# Patient Record
Sex: MALE | Race: AMERICAN INDIAN OR ALASKA NATIVE | NOT HISPANIC OR LATINO | Employment: OTHER | URBAN - METROPOLITAN AREA
[De-identification: names, ages, dates, MRNs, and addresses within clinical notes are randomized per-mention and may not be internally consistent; named-entity substitution may affect disease eponyms.]

---

## 2017-12-21 LAB
ANION GAP SERPL CALCULATED.3IONS-SCNC: 7 MMOL/L (ref 5–15)
BUN SERPL-MCNC: 15 MG/DL (ref 8–26)
CALCIUM SERPL-MCNC: 8.9 MG/DL (ref 8.5–10.1)
CHLORIDE SERPLBLD-SCNC: 107 MMOL/L (ref 98–107)
CO2 SERPL-SCNC: 28 MMOL/L (ref 21–32)
CREAT SERPL-MCNC: 1 MG/DL (ref 0.7–1.2)
GLUCOSE SERPL-MCNC: 43 MG/DL (ref 74–106)
HBA1C MFR BLD: 8 % (ref 0–5.7)
MAGNESIUM (EXTERNAL): 2.2 (ref 1.8–2.4)
POTASSIUM SERPL-SCNC: 3.5 MMOL/L (ref 3.5–5)
SODIUM SERPL-SCNC: 142 MMOL/L (ref 136–145)

## 2017-12-31 ENCOUNTER — TRANSFERRED RECORDS (OUTPATIENT)
Dept: HEALTH INFORMATION MANAGEMENT | Facility: CLINIC | Age: 72
End: 2017-12-31

## 2018-01-20 ENCOUNTER — APPOINTMENT (OUTPATIENT)
Dept: GENERAL RADIOLOGY | Facility: CLINIC | Age: 73
End: 2018-01-20
Attending: EMERGENCY MEDICINE
Payer: MEDICARE

## 2018-01-20 ENCOUNTER — APPOINTMENT (OUTPATIENT)
Dept: CT IMAGING | Facility: CLINIC | Age: 73
End: 2018-01-20
Attending: EMERGENCY MEDICINE
Payer: MEDICARE

## 2018-01-20 ENCOUNTER — HOSPITAL ENCOUNTER (EMERGENCY)
Facility: CLINIC | Age: 73
Discharge: ANOTHER HOSPITAL | End: 2018-01-21
Attending: EMERGENCY MEDICINE | Admitting: EMERGENCY MEDICINE
Payer: MEDICARE

## 2018-01-20 DIAGNOSIS — I63.9 CEREBROVASCULAR ACCIDENT (CVA), UNSPECIFIED MECHANISM (H): ICD-10-CM

## 2018-01-20 DIAGNOSIS — R79.89 TROPONIN LEVEL ELEVATED: ICD-10-CM

## 2018-01-20 DIAGNOSIS — R41.82 ALTERED MENTAL STATUS, UNSPECIFIED ALTERED MENTAL STATUS TYPE: ICD-10-CM

## 2018-01-20 LAB
ALBUMIN SERPL-MCNC: 3.7 G/DL (ref 3.4–5)
ALBUMIN UR-MCNC: 30 MG/DL
ALP SERPL-CCNC: 97 U/L (ref 40–150)
ALT SERPL W P-5'-P-CCNC: 21 U/L (ref 0–70)
ANION GAP SERPL CALCULATED.3IONS-SCNC: 2 MMOL/L (ref 3–14)
APPEARANCE UR: CLEAR
AST SERPL W P-5'-P-CCNC: 19 U/L (ref 0–45)
BASE EXCESS BLDV CALC-SCNC: 0.7 MMOL/L
BASOPHILS # BLD AUTO: 0.1 10E9/L (ref 0–0.2)
BASOPHILS NFR BLD AUTO: 1.2 %
BILIRUB SERPL-MCNC: 0.4 MG/DL (ref 0.2–1.3)
BILIRUB UR QL STRIP: NEGATIVE
BUN SERPL-MCNC: 22 MG/DL (ref 7–30)
CALCIUM SERPL-MCNC: 8.8 MG/DL (ref 8.5–10.1)
CHLORIDE SERPL-SCNC: 106 MMOL/L (ref 94–109)
CO2 SERPL-SCNC: 32 MMOL/L (ref 20–32)
COLOR UR AUTO: YELLOW
CREAT BLD-MCNC: 1 MG/DL (ref 0.66–1.25)
CREAT SERPL-MCNC: 1.02 MG/DL (ref 0.66–1.25)
DIFFERENTIAL METHOD BLD: NORMAL
EOSINOPHIL # BLD AUTO: 0.3 10E9/L (ref 0–0.7)
EOSINOPHIL NFR BLD AUTO: 3 %
ERYTHROCYTE [DISTWIDTH] IN BLOOD BY AUTOMATED COUNT: 15 % (ref 10–15)
ETHANOL SERPL-MCNC: <0.01 G/DL
FLUAV+FLUBV AG SPEC QL: NEGATIVE
FLUAV+FLUBV AG SPEC QL: NEGATIVE
GFR SERPL CREATININE-BSD FRML MDRD: 72 ML/MIN/1.7M2
GFR SERPL CREATININE-BSD FRML MDRD: 74 ML/MIN/1.7M2
GLUCOSE BLDC GLUCOMTR-MCNC: 179 MG/DL (ref 70–99)
GLUCOSE SERPL-MCNC: 190 MG/DL (ref 70–99)
GLUCOSE UR STRIP-MCNC: 150 MG/DL
HCO3 BLDV-SCNC: 26 MMOL/L (ref 21–28)
HCT VFR BLD AUTO: 44.8 % (ref 40–53)
HGB BLD-MCNC: 14.5 G/DL (ref 13.3–17.7)
HGB UR QL STRIP: NEGATIVE
IMM GRANULOCYTES # BLD: 0 10E9/L (ref 0–0.4)
IMM GRANULOCYTES NFR BLD: 0.2 %
KETONES UR STRIP-MCNC: NEGATIVE MG/DL
LACTATE BLD-SCNC: 1 MMOL/L (ref 0.7–2)
LEUKOCYTE ESTERASE UR QL STRIP: NEGATIVE
LYMPHOCYTES # BLD AUTO: 2.8 10E9/L (ref 0.8–5.3)
LYMPHOCYTES NFR BLD AUTO: 33.6 %
MCH RBC QN AUTO: 31.5 PG (ref 26.5–33)
MCHC RBC AUTO-ENTMCNC: 32.4 G/DL (ref 31.5–36.5)
MCV RBC AUTO: 97 FL (ref 78–100)
MONOCYTES # BLD AUTO: 0.9 10E9/L (ref 0–1.3)
MONOCYTES NFR BLD AUTO: 11.2 %
MUCOUS THREADS #/AREA URNS LPF: PRESENT /LPF
NEUTROPHILS # BLD AUTO: 4.2 10E9/L (ref 1.6–8.3)
NEUTROPHILS NFR BLD AUTO: 50.8 %
NITRATE UR QL: NEGATIVE
NRBC # BLD AUTO: 0 10*3/UL
NRBC BLD AUTO-RTO: 0 /100
O2/TOTAL GAS SETTING VFR VENT: NORMAL %
PCO2 BLDV: 45 MM HG (ref 40–50)
PH BLDV: 7.38 PH (ref 7.32–7.43)
PH UR STRIP: 5 PH (ref 5–7)
PLATELET # BLD AUTO: 275 10E9/L (ref 150–450)
PO2 BLDV: 39 MM HG (ref 25–47)
POTASSIUM SERPL-SCNC: 4 MMOL/L (ref 3.4–5.3)
PROT SERPL-MCNC: 7.9 G/DL (ref 6.8–8.8)
RBC # BLD AUTO: 4.61 10E12/L (ref 4.4–5.9)
RBC #/AREA URNS AUTO: 1 /HPF (ref 0–2)
SODIUM SERPL-SCNC: 140 MMOL/L (ref 133–144)
SOURCE: ABNORMAL
SP GR UR STRIP: 1.03 (ref 1–1.03)
SPECIMEN SOURCE: NORMAL
SQUAMOUS #/AREA URNS AUTO: <1 /HPF (ref 0–1)
TROPONIN I SERPL-MCNC: 1.24 UG/L (ref 0–0.04)
UROBILINOGEN UR STRIP-MCNC: 0 MG/DL (ref 0–2)
WBC # BLD AUTO: 8.3 10E9/L (ref 4–11)
WBC #/AREA URNS AUTO: 2 /HPF (ref 0–2)

## 2018-01-20 PROCEDURE — 93005 ELECTROCARDIOGRAM TRACING: CPT | Mod: 76

## 2018-01-20 PROCEDURE — 82565 ASSAY OF CREATININE: CPT

## 2018-01-20 PROCEDURE — 00000146 ZZHCL STATISTIC GLUCOSE BY METER IP

## 2018-01-20 PROCEDURE — 25000132 ZZH RX MED GY IP 250 OP 250 PS 637: Mod: GY | Performed by: EMERGENCY MEDICINE

## 2018-01-20 PROCEDURE — 83605 ASSAY OF LACTIC ACID: CPT | Performed by: EMERGENCY MEDICINE

## 2018-01-20 PROCEDURE — 96360 HYDRATION IV INFUSION INIT: CPT

## 2018-01-20 PROCEDURE — 85025 COMPLETE CBC W/AUTO DIFF WBC: CPT | Performed by: EMERGENCY MEDICINE

## 2018-01-20 PROCEDURE — 93005 ELECTROCARDIOGRAM TRACING: CPT

## 2018-01-20 PROCEDURE — A9270 NON-COVERED ITEM OR SERVICE: HCPCS | Mod: GY | Performed by: EMERGENCY MEDICINE

## 2018-01-20 PROCEDURE — 25000128 H RX IP 250 OP 636: Performed by: EMERGENCY MEDICINE

## 2018-01-20 PROCEDURE — 81001 URINALYSIS AUTO W/SCOPE: CPT | Mod: XU | Performed by: EMERGENCY MEDICINE

## 2018-01-20 PROCEDURE — 71046 X-RAY EXAM CHEST 2 VIEWS: CPT

## 2018-01-20 PROCEDURE — 87804 INFLUENZA ASSAY W/OPTIC: CPT | Performed by: EMERGENCY MEDICINE

## 2018-01-20 PROCEDURE — 82803 BLOOD GASES ANY COMBINATION: CPT | Performed by: EMERGENCY MEDICINE

## 2018-01-20 PROCEDURE — 87040 BLOOD CULTURE FOR BACTERIA: CPT | Mod: 91 | Performed by: EMERGENCY MEDICINE

## 2018-01-20 PROCEDURE — 80053 COMPREHEN METABOLIC PANEL: CPT | Performed by: EMERGENCY MEDICINE

## 2018-01-20 PROCEDURE — 80320 DRUG SCREEN QUANTALCOHOLS: CPT | Performed by: EMERGENCY MEDICINE

## 2018-01-20 PROCEDURE — 70450 CT HEAD/BRAIN W/O DYE: CPT

## 2018-01-20 PROCEDURE — 36415 COLL VENOUS BLD VENIPUNCTURE: CPT | Performed by: EMERGENCY MEDICINE

## 2018-01-20 PROCEDURE — 99285 EMERGENCY DEPT VISIT HI MDM: CPT | Mod: 25

## 2018-01-20 PROCEDURE — 84484 ASSAY OF TROPONIN QUANT: CPT | Performed by: EMERGENCY MEDICINE

## 2018-01-20 PROCEDURE — 96361 HYDRATE IV INFUSION ADD-ON: CPT

## 2018-01-20 RX ORDER — SODIUM CHLORIDE 9 MG/ML
1000 INJECTION, SOLUTION INTRAVENOUS CONTINUOUS
Status: DISCONTINUED | OUTPATIENT
Start: 2018-01-20 | End: 2018-01-21 | Stop reason: HOSPADM

## 2018-01-20 RX ORDER — NICOTINE 21 MG/24HR
1 PATCH, TRANSDERMAL 24 HOURS TRANSDERMAL DAILY
Status: DISCONTINUED | OUTPATIENT
Start: 2018-01-20 | End: 2018-01-21 | Stop reason: HOSPADM

## 2018-01-20 RX ORDER — METOPROLOL TARTRATE 50 MG
50 TABLET ORAL ONCE
Status: COMPLETED | OUTPATIENT
Start: 2018-01-20 | End: 2018-01-20

## 2018-01-20 RX ORDER — ASPIRIN 325 MG
325 TABLET ORAL ONCE
Status: COMPLETED | OUTPATIENT
Start: 2018-01-20 | End: 2018-01-20

## 2018-01-20 RX ADMIN — SODIUM CHLORIDE 1000 ML: 9 INJECTION, SOLUTION INTRAVENOUS at 21:10

## 2018-01-20 RX ADMIN — METOPROLOL TARTRATE 50 MG: 50 TABLET, FILM COATED ORAL at 23:49

## 2018-01-20 RX ADMIN — ASPIRIN 325 MG ORAL TABLET 325 MG: 325 PILL ORAL at 23:15

## 2018-01-20 RX ADMIN — NICOTINE 1 PATCH: 21 PATCH, EXTENDED RELEASE TRANSDERMAL at 23:30

## 2018-01-20 ASSESSMENT — ENCOUNTER SYMPTOMS
ABDOMINAL PAIN: 0
COUGH: 1
FACIAL ASYMMETRY: 0
FEVER: 0
VOMITING: 0
WEAKNESS: 0
NUMBNESS: 0
NAUSEA: 0
DIARRHEA: 0
CONFUSION: 1
HEADACHES: 0
SHORTNESS OF BREATH: 0

## 2018-01-21 ENCOUNTER — APPOINTMENT (OUTPATIENT)
Dept: CARDIOLOGY | Facility: CLINIC | Age: 73
DRG: 064 | End: 2018-01-21
Attending: INTERNAL MEDICINE
Payer: MEDICARE

## 2018-01-21 ENCOUNTER — APPOINTMENT (OUTPATIENT)
Dept: OCCUPATIONAL THERAPY | Facility: CLINIC | Age: 73
DRG: 064 | End: 2018-01-21
Attending: INTERNAL MEDICINE
Payer: MEDICARE

## 2018-01-21 ENCOUNTER — HOSPITAL ENCOUNTER (INPATIENT)
Facility: CLINIC | Age: 73
LOS: 2 days | Discharge: HOME | DRG: 064 | End: 2018-01-23
Attending: INTERNAL MEDICINE | Admitting: INTERNAL MEDICINE
Payer: MEDICARE

## 2018-01-21 ENCOUNTER — APPOINTMENT (OUTPATIENT)
Dept: MRI IMAGING | Facility: CLINIC | Age: 73
DRG: 064 | End: 2018-01-21
Attending: INTERNAL MEDICINE
Payer: MEDICARE

## 2018-01-21 ENCOUNTER — APPOINTMENT (OUTPATIENT)
Dept: SPEECH THERAPY | Facility: CLINIC | Age: 73
DRG: 064 | End: 2018-01-21
Attending: INTERNAL MEDICINE
Payer: MEDICARE

## 2018-01-21 ENCOUNTER — APPOINTMENT (OUTPATIENT)
Dept: PHYSICAL THERAPY | Facility: CLINIC | Age: 73
DRG: 064 | End: 2018-01-21
Attending: INTERNAL MEDICINE
Payer: MEDICARE

## 2018-01-21 VITALS
SYSTOLIC BLOOD PRESSURE: 140 MMHG | HEART RATE: 86 BPM | DIASTOLIC BLOOD PRESSURE: 94 MMHG | OXYGEN SATURATION: 97 % | TEMPERATURE: 98 F | WEIGHT: 195 LBS | RESPIRATION RATE: 18 BRPM

## 2018-01-21 DIAGNOSIS — I21.4 NSTEMI (NON-ST ELEVATED MYOCARDIAL INFARCTION) (H): ICD-10-CM

## 2018-01-21 DIAGNOSIS — Z72.0 TOBACCO ABUSE: ICD-10-CM

## 2018-01-21 DIAGNOSIS — I63.412 CEREBROVASCULAR ACCIDENT (CVA) DUE TO EMBOLISM OF LEFT MIDDLE CEREBRAL ARTERY (H): Primary | ICD-10-CM

## 2018-01-21 DIAGNOSIS — I48.0 PAROXYSMAL ATRIAL FIBRILLATION (H): ICD-10-CM

## 2018-01-21 PROBLEM — I63.9 STROKE (H): Status: ACTIVE | Noted: 2018-01-21

## 2018-01-21 LAB
CHOLEST SERPL-MCNC: 133 MG/DL
ERYTHROCYTE [DISTWIDTH] IN BLOOD BY AUTOMATED COUNT: 15 % (ref 10–15)
GLUCOSE BLDC GLUCOMTR-MCNC: 156 MG/DL (ref 70–99)
GLUCOSE BLDC GLUCOMTR-MCNC: 171 MG/DL (ref 70–99)
GLUCOSE BLDC GLUCOMTR-MCNC: 201 MG/DL (ref 70–99)
GLUCOSE BLDC GLUCOMTR-MCNC: 90 MG/DL (ref 70–99)
HBA1C MFR BLD: 8.4 % (ref 4.3–6)
HCT VFR BLD AUTO: 38 % (ref 40–53)
HDLC SERPL-MCNC: 60 MG/DL
HGB BLD-MCNC: 12.9 G/DL (ref 13.3–17.7)
INTERPRETATION ECG - MUSE: NORMAL
LDLC SERPL CALC-MCNC: 66 MG/DL
MAGNESIUM SERPL-MCNC: 2 MG/DL (ref 1.6–2.3)
MCH RBC QN AUTO: 31.9 PG (ref 26.5–33)
MCHC RBC AUTO-ENTMCNC: 33.9 G/DL (ref 31.5–36.5)
MCV RBC AUTO: 94 FL (ref 78–100)
NONHDLC SERPL-MCNC: 73 MG/DL
PLATELET # BLD AUTO: 233 10E9/L (ref 150–450)
RBC # BLD AUTO: 4.04 10E12/L (ref 4.4–5.9)
TRIGL SERPL-MCNC: 35 MG/DL
TROPONIN I SERPL-MCNC: 1.03 UG/L (ref 0–0.04)
TROPONIN I SERPL-MCNC: 1.23 UG/L (ref 0–0.04)
WBC # BLD AUTO: 6.6 10E9/L (ref 4–11)

## 2018-01-21 PROCEDURE — 93010 ELECTROCARDIOGRAM REPORT: CPT | Performed by: INTERNAL MEDICINE

## 2018-01-21 PROCEDURE — 84484 ASSAY OF TROPONIN QUANT: CPT | Performed by: INTERNAL MEDICINE

## 2018-01-21 PROCEDURE — 99222 1ST HOSP IP/OBS MODERATE 55: CPT | Mod: 25 | Performed by: INTERNAL MEDICINE

## 2018-01-21 PROCEDURE — 92526 ORAL FUNCTION THERAPY: CPT | Mod: GN | Performed by: SPEECH-LANGUAGE PATHOLOGIST

## 2018-01-21 PROCEDURE — 00000146 ZZHCL STATISTIC GLUCOSE BY METER IP

## 2018-01-21 PROCEDURE — A9270 NON-COVERED ITEM OR SERVICE: HCPCS | Mod: GY | Performed by: INTERNAL MEDICINE

## 2018-01-21 PROCEDURE — 36415 COLL VENOUS BLD VENIPUNCTURE: CPT | Performed by: INTERNAL MEDICINE

## 2018-01-21 PROCEDURE — 99223 1ST HOSP IP/OBS HIGH 75: CPT | Mod: AI | Performed by: INTERNAL MEDICINE

## 2018-01-21 PROCEDURE — 25000132 ZZH RX MED GY IP 250 OP 250 PS 637: Mod: GY

## 2018-01-21 PROCEDURE — 25000131 ZZH RX MED GY IP 250 OP 636 PS 637: Mod: GY | Performed by: INTERNAL MEDICINE

## 2018-01-21 PROCEDURE — A9270 NON-COVERED ITEM OR SERVICE: HCPCS | Mod: GY

## 2018-01-21 PROCEDURE — 93306 TTE W/DOPPLER COMPLETE: CPT

## 2018-01-21 PROCEDURE — 25000128 H RX IP 250 OP 636: Performed by: INTERNAL MEDICINE

## 2018-01-21 PROCEDURE — 70553 MRI BRAIN STEM W/O & W/DYE: CPT

## 2018-01-21 PROCEDURE — 83036 HEMOGLOBIN GLYCOSYLATED A1C: CPT | Performed by: INTERNAL MEDICINE

## 2018-01-21 PROCEDURE — 99223 1ST HOSP IP/OBS HIGH 75: CPT | Performed by: PSYCHIATRY & NEUROLOGY

## 2018-01-21 PROCEDURE — 21000001 ZZH R&B HEART CARE

## 2018-01-21 PROCEDURE — 83735 ASSAY OF MAGNESIUM: CPT | Performed by: INTERNAL MEDICINE

## 2018-01-21 PROCEDURE — A9585 GADOBUTROL INJECTION: HCPCS | Performed by: INTERNAL MEDICINE

## 2018-01-21 PROCEDURE — 25000132 ZZH RX MED GY IP 250 OP 250 PS 637: Mod: GY | Performed by: INTERNAL MEDICINE

## 2018-01-21 PROCEDURE — 25000125 ZZHC RX 250: Performed by: INTERNAL MEDICINE

## 2018-01-21 PROCEDURE — 84484 ASSAY OF TROPONIN QUANT: CPT | Mod: 91 | Performed by: EMERGENCY MEDICINE

## 2018-01-21 PROCEDURE — 93306 TTE W/DOPPLER COMPLETE: CPT | Mod: 26 | Performed by: INTERNAL MEDICINE

## 2018-01-21 PROCEDURE — 92610 EVALUATE SWALLOWING FUNCTION: CPT | Mod: GN | Performed by: SPEECH-LANGUAGE PATHOLOGIST

## 2018-01-21 PROCEDURE — 97116 GAIT TRAINING THERAPY: CPT | Mod: GP | Performed by: PHYSICAL THERAPIST

## 2018-01-21 PROCEDURE — 40000193 ZZH STATISTIC PT WARD VISIT: Performed by: PHYSICAL THERAPIST

## 2018-01-21 PROCEDURE — 93005 ELECTROCARDIOGRAM TRACING: CPT

## 2018-01-21 PROCEDURE — 40000133 ZZH STATISTIC OT WARD VISIT: Performed by: OCCUPATIONAL THERAPIST

## 2018-01-21 PROCEDURE — 80061 LIPID PANEL: CPT | Performed by: INTERNAL MEDICINE

## 2018-01-21 PROCEDURE — 40000225 ZZH STATISTIC SLP WARD VISIT: Performed by: SPEECH-LANGUAGE PATHOLOGIST

## 2018-01-21 PROCEDURE — 97161 PT EVAL LOW COMPLEX 20 MIN: CPT | Mod: GP | Performed by: PHYSICAL THERAPIST

## 2018-01-21 PROCEDURE — 97535 SELF CARE MNGMENT TRAINING: CPT | Mod: GO | Performed by: OCCUPATIONAL THERAPIST

## 2018-01-21 PROCEDURE — 99207 ZZC NO CHARGE VISIT/PATIENT NOT SEEN: CPT | Performed by: HOSPITALIST

## 2018-01-21 PROCEDURE — 97165 OT EVAL LOW COMPLEX 30 MIN: CPT | Mod: GO | Performed by: OCCUPATIONAL THERAPIST

## 2018-01-21 PROCEDURE — 85027 COMPLETE CBC AUTOMATED: CPT | Performed by: INTERNAL MEDICINE

## 2018-01-21 RX ORDER — NICOTINE 21 MG/24HR
1 PATCH, TRANSDERMAL 24 HOURS TRANSDERMAL DAILY
Status: DISCONTINUED | OUTPATIENT
Start: 2018-01-21 | End: 2018-01-23 | Stop reason: HOSPADM

## 2018-01-21 RX ORDER — PROCHLORPERAZINE MALEATE 5 MG
5 TABLET ORAL EVERY 6 HOURS PRN
Status: DISCONTINUED | OUTPATIENT
Start: 2018-01-21 | End: 2018-01-23 | Stop reason: HOSPADM

## 2018-01-21 RX ORDER — POTASSIUM CHLORIDE 1.5 G/1.58G
20-40 POWDER, FOR SOLUTION ORAL
Status: DISCONTINUED | OUTPATIENT
Start: 2018-01-21 | End: 2018-01-23 | Stop reason: HOSPADM

## 2018-01-21 RX ORDER — POTASSIUM CHLORIDE 29.8 MG/ML
20 INJECTION INTRAVENOUS
Status: DISCONTINUED | OUTPATIENT
Start: 2018-01-21 | End: 2018-01-23 | Stop reason: HOSPADM

## 2018-01-21 RX ORDER — BISACODYL 10 MG
10 SUPPOSITORY, RECTAL RECTAL DAILY PRN
Status: DISCONTINUED | OUTPATIENT
Start: 2018-01-21 | End: 2018-01-23 | Stop reason: HOSPADM

## 2018-01-21 RX ORDER — SODIUM CHLORIDE 9 MG/ML
INJECTION, SOLUTION INTRAVENOUS CONTINUOUS
Status: DISCONTINUED | OUTPATIENT
Start: 2018-01-21 | End: 2018-01-23

## 2018-01-21 RX ORDER — GLIPIZIDE 10 MG/1
10 TABLET ORAL
COMMUNITY
End: 2023-04-04

## 2018-01-21 RX ORDER — CLOPIDOGREL 300 MG/1
600 TABLET, FILM COATED ORAL ONCE
Status: COMPLETED | OUTPATIENT
Start: 2018-01-21 | End: 2018-01-21

## 2018-01-21 RX ORDER — ACETAMINOPHEN 650 MG/1
650 SUPPOSITORY RECTAL EVERY 4 HOURS PRN
Status: DISCONTINUED | OUTPATIENT
Start: 2018-01-21 | End: 2018-01-23 | Stop reason: HOSPADM

## 2018-01-21 RX ORDER — ASPIRIN 300 MG/1
300 SUPPOSITORY RECTAL DAILY
Status: DISCONTINUED | OUTPATIENT
Start: 2018-01-21 | End: 2018-01-23

## 2018-01-21 RX ORDER — PROCHLORPERAZINE 25 MG
12.5 SUPPOSITORY, RECTAL RECTAL EVERY 12 HOURS PRN
Status: DISCONTINUED | OUTPATIENT
Start: 2018-01-21 | End: 2018-01-23 | Stop reason: HOSPADM

## 2018-01-21 RX ORDER — LABETALOL HYDROCHLORIDE 5 MG/ML
10-40 INJECTION, SOLUTION INTRAVENOUS EVERY 10 MIN PRN
Status: DISCONTINUED | OUTPATIENT
Start: 2018-01-21 | End: 2018-01-22

## 2018-01-21 RX ORDER — GADOBUTROL 604.72 MG/ML
9 INJECTION INTRAVENOUS ONCE
Status: COMPLETED | OUTPATIENT
Start: 2018-01-21 | End: 2018-01-21

## 2018-01-21 RX ORDER — ONDANSETRON 2 MG/ML
4 INJECTION INTRAMUSCULAR; INTRAVENOUS EVERY 6 HOURS PRN
Status: DISCONTINUED | OUTPATIENT
Start: 2018-01-21 | End: 2018-01-23 | Stop reason: HOSPADM

## 2018-01-21 RX ORDER — IOPAMIDOL 755 MG/ML
140 INJECTION, SOLUTION INTRAVASCULAR ONCE
Status: COMPLETED | OUTPATIENT
Start: 2018-01-21 | End: 2018-01-21

## 2018-01-21 RX ORDER — DEXTROSE MONOHYDRATE 25 G/50ML
25-50 INJECTION, SOLUTION INTRAVENOUS
Status: DISCONTINUED | OUTPATIENT
Start: 2018-01-21 | End: 2018-01-23 | Stop reason: HOSPADM

## 2018-01-21 RX ORDER — CLOPIDOGREL BISULFATE 75 MG/1
75 TABLET ORAL DAILY
Status: DISCONTINUED | OUTPATIENT
Start: 2018-01-22 | End: 2018-01-23

## 2018-01-21 RX ORDER — POTASSIUM CHLORIDE 1500 MG/1
20-40 TABLET, EXTENDED RELEASE ORAL
Status: DISCONTINUED | OUTPATIENT
Start: 2018-01-21 | End: 2018-01-23 | Stop reason: HOSPADM

## 2018-01-21 RX ORDER — ONDANSETRON 4 MG/1
4 TABLET, ORALLY DISINTEGRATING ORAL EVERY 6 HOURS PRN
Status: DISCONTINUED | OUTPATIENT
Start: 2018-01-21 | End: 2018-01-23 | Stop reason: HOSPADM

## 2018-01-21 RX ORDER — POLYETHYLENE GLYCOL 3350 17 G/17G
17 POWDER, FOR SOLUTION ORAL DAILY PRN
Status: DISCONTINUED | OUTPATIENT
Start: 2018-01-21 | End: 2018-01-23 | Stop reason: HOSPADM

## 2018-01-21 RX ORDER — POTASSIUM CL/LIDO/0.9 % NACL 10MEQ/0.1L
10 INTRAVENOUS SOLUTION, PIGGYBACK (ML) INTRAVENOUS
Status: DISCONTINUED | OUTPATIENT
Start: 2018-01-21 | End: 2018-01-23 | Stop reason: HOSPADM

## 2018-01-21 RX ORDER — MAGNESIUM SULFATE HEPTAHYDRATE 40 MG/ML
4 INJECTION, SOLUTION INTRAVENOUS EVERY 4 HOURS PRN
Status: DISCONTINUED | OUTPATIENT
Start: 2018-01-21 | End: 2018-01-23 | Stop reason: HOSPADM

## 2018-01-21 RX ORDER — NICOTINE POLACRILEX 4 MG
15-30 LOZENGE BUCCAL
Status: DISCONTINUED | OUTPATIENT
Start: 2018-01-21 | End: 2018-01-23 | Stop reason: HOSPADM

## 2018-01-21 RX ORDER — AMOXICILLIN 250 MG
2 CAPSULE ORAL 2 TIMES DAILY PRN
Status: DISCONTINUED | OUTPATIENT
Start: 2018-01-21 | End: 2018-01-23 | Stop reason: HOSPADM

## 2018-01-21 RX ORDER — ACETAMINOPHEN 325 MG/1
650 TABLET ORAL EVERY 4 HOURS PRN
Status: DISCONTINUED | OUTPATIENT
Start: 2018-01-21 | End: 2018-01-23 | Stop reason: HOSPADM

## 2018-01-21 RX ORDER — AMOXICILLIN 250 MG
1 CAPSULE ORAL 2 TIMES DAILY PRN
Status: DISCONTINUED | OUTPATIENT
Start: 2018-01-21 | End: 2018-01-23 | Stop reason: HOSPADM

## 2018-01-21 RX ORDER — POTASSIUM CHLORIDE 7.45 MG/ML
10 INJECTION INTRAVENOUS
Status: DISCONTINUED | OUTPATIENT
Start: 2018-01-21 | End: 2018-01-23 | Stop reason: HOSPADM

## 2018-01-21 RX ORDER — NALOXONE HYDROCHLORIDE 0.4 MG/ML
.1-.4 INJECTION, SOLUTION INTRAMUSCULAR; INTRAVENOUS; SUBCUTANEOUS
Status: DISCONTINUED | OUTPATIENT
Start: 2018-01-21 | End: 2018-01-23 | Stop reason: HOSPADM

## 2018-01-21 RX ADMIN — IOPAMIDOL 140 ML: 755 INJECTION, SOLUTION INTRAVENOUS at 07:06

## 2018-01-21 RX ADMIN — INSULIN GLARGINE 20 UNITS: 100 INJECTION, SOLUTION SUBCUTANEOUS at 10:43

## 2018-01-21 RX ADMIN — CLOPIDOGREL BISULFATE 600 MG: 300 TABLET, FILM COATED ORAL at 14:36

## 2018-01-21 RX ADMIN — NICOTINE 1 PATCH: 21 PATCH, EXTENDED RELEASE TRANSDERMAL at 09:42

## 2018-01-21 RX ADMIN — INSULIN ASPART 1 UNITS: 100 INJECTION, SOLUTION INTRAVENOUS; SUBCUTANEOUS at 17:16

## 2018-01-21 RX ADMIN — SODIUM CHLORIDE: 9 INJECTION, SOLUTION INTRAVENOUS at 02:54

## 2018-01-21 RX ADMIN — SODIUM CHLORIDE: 9 INJECTION, SOLUTION INTRAVENOUS at 07:07

## 2018-01-21 RX ADMIN — GADOBUTROL 9 ML: 604.72 INJECTION INTRAVENOUS at 06:29

## 2018-01-21 RX ADMIN — ASPIRIN 325 MG: 325 TABLET, DELAYED RELEASE ORAL at 09:41

## 2018-01-21 RX ADMIN — INSULIN ASPART 1 UNITS: 100 INJECTION, SOLUTION INTRAVENOUS; SUBCUTANEOUS at 14:00

## 2018-01-21 ASSESSMENT — VISUAL ACUITY
OU: NORMAL ACUITY

## 2018-01-21 NOTE — PLAN OF CARE
Problem: Patient Care Overview  Goal: Plan of Care/Patient Progress Review  Outcome: No Change  A&O x 4, vss, a-febrile, denies pain/lightheadedness or vision problems, neuros are intact except slight facial droop, MRI done this morning see results, CTA of neck done also this morning, pictures were no clear per tech, will repeat CTA of neck tomorrow morning, will need 18 gauge iv access to left AC for CT. Up independently to the bathroom, speech saw, no swallowing issues, neurology/cardiology/OT and speech following.

## 2018-01-21 NOTE — ED NOTES
Pt family reports confusion, memory lapses and difficulty with words. Pt denies this and states he feels fine unsure of date initially but corrected himself.
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
## 2018-01-21 NOTE — PROGRESS NOTES
01/21/18 1223   Quick Adds   Type of Visit Initial Occupational Therapy Evaluation   Living Environment   Lives With significant other   Living Arrangements house   Home Accessibility bed and bath are not on the first floor;tub/shower is not walk in   Number of Stairs to Enter Home 0   Number of Stairs Within Home 14   Transportation Available car   Living Environment Comment pt lives in a 2 story home   Self-Care   Dominant Hand right   Usual Activity Tolerance good   Current Activity Tolerance moderate   Regular Exercise no   Equipment Currently Used at Home none   Activity/Exercise/Self-Care Comment was I with ADL's prior to admit   General Information   Onset of Illness/Injury or Date of Surgery - Date 01/21/18   Patient/Family Goals Statement to be discharged   Additional Occupational Profile Info/Pertinent History of Current Problem pt admitted with confusion, slurred speech and aphasia x 3 days. CT showed subacute infarct in L frontal lobe   Precautions/Limitations fall precautions   Heart Disease Risk Factors Smoking   General Observations pt in bed, agreeable to OT eval. family present   Cognitive Status Examination   Orientation orientation to person, place and time   Level of Consciousness alert   Able to Follow Commands WNL/WFL   Personal Safety (Cognitive) impulsive   Memory intact   Cognitive Comment some word finding difficulty noted   Visual Perception   Visual Perception No deficits were identified   Sensory Examination   Sensory Quick Adds No deficits were identified   Pain Assessment   Patient Currently in Pain No   Integumentary/Edema   Integumentary/Edema no deficits were identifed   Posture   Posture not impaired   Range of Motion (ROM)   ROM Quick Adds No deficits were identified   Strength   Manual Muscle Testing Quick Adds No deficits were identified   Hand Strength   Hand Strength Comments WFL   Muscle Tone Assessment   Muscle Tone Quick Adds No deficits were identified   Coordination  "  Upper Extremity Coordination No deficits were identified   Mobility   Bed Mobility Comments SBA   Transfer Skill: Bed to Chair/Chair to Bed   Level of [place]: Bed to Chair stand-by assist   Physical Assist/Nonphysical Assist: Bed to Chair supervision   Transfer Skill: Sit to Stand   Level of [place]: Sit/Stand stand-by assist   Physical Assist/Nonphysical Assist: Sit/Stand supervision   Transfer Skill: Toilet Transfer   Level of [place]: Toilet stand-by assist   Physical Assist/Nonphysical Assist: Toilet supervision   Lower Body Dressing   Level of [place]: Dress Lower Body stand-by assist   Toileting   Level of [place]: Toilet stand-by assist   Grooming   Level of [place]: Grooming stand-by assist   Physical Assist/Nonphysical Assist: Grooming supervision   Eating/Self Feeding   Level of [place]: Eating independent   Instrumental Activities of Daily Living (IADL)   Previous Responsibilities shopping;meal prep;medication management;finances;driving;work   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   Activities of Daily Living Analysis   Impairments Contributing to Impaired Activities of Daily Living balance impaired;cognition impaired  (impulsive)   General Therapy Interventions   Planned Therapy Interventions ADL retraining;IADL retraining;cognition;home program guidelines;progressive activity/exercise   Clinical Impression   Criteria for Skilled Therapeutic Interventions Met yes, treatment indicated   OT Diagnosis decreased I with ADL\"s and functional mobility   Influenced by the following impairments impulsive, decreased cogntion,    Assessment of Occupational Performance 1-3 Performance Deficits   Identified Performance Deficits social skills, home mgmt   Clinical Decision Making (Complexity) Low complexity   Therapy Frequency daily   Predicted Duration of Therapy Intervention (days/wks) 4 days   Anticipated Discharge Disposition Home with Outpatient Therapy   Risks " "and Benefits of Treatment have been explained. Yes   Patient, Family & other staff in agreement with plan of care Yes   Clinical Impression Comments pt may benefit from driving assessment prior to returning to work   [facility] TM \"6 Clicks\"   2016, Trustees of [facility], under license to [employer].  All rights reserved.   6 Clicks Short Forms Daily Activity Inpatient Short Form   [facility] AM-PAC  \"6 Clicks\" Daily Activity Inpatient Short Form   1. Putting on and taking off regular lower body clothing? 3 - A Little   2. Bathing (including washing, rinsing, drying)? 3 - A Little   3. Toileting, which includes using toilet, bedpan or urinal? 4 - None   4. Putting on and taking off regular upper body clothing? 4 - None   5. Taking care of personal grooming such as brushing teeth? 4 - None   6. Eating meals? 4 - None   Daily Activity Raw Score (Score out of 24.Lower scores equate to lower levels of function) 22   Total Evaluation Time   Total Evaluation Time (Minutes) 10     "

## 2018-01-21 NOTE — PLAN OF CARE
Problem: Patient Care Overview  Goal: Plan of Care/Patient Progress Review  Outcome: No Change  Patient arrived from [facility] around 0130. Alert and oriented x4. VSS on RA. Neuro's intact except slight slurred speech and facial droop. Speech to evaluate for diet. Rash to BLE. Scratches upper arms and back. Patient denies pain and shortness of breath. IVF infusing at 75 mL/hr. Tele: SR with PAC and BBB. Plan for neurology, cardiology, and speech consults. MRI done this shift. Will continue to monitor.     Understanding stroke booklet given to patient.

## 2018-01-21 NOTE — IP AVS SNAPSHOT
[facility]    [address] [place] [address]., [address]    [place] [address]    Phone:  [phone]                                       After Visit Summary   1/21/2018    Yanick Adorno    MRN: [id number]           After Visit Summary Signature Page     I have received my discharge instructions, and my questions have been answered. I have discussed any challenges I see with this plan with the nurse or doctor.    ..........................................................................................................................................  Patient/Patient Representative Signature      ..........................................................................................................................................  Patient Representative Print Name and Relationship to Patient    ..................................................               ................................................  Date                                            Time    ..........................................................................................................................................  Reviewed by Signature/Title    ...................................................              ..............................................  Date                                                            Time

## 2018-01-21 NOTE — PHARMACY-ADMISSION MEDICATION HISTORY
Admission medication history interview status for the 1/21/2018  admission is complete. See EPIC admission navigator for prior to admission medications     Medication history source reliability:Good    Actions taken by pharmacist (provider contacted, etc):interviewed patient     Additional medication history information not noted on PTA med list :None    Medication reconciliation/reorder completed by provider prior to medication history? No    Time spent in this activity: 10 minutes    Prior to Admission medications    Medication Sig Last Dose Taking? Auth Provider   insulin glargine (LANTUS) 100 UNIT/ML injection Inject 28 Units Subcutaneous every morning 1/20/2018 at am Yes Unknown, Entered By History   GLIPIZIDE PO Take 10 mg by mouth 2 times daily (before meals) 1/20/2018 at am Yes Unknown, Entered By History   METFORMIN HCL PO Take 500 mg by mouth 2 times daily (with meals)  1/20/2018 at am Yes Reported, Patient

## 2018-01-21 NOTE — PLAN OF CARE
Problem: Patient Care Overview  Goal: Plan of Care/Patient Progress Review      Discharge Planner SLP   Patient plan for discharge: Did not state  Current status: A bedside swallow evaluation was completed this am with mild oral-pharyngeal dysphagia found.  See report for details.  Recommend regular diet textures and thin liquids with strategies including: sit up at 90 degrees, small bites/sips, alternate textures, check right side for oral residue.  Plan to continue swallow Tx for 1-2 additional visits.  Decreased verbal initiation was noted during conversation, but answers were accurate to basic questions.  Will evaluate speech-language function as able.  Barriers to return to prior living situation: To be determined  Recommendations for discharge: To be determined; Follow up SLP services recommended, OP if no other needs present  Rationale for recommendations: Insure tolerance of a regular diet and train strategies; Insure return to baseline level of communication       Entered by: Candi Iyer 01/21/2018 9:48 AM

## 2018-01-21 NOTE — PLAN OF CARE
Problem: Patient Care Overview  Goal: Plan of Care/Patient Progress Review  PT: Orders received, evaluation completed, treatment initiated. Pt is 1/21 admit for L frontal CVA    Discharge Planner PT   Patient plan for discharge: Home with OPPT  Current status: Pt is IND with bed mobility and SBA for all transfers due to mild impulsivity. Ambulated 250' during session with SBA for normal gait, however CGA with DGI dual tasking. Pt demo heavy, midfoot strike gait pattern with impaired reciprocal arm movement, yet no overt LOB during session. Pt asc<>desc 6 stairs with no report of SOB, fatigue and VSS at end of session.  Barriers to return to prior living situation: 14 stairs in the home, decreased activity tolerance, and impaired dynamic balance  Recommendations for discharge: Home with OPPT  Rationale for recommendations: Pt would benefit from continued skilled therapy for improved balance with functional activity and to progress towards IND to PLOF.       Entered by: Briana Chatterjee 01/21/2018 5:27 PM

## 2018-01-21 NOTE — IP AVS SNAPSHOT
MRN:[id number]                      After Visit Summary   1/21/2018    Yancik Adorno    MRN: [id number]           Thank you!     Thank you for choosing [facility] for your care. Our goal is always to provide you with excellent care. Hearing back from our patients is one way we can continue to improve our services. Please take a few minutes to complete the written survey that you may receive in the mail after you visit with us. Thank you!        Patient Information     Date Of Birth          1945        Designated Caregiver       Most Recent Value    Caregiver    Will someone help with your care after discharge? no      About your hospital stay     You were admitted on:  January 21, 2018 You last received care in the:  [facility]    You were discharged on:  January 23, 2018        Reason for your hospital stay       Stroke                  Who to Call     For medical emergencies, please call 911.  For non-urgent questions about your medical care, please call your primary care provider or clinic, [phone]          Attending Provider     Provider Specialty    Mele Branham MD Internal Medicine       Primary Care Provider Office Phone # Fax #    Candi Leon [phone]       When to contact your care team       Call your primary doctor if you have any of the following:  increased shortness of breath or increased chest pain.                  After Care Instructions     Activity       Your activity upon discharge: activity as tolerated            Diet       Follow this diet upon discharge: Orders Placed This Encounter      Combination Diet 6749-0898 Calories: Moderate Consistent CHO (4-6 CHO units/meal)            Discharge Instructions       Gradually resume your diabetes medications                  Follow-up Appointments     Follow-up and recommended labs and tests        Follow up with primary care provider, Candi Leon,  within 7 days to evaluate medication change and for hospital follow- up.  The following labs/tests are recommended:   - needs outpatient CT chest abdomen and pelvis to evaluate the mediastinal lymph nodes.    Follow up with neurology, stroke clinic in 1 month.      A follow-up appointment has been made for you with Candi Leon on Thursday, February 1st at 10:00 AM at [facility].  Please call the clinic at [phone] should you need to change or cancel your appointment.      A follow-up appointment has been made for you with Dr. Case Gonzalez on Wednesday, February 28th at 10:45 AM at [facility] [place] [facility].  Please call the clinic at [phone] should you need to change or cancel your appointment. The clinic will be sending you paperwork that you need to fill out and send back to the clinic prior to the appointment.     [facility] [address], [address]  [place] [address]                  Your next 10 appointments already scheduled     Feb 09, 2018  1:30 PM CST   Return Discharge with ABENA Goff CNP   [facility] ([facility])    [address] [place] [address]   [phone]            Mar 19, 2018  8:45 AM CDT   Return Visit with Edin Choi MD   [facility] ([facility])    [address] [id number]  [facility] [address]   [phone]              Additional Services     Follow-Up with Cardiac Advanced Practice Provider           Occupational Therapy Referral       *This therapy referral will be filtered to a centralized scheduling office at [facility] and the patient will receive a call to schedule an appointment at a [place] location most convenient for them. *     [facility] provides Occupational Therapy evaluation and treatment and many specialty  "services across the [place] system.  If requesting a specialty program, please choose from the list below.    If you have not heard from the scheduling office within 2 business days, please call [phone] for all locations, with the exception of [place], please call [phone].     Treatment: Evaluation & Treatment  Special Instructions/Modalities: follow up cognitive function testing  Special Programs: Cognition Assessment post stroke    Please be aware that coverage of these services is subject to the terms and limitations of your health insurance plan.  Call member services at your health plan with any benefit or coverage questions.      **Note to Provider:  If you are referring outside of [facility] for the therapy appointment, please list the name of the location in the \"special instructions\" above, print the referral and give to the patient to schedule the appointment.            Physical Therapy Referral       *This therapy referral will be filtered to a centralized scheduling office at [facility] and the patient will receive a call to schedule an appointment at a [place] location most convenient for them. *     [facility] provides Physical Therapy evaluation and treatment and many specialty services across the [place] system.  If requesting a specialty program, please choose from the list below.    If you have not heard from the scheduling office within 2 business days, please call [phone] for all locations, with the exception of [place], please call [phone].  Treatment: Evaluation & Treatment  Special Instructions/Modalities: none  Special Programs: post stroke rehabilitation    Please be aware that coverage of these services is subject to the terms and limitations of your health insurance plan.  Call member services at your health plan with any benefit or coverage questions.      **Note to Provider:  If you are referring outside of [place] for the " "therapy appointment, please list the name of the location in the \"special instructions\" above, print the referral and give to the patient to schedule the appointment.            Speech Therapy Referral       *This therapy referral will be filtered to a centralized scheduling office at [facility] and the patient will receive a call to schedule an appointment at a [place] location most convenient for them. *     [facility] provides Speech Therapy evaluation and treatment and many specialty services across the [place] system.  If requesting a specialty program, please choose from the list below.  If you have not heard from the scheduling office within 2 business days, please call [phone] for all locations, with the exception of [place], please call [phone].       Treatment: Evaluation & Treatment  Speech Treatment Diagnosis: Aphasia    Dysarthria  Language Deficits  Special Instructions: none  Special Programs:     Please be aware that coverage of these services is subject to the terms and limitations of your health insurance plan.  Call member services at your health plan with any benefit or coverage questions.      **Note to Provider:  If you are referring outside of [place] for the therapy appointment, please list the name of the location in the \"special instructions\" above, print the referral and give to the patient to schedule the appointment.            Follow-Up with Cardiologist                 Further instructions from your care team       A follow-up appointment has been made for you with Candi Leon on Thursday, February 1st at 10:00 AM at [facility].  Please call the clinic at [phone] should you need to change or cancel your appointment.       A follow-up appointment has been made for you with Dr. Case Gonzalez on Wednesday, February 28th at 10:45 AM at [facility] [place] [facility].  Please call the clinic at " "[phone] should you need to change or cancel your appointment. The clinic will be sending you paperwork that you need to fill out and send back to the clinic prior to the appointment.     [facility] [address], [address]   [place] [address]     Pending Results     Date and Time Order Name Status Description    2018 0844 EKG 12-lead, tracing only Preliminary     2018 0235 EKG 12-lead, tracing only Preliminary     2018 Blood culture Preliminary     2018 1940 Blood culture Preliminary             Statement of Approval     Ordered          18 1544  I have reviewed and agree with all the recommendations and orders detailed in this document.  EFFECTIVE NOW     Approved and electronically signed by:  Poornima Khoury MD             Admission Information     Date & Time Provider Department Dept. Phone    2018 Mele Branham MD [facility] [phone]      Your Vitals Were     Blood Pressure Pulse Temperature Respirations Height Weight    135/84 (BP Location: Right arm) 84 97.4  F (36.3  C) (Oral) 20 1.88 m (6' 2\") 86.8 kg (191 lb 6.4 oz)    Pulse Oximetry BMI (Body Mass Index)                96% 24.57 kg/m2          [employer] Information     [employer] lets you send messages to your doctor, view your test results, renew your prescriptions, schedule appointments and more. To sign up, go to [email or web address] . Click on \"Log in\" on the left side of the screen, which will take you to the Welcome page. Then click on \"Sign up Now\" on the right side of the page.     You will be asked to enter the access code listed below, as well as some personal information. Please follow the directions to create your username and password.     Your access code is: [email or web address]  Expires: 2018  8:10 PM     Your access code will  in 90 days. If you need help or a new code, please call your [place] clinic or [phone].      "   Care EveryWhere ID     This is your Care EveryWhere ID. This could be used by other organizations to access your [facility] medical records  [id number]        Equal Access to Services     ALDO MARSAHLL : Meek Bowling, gordy ram, ekaterinanik lawsbriananival perdomomark anthony, waxyeyo sailajain hayaayesenia perdomohenrique abarca rosie bautista. So [facility] [phone].    ATENCIÓN: Si habla español, tiene a moeller disposición servicios gratuitos de asistencia lingüística. Llame al [phone].    We comply with applicable federal civil rights laws and [place] laws. We do not discriminate on the basis of race, color, national origin, age, disability, sex, sexual orientation, or gender identity.               Review of your medicines      START taking        Dose / Directions    apixaban ANTICOAGULANT 5 MG tablet   Commonly known as:  ELIQUIS   Used for:  Paroxysmal atrial fibrillation (H)        Dose:  5 mg   Take 1 tablet (5 mg) by mouth 2 times daily   Quantity:  60 tablet   Refills:  0       aspirin 81 MG chewable tablet   Used for:  Paroxysmal atrial fibrillation (H), NSTEMI (non-ST elevated myocardial infarction) (H)        Dose:  81 mg   Start taking on:  1/24/2018   Take 1 tablet (81 mg) by mouth daily   Quantity:  30 tablet   Refills:  0       atorvastatin 40 MG tablet   Commonly known as:  LIPITOR        Dose:  40 mg   Take 1 tablet (40 mg) by mouth daily   Quantity:  10 tablet   Refills:  0       metoprolol succinate 25 MG 24 hr tablet   Commonly known as:  TOPROL-XL   Used for:  Paroxysmal atrial fibrillation (H), NSTEMI (non-ST elevated myocardial infarction) (H)        Dose:  25 mg   Take 1 tablet (25 mg) by mouth daily   Quantity:  10 tablet   Refills:  0       nicotine 21 MG/24HR 24 hr patch   Commonly known as:  NICODERM CQ   Used for:  Tobacco abuse        Dose:  1 patch   Start taking on:  1/24/2018   Place 1 patch onto the skin daily   Quantity:  10 patch   Refills:  0         CONTINUE these medicines which have NOT CHANGED         Dose / Directions    GLIPIZIDE PO        Dose:  10 mg   Take 10 mg by mouth 2 times daily (before meals)   Refills:  0       insulin glargine 100 UNIT/ML injection   Commonly known as:  LANTUS        Dose:  28 Units   Inject 28 Units Subcutaneous every morning   Refills:  0       METFORMIN HCL PO        Dose:  500 mg   Take 500 mg by mouth 2 times daily (with meals)   Refills:  0            Where to get your medicines      These medications were sent to [facility] [place], [place] - [address]  [address] [facility], [place] [address]     Phone:  [phone]     apixaban ANTICOAGULANT 5 MG tablet    aspirin 81 MG chewable tablet    atorvastatin 40 MG tablet    metoprolol succinate 25 MG 24 hr tablet    nicotine 21 MG/24HR 24 hr patch                Protect others around you: Learn how to safely use, store and throw away your medicines at www.disposemymeds.org.             Medication List: This is a list of all your medications and when to take them. Check marks below indicate your daily home schedule. Keep this list as a reference.      Medications           Morning Afternoon Evening Bedtime As Needed    apixaban ANTICOAGULANT 5 MG tablet   Commonly known as:  ELIQUIS   Take 1 tablet (5 mg) by mouth 2 times daily                                      aspirin 81 MG chewable tablet   Take 1 tablet (81 mg) by mouth daily   Start taking on:  1/24/2018                                   atorvastatin 40 MG tablet   Commonly known as:  LIPITOR   Take 1 tablet (40 mg) by mouth daily   Last time this was given:  40 mg on 1/22/2018  9:59 PM                                   GLIPIZIDE PO   Take 10 mg by mouth 2 times daily (before meals)                                      insulin glargine 100 UNIT/ML injection   Commonly known as:  LANTUS   Inject 28 Units Subcutaneous every morning   Last time this was given:  20 Units on 1/23/2018  2:17 PM                                   METFORMIN HCL PO   Take  500 mg by mouth 2 times daily (with meals)                                      metoprolol succinate 25 MG 24 hr tablet   Commonly known as:  TOPROL-XL   Take 1 tablet (25 mg) by mouth daily   Last time this was given:  25 mg on 1/23/2018  5:23 PM                                   nicotine 21 MG/24HR 24 hr patch   Commonly known as:  NICODERM CQ   Place 1 patch onto the skin daily   Start taking on:  1/24/2018   Last time this was given:  1 patch on 1/23/2018 10:00 AM                                             More Information        Atorvastatin Calcium Oral tablet  What is this medicine?  ATORVASTATIN (a TORE va sta tin) is known as a HMG-CoA reductase inhibitor or 'statin'. It lowers the level of cholesterol and triglycerides in the blood. This drug may also reduce the risk of heart attack, stroke, or other health problems in patients with risk factors for heart disease. Diet and lifestyle changes are often used with this drug.  This medicine may be used for other purposes; ask your health care provider or pharmacist if you have questions.  What should I tell my health care provider before I take this medicine?  They need to know if you have any of these conditions:    frequently drink alcoholic beverages    history of stroke, TIA    kidney disease    liver disease    muscle aches or weakness    other medical condition    an unusual or allergic reaction to atorvastatin, other medicines, foods, dyes, or preservatives    pregnant or trying to get pregnant    breast-feeding  How should I use this medicine?  Take this medicine by mouth with a glass of water. Follow the directions on the prescription label. You can take this medicine with or without food. Take your doses at regular intervals. Do not take your medicine more often than directed.  Talk to your pediatrician regarding the use of this medicine in children. While this drug may be prescribed for children as young as 10 years old for selected conditions,  precautions do apply.  Overdosage: If you think you have taken too much of this medicine contact a poison control center or emergency room at once.  NOTE: This medicine is only for you. Do not share this medicine with others.  What if I miss a dose?  If you miss a dose, take it as soon as you can. If it is almost time for your next dose, take only that dose. Do not take double or extra doses.  What may interact with this medicine?  Do not take this medicine with any of the following medications:    red yeast rice    telaprevir    telithromycin    voriconazole  This medicine may also interact with the following medications:    alcohol    antiviral medicines for HIV or AIDS    boceprevir    certain antibiotics like clarithromycin, erythromycin, troleandomycin    certain medicines for cholesterol like fenofibrate or gemfibrozil    cimetidine    clarithromycin    colchicine    cyclosporine    digoxin    female hormones, like estrogens or progestins and birth control pills    grapefruit juice    medicines for fungal infections like fluconazole, itraconazole, ketoconazole    niacin    rifampin    spironolactone  This list may not describe all possible interactions. Give your health care provider a list of all the medicines, herbs, non-prescription drugs, or dietary supplements you use. Also tell them if you smoke, drink alcohol, or use illegal drugs. Some items may interact with your medicine.  What should I watch for while using this medicine?  Visit your doctor or health care professional for regular check-ups. You may need regular tests to make sure your liver is working properly.  Tell your doctor or health care professional right away if you get any unexplained muscle pain, tenderness, or weakness, especially if you also have a fever and tiredness. Your doctor or health care professional may tell you to stop taking this medicine if you develop muscle problems. If your muscle problems do not go away after stopping  this medicine, contact your health care professional.  This drug is only part of a total heart-health program. Your doctor or a dietician can suggest a low-cholesterol and low-fat diet to help. Avoid alcohol and smoking, and keep a proper exercise schedule.  Do not use this drug if you are pregnant or breast-feeding. Serious side effects to an unborn child or to an infant are possible. Talk to your doctor or pharmacist for more information.  This medicine may affect blood sugar levels. If you have diabetes, check with your doctor or health care professional before you change your diet or the dose of your diabetic medicine.  If you are going to have surgery tell your health care professional that you are taking this drug.  What side effects may I notice from receiving this medicine?  Side effects that you should report to your doctor or health care professional as soon as possible:    allergic reactions like skin rash, itching or hives, swelling of the face, lips, or tongue    dark urine    fever    joint pain    muscle cramps, pain    redness, blistering, peeling or loosening of the skin, including inside the mouth    trouble passing urine or change in the amount of urine    unusually weak or tired    yellowing of eyes or skin  Side effects that usually do not require medical attention (report to your doctor or health care professional if they continue or are bothersome):    constipation    heartburn    stomach gas, pain, upset  This list may not describe all possible side effects. Call your doctor for medical advice about side effects. You may report side effects to FDA at 5-129-FDA-1439.  Where should I keep my medicine?  Keep out of the reach of children.  Store at room temperature between 20 to 25 degrees C (68 to 77 degrees F). Throw away any unused medicine after the expiration date.  NOTE:This sheet is a summary. It may not cover all possible information. If you have questions about this medicine, talk to  your doctor, pharmacist, or health care provider. Copyright  2016 Gold Standard                Apixaban Oral tablet  What is this medicine?  APIXABAN (a PIX a ban) is an anticoagulant (blood thinner). It is used to lower the chance of stroke in people with a medical condition called atrial fibrillation. It is also used to treat or prevent blood clots in the lungs or in the veins.  This medicine may be used for other purposes; ask your health care provider or pharmacist if you have questions.  What should I tell my health care provider before I take this medicine?  They need to know if you have any of these conditions:    bleeding disorders    bleeding in the brain    blood in your stools (black or tarry stools) or if you have blood in your vomit    history of stomach bleeding    kidney disease    liver disease    mechanical heart valve    an unusual or allergic reaction to apixaban, other medicines, foods, dyes, or preservatives    pregnant or trying to get pregnant    breast-feeding  How should I use this medicine?  Take this medicine by mouth with a glass of water. Follow the directions on the prescription label. You can take it with or without food. If it upsets your stomach, take it with food. Take your medicine at regular intervals. Do not take it more often than directed. Do not stop taking except on your doctor's advice. Stopping this medicine may increase your risk of a blot clot. Be sure to refill your prescription before you run out of medicine.  Talk to your pediatrician regarding the use of this medicine in children. Special care may be needed.  Overdosage: If you think you have taken too much of this medicine contact a poison control center or emergency room at once.  NOTE: This medicine is only for you. Do not share this medicine with others.  What if I miss a dose?  If you miss a dose, take it as soon as you can. If it is almost time for your next dose, take only that dose. Do not take double or extra  doses.  What may interact with this medicine?  This medicine may interact with the following:    aspirin and aspirin-like medicines    certain medicines for fungal infections like ketoconazole and itraconazole    certain medicines for seizures like carbamazepine and phenytoin    certain medicines that treat or prevent blood clots like warfarin, enoxaparin, and dalteparin    clarithromycin    NSAIDs, medicines for pain and inflammation, like ibuprofen or naproxen    rifampin    ritonavir    Tucumcari's wort  This list may not describe all possible interactions. Give your health care provider a list of all the medicines, herbs, non-prescription drugs, or dietary supplements you use. Also tell them if you smoke, drink alcohol, or use illegal drugs. Some items may interact with your medicine.  What should I watch for while using this medicine?  Notify your doctor or health care professional and seek emergency treatment if you develop breathing problems; changes in vision; chest pain; severe, sudden headache; pain, swelling, warmth in the leg; trouble speaking; sudden numbness or weakness of the face, arm, or leg. These can be signs that your condition has gotten worse.  If you are going to have surgery, tell your doctor or health care professional that you are taking this medicine.  Tell your health care professional that you use this medicine before you have a spinal or epidural procedure. Sometimes people who take this medicine have bleeding problems around the spine when they have a spinal or epidural procedure. This bleeding is very rare. If you have a spinal or epidural procedure while on this medicine, call your health care professional immediately if you have back pain, numbness or tingling (especially in your legs and feet), muscle weakness, paralysis, or loss of bladder or bowel control.  Avoid sports and activities that might cause injury while you are using this medicine. Severe falls or injuries can cause  unseen bleeding. Be careful when using sharp tools or knives. Consider using an electric razor. Take special care brushing or flossing your teeth. Report any injuries, bruising, or red spots on the skin to your doctor or health care professional.  What side effects may I notice from receiving this medicine?  Side effects that you should report to your doctor or health care professional as soon as possible:    allergic reactions like skin rash, itching or hives, swelling of the face, lips, or tongue    signs and symptoms of bleeding such as bloody or black, tarry stools; red or dark-brown urine; spitting up blood or brown material that looks like coffee grounds; red spots on the skin; unusual bruising or bleeding from the eye, gums, or nose  This list may not describe all possible side effects. Call your doctor for medical advice about side effects. You may report side effects to FDA at 4-632-FDA-8100.  Where should I keep my medicine?  Keep out of the reach of children.  Store at room temperature between 20 and 25 degrees C (68 and 77 degrees F). Throw away any unused medicine after the expiration date.  NOTE: This sheet is a summary. It may not cover all possible information. If you have questions about this medicine, talk to your doctor, pharmacist, or health care provider.  NOTE:This sheet is a summary. It may not cover all possible information. If you have questions about this medicine, talk to your doctor, pharmacist, or health care provider. Copyright  2016 [employer]                Patient Education    Nicotine Inhalation vapour, liquid    Nicotine Nasal spray, solution    Nicotine Polacrilex Chewing-gum, medicated    Nicotine Polacrilex Oral lozenge    Nicotine Transdermal patch - 16 hour    Nicotine Transdermal patch - 24 hour  Nicotine Transdermal patch - 24 hour  What is this medicine?  NICOTINE (JULIAN [place] teen) helps people stop smoking. The patches replace the nicotine found in cigarettes and help to  decrease withdrawal effects. They are most effective when used in combination with a stop-smoking program.  This medicine may be used for other purposes; ask your health care provider or pharmacist if you have questions.  What should I tell my health care provider before I take this medicine?  They need to know if you have any of these conditions:    diabetes    heart disease, angina, irregular heartbeat or previous heart attack    high blood pressure    lung disease, including asthma    overactive thyroid    pheochromocytoma    seizures or a history of seizures    skin problems, like eczema    stomach problems or ulcers    an unusual or allergic reaction to nicotine, adhesives, other medicines, foods, dyes, or preservatives    pregnant or trying to get pregnant    breast-feeding  How should I use this medicine?  This medicine is for use on the skin. Follow the directions that come with the patches. Find an area of skin on your upper arm, chest, or back that is clean, dry, greaseless, undamaged and hairless. Wash hands with plain soap and water. Do not use anything that contains aloe, lanolin or glycerin as these may prevent the patch from sticking. Dry thoroughly. Remove the patch from the sealed pouch. Do not try to cut or trim the patch. Using your palm, press the patch firmly in place for 10 seconds to make sure that there is good contact with your skin. After applying the patch, wash your hands. Change the patch every day, keeping to a regular schedule. When you apply a new patch, use a new area of skin. Wait at least 1 week before using the same area again.  Talk to your pediatrician regarding the use of this medicine in children. Special care may be needed.  Overdosage: If you think you have taken too much of this medicine contact a poison control center or emergency room at once.  NOTE: This medicine is only for you. Do not share this medicine with others.  What if I miss a dose?  If you forget to replace a  patch, use it as soon as you can. Only use one patch at a time and do not leave on the skin for longer than directed. If a patch falls off, you can replace it, but keep to your schedule and remove the patch at the right time.  What may interact with this medicine?    medicines for asthma    medicines for blood pressure    medicines for mental depression  This list may not describe all possible interactions. Give your health care provider a list of all the medicines, herbs, non-prescription drugs, or dietary supplements you use. Also tell them if you smoke, drink alcohol, or use illegal drugs. Some items may interact with your medicine.  What should I watch for while using this medicine?  You should begin using the nicotine patch the day you stop smoking. It is okay if you do not succeed at your attempt to quit and have a cigarette. You can still continue your quit attempt and keep using the product as directed. Just throw away your cigarettes and get back to your quit plan.  You can keep the patch in place during swimming, bathing, and showering. If your patch falls off during these activities, replace it.  When you first apply the patch, your skin may itch or burn. This should go away soon. When you remove a patch, the skin may look red, but this should only last for a few days. Call your doctor or health care professional if skin redness does not go away after 4 days, if your skin swells, or if you get a rash.  If you are a diabetic and you quit smoking, the effects of insulin may be increased and you may need to reduce your insulin dose. Check with your doctor or health care professional about how you should adjust your insulin dose.  If you are going to have a magnetic resonance imaging (MRI) procedure, tell your MRI technician if you have this patch on your body. It must be removed before a MRI.  What side effects may I notice from receiving this medicine?  Side effects that you should report to your doctor or  health care professional as soon as possible:    allergic reactions like skin rash, itching or hives, swelling of the face, lips, or tongue    breathing problems    changes in hearing    changes in vision    chest pain    cold sweats    confusion    fast, irregular heartbeat    feeling faint or lightheaded, falls    headache    increased saliva    skin redness that lasts more than 4 days    stomach pain    signs and symptoms of nicotine overdose like nausea; vomiting; dizziness; weakness; and rapid heartbeat  Side effects that usually do not require medical attention (report to your doctor or health care professional if they continue or are bothersome):    diarrhea    dry mouth    hiccups    irritability    nervousness or restlessness    trouble sleeping or vivid dreams  This list may not describe all possible side effects. Call your doctor for medical advice about side effects. You may report side effects to FDA at 5-730-LSH-0590.  Where should I keep my medicine?  Keep out of the reach of children.  Store at room temperature between 20 and 25 degrees C (68 and 77 degrees F). Protect from heat and light. Store in manufacturers packaging until ready to use. Throw away unused medicine after the expiration date. When you remove a patch, fold with sticky sides together; put in an empty opened pouch and throw away.  NOTE:This sheet is a summary. It may not cover all possible information. If you have questions about this medicine, talk to your doctor, pharmacist, or health care provider. Copyright  2016 [employer]                Patient Education    Aspirin Chewable tablet    Aspirin Chewing-gum, medicated    Aspirin Gastro-resistant tablet    Aspirin Oral tablet    Aspirin Oral tablet, extended-release    Aspirin Rectal suppository  Aspirin Oral tablet  What is this medicine?  ASPIRIN (AS pir in) is a pain reliever. It is used to treat mild pain and fever. This medicine is also used as directed by a doctor to prevent  and to treat heart attacks, to prevent strokes, and to treat arthritis or inflammation.  This medicine may be used for other purposes; ask your health care provider or pharmacist if you have questions.  What should I tell my health care provider before I take this medicine?  They need to know if you have any of these conditions:    anemia    asthma    bleeding problems    child with chickenpox, the flu, or other viral infection    diabetes    gout    if you frequently drink alcohol containing drinks    kidney disease    liver disease    low level of vitamin K    lupus    smoke tobacco    stomach ulcers or other problems    an unusual or allergic reaction to aspirin, tartrazine dye, other medicines, dyes, or preservatives    pregnant or trying to get pregnant    breast-feeding  How should I use this medicine?  Take this medicine by mouth with a glass of water. Follow the directions on the package or prescription label. You can take this medicine with or without food. If it upsets your stomach, take it with food. Do not take your medicine more often than directed.  Talk to your pediatrician regarding the use of this medicine in children. While this drug may be prescribed for children as young as 12 years of age for selected conditions, precautions do apply. Children and teenagers should not use this medicine to treat chicken pox or flu symptoms unless directed by a doctor.  Patients over 65 years old may have a stronger reaction and need a smaller dose.  Overdosage: If you think you have taken too much of this medicine contact a poison control center or emergency room at once.  NOTE: This medicine is only for you. Do not share this medicine with others.  What if I miss a dose?  If you are taking this medicine on a regular schedule and miss a dose, take it as soon as you can. If it is almost time for your next dose, take only that dose. Do not take double or extra doses.  What may interact with this medicine?  Do not  take this medicine with any of the following medications:    cidofovir    ketorolac    probenecid  This medicine may also interact with the following medications:    alcohol    alendronate    bismuth subsalicylate    flavocoxid    herbal supplements like feverfew, garlic, kirsten, ginkgo biloba, horse chestnut    medicines for diabetes or glaucoma like acetazolamide, methazolamide    medicines for gout    medicines that treat or prevent blood clots like enoxaparin, heparin, ticlopidine, warfarin    other aspirin and aspirin-like medicines    NSAIDs, medicines for pain and inflammation, like ibuprofen or naproxen    pemetrexed    sulfinpyrazone    varicella live vaccine  This list may not describe all possible interactions. Give your health care provider a list of all the medicines, herbs, non-prescription drugs, or dietary supplements you use. Also tell them if you smoke, drink alcohol, or use illegal drugs. Some items may interact with your medicine.  What should I watch for while using this medicine?  If you are treating yourself for pain, tell your doctor or health care professional if the pain lasts more than 10 days, if it gets worse, or if there is a new or different kind of pain. Tell your doctor if you see redness or swelling. Also, check with your doctor if you have a fever that lasts for more than 3 days. Only take this medicine to prevent heart attacks or blood clotting if prescribed by your doctor or health care professional.  Do not take aspirin or aspirin-like medicines with this medicine. Too much aspirin can be dangerous. Always read the labels carefully.  This medicine can irritate your stomach or cause bleeding problems. Do not smoke cigarettes or drink alcohol while taking this medicine. Do not lie down for 30 minutes after taking this medicine to prevent irritation to your throat.  If you are scheduled for any medical or dental procedure, tell your healthcare provider that you are taking this  medicine. You may need to stop taking this medicine before the procedure.  This medicine may be used to treat migraines. If you take migraine medicines for 10 or more days a month, your migraines may get worse. Keep a diary of headache days and medicine use. Contact your healthcare professional if your migraine attacks occur more frequently.  What side effects may I notice from receiving this medicine?  Side effects that you should report to your doctor or health care professional as soon as possible:    allergic reactions like skin rash, itching or hives, swelling of the face, lips, or tongue    breathing problems    changes in hearing, ringing in the ears    confusion    general ill feeling or flu-like symptoms    pain on swallowing    redness, blistering, peeling or loosening of the skin, including inside the mouth or nose    signs and symptoms of bleeding such as bloody or black, tarry stools; red or dark-brown urine; spitting up blood or brown material that looks like coffee grounds; red spots on the skin; unusual bruising or bleeding from the eye, gums, or nose    trouble passing urine or change in the amount of urine    unusually weak or tired    yellowing of the eyes or skin  Side effects that usually do not require medical attention (report to your doctor or health care professional if they continue or are bothersome):    diarrhea or constipation    headache    nausea, vomiting    stomach gas, heartburn  This list may not describe all possible side effects. Call your doctor for medical advice about side effects. You may report side effects to FDA at 3-975-FDA-8399.  Where should I keep my medicine?  Keep out of the reach of children.  Store at room temperature between 15 and 30 degrees C (59 and 86 degrees F). Protect from heat and moisture. Do not use this medicine if it has a strong vinegar smell. Throw away any unused medicine after the expiration date.  NOTE:This sheet is a summary. It may not cover all  possible information. If you have questions about this medicine, talk to your doctor, pharmacist, or health care provider. Copyright  2016 [employer]                Patient Education    Metoprolol Succinate Oral tablet, extended-release    Metoprolol Tartrate Oral tablet    Metoprolol Tartrate Solution for injection  Metoprolol Succinate Oral tablet, extended-release  What is this medicine?  METOPROLOL (me TOE proe lole) is a beta-blocker. Beta-blockers reduce the workload on the heart and help it to beat more regularly. This medicine is used to treat high blood pressure and to prevent chest pain. It is also used to after a heart attack and to prevent an additional heart attack from occurring.  This medicine may be used for other purposes; ask your health care provider or pharmacist if you have questions.  What should I tell my health care provider before I take this medicine?  They need to know if you have any of these conditions:    diabetes    heart or vessel disease like slow heart rate, worsening heart failure, heart block, sick sinus syndrome or Raynaud's disease    kidney disease    liver disease    lung or breathing disease, like asthma or emphysema    pheochromocytoma    thyroid disease    an unusual or allergic reaction to metoprolol, other beta-blockers, medicines, foods, dyes, or preservatives    pregnant or trying to get pregnant    breast-feeding  How should I use this medicine?  Take this medicine by mouth with a glass of water. Follow the directions on the prescription label. Do not crush or chew. Take this medicine with or immediately after meals. Take your doses at regular intervals. Do not take more medicine than directed. Do not stop taking this medicine suddenly. This could lead to serious heart-related effects.  Talk to your pediatrician regarding the use of this medicine in children. While this drug may be prescribed for children as young as 6 years for selected conditions, precautions do  apply.  Overdosage: If you think you have taken too much of this medicine contact a poison control center or emergency room at once.  NOTE: This medicine is only for you. Do not share this medicine with others.  What if I miss a dose?  If you miss a dose, take it as soon as you can. If it is almost time for your next dose, take only that dose. Do not take double or extra doses.  What may interact with this medicine?  This medicine may interact with the following medications:    certain medicines for blood pressure, heart disease, irregular heart beat    certain medicines for depression, like monoamine oxidase (MAO) inhibitors, fluoxetine, or paroxetine    clonidine    dobutamine    epinephrine    isoproterenol    reserpine  This list may not describe all possible interactions. Give your health care provider a list of all the medicines, herbs, non-prescription drugs, or dietary supplements you use. Also tell them if you smoke, drink alcohol, or use illegal drugs. Some items may interact with your medicine.  What should I watch for while using this medicine?  Visit your doctor or health care professional for regular check ups. Contact your doctor right away if your symptoms worsen. Check your blood pressure and pulse rate regularly. Ask your health care professional what your blood pressure and pulse rate should be, and when you should contact them.  You may get drowsy or dizzy. Do not drive, use machinery, or do anything that needs mental alertness until you know how this medicine affects you. Do not sit or stand up quickly, especially if you are an older patient. This reduces the risk of dizzy or fainting spells. Contact your doctor if these symptoms continue. Alcohol may interfere with the effect of this medicine. Avoid alcoholic drinks.  What side effects may I notice from receiving this medicine?  Side effects that you should report to your doctor or health care professional as soon as possible:    allergic  reactions like skin rash, itching or hives    cold or numb hands or feet    depression    difficulty breathing    faint    fever with sore throat    irregular heartbeat, chest pain    rapid weight gain    swollen legs or ankles  Side effects that usually do not require medical attention (report to your doctor or health care professional if they continue or are bothersome):    anxiety or nervousness    change in sex drive or performance    dry skin    headache    nightmares or trouble sleeping    short term memory loss    stomach upset or diarrhea    unusually tired  This list may not describe all possible side effects. Call your doctor for medical advice about side effects. You may report side effects to FDA at 7-604-FDA-2593.  Where should I keep my medicine?  Keep out of the reach of children.  Store at room temperature between 15 and 30 degrees C (59 and 86 degrees F). Throw away any unused medicine after the expiration date.  NOTE:This sheet is a summary. It may not cover all possible information. If you have questions about this medicine, talk to your doctor, pharmacist, or health care provider. Copyright  2016 [employer]

## 2018-01-21 NOTE — CONSULTS
[facility]      Neurology Stroke Consult    Patient Name: Yanick Adorno  : 1945 MRN#: [id number]    STROKE DATA    Stroke Code:  Stroke code not activated.  Time patient seen:  2018 8 00 AM  Last known normal (pt's baseline):  2017      TPA treatment:  Not given due to outside the time window.     National Institutes of Health Stroke Scale (at presentation)  NIHSS done at:  time patient seen      Score    Level of consciousness:  (0)   Alert, keenly responsive     LOC questions:  (0)   Answers both questions correctly    LOC commands:  (0)   Performs both tasks correctly    Best gaze:  (0)   Normal    Visual:  (0)   No visual loss    Facial palsy:  (1)  Minor facial palsy    Motor arm (left):  (0)   No drift    Motor arm (right):  (0)   No drift    Motor leg (left):  (0)   No drift    Motor leg (right):  (0)   No drift    Limb ataxia:  (0)   Absent    Sensory:  (0)   Normal- no sensory loss    Best language:  (0)   Normal- no aphasia    Dysarthria:  (0)   Normal    Extinction and inattention:  (0)   No abnormality        NIHSS Total Score:  0        Dysphagia Screen  Time of screening:pending speech evaluation     ASSESSMENT & RECOMMENDATIONS     The patient was seen for Left frontal stroke.    Recommendations:  Acute Ischemic Stroke (without tPA) Plan  - Neurochecks q 4  - Euthermia, Euglycemia  - Daily aspirin for secondary stroke prevention. Load with plavix and continue plavix and ASA for short term.  - Rpt CTA with better contrast timing.  - Statin  - TTE with Bubble Study  - Telemetry, EKG  - Bedside Glucose Monitoring  - A1c, Lipid Panel, Troponin x 3  - PT/OT/SLP  - PM&R  - Stroke Education  - Depression Screen  - Apnea Screen    Prophylaxis          For VTE Prevention:  - enoxaparin       Brief course:  Yanick Adorno is a 72 year old left handed male with hx of DM-2, smoker presented with confusion,slurred speech and Aphasia that has been going for at least 3 days  prior to presentation on 1/20. Last known normal per daughter was 4 days back. CT head showed a subacute infarct in the left frontal lobe. MRI confirmed the same. Vessel imaging was suboptimal but does not show major vessel occlusions. A1C 8.4, LDL 66, ECHO pending.    Pertinent Past Medical/Surgical History  Past Medical History:   Diagnosis Date     DIABETES UNCOMPL ADULT-TYPE II      ESOPHAGEAL REFLUX        No past surgical history on file.    Medications:   Current Facility-Administered Medications   Medication     naloxone (NARCAN) injection 0.1-0.4 mg     melatonin tablet 1 mg     Medication Instruction     aspirin EC EC tablet 325 mg    Or     aspirin Suppository 300 mg     labetalol (NORMODYNE/TRANDATE) injection 10-40 mg     prochlorperazine (COMPAZINE) injection 5 mg    Or     prochlorperazine (COMPAZINE) tablet 5 mg    Or     prochlorperazine (COMPAZINE) Suppository 12.5 mg     ondansetron (ZOFRAN-ODT) ODT tab 4 mg    Or     ondansetron (ZOFRAN) injection 4 mg     bisacodyl (DULCOLAX) Suppository 10 mg     polyethylene glycol (MIRALAX/GLYCOLAX) Packet 17 g     senna-docusate (SENOKOT-S;PERICOLACE) 8.6-50 MG per tablet 1 tablet    Or     senna-docusate (SENOKOT-S;PERICOLACE) 8.6-50 MG per tablet 2 tablet     acetaminophen (TYLENOL) Suppository 650 mg     acetaminophen (TYLENOL) tablet 650 mg     potassium chloride SA (K-DUR/KLOR-CON M) CR tablet 20-40 mEq     potassium chloride (KLOR-CON) Packet 20-40 mEq     potassium chloride 10 mEq in 100 mL sterile water intermittent infusion (premix)     potassium chloride 10 mEq in 100 mL intermittent infusion with 10 mg lidocaine     potassium chloride 20 mEq in 50 mL intermittent infusion     Patient is already receiving mechanical prophylaxis     0.9% sodium chloride infusion     magnesium sulfate 2 g in NS intermittent infusion (PharMEDium or [facility]V Cmpd)     magnesium sulfate 4 g in 100 mL sterile water (premade)     glucose 40 % gel 15-30 g    Or     dextrose 50 %  injection 25-50 mL    Or     glucagon injection 1 mg     insulin glargine (LANTUS) injection 20 Units     insulin aspart (NovoLOG) inj (RAPID ACTING)     insulin aspart (NovoLOG) inj (RAPID ACTING)     nicotine Patch in Place     [START ON 1/22/2018] nicotine patch REMOVAL     nicotine (NICODERM CQ) 21 MG/24HR 24 hr patch 1 patch   .    Allergies:   Allergies   Allergen Reactions     No Known Drug Allergies    .    Family History:   Family History   Problem Relation Age of Onset     DIABETES Father      CANCER Mother      DIABETES Brother    .    Social History:   Social History   Substance Use Topics     Smoking status: Current Every Day Smoker     Packs/day: 1.50     Years: 33.00     Smokeless tobacco: Not on file     Alcohol use Yes      Comment: rarely   .    Tobacco use: Current smoker    ROS:  The 10 point Review of Systems is negative other than noted in the HPI or here.     PHYSICAL EXAMINATION  Vital Signs:  B/P: 132/96,  T: 97.8,  P: Data Unavailable,  R: 18    General:  patient lying in bed without any acute distress Left handed.  HEENT:  normocephalic/atraumatic  Cardio:  RRR  Pulmonary:  no respiratory distress  Abdomen:  soft, non-tender  Extremities:  no edema  Skin:  intact, warm/dry     Neurologic  Mental Status:  fully alert, attentive and oriented, follows commands, speech clear and fluent  Cranial Nerves:  visual fields intact, PERRL, EOMI with normal smooth pursuit, facial sensation intact and symmetric, facial movements symmetric, hearing not formally tested but intact to conversation, palate elevation symmetric and uvula midline, no dysarthria, shoulder shrug strong bilaterally, tongue protrusion midline  Motor:  no abnormal movements, no pronator drift, strength 5/5 throughout upper and lower extremities  Reflexes:  2+ and symmetric throughout  Sensory:  intact/symmetric to light touch and pin prick throughout upper and lower extremities  Coordination:  FNF and HS intact without  dysmetria  Station/Gait:  deferred    Labs  Labs and Imaging reviewed and used in developing the plan; pertinent results included.     Lab Results   Component Value Date     (H) 01/20/2018       The patient was discussed with .    Miguel Kate MD   8:25 AM January 21, 2018  Vascular Neurology Fellow.

## 2018-01-21 NOTE — ED PROVIDER NOTES
History     Chief Complaint:  Confusion     HPI The history is obtained partially through the patient's daughter.     Yanick Adorno is a 72 year old male with a history of diabetes who presents accompanied by his daughter for evaluation of confusion. Approximately three days ago, the patient's wife noticed that he appeared abnormally confused, was having slurred speech and word finding difficulties, and was having increasing memory difficulties. The patient's daughter and her significant other have also noticed this. Today, was the first time since the patient's family noticed this confusion that his daughter was able to bring him into evaluation. The patient's daughter believes that she last saw him absolutely normal about four days ago. Currently in the ED, the patient denies feeling confused or noticing any differences in his speech. He does report a slight cough and congestion recently, but denies any fever, chest pain, shortness of breath, abdominal pain, nausea, vomiting, diarrhea, headache, numbness, weakness, facial asymmetry, or visual disturbance.     Allergies:  NKDA      Medications:    GLUCOTROL XL 10 MG OR TBCR  ACTOS 30 MG OR TABS  GLUCOPHAGE 500 MG OR TABS  AVELOX 400 MG OR TABS  ASPIRIN 325 MG OR TBEC    Past Medical History:    Diabetes mellitus, type II   Esophageal reflux     Past Surgical History:    History reviewed. No pertinent past surgical history.    Family History:    Diabetes - Father and brother  Cancer - Mother     Social History:  Tobacco use:    Current every day smoker - 1.50 packs / day for 33 years  Alcohol use:    Positive, rarely   Marital status:       Accompanied to ED by:  Daughter      Review of Systems   Constitutional: Negative for fever.   HENT: Positive for congestion.    Eyes: Negative for visual disturbance.   Respiratory: Positive for cough. Negative for shortness of breath.    Cardiovascular: Negative for chest pain.   Gastrointestinal: Negative for  abdominal pain, diarrhea, nausea and vomiting.   Neurological: Negative for facial asymmetry, weakness, numbness and headaches.   Psychiatric/Behavioral: Positive for confusion.   All other systems reviewed and are negative.    Physical Exam   First Vitals:  BP: (!) 164/99  Pulse: 86  Heart Rate: 86  Temp: 98  F (36.7  C)  Resp: 18  Weight: 88.5 kg (195 lb)  SpO2: 97 %    Physical Exam  Constitutional:  Appears well-developed and well-nourished. Alert.  His daughter provides much of the history for him.  He says that he thinks he is fine and does not need to be here.  Possibly has a small right facial droop, but daughter does not really notice that it is different from normal.  When addressed directly, he is conversant. Non toxic.  HENT:   Head: Atraumatic. No depressed skull fracture, Racoon Eyes, Agustin's sign, or hemotympanum. Face normal.  Nose: Nose normal.  Mouth/Throat: Oral mucosa is clear and moist. no trismus. Pharynx normal. Tonsils symmetric. No tonsillar enlargement, erythema, or exudate.  Eyes: Conjunctivae normal. EOM normal. Pupils equal, round, and reactive to light. No scleral icterus.   Neck: Normal range of motion. Neck supple. No tracheal deviation present.   Cardiovascular: Normal rate, regular rhythm. No gallop. No friction rub. No murmur heard. Symmetric radial artery pulses   Pulmonary/Chest: Effort normal. No stridor. No respiratory distress. No wheezes. No rales. No rhonchi . No tenderness.  No focal lung consolidation on exam.  Abdominal: Soft. Bowel sounds normal. No distension. No mass. No tenderness. No rebound. No guarding.   Musculoskeletal:   RUE: Normal range of motion. No tenderness. No deformity  LUE: Normal range of motion. No tenderness. No deformity  RLE: Normal range of motion. No edema. No tenderness. No deformity  LLE: Normal range of motion. No edema. No tenderness. No deformity  Lymph: No cervical adenopathy.   Neurological: Alert and oriented to person, place, and time.  Cranial Nerves intact II-XII except I did not formally test gag or visual acuity.  No definite facial droop.  Tongue protrudes in the midline.  Palate elevates symmetrically per  EOMI. Strength 5/5 bilaterally in the trapezius, deltoid, biceps, triceps, , thumb opposition, finger abduction, psoas, quadriceps, hamstring, gastrocnemius, tibialis anterior. Sensation intact to light touch in all 4 extremities (C4-T1 and L4-S1). Finger to nose and coordination normal. Mental status normal. Attention normal. GCS 15. Memory normal. Speech fluent. Cognition normal. Gait normal.  Romberg negative  Skin: Skin is warm and dry. No rash noted. No pallor. Normal capillary refill.  Psychiatric:  Normal mood. Normal affect.   Is a smoker.  No drugs or alcohol.    Emergency Department Course   ECG (19:28:20):  Indication: Screening for cardiovascular disease.   Rate 88 bpm. NY interval 200 ms. QRS duration 90 ms. QT/QTc 390/471 ms. P-R-T axes 64 -56 75.   Interpretation: Normal sinus rhythm, Possible left atrial enlargement, Left anterior fascicular block, cannot rule out inferior infarct (masked by fascicular block?), Abnormal ECG  Agree with computer interpretation. Yes  Interpreted at 1930 by Dr. Bauer.      ECG (23:40:16):  Indication: Screening for cardiovascular disease.   Rate 70 bpm. NY interval 216 ms. QRS duration 92 ms. QT/QTc 416/449 ms. P-R-T axes 67 -55 60.   Interpretation: Sinus rhythm with 1st degree AV block, Left anterior fascicular block, Nonspecific T wave abnormality, Abnormal ECG  Agree with computer interpretation. Yes   Interpreted at 7763 by Dr. Bauer.      Imaging:  Radiographic findings were communicated with the patient and family who voiced understanding of the findings.    XR Chest:  IMPRESSION: 0.5 cm right upper lobe pulmonary nodule. Given its density, this likely represents a calcified granuloma. The lungs are otherwise clear. No pleural effusion. Aortic arch calcification is  noted.    Per radiology.     CT Head w/o Contrast:  IMPRESSION:   1. Atrophy.  2. Low-density area in the left frontal lobe that likely represents subacute infarction.  3. No hemorrhage.  Per radiology.     Laboratory:  CBC: WNL (WBC 8.3, HGB 14.5, )    CMP: Anion gap 2 low, Glucose 190 high, o/w WNL (Creatinine 1.02)  Creatinine POCT: Creatinine 1.0, GFR 74  Glucose by meter: 179 high   Blood gas venous: pH 7.38, pCO2 45, pO2 39, Bicarbonate 26, Base excess venous 0.7, FIO2 room air  Troponin I 1938: 1.244 high   Troponin I 0000: 1.228 high   Lactic acid whole blood: 1.0   Alcohol ethyl: <0.01  Blood culture: Pending x2   Influenza A/B antigen: A negative, B negative   UA with Microscopic: , Protein albumin 30, Mucous present, o/w Negative     Interventions:  2110 NS 1,000 mL IV  2315 Aspirin 325 mg PO  2330 Nicotine 1 patch Transdermal  2349 Lopressor 50 mg PO    Emergency Department Course:  Nursing notes and vitals reviewed.  1925: I performed an exam of the patient as documented above.     2240: I updated and reassessed the patient.     2308: I updated and reassessed the patient.     2338: I spoke with Dr. Branham of the hospitalist service from [facility] regarding patient's presentation, findings, and plan of care.     2340: I updated and reassessed the patient.     Findings and plan explained to the Patient and family. Patient will be transferred to [facility] via EMS. Discussed the case with Dr. Branham, who will admit the patient to a monitored bed for further monitoring, evaluation, and treatment.     Impression & Plan      Medical Decision Making:  Yanick Adorno is a 72 year old male brought to the ER today by his daughter for evaluation of symptoms, concerning to his family but not to the patient, but began 3 or 4 days ago.  In particular his family feels that he has been confused, sometimes slurring his speech, not acting normally.  Symptoms have been present since at least  Wednesday morning.    Differential for his confusion was broad.  At this point no clear evidence for infectious etiology to cause delirium.  No significant electrolyte disturbance or hyponatremia.  No evidence for UTI.  It was questionable whether or not the patient had a right facial droop upon presentation but does not have any other obvious focal deficits to suggest stroke.  CT imaging of his head was obtained to look for bleed and does show evidence for a subacute left frontal infarct.  This is likely the cause of his symptoms.  It is clear that he is outside the window for IV or IA TPA.  No evidence for hemorrhagic conversion or significant mass-effect at this time.  Etiology for the stroke unclear.  No known history of A. fib.  No known history of PFO.  Will require admission for neurologic monitoring, further stroke workup.    Patient does have a chronic cough, that is not significantly the change from baseline.  Chest x-ray negative for pneumonia.  He is a smoker but is not having any bronchospasm at this time.  No evidence for significant CO2 retention.    He is not having any chest pain, dyspnea, dizziness, or other cardiovascular symptoms but his troponin is positive at 1.2.  Initial EKG was somewhat limited by artifact but showed no obvious coronary ischemia.  Second troponin was obtained and is essentially unchanged, suggesting that we may be seeing an MI that is also several days old.  Second EKG was obtained and does show possible lateral ischemia with nonspecific T-wave inversions.  No definite ST depression or elevation.  While the patient was here in the ER he was noted to have a brief run of consecutive PVCs about 25 beats in a row.  Overall this would qualify his nonsustained V. tach.  He was completely asymptomatic with this.  We added on beta-blocker given his baseline heart rate was in the 80s.  He will require admission to the cardiac floor.  Unfortunately there are no beds here at [facility]  that would be capable of handling this patient.  I discussed this with the patient and his daughter and they agreed to transfer to [facility].  I made contact with the hospitalist services also and they will accept the patient to their [facility] cardiac unit.  [facility]    Critical Care time:  none    Diagnosis:    ICD-10-CM   1. Cerebrovascular accident (CVA), unspecified mechanism (H) I63.9   2. Troponin level elevated R74.8   3. Altered mental status, unspecified altered mental status type R41.82       Disposition:  Transfer to [facility].       I, Shaka Spencer, am serving as a scribe at 7:25 PM on 1/20/2018 to document services personally performed by Dr. Bauer, based on my observations and the provider's statements to me.    [facility] EMERGENCY DEPARTMENT       Chele Bauer MD  01/21/18 0140

## 2018-01-21 NOTE — PROGRESS NOTES
Hospitalist brief update note:    Patient was admitted last night to evaluate stroke. He was noticed to have slurred speech and confusion.  Denies headache, chest pain, dyspnea currently.     VSS  Lungs clear  CVS normal S1S2  Neuro: AAOx3, CN 2-12 intact, strength symmetrical.    Labs noted, HbA1C 8.6  Lipid panel looks good  2D ECHO with bubble, done, report pending.  Troponin 1.2 then slightly down to 1.02.   MRI brain - Recent moderate-sized ischemic infarct of the lateral aspect of the left frontal lobe just above the left sylvian fissure (left middle cerebral artery territory). No other recent infarcts.  Appreciate neuro eval, plavix load and 75 mg daily. Full dose ASA continued.  Pending cardiology eval  BS 90 this am, diet resumed, but will keep him on glargine 20 units daily, covering with ISS.  Hodling PTA metformin and glipizide.  Therapy evals    Discussed with patient, likely 2 more days of hospital stay with work up.    Reviewed with CORI Khoury MD  Hospitalist

## 2018-01-21 NOTE — PLAN OF CARE
Problem: Patient Care Overview  Goal: Plan of Care/Patient Progress Review  Outcome: Therapy, progress toward functional goals as expected  OT eval completed and treatment initiated.  At baseline patient lives in a 2 story home with S.O. Bed and bath on second floor, tub shower. No bathroom on main level. 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   Discharge Planner OT   Patient plan for discharge: none in chart  Current status: pt needs SBA with mobility, noted to be impulsive. SBA with Br transfers and ADL's.   Barriers to return to prior living situation: bed and bath on second level  Recommendations for discharge: home with family assist and OP followup. Cognition can be deferred to SLP if they are going to follow up with swallow deficits, may benefit from driving assessment prior to returning to work.   Rationale for recommendations: impulsive and lacks insight into possible deficits.        Entered by: Lisy Marte 01/21/2018 3:18 PM

## 2018-01-21 NOTE — PROGRESS NOTES
01/21/18 0952   General Information   Onset Date 01/21/18   Start of Care Date 01/21/18   Referring Physician Dr. Branham   Patient Profile Review/OT: Additional Occupational Profile Info See Profile for full history and prior level of function   Patient/Family Goals Statement Agreed to POC goal.   Swallowing Evaluation Bedside swallow evaluation   Behaviorial Observations Alert   Mode of current nutrition NPO   Respiratory Status Room air   Comments Per MD note: Yanick Adorno is a 72 year old male with history of diabetes mellitus type 2, tobacco abuse who presents with confusion, slurred speech, found to have subacute left frontal CVA. Admitted 1/21/2018.      Patient reports a baseline congested cough unrelated to po intake for 2-3 months.  No dysphagia hx.   Clinical Swallow Evaluation   Dentition present and adequate  (upper partial denture)   Oral Labial Strength and Mobility impaired retraction  (at rest R droop, slight drool; L slight decreased lift on retraction)   Lingual Strength and Mobility impaired coordination  (min-no decrease)   Velar Elevation intact   Laryngeal Function Cough;Throat clear;Swallow;Voicing initiated;Dry swallow palpated   Clinical Swallow Eval: Thin Liquid Texture Trial   Mode of Presentation, Thin Liquids cup;straw   Volume of Liquid or Food Presented sips by cup x 8, sips by straw x 3   Oral Phase of Swallow (slight hold)   Pharyngeal Phase of Swallow (slight delay)   Diagnostic Statement no signs of aspiration    Clinical Swallow Eval: Solid Food Texture Trial   Mode of Presentation, Solid self-fed   Volume of Solid Food Presented bites x 5   Oral Phase, Solid (see below)   Pharyngeal Phase, Solid intact   Diagnostic Statement no signs of aspiration, decreased ROM on right during chewing, min residue   Swallow Compensations   Swallow Compensations Reduce amounts;Alternate viscosity of consistencies  (check for residue)   Esophageal Phase of Swallow   Patient reports or  presents with symptoms of esophageal dysphagia No   Swallow Eval: Clinical Impressions   Skilled Criteria for Therapy Intervention Skilled criteria met.  Treatment indicated.   Functional Assessment Scale (FAS) 5   Treatment Diagnosis mild oral-pharyngeal dysphagia   Diet texture recommendations Regular diet;Thin liquids   Recommended Feeding/Eating Techniques (see below)   Therapy Frequency (1-2 visits)   Predicted Duration of Therapy Intervention (days/wks) 3 days   Anticipated Discharge Disposition (OP SLP if no other needs present)   Risks and Benefits of Treatment have been explained. Yes   Patient, family and/or staff in agreement with Plan of Care Yes   Clinical Impression Comments A bedside swallow evaluation was completed this am with mild oral-pharyngeal dysphagia found.  See report for details.  Recommend regular diet textures and thin liquids with strategies including: sit up at 90 degrees, small bites/sips, alternate textures, check right side for oral residue.  Plan to continue swallow Tx for 1-2 additional visits   Total Evaluation Time   Total Evaluation Time (Minutes) 15

## 2018-01-21 NOTE — PROGRESS NOTES
01/21/18 1600   Quick Adds   Type of Visit Initial PT Evaluation   Living Environment   Lives With significant other   Living Arrangements house   Home Accessibility bed and bath are not on the first floor;tub/shower is not walk in   Number of Stairs to Enter Home 0   Number of Stairs Within Home 14   Stair Railings at Home inside, present at both sides   Transportation Available car   Living Environment Comment Pt lives independently in 2 story home with no use of AD for functional mobility and no services   Self-Care   Dominant Hand right   Usual Activity Tolerance good   Current Activity Tolerance moderate   Regular Exercise no   Equipment Currently Used at Home none   Functional Level Prior   Ambulation 0-->independent   Transferring 0-->independent   Toileting 0-->independent   Bathing 0-->independent   Dressing 0-->independent   Eating 0-->independent   Communication 0-->understands/communicates without difficulty   Swallowing 0-->swallows foods/liquids without difficulty   Cognition 0 - no cognition issues reported   Fall history within last six months no   Which of the above functional risks had a recent onset or change? ambulation;transferring   General Information   Onset of Illness/Injury or Date of Surgery - Date 01/21/18   Referring Physician Mele Branham MD   Patient/Family Goals Statement Agreeable to recommendation for OPPT   Pertinent History of Current Problem (include personal factors and/or comorbidities that impact the POC) Pt is a 72 year old male with history of diabetes mellitus type 2, tobacco abuse who presents with confusion, slurred speech, found to have subacute left frontal CVA.   Precautions/Limitations fall precautions   General Observations Pt agreeable to therapy and is supine with VSS w/ elevated HOB   Cognitive Status Examination   Orientation orientation to person, place and time   Level of Consciousness alert   Follows Commands and Answers Questions 100% of the time;able to  follow single-step instructions   Personal Safety and Judgment impaired;at risk behaviors demonstrated;impulsive   Pain Assessment   Patient Currently in Pain No   Posture    Posture Protracted shoulders   Range of Motion (ROM)   ROM Comment BLE ROM WNL for AROM   Strength   Strength Comments demonstrated functional strength WNL   Bed Mobility   Bed Mobility Comments Independent supine<>sitting EOB. Demo abiltiy to scoot at EOB and boost in bed for repositioning   Transfer Skills   Transfer Comments Sit<>stand SBA for impulsivity   Gait   Gait Comments Pt ambulated 10' with symmetric, heavy gait with impaired reciprocal UE use and with difficulty multitasking with DGI tasks including vertical and horizontal head turns, change in speeds, stopping in place evidenced by decreased speed and increased CHAPITO with dual-task   Balance   Balance Comments Pt performed rhomberg progression: normal CHAPITO eyes closed, NBOS eyes open, BL tandem stance with none-minimal postural sway. However, grossly demonstrated imapired balance with gait evidenced by stiff UE in flexed posture for improved stability. Pt completed 5xSTS in 14.15, indicating borderline for fall risk at 14.2 as cut-off. All scores just below fall risk for these measures   Sensory Examination   Sensory Perception Comments no remarkable changes noted in BLE with LT screen   Coordination   Coordination Comments no remarkable changes in BLE with heel to shin or alternating toe taps nor with finger to nose. However, grossly demonstrated some dyscoordination with gait evidenced by heavy flat foot initial contact and decreased reciprocal arm movement   General Therapy Interventions   Planned Therapy Interventions balance training;gait training;neuromuscular re-education;ROM;strengthening;stretching;transfer training;motor coordination training;home program guidelines;progressive activity/exercise;risk factor education   Clinical Impression   Criteria for Skilled Therapeutic  "Intervention yes, treatment indicated   PT Diagnosis impaired gait   Influenced by the following impairments impaired balance, strength and coordination   Functional limitations due to impairments limited functional transfers, gait, activity tolerance   Clinical Presentation Stable/Uncomplicated   Clinical Presentation Rationale Pt able to improve mobility this afternoon and improve functional activity tolerance   Clinical Decision Making (Complexity) Low complexity   Therapy Frequency` daily   Predicted Duration of Therapy Intervention (days/wks) 3   Anticipated Discharge Disposition Home with Outpatient Therapy   Risk & Benefits of therapy have been explained Yes   Patient, Family & other staff in agreement with plan of care Yes   Clinical Impression Comments Pt is open to recommendation for more skilled PT services to improve balance, cooridnation and functional activity tolerance   [facility] TM \"6 Clicks\"   2016, Trustees of [facility], under license to [employer].  All rights reserved.   6 Clicks Short Forms Basic Mobility Inpatient Short Form   [facility]  \"6 Clicks\" V.2 Basic Mobility Inpatient Short Form   1. Turning from your back to your side while in a flat bed without using bedrails? 4 - None   2. Moving from lying on your back to sitting on the side of a flat bed without using bedrails? 4 - None   3. Moving to and from a bed to a chair (including a wheelchair)? 3 - A Little   4. Standing up from a chair using your arms (e.g., wheelchair, or bedside chair)? 3 - A Little   5. To walk in hospital room? 3 - A Little   6. Climbing 3-5 steps with a railing? 3 - A Little   Basic Mobility Raw Score (Score out of 24.Lower scores equate to lower levels of function) 20   Total Evaluation Time   Total Evaluation Time (Minutes) 8     "

## 2018-01-21 NOTE — CONSULTS
CARDIOVASCULAR DISEASE CONSULTATION       REASON FOR CONSULTATION:  Troponin elevation and 1 episode of nonsustained ventricular tachycardia, asymptomatic.      HISTORY OF PRESENT ILLNESS:  Mr. Adorno is a 72-year-old male with a background history of type 2 diabetes and tobacco use, who was recently admitted for a subacute left frontal stroke on 01/21/2018.  Apparently, he was having episodes of confusion and altered speech, prompting his evaluation.  He was otherwise neurologically intact. Head imaging confirmed the presence of a subacute infarct of the left frontal lobe.   As part of his workup, a troponin series was obtained which was elevated, but trended flat then downward.  Cardiology Consult Service was asked to evaluate Mr. Adorno in this context.      Upon speaking with Mr. Adorno, he reports absent cardiac symptoms since and prior to his arrival here.  He specifically denies chest pain syndrome, shortness of breath, difficulty with exertion, palpitations, syncopal or presyncopal episodes.  He has no known history of coronary artery disease or other known heart disease.      Latest vital signs reveal that he is afebrile, hemodynamically stable, heartrate in the 70s, blood pressure 132/96.      CBC reveals a hemoglobin 12.9, otherwise unremarkable.  Creatinine is normal at 1.02.      A 12-lead ECG demonstrates low-anterior forces vs. anterior septal infarct pattern and left anterior fascicular block.      Chest x-ray reveals a normal heart size, increased LV contour, enlarged PA and a pulmonary nodule.  No evidence of congestion or infiltrate.      Troponin series reveals a troponin of 1.244, a 3-hour troponin of 1.228, a 6-hour troponin of 1.027.      There is no catheterization or transthoracic echocardiogram data available.      SOCIAL HISTORY:  The patient is a 1-pack-per-day smoker.  He does not drink excess alcohol.  He works at a car dealership.      FAMILY HISTORY:  Mother had uterine cancer.   Father had a stroke in his 80s.      PHYSICAL EXAMINATION:   GENERAL:  Well-appearing adult, appears slightly older than stated age, obese, friendly, Sensorium clear, some evidence of word-finding difficulty, relaxed watching the game, non-dyspneic.   HEENT:  No major abnormalities.   VESSELS:  JVP is modestly elevated about 8 cm of water above the right atrium.   LUNGS:  Positive rales in the left lung base.  Acceptable air movement.  Positive cough.   CARDIOVASCULAR:  Mildly displaced apical impulse, diffuse.  No precordial heaves.   ABDOMEN:  Soft, nontender, nondistended abdomen.  Liver edge is palpable, no pulsations.    EXTREMITIES:  No edema.  Acceptable perfusion.      IMPRESSION, REPORT AND PLAN:   1.  Troponinemia.    2.  Abnormal physical examination, subtle heart failure findings  3.  Anterior septal infarct pattern on 12-lead ECG.   4.  Nonsustained ventricular tachycardia, 6 beats, asymptomatic.   5.  Recent stroke.   6.  Type 2 diabetes.   7.  Tobacco abuse.        Mr. Adorno arrives to [facility] with symptoms from a subacute left frontal lobe stroke.  He is without a reported prior history or recent ischemic heart disease symptoms.  We await the results of his transthoracic echocardiogram to better determine if he has underlying ischemic heart disease (non-acute).  That being said there are some features about his physical examination, 12-lead ECG and a chest x-ray that suggests that there may be underlying myocardial disease; however, we will make a more reasonable determination of this following completion of his transthoracic echo.      Beyond his already engaged workup/therapy I have no other recommendations at this time.  He should continue his aspirin therapy, a nicotine patch replacement, and insulin dosing for glycemic control.        DIAGNOSES:     1.  Troponinemia.    2.  Abnormal physical examination.   3.  Anterior septal infarct pattern on 12-lead ECG.   4.  Nonsustained  ventricular tachycardia, 6 beats, asymptomatic.   5.  Recent stroke.   6.  Type 2 diabetes.   7.  Tobacco abuse.        Thank you for this consultation.  We will follow along.         JORGE GEREN MD             D: 2018 14:56   T: 2018 15:23   MT: OLEGARIO      Name:     KWAME MULLEN   MRN:      [id number]        Account:       [id number]   :      1945           Consult Date:  2018      Document: [id number]

## 2018-01-22 ENCOUNTER — APPOINTMENT (OUTPATIENT)
Dept: SPEECH THERAPY | Facility: CLINIC | Age: 73
DRG: 064 | End: 2018-01-22
Attending: INTERNAL MEDICINE
Payer: MEDICARE

## 2018-01-22 ENCOUNTER — APPOINTMENT (OUTPATIENT)
Dept: CT IMAGING | Facility: CLINIC | Age: 73
DRG: 064 | End: 2018-01-22
Attending: INTERNAL MEDICINE
Payer: MEDICARE

## 2018-01-22 ENCOUNTER — APPOINTMENT (OUTPATIENT)
Dept: PHYSICAL THERAPY | Facility: CLINIC | Age: 73
DRG: 064 | End: 2018-01-22
Attending: INTERNAL MEDICINE
Payer: MEDICARE

## 2018-01-22 LAB
ANION GAP SERPL CALCULATED.3IONS-SCNC: 5 MMOL/L (ref 3–14)
ANION GAP SERPL CALCULATED.3IONS-SCNC: 7 MMOL/L (ref 3–14)
BUN SERPL-MCNC: 14 MG/DL (ref 7–30)
BUN SERPL-MCNC: 16 MG/DL (ref 7–30)
CALCIUM SERPL-MCNC: 8.1 MG/DL (ref 8.5–10.1)
CALCIUM SERPL-MCNC: 8.3 MG/DL (ref 8.5–10.1)
CHLORIDE SERPL-SCNC: 109 MMOL/L (ref 94–109)
CHLORIDE SERPL-SCNC: 110 MMOL/L (ref 94–109)
CO2 SERPL-SCNC: 25 MMOL/L (ref 20–32)
CO2 SERPL-SCNC: 27 MMOL/L (ref 20–32)
CREAT SERPL-MCNC: 0.71 MG/DL (ref 0.66–1.25)
CREAT SERPL-MCNC: 0.76 MG/DL (ref 0.66–1.25)
GFR SERPL CREATININE-BSD FRML MDRD: >90 ML/MIN/1.7M2
GFR SERPL CREATININE-BSD FRML MDRD: >90 ML/MIN/1.7M2
GLUCOSE BLDC GLUCOMTR-MCNC: 140 MG/DL (ref 70–99)
GLUCOSE BLDC GLUCOMTR-MCNC: 159 MG/DL (ref 70–99)
GLUCOSE BLDC GLUCOMTR-MCNC: 169 MG/DL (ref 70–99)
GLUCOSE BLDC GLUCOMTR-MCNC: 223 MG/DL (ref 70–99)
GLUCOSE BLDC GLUCOMTR-MCNC: 265 MG/DL (ref 70–99)
GLUCOSE SERPL-MCNC: 140 MG/DL (ref 70–99)
GLUCOSE SERPL-MCNC: 259 MG/DL (ref 70–99)
MAGNESIUM SERPL-MCNC: 2.1 MG/DL (ref 1.6–2.3)
POTASSIUM SERPL-SCNC: 3.8 MMOL/L (ref 3.4–5.3)
POTASSIUM SERPL-SCNC: 4.2 MMOL/L (ref 3.4–5.3)
SODIUM SERPL-SCNC: 141 MMOL/L (ref 133–144)
SODIUM SERPL-SCNC: 142 MMOL/L (ref 133–144)
TROPONIN I SERPL-MCNC: 0.58 UG/L (ref 0–0.04)
TSH SERPL DL<=0.005 MIU/L-ACNC: 1.71 MU/L (ref 0.4–4)

## 2018-01-22 PROCEDURE — 25000132 ZZH RX MED GY IP 250 OP 250 PS 637: Mod: GY | Performed by: INTERNAL MEDICINE

## 2018-01-22 PROCEDURE — 40000225 ZZH STATISTIC SLP WARD VISIT: Performed by: SPEECH-LANGUAGE PATHOLOGIST

## 2018-01-22 PROCEDURE — 36415 COLL VENOUS BLD VENIPUNCTURE: CPT | Performed by: INTERNAL MEDICINE

## 2018-01-22 PROCEDURE — 21000001 ZZH R&B HEART CARE

## 2018-01-22 PROCEDURE — 25000128 H RX IP 250 OP 636: Performed by: INTERNAL MEDICINE

## 2018-01-22 PROCEDURE — 99232 SBSQ HOSP IP/OBS MODERATE 35: CPT | Performed by: HOSPITALIST

## 2018-01-22 PROCEDURE — 92526 ORAL FUNCTION THERAPY: CPT | Mod: GN | Performed by: SPEECH-LANGUAGE PATHOLOGIST

## 2018-01-22 PROCEDURE — 36415 COLL VENOUS BLD VENIPUNCTURE: CPT | Performed by: NURSE PRACTITIONER

## 2018-01-22 PROCEDURE — 99233 SBSQ HOSP IP/OBS HIGH 50: CPT | Mod: 25 | Performed by: INTERNAL MEDICINE

## 2018-01-22 PROCEDURE — 25000128 H RX IP 250 OP 636

## 2018-01-22 PROCEDURE — 80048 BASIC METABOLIC PNL TOTAL CA: CPT | Performed by: INTERNAL MEDICINE

## 2018-01-22 PROCEDURE — 84484 ASSAY OF TROPONIN QUANT: CPT | Performed by: NURSE PRACTITIONER

## 2018-01-22 PROCEDURE — 84443 ASSAY THYROID STIM HORMONE: CPT | Performed by: NURSE PRACTITIONER

## 2018-01-22 PROCEDURE — A9270 NON-COVERED ITEM OR SERVICE: HCPCS | Mod: GY | Performed by: NURSE PRACTITIONER

## 2018-01-22 PROCEDURE — 92523 SPEECH SOUND LANG COMPREHEN: CPT | Mod: GN | Performed by: SPEECH-LANGUAGE PATHOLOGIST

## 2018-01-22 PROCEDURE — 80048 BASIC METABOLIC PNL TOTAL CA: CPT | Performed by: NURSE PRACTITIONER

## 2018-01-22 PROCEDURE — 93010 ELECTROCARDIOGRAM REPORT: CPT | Performed by: INTERNAL MEDICINE

## 2018-01-22 PROCEDURE — A9270 NON-COVERED ITEM OR SERVICE: HCPCS | Mod: GY

## 2018-01-22 PROCEDURE — 25000131 ZZH RX MED GY IP 250 OP 636 PS 637: Mod: GY | Performed by: INTERNAL MEDICINE

## 2018-01-22 PROCEDURE — 25000132 ZZH RX MED GY IP 250 OP 250 PS 637: Mod: GY

## 2018-01-22 PROCEDURE — 40000193 ZZH STATISTIC PT WARD VISIT

## 2018-01-22 PROCEDURE — A9270 NON-COVERED ITEM OR SERVICE: HCPCS | Mod: GY | Performed by: INTERNAL MEDICINE

## 2018-01-22 PROCEDURE — 93005 ELECTROCARDIOGRAM TRACING: CPT

## 2018-01-22 PROCEDURE — 97116 GAIT TRAINING THERAPY: CPT | Mod: GP

## 2018-01-22 PROCEDURE — 83735 ASSAY OF MAGNESIUM: CPT | Performed by: NURSE PRACTITIONER

## 2018-01-22 PROCEDURE — 00000146 ZZHCL STATISTIC GLUCOSE BY METER IP

## 2018-01-22 PROCEDURE — 70498 CT ANGIOGRAPHY NECK: CPT

## 2018-01-22 PROCEDURE — 99232 SBSQ HOSP IP/OBS MODERATE 35: CPT | Performed by: PSYCHIATRY & NEUROLOGY

## 2018-01-22 PROCEDURE — 25000125 ZZHC RX 250: Performed by: INTERNAL MEDICINE

## 2018-01-22 PROCEDURE — 25000132 ZZH RX MED GY IP 250 OP 250 PS 637: Mod: GY | Performed by: NURSE PRACTITIONER

## 2018-01-22 RX ORDER — METOPROLOL TARTRATE 1 MG/ML
2.5 INJECTION, SOLUTION INTRAVENOUS EVERY 4 HOURS PRN
Status: DISCONTINUED | OUTPATIENT
Start: 2018-01-22 | End: 2018-01-23 | Stop reason: HOSPADM

## 2018-01-22 RX ORDER — IOPAMIDOL 755 MG/ML
70 INJECTION, SOLUTION INTRAVASCULAR ONCE
Status: COMPLETED | OUTPATIENT
Start: 2018-01-22 | End: 2018-01-22

## 2018-01-22 RX ORDER — ATORVASTATIN CALCIUM 40 MG/1
40 TABLET, FILM COATED ORAL DAILY
Status: DISCONTINUED | OUTPATIENT
Start: 2018-01-22 | End: 2018-01-23 | Stop reason: HOSPADM

## 2018-01-22 RX ADMIN — SODIUM CHLORIDE: 9 INJECTION, SOLUTION INTRAVENOUS at 11:02

## 2018-01-22 RX ADMIN — NICOTINE 1 PATCH: 21 PATCH, EXTENDED RELEASE TRANSDERMAL at 08:26

## 2018-01-22 RX ADMIN — SODIUM CHLORIDE 90 ML: 9 INJECTION, SOLUTION INTRAVENOUS at 17:31

## 2018-01-22 RX ADMIN — INSULIN ASPART 1 UNITS: 100 INJECTION, SOLUTION INTRAVENOUS; SUBCUTANEOUS at 18:45

## 2018-01-22 RX ADMIN — IOPAMIDOL 70 ML: 755 INJECTION, SOLUTION INTRAVENOUS at 17:30

## 2018-01-22 RX ADMIN — INSULIN ASPART 1 UNITS: 100 INJECTION, SOLUTION INTRAVENOUS; SUBCUTANEOUS at 12:52

## 2018-01-22 RX ADMIN — INSULIN ASPART 1 UNITS: 100 INJECTION, SOLUTION INTRAVENOUS; SUBCUTANEOUS at 08:27

## 2018-01-22 RX ADMIN — INSULIN GLARGINE 20 UNITS: 100 INJECTION, SOLUTION SUBCUTANEOUS at 08:27

## 2018-01-22 RX ADMIN — Medication 4000 UNITS: at 22:03

## 2018-01-22 RX ADMIN — HEPARIN SODIUM 1000 UNITS/HR: 10000 INJECTION, SOLUTION INTRAVENOUS at 22:04

## 2018-01-22 RX ADMIN — ATORVASTATIN CALCIUM 40 MG: 40 TABLET, FILM COATED ORAL at 21:59

## 2018-01-22 RX ADMIN — CLOPIDOGREL 75 MG: 75 TABLET, FILM COATED ORAL at 08:27

## 2018-01-22 RX ADMIN — ASPIRIN 325 MG: 325 TABLET, DELAYED RELEASE ORAL at 08:27

## 2018-01-22 ASSESSMENT — VISUAL ACUITY
OU: NORMAL ACUITY
OU: NORMAL ACUITY

## 2018-01-22 NOTE — PLAN OF CARE
Problem: Patient Care Overview  Goal: Plan of Care/Patient Progress Review  Discharge Planner PT   Patient plan for discharge: Home with OPPT  Current status: Pt is IND with bed mobility and SBA for all transfers due to mild impulsivity. Ambulated >1000' during session with SBA for normal gait, however CGA with dual tasking. Pt demo heavy, midfoot strike gait pattern with impaired reciprocal arm movement, yet no overt LOB during session, able to correct with vc however poor carry over requiring more frequent cueing. Pt asc<>desc 14 stairs with no report of SOB, fatigue and VSS at end of session.  Barriers to return to prior living situation: impaired dynamic balance with dual tasking  Recommendations for discharge: Home with OPPT  Rationale for recommendations: Pt would benefit from continued skilled therapy for improved balance with functional activity and to progress towards IND to PLOF.       Entered by: Briana Chatterjee 01/22/2018 9:58 AM

## 2018-01-22 NOTE — PROGRESS NOTES
01/22/18 1421   General Information, SLP   Type of Evaluation Speech and Language   Type of Visit Initial   Start of Care Date 01/22/18   Onset of Illness/Injury or Date of Surgery - Date 01/21/18   Referring Physician Dr. Branham   Patient/Family Goals Statement Agreed to POC goals.   General Info Comments Per MD note: In summary, Mr Adorno is a 72 year old man with hx of HTN, DM, Smoking presented with left MCA distribution embolic infarct. He also had mild troponin elevation with old anterior septal infarct for which cardiology is following him. His echo does have evidence of LA enlargement with EF 40-45%   Oral Motor Sensory Function   Completed on Swallow Evaluation Completed Clinical Bedside Swallow Evaluation   Language: Auditory Comprehension (understanding of spoken language)   Two Step Commands (out of 5 total) 5   Functional Assessment Scale (Auditory Comprehension) No Impairment  (for given subtest)   Language: Verbal Expression (use of spoken language to express information)   Responsive Naming; [place]kin Diagnostic Aphasia Exam 3 (out of 20 total) 19   Biographic Information (out of 3 total) 3   Conversation; Boykin Diagnostic Aphasia Exam rating (out of 5 total) 3   Define Words; [place] Test for Differential Diagnosis Of Aphasia (out of 10 total) 9   Generative Naming Score; Cognitive Linguistic Quick Test 4   Generative Naming; Cognitive Linguistic Quick Test Result Below mean   Functional Assessment Scale (Verbal Expression) Mild to Moderate Impairment   Reading Comprehension (understanding of written language)   Practical Reading (out of 7 total) 4   Functional Assessment Scale (Reading Comprehension) Moderate Impairment   Comments (Reading Comprehension) Patient misread some words when attempting questions during the practical reading task and then answered incorrectly.  Patient was not aware of errors independently.   Written Expression (use of writing to express information)   Generate  Sentences; [place] Test for Differential Diagnosis Of Aphasia (out of 6 total) 4   Functional Assessment Scale (Written Expression) Mild to Moderate Impairment   Comments (Written Expression) Paraphasic errors and omission of words noted.  Patient was not aware of errors independently.   Clinical Impression, SLP Eval   Criteria for Skilled Therapeutic Interventions Met Yes   SLP Diagnosis Moderate reading comprehension and mild-moderate expressive language deficits   Rehab Potential Good, to achieve stated therapy goals   Therapy Frequency Daily   Predicted Duration of Therapy Intervention (days/wks) 1 week   Anticipated Discharge Disposition Home with Outpatient Therapy   Risks and Benefits of Treatment have been explained. Yes   Patient, Family & other staff in agreement with plan of care Yes   Clinical Impression Comments Moderate reading comprehension and mild-moderate expressive language deficits were found during testing this pm.  Patient requires assistance for medication management and finances due to errors.  Plan to provide Tx daily until discharge.   Total Evaluation Time      Total Evaluation Time (Minutes) 25

## 2018-01-22 NOTE — PROGRESS NOTES
Progress Note    2018    In summary, Mr Adorno is a 72 year old man with hx of HTN, DM, Smoking presented with left MCA distribution embolic infarct. He also had mild troponin elevation with old anterior septal infarct for which cardiology is following him. His echo does have evidence of LA enlargement with EF 40-45%.    Plan :  - Awaiting repeat CTA head and neck   - Continue  and Plavix 75 for 3 months  - Cardionet monitoring at the time of discharge  - Smoking cessation, ANISH screen as outpatient  - PT/OT/ Speech  - BP in 140s/70s appropriate for now.  - Heparin GTT ok to start tomorrow given low infarct volume for Cardiac Cath.       24 hour events:  Stable overnight.     24 Hour Vital Signs Summary:  Temperatures:  Current - Temp: 98.4  F (36.9  C); Max - Temp  Av.1  F (36.7  C)  Min: 97.7  F (36.5  C)  Max: 98.4  F (36.9  C)  Respiration range: Resp  Av.5  Min: 16  Max: 18  Pulse range: No Data Recorded  Blood pressure range: Systolic (24hrs), Av , Min:126 , Max:149   ; Diastolic (24hrs), Av, Min:69, Max:101    Pulse oximetry range: SpO2  Av.8 %  Min: 95 %  Max: 97 %    Ventilator Settings  Resp: 16      Intake/Output Summary (Last 24 hours) at 18 1029  Last data filed at 18 0820   Gross per 24 hour   Intake             1260 ml   Output                0 ml   Net             1260 ml            Current Medications:    aspirin EC  325 mg Oral Daily    Or     aspirin  300 mg Rectal Daily     insulin glargine  20 Units Subcutaneous QAM AC     insulin aspart  1-7 Units Subcutaneous TID AC     insulin aspart  1-5 Units Subcutaneous At Bedtime     nicotine   Transdermal Q8H     nicotine   Transdermal Daily     nicotine  1 patch Transdermal Daily     clopidogrel  75 mg Oral Daily       PRN Medications:  naloxone, melatonin, - MEDICATION INSTRUCTIONS -, labetalol, prochlorperazine **OR** prochlorperazine **OR** prochlorperazine, ondansetron **OR** ondansetron, bisacodyl,  "polyethylene glycol, senna-docusate **OR** senna-docusate, acetaminophen, acetaminophen, potassium chloride, potassium chloride, potassium chloride, potassium chloride with lidocaine, potassium chloride, - MEDICATION INSTRUCTIONS -, magnesium sulfate, magnesium sulfate, glucose **OR** dextrose **OR** glucagon    Infusions:    - MEDICATION INSTRUCTIONS -       - MEDICATION INSTRUCTIONS -       NaCl 75 mL/hr at 01/21/18 1500       Allergies   Allergen Reactions     No Known Drug Allergies        Physical Examination:  /77 (BP Location: Right arm)  Temp 98.4  F (36.9  C) (Oral)  Resp 16  Ht 1.88 m (6' 2\")  Wt 89 kg (196 lb 3.4 oz)  SpO2 95%  BMI 25.19 kg/m2  Mental Status:  fully alert, attentive and oriented, follows commands, speech clear and fluent  Cranial Nerves:  visual fields intact, PERRL, EOMI with normal smooth pursuit, facial sensation intact and symmetric, facial movements symmetric, hearing not formally tested but intact to conversation, palate elevation symmetric and uvula midline, no dysarthria, shoulder shrug strong bilaterally, tongue protrusion midline  Motor:  no abnormal movements, no pronator drift, strength 5/5 throughout upper and lower extremities  Reflexes:  2+ and symmetric throughout  Sensory:  intact/symmetric to light touch and pin prick throughout upper and lower extremities  Coordination:  FNF and HS intact without dysmetria  Station/Gait:  deferred    Labs/Studies:  Recent Labs   Lab Test  01/22/18   0525  01/21/18   0530  01/20/18   1938   NA  142   --   140   POTASSIUM  3.8   --   4.0   CHLORIDE  110*   --   106   CO2  27   --   32   ANIONGAP  5   --   2*   GLC  140*   --   190*   BUN  14   --   22   CR  0.71   --   1.02   ROSA  8.1*   --   8.8   WBC   --   6.6  8.3   RBC   --   4.04*  4.61   HGB   --   12.9*  14.5   PLT   --   233  275       No results for input(s): INR, PTT in the last 25409 hours.        Recent Labs  Lab 01/20/18 2003   O2PER Room Air "           Imaging:  Reviewed    Plan discussed with Dr. Luc Moser MD  Fellow   0507

## 2018-01-22 NOTE — PROGRESS NOTES
[facility]    Hospitalist Progress Note    Date of Service (when I saw the patient): 01/22/2018    Assessment & Plan   Yanick Adorno is a 72 year old male with history of diabetes mellitus type 2, tobacco abuse who presents with confusion, slurred speech, found to have subacute left frontal CVA. Admitted 1/21/2018.      Subacute left frontal CVA: Patient denied any acute symptoms at presentation however his daughter noted new confusion as well as slurring of his speech starting 1/18/18.  His head CT on admission demonstrated a subacute infarct of the left frontal lobe. He remains neurologically intact and mostly oriented. Risk factors included diabetes, tobacco abuse.   - Neurology consulted, MRI brain showed Recent moderate-sized ischemic infarct of the lateral aspect of the left frontal lobe just above the left sylvian fissure, started on full dose ASA, plavix load then 75 mg daily added per neurology. Plan is CTA head and neck.  - PT, OT, speech therapy consulted, evaluations noted, now on regular diet.  - Echocardiogram with bubble study is negative for shunt. See below.  - Continue gentle IVF, as he will be NPO tonight.  - Lipid panel looks good, statin added. HgbA1c 8.4.   - Permissive hypertension, PRN medications ordered for SBP >220  - Telemetry to continue     NSVT  Troponin elevation  Denies any associated cardiopulmonary symptoms, EKG without acute ischemic changes. Significant elevation of Troponin but flat and overall not consistent with ACS, therefore heparin drip deferred. NSVT appears to be asymptomatic. Given his diabetes, smoking history, stroke, likely has underlying coronary artery disease.   - Echocardiogram shows global hypokinesis and mildly decreased LV EF 40-45%  - Cardiology consulted, discussed with eliezer Holguin NM stress test in am.  - Telemetry, ASA, Statin added.     Diabetes mellitus type 2  Patient reports being on glargine, glipizide, metformin.   -HbA1C8.4  indicating poor glycemic control.  - Holding PTA oral agents  - On glargine at reduced dose of 20 units daily, BS in mid 100s, and patient will be NPO after MN, already received glargine, closely monitor for hypoglycemia.  - Medium SSI      Tobacco abuse  - Smoking cessation consult placed  - Nicotine patch ordered    DVT Prophylaxis: Pneumatic Compression Devices    Code Status: Full Code    Disposition: Expected discharge: once cardiac work up complete, likely 1-2 days, discussed with patient, I called his son but unable to reach him on the phone.    Poornima Khoury MD  Hospitalist    Interval History   Patient was seen and examined in am, denies headache, slurred speech, diplopia, dysphagia or cough after eating.  No chest pain orthopnea or PND    -Data reviewed today: I reviewed all new labs and discussed ECHO result with Dr Choi.     ECHO result:   Interpretation Summary     The left ventricle is normal in size.  There is mild to moderate concentric left ventricular hypertrophy.  The visual ejection fraction is estimated at 40-45%.  The transmitral spectral Doppler flow pattern is suggestive of diastolic dysfunction of the left ventricle.  There is mild-moderate global hypokinesia of the left ventricle.  The right ventricle is normal in structure, function and size.  The left atrium is moderate to severely dilated. Intact atrial septum  A contrast injection (Bubble Study) was performed that was negative for flow across the interatrial septum.  The mitral valve leaflets are mildly thickened.  Thickened mitral valve chordae  There is trace to mild mitral regurgitation.  There is mild trileaflet aortic sclerosis.  There is trace to mild aortic regurgitation.  Mild aortic root dilatation.  The ascending aorta is Mildly dilated.  Sinus rhythm was noted.    The study was technically adequate. There is no comparison study available.    Physical Exam   Temp: 98.9  F (37.2  C) Temp src: Oral BP: 148/81   Heart Rate:  71 Resp: 16 SpO2: 96 % O2 Device: None (Room air)    Vitals:    01/21/18 0140 01/22/18 0202   Weight: 88.2 kg (194 lb 7.1 oz) 89 kg (196 lb 3.4 oz)     Vital Signs with Ranges  Temp:  [98  F (36.7  C)-98.9  F (37.2  C)] 98.9  F (37.2  C)  Heart Rate:  [71-83] 71  Resp:  [16-18] 16  BP: (141-150)/() 148/81  SpO2:  [95 %-97 %] 96 %  I/O last 3 completed shifts:  In: 1260 [P.O.:960; I.V.:300]  Out: -     Constitutional: Alert, awake. Oriented to self, was able to state that the year is 2-0-1-8 after couple of attempts. Not in distress.   HEENT: PERRLA EOMI, MMM  Respiratory: Bilateral equal air entry, clear to auscultation. No respiratory distress.  Cardiovascular: Regular s1s2, no murmur, rub or gallop. No tachycardia.  GI: Soft, non distended, non tender, bowel tones active.  Skin/Integumen: Macular rash over the shin bilaterally, no blister.  Neuro: Alert, awake, knows he is in hospital but though it was [facility]. Was able to state it is 2-0-1-8 after couple of attempts. CN 2-12 intact, has some expressive aphasia. Strength symmetrical.     Medications     - MEDICATION INSTRUCTIONS -       - MEDICATION INSTRUCTIONS -       NaCl 75 mL/hr at 01/22/18 1102       atorvastatin  40 mg Oral Daily     aspirin EC  325 mg Oral Daily    Or     aspirin  300 mg Rectal Daily     insulin glargine  20 Units Subcutaneous QAM AC     insulin aspart  1-7 Units Subcutaneous TID AC     insulin aspart  1-5 Units Subcutaneous At Bedtime     nicotine   Transdermal Q8H     nicotine   Transdermal Daily     nicotine  1 patch Transdermal Daily     clopidogrel  75 mg Oral Daily       Data     Recent Labs  Lab 01/22/18  0525 01/21/18  0530 01/21/18  0000 01/20/18  1938   WBC  --  6.6  --  8.3   HGB  --  12.9*  --  14.5   MCV  --  94  --  97   PLT  --  233  --  275     --   --  140   POTASSIUM 3.8  --   --  4.0   CHLORIDE 110*  --   --  106   CO2 27  --   --  32   BUN 14  --   --  22   CR 0.71  --   --  1.02   ANIONGAP 5   --   --  2*   ROSA 8.1*  --   --  8.8   *  --   --  190*   ALBUMIN  --   --   --  3.7   PROTTOTAL  --   --   --  7.9   BILITOTAL  --   --   --  0.4   ALKPHOS  --   --   --  97   ALT  --   --   --  21   AST  --   --   --  19   TROPI  --  1.027* 1.228* 1.244*       No results found for this or any previous visit (from the past 24 hour(s)).

## 2018-01-22 NOTE — PLAN OF CARE
Problem: Patient Care Overview  Goal: Plan of Care/Patient Progress Review      SLP - Attempted to see patient this am; however, he was not in his room at time of attempt.  Will return this pm for eval/Tx as able.

## 2018-01-22 NOTE — PLAN OF CARE
Problem: Patient Care Overview  Goal: Plan of Care/Patient Progress Review  A&Ox4, VSS on RA. Denies pain, neuros intact except for slurred speech and slight R facial droop. CMS intact. Tele: SR w/ 1st deg AVB. Up IND. Voiding in BR. IVF. 18gauge IV placed by resource RN- Plan for repeat CTA tomorrow.

## 2018-01-22 NOTE — PROGRESS NOTES
[facility]  Cardiology Progress Note  Date of Service: 01/22/2018       Physician Supervisory Attestation:   I have reviewed and discussed with Patt Moreno NP the history, physical and plan and independently interviewed and examined Yanick Adorno and agree with the plan as stated in the  Note.    --- He was noted to have mild TnI elevation in the setting of acute stroke. No chest pain but significant risk factors. We will obtain stress tomorrow for risk stratification.     Edin Choi MD 1/22/2018 5:16 PM    Assessment & Plan    Yanick Adorno is a 72 year old male with past medical history significant for T2DM, tobacco abuse admitted on 1/21/2018 with with confusion and altered speech. He was found to have a subacute left frontal stroke of the left frontal lobe.     Assessment:  1. Elevated troponin: 1.244-1.228-1.027. Echo showed LVEF 40-45% with mild to moderate LVH and mild to moderate global hypokinesia of the LV  2. Acute left frontal stroke:, likely embolic per neurology. Recommendation is ASA and Plavix x 3 months. Negative bubble study.  3. NSVT: 6 beats, asymptomatic  4. Tobacco abuse: Cessation highly recommended  5. Type 2 DM: per IM    Plan:   1. Repeat CTA today as previous was unable to evaluate aortic arch and carotids for presence of ulcerated plaque.   2. Start atorvastatin 40 mg daily  3. Troponin elevation could be secondary to recent CVA. Will obtain a Lexiscan stress test tomorrow for ischemic evaluation given cardiac risk factors. If cath is recommended would appreciate neurology's input regarding Heparin.  4. Discharge on 30 monitor for further assessment of NSVT and potential embolic source of CVA    Patt Moreno, APRN, CNP  [facility]  Pager: [phone]     Interval History   No chest pain, SOB, orthopnea. Ambulated with rehab without complaint.    Physical Exam   Temp: 98.4  F (36.9  C) Temp src: Oral BP: 144/85   Heart Rate: 78 Resp: 16 SpO2: 95 % O2 Device: None (Room  air)    Vitals:    01/21/18 0140 01/22/18 0202   Weight: 88.2 kg (194 lb 7.1 oz) 89 kg (196 lb 3.4 oz)       Constitutional:   NAD   Skin:   Warm and dry   Head:   Nontraumatic   Neck:   no JVD   Lungs:   symmetric, clear to auscultation   Cardiovascular:   regular rate and rhythm, S1S2, no murmurs appreciated   Abdomen:   Benign   Extremities and Back:   No edema   Neurological:   Left facial droop       Medications     - MEDICATION INSTRUCTIONS -       - MEDICATION INSTRUCTIONS -       NaCl 75 mL/hr at 01/21/18 1500       aspirin EC  325 mg Oral Daily    Or     aspirin  300 mg Rectal Daily     insulin glargine  20 Units Subcutaneous QAM AC     insulin aspart  1-7 Units Subcutaneous TID AC     insulin aspart  1-5 Units Subcutaneous At Bedtime     nicotine   Transdermal Q8H     nicotine   Transdermal Daily     nicotine  1 patch Transdermal Daily     clopidogrel  75 mg Oral Daily       Data     Most Recent 3 BMP's:  Recent Labs   Lab Test  01/22/18   0525  01/20/18   1938   NA  142  140   POTASSIUM  3.8  4.0   CHLORIDE  110*  106   CO2  27  32   BUN  14  22   CR  0.71  1.02   ANIONGAP  5  2*   ROSA  8.1*  8.8   GLC  140*  190*   Most Recent 3 Troponin's:  Recent Labs   Lab Test  01/21/18   0530  01/21/18   0000  01/20/18   1938   TROPI  1.027*  1.228*  1.244*     EKG: (reviewed personally) anterior sepal infarct pattern on EKG    Imaging: MRA: Recent moderate-sized ischemic infarct of the lateral aspect of the  left frontal lobe just above the left sylvian fissure (left middle cerebral artery territory). No other recent infarcts.  2. Diffuse cerebral volume loss and cerebral white matter changes  consistent with chronic small vessel ischemic disease.    CTA: pending    Tele: SR with PAC's    Echo: As above    Last ischemic eval: none

## 2018-01-22 NOTE — PLAN OF CARE
Problem: Patient Care Overview  Goal: Plan of Care/Patient Progress Review      Discharge Planner SLP   Patient plan for discharge: Home  Current status: Swallow Tx was provided, and a speech-language evaluation was completed this pm.  Patient tolerated regular solids and thin liquids without signs of aspiration.  Large bites/sips and a fast rate were noted.  Recommend a continued regular diet and thin liquids with reminders to take small bites/sips, use a slow rate, and alternate textures.  No further swallow Tx is indicated at this time.  Moderate reading comprehension and mild-moderate expressive language deficits were found during testing this pm.  Patient requires assistance for medication management and finances due to errors.  Plan to provide Tx daily until discharge.  Barriers to return to prior living situation: None  Recommendations for discharge: Family assist with finances and medications; OP SLP Tx  Rationale for recommendations: SLP Tx to maximize communication for daily needs       Entered by: Candi Iyer 01/22/2018 2:17 PM

## 2018-01-22 NOTE — PLAN OF CARE
Problem: Patient Care Overview  Goal: Plan of Care/Patient Progress Review  Outcome: Improving  pts alert oriented. Vital signs stable. Sinus  Rhythm on the tele monitor. Denies pain and SOB. NIH stroke scale at 2. pts ambulates independently. Lost RAC PIV. Plan for repeat CTA in am. Will continue to monitor.

## 2018-01-23 ENCOUNTER — APPOINTMENT (OUTPATIENT)
Dept: NUCLEAR MEDICINE | Facility: CLINIC | Age: 73
DRG: 064 | End: 2018-01-23
Attending: NURSE PRACTITIONER
Payer: MEDICARE

## 2018-01-23 ENCOUNTER — APPOINTMENT (OUTPATIENT)
Dept: SPEECH THERAPY | Facility: CLINIC | Age: 73
DRG: 064 | End: 2018-01-23
Attending: INTERNAL MEDICINE
Payer: MEDICARE

## 2018-01-23 ENCOUNTER — APPOINTMENT (OUTPATIENT)
Dept: CARDIOLOGY | Facility: CLINIC | Age: 73
DRG: 064 | End: 2018-01-23
Attending: NURSE PRACTITIONER
Payer: MEDICARE

## 2018-01-23 VITALS
HEART RATE: 84 BPM | SYSTOLIC BLOOD PRESSURE: 135 MMHG | DIASTOLIC BLOOD PRESSURE: 84 MMHG | BODY MASS INDEX: 24.56 KG/M2 | RESPIRATION RATE: 20 BRPM | WEIGHT: 191.4 LBS | HEIGHT: 74 IN | OXYGEN SATURATION: 96 % | TEMPERATURE: 97.4 F

## 2018-01-23 LAB
GLUCOSE BLDC GLUCOMTR-MCNC: 123 MG/DL (ref 70–99)
GLUCOSE BLDC GLUCOMTR-MCNC: 123 MG/DL (ref 70–99)
GLUCOSE BLDC GLUCOMTR-MCNC: 126 MG/DL (ref 70–99)
GLUCOSE BLDC GLUCOMTR-MCNC: 159 MG/DL (ref 70–99)
GLUCOSE BLDC GLUCOMTR-MCNC: 218 MG/DL (ref 70–99)
LMWH PPP CHRO-ACNC: 0.16 IU/ML

## 2018-01-23 PROCEDURE — A9270 NON-COVERED ITEM OR SERVICE: HCPCS | Mod: GY

## 2018-01-23 PROCEDURE — A9270 NON-COVERED ITEM OR SERVICE: HCPCS | Mod: GY | Performed by: NURSE PRACTITIONER

## 2018-01-23 PROCEDURE — 40000225 ZZH STATISTIC SLP WARD VISIT: Performed by: SPEECH-LANGUAGE PATHOLOGIST

## 2018-01-23 PROCEDURE — 00000146 ZZHCL STATISTIC GLUCOSE BY METER IP

## 2018-01-23 PROCEDURE — 85520 HEPARIN ASSAY: CPT | Performed by: INTERNAL MEDICINE

## 2018-01-23 PROCEDURE — 34300033 ZZH RX 343: Performed by: INTERNAL MEDICINE

## 2018-01-23 PROCEDURE — 25000132 ZZH RX MED GY IP 250 OP 250 PS 637: Mod: GY | Performed by: INTERNAL MEDICINE

## 2018-01-23 PROCEDURE — 25000132 ZZH RX MED GY IP 250 OP 250 PS 637: Mod: GY

## 2018-01-23 PROCEDURE — 25000128 H RX IP 250 OP 636: Performed by: INTERNAL MEDICINE

## 2018-01-23 PROCEDURE — 93016 CV STRESS TEST SUPVJ ONLY: CPT | Performed by: INTERNAL MEDICINE

## 2018-01-23 PROCEDURE — 25000132 ZZH RX MED GY IP 250 OP 250 PS 637: Mod: GY | Performed by: NURSE PRACTITIONER

## 2018-01-23 PROCEDURE — 78452 HT MUSCLE IMAGE SPECT MULT: CPT

## 2018-01-23 PROCEDURE — 40000855 ZZH STATISTIC ECHO STRESS OR NM NPI

## 2018-01-23 PROCEDURE — 93018 CV STRESS TEST I&R ONLY: CPT | Performed by: INTERNAL MEDICINE

## 2018-01-23 PROCEDURE — 93017 CV STRESS TEST TRACING ONLY: CPT

## 2018-01-23 PROCEDURE — 99232 SBSQ HOSP IP/OBS MODERATE 35: CPT | Mod: 25 | Performed by: INTERNAL MEDICINE

## 2018-01-23 PROCEDURE — 36415 COLL VENOUS BLD VENIPUNCTURE: CPT | Performed by: INTERNAL MEDICINE

## 2018-01-23 PROCEDURE — A9502 TC99M TETROFOSMIN: HCPCS | Performed by: INTERNAL MEDICINE

## 2018-01-23 PROCEDURE — 25000131 ZZH RX MED GY IP 250 OP 636 PS 637: Mod: GY | Performed by: INTERNAL MEDICINE

## 2018-01-23 PROCEDURE — 78452 HT MUSCLE IMAGE SPECT MULT: CPT | Mod: 26 | Performed by: INTERNAL MEDICINE

## 2018-01-23 PROCEDURE — 99239 HOSP IP/OBS DSCHRG MGMT >30: CPT | Performed by: HOSPITALIST

## 2018-01-23 PROCEDURE — 92507 TX SP LANG VOICE COMM INDIV: CPT | Mod: GN | Performed by: SPEECH-LANGUAGE PATHOLOGIST

## 2018-01-23 PROCEDURE — A9270 NON-COVERED ITEM OR SERVICE: HCPCS | Mod: GY | Performed by: INTERNAL MEDICINE

## 2018-01-23 PROCEDURE — 99232 SBSQ HOSP IP/OBS MODERATE 35: CPT | Performed by: PSYCHIATRY & NEUROLOGY

## 2018-01-23 RX ORDER — METOPROLOL SUCCINATE 25 MG/1
25 TABLET, EXTENDED RELEASE ORAL DAILY
Status: DISCONTINUED | OUTPATIENT
Start: 2018-01-23 | End: 2018-01-23 | Stop reason: HOSPADM

## 2018-01-23 RX ORDER — ATORVASTATIN CALCIUM 40 MG/1
40 TABLET, FILM COATED ORAL DAILY
Qty: 10 TABLET | Refills: 0 | Status: SHIPPED | OUTPATIENT
Start: 2018-01-23 | End: 2021-03-23

## 2018-01-23 RX ORDER — AMINOPHYLLINE 25 MG/ML
50-100 INJECTION, SOLUTION INTRAVENOUS
Status: DISCONTINUED | OUTPATIENT
Start: 2018-01-23 | End: 2018-01-23

## 2018-01-23 RX ORDER — REGADENOSON 0.08 MG/ML
0.4 INJECTION, SOLUTION INTRAVENOUS ONCE
Status: COMPLETED | OUTPATIENT
Start: 2018-01-23 | End: 2018-01-23

## 2018-01-23 RX ORDER — ACYCLOVIR 200 MG/1
0-1 CAPSULE ORAL
Status: DISCONTINUED | OUTPATIENT
Start: 2018-01-23 | End: 2018-01-23

## 2018-01-23 RX ORDER — ALBUTEROL SULFATE 90 UG/1
2 AEROSOL, METERED RESPIRATORY (INHALATION) EVERY 5 MIN PRN
Status: DISCONTINUED | OUTPATIENT
Start: 2018-01-23 | End: 2018-01-23

## 2018-01-23 RX ORDER — METOPROLOL SUCCINATE 25 MG/1
25 TABLET, EXTENDED RELEASE ORAL DAILY
Qty: 10 TABLET | Refills: 0 | Status: SHIPPED | OUTPATIENT
Start: 2018-01-23 | End: 2018-04-11

## 2018-01-23 RX ORDER — NICOTINE 21 MG/24HR
1 PATCH, TRANSDERMAL 24 HOURS TRANSDERMAL DAILY
Qty: 10 PATCH | Refills: 0 | Status: SHIPPED | OUTPATIENT
Start: 2018-01-24 | End: 2018-08-17

## 2018-01-23 RX ORDER — ASPIRIN 81 MG/1
81 TABLET, CHEWABLE ORAL DAILY
Qty: 30 TABLET | Refills: 0 | Status: SHIPPED | OUTPATIENT
Start: 2018-01-24 | End: 2018-03-21

## 2018-01-23 RX ORDER — ASPIRIN 81 MG/1
81 TABLET, CHEWABLE ORAL DAILY
Status: DISCONTINUED | OUTPATIENT
Start: 2018-01-24 | End: 2018-01-23 | Stop reason: HOSPADM

## 2018-01-23 RX ADMIN — TETROFOSMIN 9.9 MCI.: 1.38 INJECTION, POWDER, LYOPHILIZED, FOR SOLUTION INTRAVENOUS at 07:20

## 2018-01-23 RX ADMIN — INSULIN GLARGINE 20 UNITS: 100 INJECTION, SOLUTION SUBCUTANEOUS at 14:17

## 2018-01-23 RX ADMIN — TETROFOSMIN 31.8 MCI.: 1.38 INJECTION, POWDER, LYOPHILIZED, FOR SOLUTION INTRAVENOUS at 09:30

## 2018-01-23 RX ADMIN — NICOTINE 1 PATCH: 21 PATCH, EXTENDED RELEASE TRANSDERMAL at 10:00

## 2018-01-23 RX ADMIN — ASPIRIN 325 MG: 325 TABLET, DELAYED RELEASE ORAL at 14:17

## 2018-01-23 RX ADMIN — INSULIN ASPART 2 UNITS: 100 INJECTION, SOLUTION INTRAVENOUS; SUBCUTANEOUS at 18:39

## 2018-01-23 RX ADMIN — METOPROLOL SUCCINATE 25 MG: 25 TABLET, EXTENDED RELEASE ORAL at 17:23

## 2018-01-23 RX ADMIN — CLOPIDOGREL 75 MG: 75 TABLET, FILM COATED ORAL at 14:17

## 2018-01-23 RX ADMIN — REGADENOSON 0.4 MG: 0.08 INJECTION, SOLUTION INTRAVENOUS at 09:27

## 2018-01-23 ASSESSMENT — VISUAL ACUITY
OU: NORMAL ACUITY
OU: NORMAL ACUITY

## 2018-01-23 NOTE — PROGRESS NOTES
[facility]  Cardiology Progress Note  Date of Service: 01/23/2018       Physician Supervisory Attestation:   I have reviewed and discussed with Patt Moreno NP the history, physical and plan and independently interviewed and examined Yanick Adorno and agree with the plan as stated in the note.    --- Reviewed the patient's nuclear stress test. No significant ischemia is note. He's never had chest pain the pattern of TnI elevation didn't suggest ACS. We will continue Eliquis given PAF and low dose aspirin given probable CAD    -- Ok to discharge with close follow up    Edin Choi MD 1/23/2018 3:41 PM        Assessment & Plan    Yanick Adorno is a 72 year old male with past medical history significant for T2DM, tobacco abuse admitted on 1/21/2018 with with confusion and altered speech. He was found to have a subacute left frontal stroke of the left frontal lobe.     Assessment:  1. Elevated troponin: 1.244-1.228-1.027. Echo showed LVEF 40-45% with mild to moderate LVH and mild to moderate global hypokinesia of the LV. Lexiscan showed small fixed apical defect with only trival associated cande-infarct ischemia. Small area of ischemia in the inferolateral mid ventricle like diaphragmatic attenuation.  2. Acute left frontal stroke:, likely embolic per neurology. Recommendation is ASA and Plavix x 3 months. Negative bubble study. Atorvastatin 40 mg daily  3. Paroxysmal atrial fibrillation/flutter: Noted overnight on tele. CVR. Neurology is recommending anticoagulation with Eliquis  4. NSVT: 6 beats, asymptomatic  5. Tobacco abuse: Cessation highly recommended  6. Type 2 DM: per IM    Plan:   1. Medical management recommended for troponin elevation  2. Anticoagulation on Eliquis 5 mg BID  3. Continue ASA, but decrease to 81 mg   4. Discontinue Plavix   5. Add Metoprolol XL 25 mg.   6. OK to discharge per cardiology    [facility]:   ABENA Franklin, CNP 2/9  Dr. Choi in March    ABENA Franklin,  CNP  [facility]  Pager: [phone]     Interval History   No chest pain, SOB, orthopnea. Ambulated with rehab without complaint.    Physical Exam   Temp: 98.3  F (36.8  C) Temp src: Oral BP: 136/73   Heart Rate: 81 Resp: 18 SpO2: 97 % O2 Device: None (Room air)    Vitals:    01/21/18 0140 01/22/18 0202 01/23/18 0548   Weight: 88.2 kg (194 lb 7.1 oz) 89 kg (196 lb 3.4 oz) 86.8 kg (191 lb 6.4 oz)       Constitutional:   NAD   Skin:   Warm and dry   Head:   Nontraumatic   Neck:   no JVD   Lungs:   symmetric, clear to auscultation   Cardiovascular:   regular rate and rhythm, S1S2, no murmurs appreciated   Abdomen:   Benign   Extremities and Back:   No edema   Neurological:   Left facial droop       Medications     HEParin 1,000 Units/hr (01/23/18 0619)     - MEDICATION INSTRUCTIONS -       - MEDICATION INSTRUCTIONS -       NaCl 75 mL/hr at 01/22/18 1102       sodium chloride (PF)  10 mL Intravenous Once     atorvastatin  40 mg Oral Daily     aspirin EC  325 mg Oral Daily    Or     aspirin  300 mg Rectal Daily     insulin glargine  20 Units Subcutaneous QAM AC     insulin aspart  1-7 Units Subcutaneous TID AC     insulin aspart  1-5 Units Subcutaneous At Bedtime     nicotine   Transdermal Q8H     nicotine   Transdermal Daily     nicotine  1 patch Transdermal Daily     clopidogrel  75 mg Oral Daily       Data     Most Recent 3 BMP's:  Recent Labs   Lab Test  01/22/18   2140  01/22/18   0525  01/20/18   1938   NA  141  142  140   POTASSIUM  4.2  3.8  4.0   CHLORIDE  109  110*  106   CO2  25  27  32   BUN  16  14  22   CR  0.76  0.71  1.02   ANIONGAP  7  5  2*   ROSA  8.3*  8.1*  8.8   GLC  259*  140*  190*   Most Recent 3 Troponin's:  Recent Labs   Lab Test  01/22/18   2140  01/21/18   0530  01/21/18   0000   TROPI  0.585*  1.027*  1.228*     EKG: (reviewed personally) anterior sepal infarct pattern on EKG    Imaging: MRA: Recent moderate-sized ischemic infarct of the lateral aspect of the  left frontal lobe just above the  left sylvian fissure (left middle cerebral artery territory). No other recent infarcts.  2. Diffuse cerebral volume loss and cerebral white matter changes  consistent with chronic small vessel ischemic disease.    CTA: head and neck: IMPRESSION:   1. Recent infarct in the lateral left frontal lobe. No hemorrhage.  2. Brain atrophy and white matter changes consistent with sequelae of  small vessel ischemic disease.  3. Mild calcified atherosclerotic plaque in the right carotid siphon,  and tortuous cervical internal carotid arteries, but no significant  stenosis, arterial occlusion or thrombus.  4. Bilateral upper mediastinal adenopathy of indeterminate etiology.  Formal CT scan of the chest is suggested for further evaluation    Tele: Atrial fibrillation/Flutter with CVR last evening    Echo: As above    Last ischemic eval: none

## 2018-01-23 NOTE — PLAN OF CARE
Problem: Patient Care Overview  Goal: Plan of Care/Patient Progress Review  Outcome: Improving  Pt VSS on RA. Tele converted to afib/aflutter cvr last evening. A&Ox4, other neurological deficits include some facial drooping that comes and goes, and occasionally difficulty coming up with words . Up ad abbi, using bathroom. Denies pain or headache. Slept. Plan for lexiscan this am. Continue to monitor.

## 2018-01-23 NOTE — PLAN OF CARE
Problem: Patient Care Overview  Goal: Plan of Care/Patient Progress Review  Outcome: Adequate for Discharge Date Met: 01/23/18  D/C patient home with family, up in the room independent, no chest pain, tolerated food, review medications and D/C with family.

## 2018-01-23 NOTE — PROGRESS NOTES
Neuroscience Intensive Care Progress Note    2018    Assessment/Plan    In summary, Mr Adorno is a 72 year old man with hx of HTN, DM, Smoking presented with left MCA distribution embolic infarct. He also had mild troponin elevation with old anterior septal infarct for which cardiology is following him. His echo does have evidence of LA enlargement with EF 40-45%.    Patient was noted to be in A Flutter Vs Fib yesterday evening. That would explain embolic stroke. Repeat CTA imaging does not show <50% carotid stenosis with no plaque ulceration.     Plan :   - Heparin GTT, if needed from cardiology stand point - Otherwise consider Apixaban    - ASA and Plavix can be discontinued from stroke prophylaxis stand point    - Continue statin   - Cardiac monitoring to investigate stroke etiology not necessary     24 hour events:  A Flutter/ Fib noted.     24 Hour Vital Signs Summary:  Temperatures:  Current - Temp: 98  F (36.7  C); Max - Temp  Av.3  F (36.8  C)  Min: 98  F (36.7  C)  Max: 98.9  F (37.2  C)  Respiration range: Resp  Av.8  Min: 16  Max: 18  Pulse range: No Data Recorded  Blood pressure range: Systolic (24hrs), Av , Min:130 , Max:150   ; Diastolic (24hrs), Av, Min:72, Max:92    Pulse oximetry range: SpO2  Av.2 %  Min: 95 %  Max: 97 %    Ventilator Settings  Resp: 16      Intake/Output Summary (Last 24 hours) at 18 0619  Last data filed at 18 0820   Gross per 24 hour   Intake              480 ml   Output                0 ml   Net              480 ml            Current Medications:    sodium chloride (PF)  10 mL Intravenous Once     atorvastatin  40 mg Oral Daily     aspirin EC  325 mg Oral Daily    Or     aspirin  300 mg Rectal Daily     insulin glargine  20 Units Subcutaneous QAM AC     insulin aspart  1-7 Units Subcutaneous TID AC     insulin aspart  1-5 Units Subcutaneous At Bedtime     nicotine   Transdermal Q8H     nicotine   Transdermal Daily     nicotine  1 patch  "Transdermal Daily     clopidogrel  75 mg Oral Daily       PRN Medications:  sodium chloride (PF), HOLD MEDICATION, HOLD MEDICATION, HOLD MEDICATION, HOLD MEDICATION, HOLD MEDICATION, metoprolol, naloxone, melatonin, - MEDICATION INSTRUCTIONS -, prochlorperazine **OR** prochlorperazine **OR** prochlorperazine, ondansetron **OR** ondansetron, bisacodyl, polyethylene glycol, senna-docusate **OR** senna-docusate, acetaminophen, acetaminophen, potassium chloride, potassium chloride, potassium chloride, potassium chloride with lidocaine, potassium chloride, - MEDICATION INSTRUCTIONS -, magnesium sulfate, magnesium sulfate, glucose **OR** dextrose **OR** glucagon    Infusions:    HEParin 1,000 Units/hr (01/22/18 2204)     - MEDICATION INSTRUCTIONS -       - MEDICATION INSTRUCTIONS -       NaCl 75 mL/hr at 01/22/18 1102       Allergies   Allergen Reactions     No Known Drug Allergies        Physical Examination:  /72 (BP Location: Right arm)  Temp 98  F (36.7  C) (Oral)  Resp 16  Ht 1.88 m (6' 2\")  Wt 86.8 kg (191 lb 6.4 oz)  SpO2 97%  BMI 24.57 kg/m2  Alert oriented. Follows simple commands. EOMI, Symmetric face, not aphasic or dysarthric. Subtle right facial droop noted. Antigravity function noted in BL UE and LE. Localizes touch symmetrically.       Labs/Studies:  Recent Labs   Lab Test  01/22/18   2140  01/22/18   0525  01/21/18   0530  01/20/18   1938   NA  141  142   --   140   POTASSIUM  4.2  3.8   --   4.0   CHLORIDE  109  110*   --   106   CO2  25  27   --   32   ANIONGAP  7  5   --   2*   GLC  259*  140*   --   190*   BUN  16  14   --   22   CR  0.76  0.71   --   1.02   ROSA  8.3*  8.1*   --   8.8   WBC   --    --   6.6  8.3   RBC   --    --   4.04*  4.61   HGB   --    --   12.9*  14.5   PLT   --    --   233  275       No results for input(s): INR, PTT in the last 95068 hours.        Recent Labs  Lab 01/20/18  2003   O2PER Room Air           Imaging:  Reviewed.    Plan discussed with Dr. Luc Capone " MD Ehsan  Fellow   [id number]

## 2018-01-23 NOTE — PROGRESS NOTES
A follow-up appointment has been made for you with Candi Leon on Thursday, February 1st at 10:00 AM at [facility].  Please call the clinic at [phone] should you need to change or cancel your appointment.      A follow-up appointment has been made for you with Dr. Case Gonzalez on Wednesday, February 28th at 10:45 AM at [facility] [place] [facility].  Please call the clinic at [phone] should you need to change or cancel your appointment.    [facility] [address], [address]  [place] [address]

## 2018-01-23 NOTE — PLAN OF CARE
Problem: Patient Care Overview  Goal: Plan of Care/Patient Progress Review      Discharge Planner SLP   Patient plan for discharge: Home  Current status: Language Tx was provided this pm.  Improved word retrieval and reading comprehension were noted during the session.  Occasional semantic paraphasic error was observed during conversation.  Discharge to home planned today.  Barriers to return to prior living situation: None  Recommendations for discharge: Home with OP SLP   Rationale for recommendations: Insure resolution of aphasia and maximize communication for daily needs       Entered by: Candi Iyer 01/23/2018 4:02 PM     Speech Language Therapy Discharge Summary    Reason for therapy discharge:    Discharged to home with outpatient therapy.    Progress towards therapy goal(s). See goals on Care Plan in Epic electronic health record for goal details.  Goals partially met.  Barriers to achieving goals:   discharge from facility.    Therapy recommendation(s):    Continued therapy is recommended.  Rationale/Recommendations:  See note above.

## 2018-01-23 NOTE — PROVIDER NOTIFICATION
MD Notification    Notified Person:  MD    Notified Persons Name: Amrit Du LAURA    Notification Date/Time: 01/22/18 8:39 PM     Notification Interaction:  Talked with Physician    Purpose of Notification: Pt seems to be in new afib cvr, confirmed with EKG    Orders Received: Pt currently anticoagulated sufficiently, may consider heparin drip, prn rate control as needed, continue to monitor    Comments:
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
## 2018-01-23 NOTE — PLAN OF CARE
Problem: Patient Care Overview  Goal: Plan of Care/Patient Progress Review  OT: Patient gone for stress testing. Cancel OT this AM.

## 2018-01-23 NOTE — PLAN OF CARE
Problem: Patient Care Overview  Goal: Plan of Care/Patient Progress Review  Outcome: Improving  A&O x4. VSS on room air. Up independently, steady. Denied pain. CT angio completed today. Blood glucose monitored, insulin coverage given with meals. Plan for Lexiscan tomorrow morning, NPO at midnight. Discharge plan pending, possible discharge home tomorrow.

## 2018-01-23 NOTE — PLAN OF CARE
Problem: Patient Care Overview  Goal: Plan of Care/Patient Progress Review  PT: Per chart review, note that pt went into run of a flutter last night, pt gone for stress test, will hold until cardiology is able to review results.

## 2018-01-23 NOTE — DISCHARGE INSTRUCTIONS
A follow-up appointment has been made for you with Candi Leon on Thursday, February 1st at 10:00 AM at [facility].  Please call the clinic at [phone] should you need to change or cancel your appointment.       A follow-up appointment has been made for you with Dr. Case Gonzalez on Wednesday, February 28th at 10:45 AM at [facility].  Please call the clinic at [phone] should you need to change or cancel your appointment. The clinic will be sending you paperwork that you need to fill out and send back to the clinic prior to the appointment.     [facility] [address], [address]   [place] [address]

## 2018-01-24 LAB — INTERPRETATION ECG - MUSE: NORMAL

## 2018-01-24 NOTE — PLAN OF CARE
Problem: Patient Care Overview  Goal: Plan of Care/Patient Progress Review  Physical Therapy Discharge Summary    Reason for therapy discharge:    Discharged to home with outpatient therapy.    Progress towards therapy goal(s). See goals on Care Plan in [facility] electronic health record for goal details.  Goals met    Therapy recommendation(s):    Continued therapy is recommended.  Rationale/Recommendations:  OPPT to progress balance for community mobility.

## 2018-01-24 NOTE — PLAN OF CARE
Problem: Patient Care Overview  Goal: Plan of Care/Patient Progress Review  Occupational Therapy Discharge Summary    Reason for therapy discharge:    Discharged to home with outpatient therapy.    Progress towards therapy goal(s). See goals on Care Plan in [facility] electronic health record for goal details.  Goals not met.  Barriers to achieving goals:   discharge from facility.    Therapy recommendation(s):    Continued therapy is recommended.  Rationale/Recommendations:  To maximize I.

## 2018-01-25 ENCOUNTER — CARE COORDINATION (OUTPATIENT)
Dept: CARDIOLOGY | Facility: CLINIC | Age: 73
End: 2018-01-25

## 2018-01-25 LAB — INTERPRETATION ECG - MUSE: NORMAL

## 2018-01-25 NOTE — PROGRESS NOTES
Patient was evaluated by cardiology while inpatient for elevated troponin and paroxysmal afib (patient suffered left frontal stroke). Called patient to discuss any post hospital d/c questions he may have, review medication changes, and confirm f/u appts. Patient confirmed for RN he did have his rx for Eliquis. Patient denied any questions regarding new medications or changes to some of his current medications that he was taking prior to admission. Patient denied any SOB, chest pain, or light headedness. RN confirmed with patient that he has an apt scheduled on 2/9/18 with THOMAS Patt Moreno. Patient advised to call clinic with any cardiac related questions or concerns prior to his apt on 2/9/18. Patient verbalized understanding and agreed with plan.

## 2018-01-27 LAB
BACTERIA SPEC CULT: NO GROWTH
BACTERIA SPEC CULT: NO GROWTH
Lab: NORMAL
Lab: NORMAL
SPECIMEN SOURCE: NORMAL
SPECIMEN SOURCE: NORMAL

## 2018-02-01 ENCOUNTER — TRANSFERRED RECORDS (OUTPATIENT)
Dept: HEALTH INFORMATION MANAGEMENT | Facility: CLINIC | Age: 73
End: 2018-02-01

## 2018-02-05 ENCOUNTER — HOSPITAL ENCOUNTER (OUTPATIENT)
Dept: OCCUPATIONAL THERAPY | Facility: CLINIC | Age: 73
Setting detail: THERAPIES SERIES
End: 2018-02-05
Attending: HOSPITALIST
Payer: MEDICARE

## 2018-02-05 ENCOUNTER — HOSPITAL ENCOUNTER (OUTPATIENT)
Dept: PHYSICAL THERAPY | Facility: CLINIC | Age: 73
Setting detail: THERAPIES SERIES
End: 2018-02-05
Attending: HOSPITALIST
Payer: MEDICARE

## 2018-02-05 ENCOUNTER — HOSPITAL ENCOUNTER (OUTPATIENT)
Dept: SPEECH THERAPY | Facility: CLINIC | Age: 73
Setting detail: THERAPIES SERIES
End: 2018-02-05
Attending: HOSPITALIST
Payer: MEDICARE

## 2018-02-05 PROCEDURE — G9163 LANG EXPRESS GOAL STATUS: HCPCS | Mod: GN,CI | Performed by: SPEECH-LANGUAGE PATHOLOGIST

## 2018-02-05 PROCEDURE — 97161 PT EVAL LOW COMPLEX 20 MIN: CPT | Mod: GP | Performed by: PHYSICAL THERAPIST

## 2018-02-05 PROCEDURE — 97166 OT EVAL MOD COMPLEX 45 MIN: CPT | Mod: GO | Performed by: OCCUPATIONAL THERAPIST

## 2018-02-05 PROCEDURE — G8979 MOBILITY GOAL STATUS: HCPCS | Mod: GP,CI | Performed by: PHYSICAL THERAPIST

## 2018-02-05 PROCEDURE — 92523 SPEECH SOUND LANG COMPREHEN: CPT | Mod: GN | Performed by: SPEECH-LANGUAGE PATHOLOGIST

## 2018-02-05 PROCEDURE — 40000719 ZZHC STATISTIC PT DEPARTMENT NEURO VISIT: Performed by: PHYSICAL THERAPIST

## 2018-02-05 PROCEDURE — 97110 THERAPEUTIC EXERCISES: CPT | Mod: GP | Performed by: PHYSICAL THERAPIST

## 2018-02-05 PROCEDURE — 97535 SELF CARE MNGMENT TRAINING: CPT | Mod: GO | Performed by: OCCUPATIONAL THERAPIST

## 2018-02-05 PROCEDURE — 40000125 ZZHC STATISTIC OT OUTPT VISIT: Performed by: OCCUPATIONAL THERAPIST

## 2018-02-05 PROCEDURE — 40000211 ZZHC STATISTIC SLP  DEPARTMENT VISIT: Performed by: SPEECH-LANGUAGE PATHOLOGIST

## 2018-02-05 PROCEDURE — G9169 MEMORY GOAL STATUS: HCPCS | Mod: GO,CI | Performed by: OCCUPATIONAL THERAPIST

## 2018-02-05 PROCEDURE — G9162 LANG EXPRESS CURRENT STATUS: HCPCS | Mod: GN,CK | Performed by: SPEECH-LANGUAGE PATHOLOGIST

## 2018-02-05 PROCEDURE — G9168 MEMORY CURRENT STATUS: HCPCS | Mod: GO,CJ | Performed by: OCCUPATIONAL THERAPIST

## 2018-02-05 PROCEDURE — G8978 MOBILITY CURRENT STATUS: HCPCS | Mod: GP,CJ | Performed by: PHYSICAL THERAPIST

## 2018-02-05 ASSESSMENT — ACTIVITIES OF DAILY LIVING (ADL): IADL_QUICK_ADDS: MEAL PLANNING/PREPARATION;HOME/FINANCIAL/MANAGEMENT;COMMUNICATION/COMPUTER USE;COMMUNITY MOBILITY

## 2018-02-05 NOTE — PROGRESS NOTES
02/05/18 1200   Quick Adds   Quick Adds Certification   Type of Visit Initial Outpatient Occupational Therapy Evaluation   General Information   Start Of Care Date 02/05/18   Referring Physician Poornima Khoury   Orders Evaluate and treat as indicated   Orders Date 01/23/18   Medical Diagnosis L MCA frontal embolic CVA   Onset of Illness/Injury or Date of Surgery 01/21/18  (pt admitted, sx did start 1/18/18)   Surgical/Medical History Reviewed Yes   Additional Occupational Profile Info/Pertinent History of Current Problem Pt is an active 73 y/o who started having symptoms of blance changes, confucion and word finding on 1/18/18.  Did go to work for 2 days witout anyone susepcting changes, but family noted continued issues and brought him into ED>  Was found to have vertricular tachycardia and L MCA subacute infarct at L frontal area, was in ICU at [facility] for 1/21 - 1/23/18 and was discharged with 24 hour supervision of his family.  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   Role/Living Environment   Current Community Support Family/friend caregiver  (lives with wife/does not drive.  daughter drives to appts.)   Patient role/Employment history Employed  (Car [employer] )   Community/Avocational Activities Reads the newpaper daily.  Plays computer based solitiare and word games.   Current Living Environment House   Prior Level - Transfers Independent   Prior Level - Ambulation Independent   Prior Level - ADLS Independent   Prior Responsibilities - IADL Meal Preparation;Housekeeping;Laundry;Shopping;Yardwork;Medication management;Finances;Driving;Work   Prior Level Comments Pt mostly does the outside work, home maintenance tasks, fixing things. Wife tends to do the finances, house work, cooking and cleaning.  They grocery shop and do finances together.  She stopped driving 2-3 years ago per her vision changes ( worked heavily under microscopes).  Their daughter Inna is supportive, present today, now doing all of  "the driving for her parents/appointments and shopping.     Patient/family Goals Statement \" I really feel pretty normal.  I feel a bit more tired, taking more naps, but I do things around the house and take care of myself.  I just want to know when I can get back to driving and working.\"     Fall Risk Screen   Fall screen completed by OT   Have you fallen 2 or more times in the past year? No   Have you fallen and had an injury in the past year? No   Is patient a fall risk? Yes;Department fall risk interventions implemented   Fall screen comments per new Dx.   Cognitive Status Examination   Orientation Orientation to person, place and time   Level of Consciousness Alert   Follows Commands and Answers Questions Able to follow multistep instructions;75% of the time   Personal Safety and Judgment Impaired  (Pt lacks insight, not aware of deficits.)   Memory Impaired   Memory Comments [place] Cognitive Assessment:  Pt gets 19/30, moderate cog deficits.   Does well with 6/6 orientation, animal naming, word fluency sentence and number recall.  Gets 2/5 on serial 7's,  0/5 on delayed recall words, not able to word find 'tools of measurement' for abstraction.  Makes mildly impulsive errors during finger tap/attention task. Visual Spatial tasks were very challenging for keeping  letter/number pattern going, not fully accurate with copying square deign and was unsure how to place the hands of the clock, but has good countour and number placement    Attention Alternating attention impaired, difficulty shifting between tasks;Divided attention impaired, difficulty with simultaneous tasks   Organization/Problem Solving Prioritizing of information/tasks impaired   Executive Function Cognitive flexibility impaired;Working memory impaired, decreased storage of information for performing tasks;Planning ability impaired;Self awareness/monitoring impaired   Cognitive Comment Symbol Digit Modalities test (timed problem solving):  " Decodes 36/36 shapes in 90 seconds ( WNL where > 33 is WNL).     Visual Perception   Visual Perception No deficits were identified   Visual Field Appears WNL but will montor closely with periometry during IADL   Visual Attention Tested with Dynavision tasks.:  Mode A gets 49 hits/min  ( BNL where 52 is  WNL) and 2nd trial, gets 53 hits.  WIth mode B at 1.0 light speed, decreases to 28/65.  Slight decreased of R sided speed, will monitor closely   Oculomotor WNL tracking, sccades and NPC.     Sensation   Upper Extremity Sensory Examination No deficits were identified   Range of Motion (ROM)   ROM Comments Pt has h/o L shoulder fracture 5 years ago and cyst relase of plamr adhesions at L RF/SF.     Strength   Strength Comments 5/5 MMT for BUE, balanced.   Hand Strength   Hand Dominance Left   Left Hand  (pounds) 87 pounds   Right Hand  (pounds) 94 pounds   Left Lateral Pinch (pounds) 22 pounds   Right Lateral Pinch (pounds) 24 pounds   Left Three Point Pinch (pounds) 21 pounds   Right Three Point Pinch (pounds) 22 pounds   Coordination   Gross Motor Coordination Good symmetry, no dysmtria or tremor noted w  FNF and disdiadokinesis.   Left Hand, Nine Hole Peg Test (seconds) 31   Right Hand, Nine Hole Peg Test (seconds) 33   Functional Limitations Decreased speed  (Pt notes that this is how fast he is used to doing FMC.)   Functional Mobility   Ambulation (I) , no device needed.   Transfer Skills   Transfer Comments WNL   Lower Body Dressing   Lower Body Dressing Comments Pt's bathing, dressing rouitine takes longer per donna, ( Now takes 20 minutes 6/10 effort,  where he was able to do in 7 minutes total)   Eating/Self-Feeding   Eating/Self Feeding Comments Able to manage all fasterners, food packaging.     Activity Tolerance   Activity Tolerance Pt can stand for 10-15 minutes at a time.   Instrumental Activities of Daily Living Assessment   IADL Quick Adds Meal  Planning/Preparation;Home/Financial/Management;Communication/Computer Use;Community Mobility   Meal Planning/Preparation Pt able to make simple meals.   Home/Financial Management Manages meds with use of monthly med box, SBA with setting up, recalls admin times (I).  Having supervision with doing bills. Poor written legibility.     Communication/Computer Use uses [employer] for his bills, managing auto payments, web search.   Community Mobility Dependent on his daughter to drive   Planned Therapy Interventions   Planned Therapy Interventions Cognitive skills;Cognitive performance testing;Coordination training;Strengthening;Stretching;Visual perception   Adult OT Eval Goals   OT Eval Goals (Adult) 1;2;3;4;5;6   OT Goal 1   Goal Identifier Visual Attention   Goal Description OT to complete more in depth screening of cande visual skills as further deficits suspected; pt to demonstrate balanced visual awareness of all visual fields by scoring > 9/10 B on Scan Course Task and all modes of Dynavision to WNL.   Target Date 05/06/18   OT Goal 2   Goal Identifier STM   Goal Description 5/6/18   Target Date 05/06/18   OT Goal 3   Goal Identifier Money Management   Goal Description Patient will demonstrate the ability to complete simple to moderately complex money management tasks with 90% accuracy for increased attention to detail and problem solving skills to resume finances at home and manage money in the community.   Target Date 05/06/18   OT Goal 4   Goal Identifier CAM, IADL results   Goal Description Patient and family to verbalize understanding of  IADL screening, and Cognitive Assessment of [place] results and identify 3 strategies to increase patient's safety and independence in the home and community setting.   Target Date 05/06/18   OT Goal 5   Goal Identifier Errands   Goal Description Pt to independently complete executive functioning task sequence of 10-12 step Errands checklist ( clinic based series of daily  functioning tasks) with >90% accuracy for preparation, temporal awareness, organization/consideration of location, duration, phone/web/mailing/copying and sequence of tasks.   Target Date 05/06/18   OT Goal 6   Goal Identifier FMC   Goal Description Pt to demonstrate improved fine motor coordination by performing nine hole peg test in 25 seconds for B hands to support improved ability to cut/chop/prepare foods and write legibly.   Target Date 05/06/18   Clinical Impression   Criteria for Skilled Therapeutic Interventions Met Yes, treatment indicated   OT Diagnosis Decreased ADL/IADL   Influenced by the following impairments FMC, executive funcationing,work ing memory, divided attention, limited insight into deficits   Assessment of Occupational Performance 3-5 Performance Deficits   Identified Performance Deficits Needs A w driving, medications, finances.  UNable to work at this time.     Clinical Decision Making (Complexity) Moderate complexity   Therapy Frequency 2x/week   Predicted Duration of Therapy Intervention (days/wks) 12 weeks   Risks and Benefits of Treatment have been explained. Yes   Patient, Family & other staff in agreement with plan of care Yes   Education Assessment   Barriers To Learning No Barriers   Preferred Learning Style Listening;Reading;Demonstration;Pictures/video   Therapy Certification   Certification date from 02/05/18   Certification date to 05/06/18   Total Evaluation Time   Total Evaluation Time 35       Thank you for this thoughtful referral! If you have any questions, please call me [phone].  Nice to share in this patient's care with you.    Sincerely,   Rosa Vega, OTKEVIN/cece

## 2018-02-05 NOTE — PROGRESS NOTES
[facility]      Outpatient Physical Therapy Evaluation  PLAN OF TREATMENT FOR OUTPATIENT REHABILITATION  (COMPLETE FOR INITIAL CLAIMS ONLY)  Patient's Last Name, First Name, M.I.  YOB: 1945  Yanick Adorno                        Provider's Name  [facility] Medical Record No.  [id number]                               Onset Date:  1/21/18   Start of Care Date: 2/5/18     Type: Physical Therapy Medical Diagnosis: CVA due to embolism of L MCA                        Therapy Diagnosis:  Impaired gait   Visits from SOC:  1   _________________________________________________________________________________  Plan of Treatment:      Frequency/Duration: 1 x a week for 4 sessions in 8 weeks.      Goals: see eval in EPIC  _________________________________________________________________________________    I CERTIFY THE NEED FOR THESE SERVICES FURNISHED UNDER        THIS PLAN OF TREATMENT AND WHILE UNDER MY CARE     (Physician co-signature of this document indicates review and certification of the therapy plan).                Certification date from: 2/5/18  Certification date to: 4/2/18    Referring Provider: FLORENCE DOVE MD

## 2018-02-05 NOTE — PROGRESS NOTES
[facility] OUTPATIENT SPEECH-LANGUAGE PATHOLOGY EVALUATION 02/05/18 0900       Present No   General Information   Type of Evaluation Speech and Language   Type Of Visit Initial   Start Of Care Date 02/05/18   Referring Physician Dr. Peng MD   Orders Evaluate And Treat   Medical Diagnosis L CVA    Onset Of Illness/injury Or Date Of Surgery 01/21/18   Precautions/Limitations no known precautions/limitations   Hearing WFL   Surgical/Medical history reviewed Yes   Pertinent History Of Current Problem Catrachito Mahmood, is a 72-y.o. male with PMH significant for a left CVA on 1/21/2018. Pt received in-patient SLP services addressing verbal expression, reading comprehension, and written expression. Pt discharged on 1/23/2018 with OP SLP orders to evaluate and treat expressive/receptive language. Pt presents today with his wife and daughter, pt did not express any concerns at start of evaluation.    Prior Level Of Function Comment Pt did not have impairments prior to stroke.   Current Community Support  Family/friend caregiver   Patient Role/employment History Employed;Other/comments  ([employer] for [employer] years. [employer]  )   Living environment [place]   Patient/family Goals Pt reports he wants to improve his memory and return to work.   Fall Risk Screen   Fall screen completed by OT   Have you fallen 2 or more times in the past year? No   Have you fallen and had an injury in the past year? No   Is patient a fall risk? Department fall risk interventions implemented;Yes   Fall screen comments per OT report   Pain Assessment   Pain Reported No   Language: Auditory Comprehension (understanding of spoken language)   Comments (Auditory Comprehension) SLP: Did not test at this evalution as Pt evaluated as IP on 1/22/2018 and no impairments were id'ed at that time.    Language: Verbal Expression (use of spoken language to express information)   Tests were administered at the following  levels Basic (rote activities);Moderate (routine daily activities);Complex (vocation/community/social activities)   Automatized Sequences; [place] Diagnostic Aphasia Exam (out of 8 total) 8   Phrase/Sentence Completion (out of 10 total) 10   Responsive Naming; [place] Diagnostic Aphasia Exam 3 (out of 20 total) 20   Biographic Information (out of 3 total) 3   Generate Sentences; [place] Test for Differential Diagnosis Of Aphasia (out of 6 total) 5   [place] Naming Test, short form (out of 15 total) 14   Define Words; [place] Test for Differential Diagnosis Of Aphasia (out of 10 total) 4.5   Generative Naming Score; Cognitive Linguistic Quick Test 4   Generative Naming; Cognitive Linguistic Quick Test Result Below mean   Conversation; [place] Diagnostic Aphasia Exam rating (out of 5 total) 4   Functional Assessment Scale (Verbal Expression) Mild to Moderate Impairment   Comments (Verbal Expression) SLP: at the complex level, Pt scored mild/mod below average.   Reading Comprehension (understanding of written language)   Tests were administered at the following levels Basic (rote activities);Complex (vocation/community/social activities);Moderate (routine daily activities)   Simple Phrase/Sentence (out of 15 total) 14   Commands (out of 5 total) 3   Sentences and Paragraphs; [place] Diagnostic Aphasia Exam (out of 10 total) 10   Functional Reading; Reading Comprehension Battery for Aphasia (out of 10 total) 4   Functional Assessment Scale (Reading Comprehension) Mild to Moderate Impairment   Comments (Reading Comprehension) At moderate level, Pt demonstrated moderate errors. Pt was not aware of errors independently.   Written Expression (use of writing to express information)   Tests were administered at the following levels Basic (rote activities);Moderate (routine daily activities);Complex (vocation/community/social activities)   Mechanics of Writing; [place] Diagnostic Aphasia Exam (out of 5 total) 5  (2  self-corrections noted)   Recall Written Symbols; [place] Diagnostic Aphasia Exam (out of 62 total) 62   Spelling to Dictation; [place] Diagnostic Aphasia Exam (out of 10 total) 8   Generate Sentences; [place] Test for Differential Diagnosis Of Aphasia (out of 6 total) 4  (Spelling/ redd errors noted)   Narrative Writing; [place] Diagnostic Aphasia Exam 3 Cookie Theft (out of 11 total) 6   Functional Assessment Scale (Written Expression) Mild to Moderate Impairment   Comments (Written Expression) Paraphasic errors and omission of words noted. Patient was not aware of errors independently   Pragmatics (the social or functional use of a language)   Comments (Pragmatics) Pt took phone call in session for 3 minutes, unaware of SLP ready to begin eval.   Cognitive Status Examination   Cognitive Status Exam Comments OT to address cognition.    Education Assessment   Barriers to Learning Cognitive   Preferred Learning Style Listening;Reading;Pictures/video;Demonstration   General Therapy Interventions   Planned Therapy Interventions Language   Language Reading comprehension;Verbal expression;Written expression   Clinical Impression, SLP Eval   Criteria for Skilled Therapeutic Interventions Met yes;treatment indicated   SLP Diagnosis Mild/Moderate Expressive/Receptive Aphasia   Functional limitations due to impairments At this time Pt unable to safely and independently return to work   Rehab potential affected by Theapy attendance and carryover HEP   Therapy Frequency 2 times;per week   Predicted Duration of Therapy Intervention (days/wks) 6 wks   Risks and Benefits of Treatment have been explained. Yes   Patient, Family & other staff in agreement with plan of care Yes   Clinical Impression Comments Mr. Adorno presents with mild to moderate expressive/receptive aphasia characterized by difficulty in complex verbal communication such as when on the phone, written expression such as writing emails, and reading comprehension  such as following written directions. Based on the results of this evaluation, SLP recommends Pt to receive skilled intervention 2x/wk for 6-8 weeks pending progress. Therapy to address word-finding strategies and complex level written and reading skills.   Language/Cognition Goals   Language/Cognition Goals 1;2;3   Language/Cognition Goal 1   Goal Identifier LTG 1- Language- Verbal Expression (V/E)   Goal Description Pt will learn, implement, and demonstrate use of 3 word-finding strategies with 80% accuracy during complex word finding tasks, provided 0-min cues in order to talk on the phone with customers.   Target Date 04/02/18   Language/Cognition Goal 2   Goal Identifier LTG 2- Language- Written Expression (W/E)   Goal Description Patient will complete a complex writing task (e.g. generating an email for work) with 80% accuracy provided 0-min cues in order to take notes while on the phone with a customer.   Target Date 04/02/18   Language/Cognition Goal 3   Goal Identifier LTG 3- Language- Reading Comprehension (R/C)   Goal Description Patient will complete a moderate to complex reading task with 80% accuracy provided 0-minimal cues in order to read a sports article.    Target Date 04/02/18   Total Session Time   Total Evaluation Time 38 (+8 minutes treatment)     Thank you for referring Nicholas Adorno  to outpatient therapy at [facility] in [place].  Please call Celeste Lee MA, SLP-CCC at [phone] or email [email or web address] with any questions or concerns.      MOSHE Golden  Speech-Language Pathology        Celeste Lee M.A., SLP-CCC  Speech-Language Pathologist

## 2018-02-05 NOTE — PROGRESS NOTES
[facility]          OUTPATIENT OCCUPATIONAL THERAPY  EVALUATION  PLAN OF TREATMENT FOR OUTPATIENT REHABILITATION  (COMPLETE FOR INITIAL CLAIMS ONLY)  Patient's Last Name, First Name, M.I.  YOB: 1945  Yanick Adorno                        Provider's Name  [facility] Medical Record No.  [id number]                               Onset Date:     01/21/18 (pt admitted, sx did start 1/18/18)   Start of Care Date:     02/05/18   Type:     ___PT   _X_OT   ___SLP Medical Diagnosis:     L MCA frontal embolic CVA                          OT Diagnosis:     Decreased ADL/IADL Visits from SOC:  1   _________________________________________________________________________________  Plan of Treatment/Functional Goals:  Cognitive skills, Cognitive performance testing, Coordination training, Strengthening, Stretching, Visual perception         Goals  Goal Identifier: Visual Attention  Goal Description: OT to complete more in depth screening of cande visual skills as further deficits suspected; pt to demonstrate balanced visual awareness of all visual fields by scoring > 9/10 B on Scan Course Task and all modes of Dynavision to WNL.  Target Date: 05/06/18     Goal Identifier: STM  Goal Description: 5/6/18  Target Date: 05/06/18     Goal Identifier: Money Management  Goal Description: Patient will demonstrate the ability to complete simple to moderately complex money management tasks with 90% accuracy for increased attention to detail and problem solving skills to resume finances at home and manage money in the community.  Target Date: 05/06/18     Goal Identifier: CAM, IADL results  Goal Description: Patient and family to verbalize understanding of  IADL screening, and Cognitive Assessment of [place] results and identify 3 strategies to increase patient's safety and independence in the home and  community setting.  Target Date: 05/06/18     Goal Identifier: Errands  Goal Description: Pt to independently complete executive functioning task sequence of 10-12 step Errands checklist ( clinic based series of daily functioning tasks) with >90% accuracy for preparation, temporal awareness, organization/consideration of location, duration, phone/web/mailing/copying and sequence of tasks.  Target Date: 05/06/18     Goal Identifier[place] [facility]  [place]al Description: Pt to demonstrate improved fine motor coordination by performing nine hole peg test in 25 seconds for B hands to support improved ability to cut/chop/prepare foods and write legibly.  Target Date: 05/06/18            Therapy Frequency: 2x/week     Predicted Duration of Therapy Intervention (days/wks): 12 weeks ( starting with 8 weeks)  CHILO Aldrich          I CERTIFY THE NEED FOR THESE SERVICES FURNISHED UNDER        THIS PLAN OF TREATMENT AND WHILE UNDER MY CARE     (Physician co-signature of this document indicates review and certification of the therapy plan).                 ,    Certification date from: 02/05/18, Certification date to: 05/06/18               Referring Physician: Poornima Khoury     Initial Assessment        See Epic Evaluation      Start Of Care Date: 02/05/18
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
## 2018-02-06 ENCOUNTER — OFFICE VISIT (OUTPATIENT)
Dept: CARDIOLOGY | Facility: CLINIC | Age: 73
End: 2018-02-06
Payer: MEDICARE

## 2018-02-06 VITALS
HEART RATE: 74 BPM | HEIGHT: 74 IN | DIASTOLIC BLOOD PRESSURE: 72 MMHG | WEIGHT: 199.4 LBS | SYSTOLIC BLOOD PRESSURE: 118 MMHG | BODY MASS INDEX: 25.59 KG/M2

## 2018-02-06 DIAGNOSIS — I48.0 PAROXYSMAL ATRIAL FIBRILLATION (H): ICD-10-CM

## 2018-02-06 DIAGNOSIS — I63.412 CEREBROVASCULAR ACCIDENT (CVA) DUE TO EMBOLISM OF LEFT MIDDLE CEREBRAL ARTERY (H): ICD-10-CM

## 2018-02-06 DIAGNOSIS — I42.9 CARDIOMYOPATHY, UNSPECIFIED TYPE (H): Primary | ICD-10-CM

## 2018-02-06 PROCEDURE — 99214 OFFICE O/P EST MOD 30 MIN: CPT | Performed by: PHYSICIAN ASSISTANT

## 2018-02-06 NOTE — LETTER
2018    [facility] [address]    RE: Yanick Adorno       Dear Colleague,    I had the pleasure of seeing Yanick Adorno in the [facility].    CARDIOLOGY CLINIC PROGRESS NOTE    DOS: 2018    Yanick Adorno  : 1945, 72 year old  MRN: [id number]      History:   I had the pleasure of meeting Yanick Adorno today in the cardiology clinic for follow up of a recent hospital admission. He was accompanied by his wife, Cindy.     Review and summary of recent hospital admission:   Yanick is a 72 year old male with past medical history significant for T2DM, tobacco abuse, who was admitted on 2018 with with confusion and altered speech. He was found to have a subacute left frontal stroke of the left frontal lobe. In addition, troponin was mildly elevated. There was no history of chest pain. Echo showed LVEF 40-45%, trace to mild MR, trace to mild AI, mild aortic root dilatation. He underwent a Lexiscan stress test that did not show any significant ischemia. He was started on ASA and statin for presumed CAD. He also went into afib with controlled rate during his admission and was started on Eliquis and BB.     Interval History:   He presents today for follow up. He tells me he is going to OT/PT/speech therapy. He is doing well without any major deficit.   He is taking his medications and denies any bleeding concerns or any other side effects. No chest pain, palpitations, SOB.   He has quit smoking, and is using the nicotine patch.   His dog bit him on the right lower leg. He said it has happened before - does not seem infected and it is healing. We reviewed when he should go in to have it evaluated and consider antibiotics.   [facility].   Has questions about how long he will be on meds.   Seeing neurology 18.   He works full time at a [employer]        ROS:  Skin:  Positive for pigmentation   Eyes:  Negative    ENT:  Negative    Respiratory:  Positive for cough  Cardiovascular:    Positive for;fatigue  Gastroenterology: Negative    Genitourinary:  Negative    Musculoskeletal:  Negative    Neurologic:  Positive for stroke;numbness or tingling of feet  Psychiatric:  Negative    Heme/Lymph/Imm:  Negative    Endocrine:  Positive for diabetes    PAST MEDICAL HISTORY:  Past Medical History:   Diagnosis Date     Esophageal reflux      Type II or unspecified type diabetes mellitus without mention of complication, not stated as uncontrolled        PAST SURGICAL HISTORY:  No past surgical history on file.    SOCIAL HISTORY:  Social History     Social History     Marital status:      Spouse name: N/A     Number of children: N/A     Years of education: N/A     Social History Main Topics     Smoking status: Former Smoker     Packs/day: 1.50     Years: 33.00     Quit date: 1/20/2018     Smokeless tobacco: Never Used     Alcohol use No     Drug use: None     Sexual activity: Not Asked     Other Topics Concern     None     Social History Narrative       FAMILY HISTORY:  Family History   Problem Relation Age of Onset     DIABETES Father      Uterine Cancer Mother      DIABETES Brother      DIABETES Brother      Suicide Brother      DIABETES Brother        MEDS:   Current Outpatient Prescriptions on File Prior to Visit:  aspirin 81 MG chewable tablet Take 1 tablet (81 mg) by mouth daily   apixaban ANTICOAGULANT (ELIQUIS) 5 MG tablet Take 1 tablet (5 mg) by mouth 2 times daily   atorvastatin (LIPITOR) 40 MG tablet Take 1 tablet (40 mg) by mouth daily   metoprolol succinate (TOPROL-XL) 25 MG 24 hr tablet Take 1 tablet (25 mg) by mouth daily   nicotine (NICODERM CQ) 21 MG/24HR 24 hr patch Place 1 patch onto the skin daily   insulin glargine (LANTUS) 100 UNIT/ML injection Inject 28 Units Subcutaneous every morning   GLIPIZIDE PO Take 10 mg by mouth 2 times daily (before  "meals)   METFORMIN HCL PO Take 500 mg by mouth 2 times daily (with meals)      No current facility-administered medications on file prior to visit.     ALLERGIES:   Allergies   Allergen Reactions     No Known Drug Allergies        PHYSICAL EXAM:  Vitals: /72 (BP Location: Right arm, Patient Position: Chair, Cuff Size: Adult Large)  Pulse 74  Ht 1.88 m (6' 2\")  Wt 90.4 kg (199 lb 6.4 oz)  BMI 25.6 kg/m2  Constitutional:  cooperative;well developed;well nourished;in no acute distress        Skin:  warm and dry to the touch   scattered nonraised erythematous lesion on LEs (patient says chronic), superficial abrasion (dog bite) right shin that has mild erythema (reports chronic) and scab without oozing or warmth    Head:  normocephalic        Eyes:  pupils equal and round;conjunctivae and lids unremarkable        ENT:  no pallor or cyanosis        Neck:  JVP normal        Chest:  clear to auscultation        Cardiac: regular rhythm       systolic murmur;grade 1          Abdomen:  abdomen soft;BS normoactive;non-tender        Vascular:                                        Extremities and Back:      no pitting edema    Neurological:  no gross motor deficits;affect appropriate          LABS/DATA:  I reviewed the following:  Echo with bubble study 1/21/18:  Interpretation Summary     The left ventricle is normal in size.  There is mild to moderate concentric left ventricular hypertrophy.  The visual ejection fraction is estimated at 40-45%.  The transmitral spectral Doppler flow pattern is suggestive of diastolic  dysfunction of the left ventricle.  There is mild-moderate global hypokinesia of the left ventricle.  The right ventricle is normal in structure, function and size.  The left atrium is moderate to severely dilated.  Intact atrial septum  A contrast injection (Bubble Study) was performed that was negative for flow  across the interatrial septum.  The mitral valve leaflets are mildly " thickened.  Thickened mitral valve chordae  There is trace to mild mitral regurgitation.  There is mild trileaflet aortic sclerosis.  There is trace to mild aortic regurgitation.  Mild aortic root dilatation.  The ascending aorta is Mildly dilated.  Sinus rhythm was noted.  The study was technically adequate. There is no comparison study available.    Lexiscan stress test 1/23/18:  Impression  1.  Myocardial perfusion imaging using single isotope technique  demonstrated a small apical defect which is predominantly fixed,  consistent with prior infarction with only trivial associated  cande-infarct ischemia. There is a small area of ischemia in the  inferolateral mid ventricle associated with either an  inferior/inferolateral basal infarct or diaphragmatic attenuation  artifact at the base in the setting of global hypokinesis.   2. Gated images demonstrated severe global hypokinesis.  The left  ventricular systolic function is 18% at rest and 28% post stress.  3. Compared to the prior study from there is no prior study for  comparison .      ASSESSMENT/PLAN:  1. Elevated troponin, presumed CAD: 1.244-1.228-1.027. Echo showed LVEF 40-45% with mild to moderate LVH and mild to moderate global hypokinesia of the LV. Lexiscan showed small fixed apical defect with only trival associated cande-infarct ischemia. Small area of ischemia in the inferolateral mid ventricle like diaphragmatic attenuation. No chest pain. Medical management with ASA, BB, statin. Risk factor reduction.   2. Acute left frontal stroke: Likely embolic per neurology. ASA 81 mg, Eliquis 5 mg BID. PT/OT/speech therapy. No apparent deficit.   3. Paroxysmal atrial fibrillation/flutter: Noted on tele during admission. On Toprol XL and Eliquis. Will get cardiac monitor to evaluate for paroxysms and for rate control given the mildly decreased LVEF.   4. NSVT: 6 beats noted during admission. Cardiac monitor as noted above.   5. Tobacco abuse: no smoking since his  admission. Using nicotine patch.   6. Type 2 DM: on insulin.  7. Cardiomyopathy. Echo showed LVEF 40-45%. Appears compensated on exam. Continue Toprol XL. Getting cardiac monitor to evaluate rate control as possible etiology.  Discussed daily weights, low sodium diet, and when to call in.   8. Dog bite RLE. No obvious infection - no warm/hot, no drainage. He said it is getting better. We reviewed when he should go in for evaluation/antibiotics.         Thank you for allowing me to participate in the care of your patient.    Sincerely,     Tiffany Bowman PA-C     [facility]

## 2018-02-06 NOTE — PATIENT INSTRUCTIONS
Continue current medications.     If you notice your heart beating irregularly or fast, shortness of breath, swelling in the legs, please call us.     Watch sodium in your diet, goal is < 2,000 mg daily.     Weigh daily.  Weight gain > 3 lbs in 1 day, call us.     You will get a heart monitor to check for afib and heart rate control.     See Dr. Choi 3/19/18.     Continue to refrain from smoking!     If the right leg becomes more red or painful or warm, please see someone about the dog bite.

## 2018-02-06 NOTE — MR AVS SNAPSHOT
After Visit Summary   2/6/2018    Yanick Adorno    MRN: [id number]           Patient Information     Date Of Birth          1945        Visit Information        Provider Department      2/6/2018 11:00 AM Tiffany Bowman PA-C [facility]        Today's Diagnoses     Cardiomyopathy, unspecified type (H)    -  1    Cerebrovascular accident (CVA) due to embolism of left middle cerebral artery (H)        Paroxysmal atrial fibrillation (H)          Care Instructions    Continue current medications.     If you notice your heart beating irregularly or fast, shortness of breath, swelling in the legs, please call us.     Watch sodium in your diet, goal is < 2,000 mg daily.     Weigh daily.  Weight gain > 3 lbs in 1 day, call us.     You will get a heart monitor to check for afib and heart rate control.     See Dr. Choi 3/19/18.     Continue to refrain from smoking!     If the right leg becomes more red or painful or warm, please see someone about the dog bite.                   Follow-ups after your visit        Your next 10 appointments already scheduled     Feb 19, 2018  8:00 AM CST   Neuro Treatment with Celeste Lee, SLP   [facility] ([facility])    [address] [id number] [phone]            Feb 19, 2018  9:00 AM CST   Neuro Treatment with Rosa Vega, CHILO   [facility] Occupational Therapy ([facility])    [address] [facility] [id number] [phone]            Feb 21, 2018  8:00 AM CST   Neuro Treatment with RADHA Nieves   [facility] ([facility])    [address] [facility] [id number] [phone]            Feb 21, 2018  9:00 AM CST   Neuro Treatment with CHILO Aldrich   [facility]ational [facility] ([facility])    [address] [facility] [address]    [phone]            Feb 22, 2018  8:45 AM CST   Neuro Treatment with Rosa Sanchez, PT   [facility] ([facility])    [address] [facility] [facility] [id number] [phone]            Feb 26, 2018  8:15 AM CST   Neuro Treatment with Rosa Vega, OTR   [facility] ([facility])    [address] [facility] [id number] [phone]            Feb 26, 2018 10:00 AM CST   Neuro Treatment with Celeste Lee, SLP   [facility] ([facility])    [address] [facility] [facility] [id number] [phone]            Feb 28, 2018  8:45 AM CST   Neuro Treatment with Rosa Sanchez, PT   [facility] ([facility])    [address] [facility] [id number] [phone]            Feb 28, 2018 10:15 AM CST   Neuro Treatment with Celeste Lee, SLP   [facility] ([facility])    [address] [facility] [address] [id number] [phone]            Feb 28, 2018 11:15 AM CST   Neuro Treatment with Rosa Vega, OTR   [facility] ([facility])    [address] [facility] [id number] [phone]              Future tests that were ordered for you today     Open Future Orders        Priority Expected Expires Ordered    Zio Patch Holter Routine 2/6/2018 3/23/2018 2/6/2018            Who to contact     If you have questions or need follow up information about today's clinic visit or your schedule please contact [facility] directly at [phone].  Normal or non-critical lab and imaging results will be communicated to you by MyChart, letter or phone within 4 business days after the clinic has received the results. If you do not hear from us within 7 days, please contact the clinic through MyChart or phone. If you have a  "critical or abnormal lab result, we will notify you by phone as soon as possible.  Submit refill requests through [employer] or call your pharmacy and they will forward the refill request to us. Please allow 3 business days for your refill to be completed.          Additional Information About Your Visit        "[employer]"hart Information     [employer] lets you send messages to your doctor, view your test results, renew your prescriptions, schedule appointments and more. To sign up, go to [email or web address] . Click on \"Log in\" on the left side of the screen, which will take you to the Welcome page. Then click on \"Sign up Now\" on the right side of the page.     You will be asked to enter the access code listed below, as well as some personal information. Please follow the directions to create your username and password.     Your access code is: [email or web address]  Expires: 2018  8:10 PM     Your access code will  in 90 days. If you need help or a new code, please call your [facility] clinic or [phone].        Care EveryWhere ID     This is your Care EveryWhere ID. This could be used by other organizations to access your [facility] medical records  [id number]        Your Vitals Were     Pulse Height BMI (Body Mass Index)             74 1.88 m (6' 2\") 25.6 kg/m2          Blood Pressure from Last 3 Encounters:   18 118/72   18 135/84   18 (!) 140/94    Weight from Last 3 Encounters:   18 90.4 kg (199 lb 6.4 oz)   18 86.8 kg (191 lb 6.4 oz)   18 88.5 kg (195 lb)              We Performed the Following     Follow-Up with Cardiac Advanced Practice Provider        Primary Care Provider Office Phone # Fax #    Candi Leon [phone]       [facility]   [facility] [place] [address]        Equal Access to Services     ALDO MARSHALL AH: Meek Bowling, gordy ram, germaine barillasaan ah. " So [facility] [phone].    ATENCIÓN: Si xochiltla giancarlo, tiene a moeller disposición servicios gratuitos de asistencia lingüística. Adam clarke [phone].    We comply with applicable federal civil rights laws and [place] laws. We do not discriminate on the basis of race, color, national origin, age, disability, sex, sexual orientation, or gender identity.            Thank you!     Thank you for choosing [facility]  for your care. Our goal is always to provide you with excellent care. Hearing back from our patients is one way we can continue to improve our services. Please take a few minutes to complete the written survey that you may receive in the mail after your visit with us. Thank you!             Your Updated Medication List - Protect others around you: Learn how to safely use, store and throw away your medicines at www.disposemymeds.org.          This list is accurate as of 2/6/18  1:18 PM.  Always use your most recent med list.                   Brand Name Dispense Instructions for use Diagnosis    apixaban ANTICOAGULANT 5 MG tablet    ELIQUIS    60 tablet    Take 1 tablet (5 mg) by mouth 2 times daily    Cerebrovascular accident (CVA) due to embolism of left middle cerebral artery (H), Paroxysmal atrial fibrillation (H)       aspirin 81 MG chewable tablet     30 tablet    Take 1 tablet (81 mg) by mouth daily    Cerebrovascular accident (CVA) due to embolism of left middle cerebral artery (H), Paroxysmal atrial fibrillation (H), NSTEMI (non-ST elevated myocardial infarction) (H)       atorvastatin 40 MG tablet    LIPITOR    10 tablet    Take 1 tablet (40 mg) by mouth daily    Cerebrovascular accident (CVA) due to embolism of left middle cerebral artery (H)       GLIPIZIDE PO      Take 10 mg by mouth 2 times daily (before meals)        insulin glargine 100 UNIT/ML injection    LANTUS     Inject 28 Units Subcutaneous every morning        METFORMIN HCL PO      Take 500 mg by  mouth 2 times daily (with meals)        metoprolol succinate 25 MG 24 hr tablet    TOPROL-XL    10 tablet    Take 1 tablet (25 mg) by mouth daily    Paroxysmal atrial fibrillation (H), NSTEMI (non-ST elevated myocardial infarction) (H)       nicotine 21 MG/24HR 24 hr patch    NICODERM CQ    10 patch    Place 1 patch onto the skin daily    Tobacco abuse

## 2018-02-06 NOTE — LETTER
2018    Candi Guerra [facility] [address]   [facility] [address]    RE: Yanick Adorno       Dear Colleague,    I had the pleasure of seeing Yanick Adorno in the [facility].    CARDIOLOGY CLINIC PROGRESS NOTE    DOS: 2018    Yanick Adorno  : 1945, 72 year old  MRN: [id number]      History:   I had the pleasure of meeting Yanick Adorno today in the cardiology clinic for follow up of a recent hospital admission. He was accompanied by his wife, Cindy.     Review and summary of recent hospital admission:   Yanick is a 72 year old male with past medical history significant for T2DM, tobacco abuse, who was admitted on 2018 with with confusion and altered speech. He was found to have a subacute left frontal stroke of the left frontal lobe. In addition, troponin was mildly elevated. There was no history of chest pain. Echo showed LVEF 40-45%, trace to mild MR, trace to mild AI, mild aortic root dilatation. He underwent a Lexiscan stress test that did not show any significant ischemia. He was started on ASA and statin for presumed CAD. He also went into afib with controlled rate during his admission and was started on Eliquis and BB.     Interval History:   He presents today for follow up. He tells me he is going to OT/PT/speech therapy. He is doing well without any major deficit.   He is taking his medications and denies any bleeding concerns or any other side effects. No chest pain, palpitations, SOB.   He has quit smoking, and is using the nicotine patch.   His dog bit him on the right lower leg. He said it has happened before - does not seem infected and it is healing. We reviewed when he should go in to have it evaluated and consider antibiotics.   [facility].   Has questions about how long he will be on meds.   Seeing neurology 18.   He works full time at a [employer]        ROS:  Skin:  Positive for pigmentation   Eyes:  Negative    ENT:  Negative    Respiratory:  Positive for cough  Cardiovascular:    Positive for;fatigue  Gastroenterology: Negative    Genitourinary:  Negative    Musculoskeletal:  Negative    Neurologic:  Positive for stroke;numbness or tingling of feet  Psychiatric:  Negative    Heme/Lymph/Imm:  Negative    Endocrine:  Positive for diabetes    PAST MEDICAL HISTORY:  Past Medical History:   Diagnosis Date     Esophageal reflux      Type II or unspecified type diabetes mellitus without mention of complication, not stated as uncontrolled        PAST SURGICAL HISTORY:  No past surgical history on file.    SOCIAL HISTORY:  Social History     Social History     Marital status:      Spouse name: N/A     Number of children: N/A     Years of education: N/A     Social History Main Topics     Smoking status: Former Smoker     Packs/day: 1.50     Years: 33.00     Quit date: 1/20/2018     Smokeless tobacco: Never Used     Alcohol use No     Drug use: None     Sexual activity: Not Asked     Other Topics Concern     None     Social History Narrative       FAMILY HISTORY:  Family History   Problem Relation Age of Onset     DIABETES Father      Uterine Cancer Mother      DIABETES Brother      DIABETES Brother      Suicide Brother      DIABETES Brother        MEDS:   Current Outpatient Prescriptions on File Prior to Visit:  aspirin 81 MG chewable tablet Take 1 tablet (81 mg) by mouth daily   apixaban ANTICOAGULANT (ELIQUIS) 5 MG tablet Take 1 tablet (5 mg) by mouth 2 times daily   atorvastatin (LIPITOR) 40 MG tablet Take 1 tablet (40 mg) by mouth daily   metoprolol succinate (TOPROL-XL) 25 MG 24 hr tablet Take 1 tablet (25 mg) by mouth daily   nicotine (NICODERM CQ) 21 MG/24HR 24 hr patch Place 1 patch onto the skin daily   insulin glargine (LANTUS) 100 UNIT/ML injection Inject 28 Units Subcutaneous every morning   GLIPIZIDE PO Take 10 mg by mouth 2 times daily (before  "meals)   METFORMIN HCL PO Take 500 mg by mouth 2 times daily (with meals)      No current facility-administered medications on file prior to visit.     ALLERGIES:   Allergies   Allergen Reactions     No Known Drug Allergies        PHYSICAL EXAM:  Vitals: /72 (BP Location: Right arm, Patient Position: Chair, Cuff Size: Adult Large)  Pulse 74  Ht 1.88 m (6' 2\")  Wt 90.4 kg (199 lb 6.4 oz)  BMI 25.6 kg/m2  Constitutional:  cooperative;well developed;well nourished;in no acute distress        Skin:  warm and dry to the touch   scattered nonraised erythematous lesion on LEs (patient says chronic), superficial abrasion (dog bite) right shin that has mild erythema (reports chronic) and scab without oozing or warmth    Head:  normocephalic        Eyes:  pupils equal and round;conjunctivae and lids unremarkable        ENT:  no pallor or cyanosis        Neck:  JVP normal        Chest:  clear to auscultation        Cardiac: regular rhythm       systolic murmur;grade 1          Abdomen:  abdomen soft;BS normoactive;non-tender        Vascular:                                        Extremities and Back:      no pitting edema    Neurological:  no gross motor deficits;affect appropriate          LABS/DATA:  I reviewed the following:  Echo with bubble study 1/21/18:  Interpretation Summary     The left ventricle is normal in size.  There is mild to moderate concentric left ventricular hypertrophy.  The visual ejection fraction is estimated at 40-45%.  The transmitral spectral Doppler flow pattern is suggestive of diastolic  dysfunction of the left ventricle.  There is mild-moderate global hypokinesia of the left ventricle.  The right ventricle is normal in structure, function and size.  The left atrium is moderate to severely dilated.  Intact atrial septum  A contrast injection (Bubble Study) was performed that was negative for flow  across the interatrial septum.  The mitral valve leaflets are mildly " thickened.  Thickened mitral valve chordae  There is trace to mild mitral regurgitation.  There is mild trileaflet aortic sclerosis.  There is trace to mild aortic regurgitation.  Mild aortic root dilatation.  The ascending aorta is Mildly dilated.  Sinus rhythm was noted.  The study was technically adequate. There is no comparison study available.    Lexiscan stress test 1/23/18:  Impression  1.  Myocardial perfusion imaging using single isotope technique  demonstrated a small apical defect which is predominantly fixed,  consistent with prior infarction with only trivial associated  cande-infarct ischemia. There is a small area of ischemia in the  inferolateral mid ventricle associated with either an  inferior/inferolateral basal infarct or diaphragmatic attenuation  artifact at the base in the setting of global hypokinesis.   2. Gated images demonstrated severe global hypokinesis.  The left  ventricular systolic function is 18% at rest and 28% post stress.  3. Compared to the prior study from there is no prior study for  comparison .      ASSESSMENT/PLAN:  1. Elevated troponin, presumed CAD: 1.244-1.228-1.027. Echo showed LVEF 40-45% with mild to moderate LVH and mild to moderate global hypokinesia of the LV. Lexiscan showed small fixed apical defect with only trival associated cande-infarct ischemia. Small area of ischemia in the inferolateral mid ventricle like diaphragmatic attenuation. No chest pain. Medical management with ASA, BB, statin. Risk factor reduction.   2. Acute left frontal stroke: Likely embolic per neurology. ASA 81 mg, Eliquis 5 mg BID. PT/OT/speech therapy. No apparent deficit.   3. Paroxysmal atrial fibrillation/flutter: Noted on tele during admission. On Toprol XL and Eliquis. Will get cardiac monitor to evaluate for paroxysms and for rate control given the mildly decreased LVEF.   4. NSVT: 6 beats noted during admission. Cardiac monitor as noted above.   5. Tobacco abuse: no smoking since his  admission. Using nicotine patch.   6. Type 2 DM: on insulin.  7. Cardiomyopathy. Echo showed LVEF 40-45%. Appears compensated on exam. Continue Toprol XL. Getting cardiac monitor to evaluate rate control as possible etiology.  Discussed daily weights, low sodium diet, and when to call in.   8. Dog bite RLE. No obvious infection - no warm/hot, no drainage. He said it is getting better. We reviewed when he should go in for evaluation/antibiotics.       Tiffany Bowman PA-C    Thank you for allowing me to participate in the care of your patient.      Sincerely,     Tiffany Bowman PA-C     [facility]    cc:   [facility]  [facility]  [address] [place] [address]

## 2018-02-06 NOTE — PROGRESS NOTES
CARDIOLOGY CLINIC PROGRESS NOTE    DOS: 2018    Yanick Adorno  : 1945, 72 year old  MRN: [id number]      History:   I had the pleasure of meeting Yainck Adorno today in the cardiology clinic for follow up of a recent hospital admission. He was accompanied by his wife, Cindy.     Review and summary of recent hospital admission:   Yanick is a 72 year old male with past medical history significant for T2DM, tobacco abuse, who was admitted on 2018 with with confusion and altered speech. He was found to have a subacute left frontal stroke of the left frontal lobe. In addition, troponin was mildly elevated. There was no history of chest pain. Echo showed LVEF 40-45%, trace to mild MR, trace to mild AI, mild aortic root dilatation. He underwent a Lexiscan stress test that did not show any significant ischemia. He was started on ASA and statin for presumed CAD. He also went into afib with controlled rate during his admission and was started on Eliquis and BB.     Interval History:   He presents today for follow up. He tells me he is going to OT/PT/speech therapy. He is doing well without any major deficit.   He is taking his medications and denies any bleeding concerns or any other side effects. No chest pain, palpitations, SOB.   He has quit smoking, and is using the nicotine patch.   His dog bit him on the right lower leg. He said it has happened before - does not seem infected and it is healing. We reviewed when he should go in to have it evaluated and consider antibiotics.   [facility].   Has questions about how long he will be on meds.   Seeing neurology 18.   He works full time at a [employer].       ROS:  Skin:  Positive for pigmentation   Eyes:  Negative    ENT:  Negative    Respiratory:  Positive for cough  Cardiovascular:    Positive for;fatigue  Gastroenterology: Negative    Genitourinary:  Negative    Musculoskeletal:  Negative     Neurologic:  Positive for stroke;numbness or tingling of feet  Psychiatric:  Negative    Heme/Lymph/Imm:  Negative    Endocrine:  Positive for diabetes    PAST MEDICAL HISTORY:  Past Medical History:   Diagnosis Date     Esophageal reflux      Type II or unspecified type diabetes mellitus without mention of complication, not stated as uncontrolled        PAST SURGICAL HISTORY:  No past surgical history on file.    SOCIAL HISTORY:  Social History     Social History     Marital status:      Spouse name: N/A     Number of children: N/A     Years of education: N/A     Social History Main Topics     Smoking status: Former Smoker     Packs/day: 1.50     Years: 33.00     Quit date: 1/20/2018     Smokeless tobacco: Never Used     Alcohol use No     Drug use: None     Sexual activity: Not Asked     Other Topics Concern     None     Social History Narrative       FAMILY HISTORY:  Family History   Problem Relation Age of Onset     DIABETES Father      Uterine Cancer Mother      DIABETES Brother      DIABETES Brother      Suicide Brother      DIABETES Brother        MEDS:   Current Outpatient Prescriptions on File Prior to Visit:  aspirin 81 MG chewable tablet Take 1 tablet (81 mg) by mouth daily   apixaban ANTICOAGULANT (ELIQUIS) 5 MG tablet Take 1 tablet (5 mg) by mouth 2 times daily   atorvastatin (LIPITOR) 40 MG tablet Take 1 tablet (40 mg) by mouth daily   metoprolol succinate (TOPROL-XL) 25 MG 24 hr tablet Take 1 tablet (25 mg) by mouth daily   nicotine (NICODERM CQ) 21 MG/24HR 24 hr patch Place 1 patch onto the skin daily   insulin glargine (LANTUS) 100 UNIT/ML injection Inject 28 Units Subcutaneous every morning   GLIPIZIDE PO Take 10 mg by mouth 2 times daily (before meals)   METFORMIN HCL PO Take 500 mg by mouth 2 times daily (with meals)      No current facility-administered medications on file prior to visit.     ALLERGIES:   Allergies   Allergen Reactions     No Known Drug Allergies        PHYSICAL  "EXAM:  Vitals: /72 (BP Location: Right arm, Patient Position: Chair, Cuff Size: Adult Large)  Pulse 74  Ht 1.88 m (6' 2\")  Wt 90.4 kg (199 lb 6.4 oz)  BMI 25.6 kg/m2  Constitutional:  cooperative;well developed;well nourished;in no acute distress        Skin:  warm and dry to the touch   scattered nonraised erythematous lesion on LEs (patient says chronic), superficial abrasion (dog bite) right shin that has mild erythema (reports chronic) and scab without oozing or warmth    Head:  normocephalic        Eyes:  pupils equal and round;conjunctivae and lids unremarkable        ENT:  no pallor or cyanosis        Neck:  JVP normal        Chest:  clear to auscultation        Cardiac: regular rhythm       systolic murmur;grade 1          Abdomen:  abdomen soft;BS normoactive;non-tender        Vascular:                                        Extremities and Back:      no pitting edema    Neurological:  no gross motor deficits;affect appropriate          LABS/DATA:  I reviewed the following:  Echo with bubble study 1/21/18:  Interpretation Summary     The left ventricle is normal in size.  There is mild to moderate concentric left ventricular hypertrophy.  The visual ejection fraction is estimated at 40-45%.  The transmitral spectral Doppler flow pattern is suggestive of diastolic  dysfunction of the left ventricle.  There is mild-moderate global hypokinesia of the left ventricle.  The right ventricle is normal in structure, function and size.  The left atrium is moderate to severely dilated.  Intact atrial septum  A contrast injection (Bubble Study) was performed that was negative for flow  across the interatrial septum.  The mitral valve leaflets are mildly thickened.  Thickened mitral valve chordae  There is trace to mild mitral regurgitation.  There is mild trileaflet aortic sclerosis.  There is trace to mild aortic regurgitation.  Mild aortic root dilatation.  The ascending aorta is Mildly dilated.  Sinus " rhythm was noted.  The study was technically adequate. There is no comparison study available.    Lexiscan stress test 1/23/18:  Impression  1.  Myocardial perfusion imaging using single isotope technique  demonstrated a small apical defect which is predominantly fixed,  consistent with prior infarction with only trivial associated  cande-infarct ischemia. There is a small area of ischemia in the  inferolateral mid ventricle associated with either an  inferior/inferolateral basal infarct or diaphragmatic attenuation  artifact at the base in the setting of global hypokinesis.   2. Gated images demonstrated severe global hypokinesis.  The left  ventricular systolic function is 18% at rest and 28% post stress.  3. Compared to the prior study from there is no prior study for  comparison .      ASSESSMENT/PLAN:  1. Elevated troponin, presumed CAD: 1.244-1.228-1.027. Echo showed LVEF 40-45% with mild to moderate LVH and mild to moderate global hypokinesia of the LV. Lexiscan showed small fixed apical defect with only trival associated cande-infarct ischemia. Small area of ischemia in the inferolateral mid ventricle like diaphragmatic attenuation. No chest pain. Medical management with ASA, BB, statin. Risk factor reduction.   2. Acute left frontal stroke: Likely embolic per neurology. ASA 81 mg, Eliquis 5 mg BID. PT/OT/speech therapy. No apparent deficit.   3. Paroxysmal atrial fibrillation/flutter: Noted on tele during admission. On Toprol XL and Eliquis. Will get cardiac monitor to evaluate for paroxysms and for rate control given the mildly decreased LVEF.   4. NSVT: 6 beats noted during admission. Cardiac monitor as noted above.   5. Tobacco abuse: no smoking since his admission. Using nicotine patch.   6. Type 2 DM: on insulin.  7. Cardiomyopathy. Echo showed LVEF 40-45%. Appears compensated on exam. Continue Toprol XL. Getting cardiac monitor to evaluate rate control as possible etiology.  Discussed daily weights, low  sodium diet, and when to call in.   8. Dog bite RLE. No obvious infection - no warm/hot, no drainage. He said it is getting better. We reviewed when he should go in for evaluation/antibiotics.       Tiffany Bowman PA-C

## 2018-02-13 ENCOUNTER — CARE COORDINATION (OUTPATIENT)
Dept: CARDIOLOGY | Facility: CLINIC | Age: 73
End: 2018-02-13

## 2018-02-13 NOTE — PROGRESS NOTES
Pt's wife Mei called (C2C on file), requesting refill on nicotine patches. Advised to call [facility]) for [facility]. Mei understood and will call the [facility].   Migue PERSAUD

## 2018-02-16 ENCOUNTER — HOSPITAL ENCOUNTER (OUTPATIENT)
Dept: CARDIOLOGY | Facility: CLINIC | Age: 73
Discharge: HOME | End: 2018-02-16
Attending: PHYSICIAN ASSISTANT | Admitting: PHYSICIAN ASSISTANT
Payer: MEDICARE

## 2018-02-16 DIAGNOSIS — I48.0 PAROXYSMAL ATRIAL FIBRILLATION (H): ICD-10-CM

## 2018-02-16 DIAGNOSIS — I42.9 CARDIOMYOPATHY, UNSPECIFIED TYPE (H): ICD-10-CM

## 2018-02-16 PROCEDURE — 0296T ZIO PATCH HOLTER: CPT

## 2018-02-16 PROCEDURE — 0298T ZZC EXT ECG > 48HR TO 21 DAY REVIEW AND INTERPRETATN: CPT | Performed by: INTERNAL MEDICINE

## 2018-02-19 ENCOUNTER — HOSPITAL ENCOUNTER (OUTPATIENT)
Dept: SPEECH THERAPY | Facility: CLINIC | Age: 73
Setting detail: THERAPIES SERIES
End: 2018-02-19
Attending: HOSPITALIST
Payer: MEDICARE

## 2018-02-19 ENCOUNTER — HOSPITAL ENCOUNTER (OUTPATIENT)
Dept: OCCUPATIONAL THERAPY | Facility: CLINIC | Age: 73
Setting detail: THERAPIES SERIES
End: 2018-02-19
Attending: HOSPITALIST
Payer: MEDICARE

## 2018-02-19 PROCEDURE — 97535 SELF CARE MNGMENT TRAINING: CPT | Mod: GO | Performed by: OCCUPATIONAL THERAPIST

## 2018-02-19 PROCEDURE — 92507 TX SP LANG VOICE COMM INDIV: CPT | Mod: GN | Performed by: SPEECH-LANGUAGE PATHOLOGIST

## 2018-02-19 PROCEDURE — 40000125 ZZHC STATISTIC OT OUTPT VISIT: Performed by: OCCUPATIONAL THERAPIST

## 2018-02-19 PROCEDURE — 40000211 ZZHC STATISTIC SLP  DEPARTMENT VISIT: Performed by: SPEECH-LANGUAGE PATHOLOGIST

## 2018-02-21 ENCOUNTER — HOSPITAL ENCOUNTER (OUTPATIENT)
Dept: OCCUPATIONAL THERAPY | Facility: CLINIC | Age: 73
Setting detail: THERAPIES SERIES
End: 2018-02-21
Attending: HOSPITALIST
Payer: MEDICARE

## 2018-02-21 ENCOUNTER — HOSPITAL ENCOUNTER (OUTPATIENT)
Dept: SPEECH THERAPY | Facility: CLINIC | Age: 73
Setting detail: THERAPIES SERIES
End: 2018-02-21
Attending: HOSPITALIST
Payer: MEDICARE

## 2018-02-21 PROCEDURE — 40000211 ZZHC STATISTIC SLP  DEPARTMENT VISIT: Performed by: SPEECH-LANGUAGE PATHOLOGIST

## 2018-02-21 PROCEDURE — 40000125 ZZHC STATISTIC OT OUTPT VISIT: Performed by: OCCUPATIONAL THERAPIST

## 2018-02-21 PROCEDURE — 92507 TX SP LANG VOICE COMM INDIV: CPT | Mod: GN | Performed by: SPEECH-LANGUAGE PATHOLOGIST

## 2018-02-21 PROCEDURE — 97535 SELF CARE MNGMENT TRAINING: CPT | Mod: GO | Performed by: OCCUPATIONAL THERAPIST

## 2018-02-21 NOTE — PROGRESS NOTES
Cognitive Assessment of [place]    SUMMARY OF TEST:  The Cognitive Assessment of [place] (CAM) is a standardized measure used to assess cognitive abilities and deficits.  The CAM is formatted to assess cognitive skills in the areas of attention, orientation, memory, following directions, temporal awareness, discrimination, sequencing, calculation, planning, verbal safety/judgment, problem solving and abstract reasoning.    DATE OF TESTIN18    RESULTS OF TESTING:    The patient scores with no impairment in Immediate auditory memory, Immediate motor memory, Temporal awareness, Auditory recall/recognition, Money skills mental manipulation, Single digit math skills, Planning and Mental flexibility    The patient scores with mild impairment in Immediate motor memory and Abstract reasoning    The patient scores with moderate impairment in Auditory memory/sequencing forward, Auditory memory/sequencing backward, Multiple digit math skills, Simple problem solving and Moderate problem solving    The patient scores with severe impairment in no areas tested.     Test results comments:  Pt gave great effort with all responses in dimly lit, quiet environment. Pt very aware of his mistakes. NOt fully attending to the left side of table (where helping tools are).    INTERPRETATION OF TEST RESULTS:      Pt does well with immediate memory for 3/3 words and actions, has good time awareness and ability to make change with coins.  Required increased time and repetitions, visual model to be successful with block design copy.  Was not fully attending to instructions while drawing a square ( did not use fullest amount of paper), but then was able to use tools to create another novel solution (nused kleenex box, then ruler as straight edge, measuring). Made error with adding portion of double digit multiplication, moving quickly.      Factors affecting performance:    Impulsive actions  New or complex medical  issue    Recommendations: Pt would benefit from further skilled OT to address storage/retrieval, working memory and executive funcatioing skills to support higher level IADL and return to work for car sales.    TIME ADMINISTERING TEST: 30    TIME FOR INTERPRETATION AND PREPARATION OF REPORT: 23    TOTAL TIME: 53 minutes  Thank you for this thoughtful referral! If you have any questions, please call me [phone].  Nice to share in this patient's care with you.    Sincerely,   Rosa Vega, OTR/l

## 2018-02-22 ENCOUNTER — HOSPITAL ENCOUNTER (OUTPATIENT)
Dept: PHYSICAL THERAPY | Facility: CLINIC | Age: 73
Setting detail: THERAPIES SERIES
End: 2018-02-22
Attending: HOSPITALIST
Payer: MEDICARE

## 2018-02-22 PROCEDURE — 97110 THERAPEUTIC EXERCISES: CPT | Mod: GP | Performed by: PHYSICAL THERAPIST

## 2018-02-22 PROCEDURE — 40000719 ZZHC STATISTIC PT DEPARTMENT NEURO VISIT: Performed by: PHYSICAL THERAPIST

## 2018-02-22 PROCEDURE — 97112 NEUROMUSCULAR REEDUCATION: CPT | Mod: GP | Performed by: PHYSICAL THERAPIST

## 2018-02-26 ENCOUNTER — HOSPITAL ENCOUNTER (OUTPATIENT)
Dept: OCCUPATIONAL THERAPY | Facility: CLINIC | Age: 73
Setting detail: THERAPIES SERIES
End: 2018-02-26
Attending: HOSPITALIST
Payer: MEDICARE

## 2018-02-26 ENCOUNTER — HOSPITAL ENCOUNTER (OUTPATIENT)
Dept: SPEECH THERAPY | Facility: CLINIC | Age: 73
Setting detail: THERAPIES SERIES
End: 2018-02-26
Attending: HOSPITALIST
Payer: MEDICARE

## 2018-02-26 PROCEDURE — 40000125 ZZHC STATISTIC OT OUTPT VISIT: Performed by: OCCUPATIONAL THERAPIST

## 2018-02-26 PROCEDURE — 97535 SELF CARE MNGMENT TRAINING: CPT | Mod: GO | Performed by: OCCUPATIONAL THERAPIST

## 2018-02-26 PROCEDURE — 92507 TX SP LANG VOICE COMM INDIV: CPT | Mod: GN | Performed by: SPEECH-LANGUAGE PATHOLOGIST

## 2018-02-26 PROCEDURE — 40000211 ZZHC STATISTIC SLP  DEPARTMENT VISIT: Performed by: SPEECH-LANGUAGE PATHOLOGIST

## 2018-02-27 NOTE — ADDENDUM NOTE
Encounter addended by: Celeste Lee, SLP on: 2/27/2018 10:21 AM<BR>     Actions taken: Pend clinical note, Sign clinical note, Document created

## 2018-02-27 NOTE — PROGRESS NOTES
[facility]          OUTPATIENT SPEECH LANGUAGE PATHOLOGY LANGUAGE-COGNITION  EVALUATION  PLAN OF TREATMENT FOR OUTPATIENT REHABILITATION  (COMPLETE FOR INITIAL CLAIMS ONLY)  Patient's Last Name, First Name, M.I.  YOB: 1945  Yanick Adorno                        Provider s Name: [facility] Medical Record No.  [id number]     Onset Date:  01/21/18   Start of Care Date: 02/05/18   Type:     ___PT  __OT   _X_SLP    Medical Diagnosis: L CVA      Speech Language Pathology Diagnosis:   Mild/Moderate Expressive/Receptive Aphasia    Visits from SOC: 1                                        ________________________________________________________________________________  Plan of Treatment/Functional Goals:   Planned Therapy Interventions: Language                            Language / Cognition Goals  1. Goal Identifier: LTG 1- Language- Verbal Expression (V/E)       Goal Description: Pt will learn, implement, and demonstrate use of 3 word-finding strategies with 80% accuracy during complex word finding tasks, provided 0-min cues in order to talk on the phone with customers.       Target Date: 04/02/18   2. Goal Identifier: LTG 2- Language- Written Expression (W/E)       Goal Description: Patient will complete a complex writing task (e.g. generating an email for work) with 80% accuracy provided 0-min cues in order to take notes while on the phone with a customer.       Target Date: 04/02/18   3. Goal Identifier: LTG 3- Language- Reading Comprehension (R/C)       Goal Description: Patient will complete a moderate to complex reading task with 80% accuracy provided 0-minimal cues in order to read a sports article.        Target Date: 04/02/18                  Predicted Duration of Therapy Intervention (days/wks): 6 wks    Celeste Lee MA CCC-SLP       I CERTIFY THE NEED FOR THESE SERVICES  FURNISHED UNDER        THIS PLAN OF TREATMENT AND WHILE UNDER MY CARE     (Physician co-signature of this document indicates review and certification of the therapy plan).                  Certification Date From:   2/5/2018  Certification Date To:    5/6/2018         Referring Physician:  Dr. Peng MD    Initial Assessment        See Epic Evaluation Start Of Care Date: 02/05/18

## 2018-02-28 ENCOUNTER — HOSPITAL ENCOUNTER (OUTPATIENT)
Dept: PHYSICAL THERAPY | Facility: CLINIC | Age: 73
Setting detail: THERAPIES SERIES
End: 2018-02-28
Attending: HOSPITALIST
Payer: MEDICARE

## 2018-02-28 PROCEDURE — 97112 NEUROMUSCULAR REEDUCATION: CPT | Mod: GP | Performed by: PHYSICAL THERAPIST

## 2018-02-28 PROCEDURE — 97110 THERAPEUTIC EXERCISES: CPT | Mod: GP | Performed by: PHYSICAL THERAPIST

## 2018-02-28 PROCEDURE — 97140 MANUAL THERAPY 1/> REGIONS: CPT | Mod: GP | Performed by: PHYSICAL THERAPIST

## 2018-02-28 PROCEDURE — 40000719 ZZHC STATISTIC PT DEPARTMENT NEURO VISIT: Performed by: PHYSICAL THERAPIST

## 2018-03-05 ENCOUNTER — HOSPITAL ENCOUNTER (OUTPATIENT)
Dept: OCCUPATIONAL THERAPY | Facility: CLINIC | Age: 73
Setting detail: THERAPIES SERIES
End: 2018-03-05
Attending: HOSPITALIST
Payer: MEDICARE

## 2018-03-05 ENCOUNTER — HOSPITAL ENCOUNTER (OUTPATIENT)
Dept: SPEECH THERAPY | Facility: CLINIC | Age: 73
Setting detail: THERAPIES SERIES
End: 2018-03-05
Attending: HOSPITALIST
Payer: MEDICARE

## 2018-03-05 PROCEDURE — 92507 TX SP LANG VOICE COMM INDIV: CPT | Mod: GN | Performed by: SPEECH-LANGUAGE PATHOLOGIST

## 2018-03-05 PROCEDURE — 40000211 ZZHC STATISTIC SLP  DEPARTMENT VISIT: Performed by: SPEECH-LANGUAGE PATHOLOGIST

## 2018-03-05 PROCEDURE — 40000125 ZZHC STATISTIC OT OUTPT VISIT: Performed by: OCCUPATIONAL THERAPIST

## 2018-03-05 PROCEDURE — 97535 SELF CARE MNGMENT TRAINING: CPT | Mod: GO | Performed by: OCCUPATIONAL THERAPIST

## 2018-03-07 ENCOUNTER — HOSPITAL ENCOUNTER (OUTPATIENT)
Dept: SPEECH THERAPY | Facility: CLINIC | Age: 73
Setting detail: THERAPIES SERIES
End: 2018-03-07
Attending: HOSPITALIST
Payer: MEDICARE

## 2018-03-07 ENCOUNTER — HOSPITAL ENCOUNTER (OUTPATIENT)
Dept: OCCUPATIONAL THERAPY | Facility: CLINIC | Age: 73
Setting detail: THERAPIES SERIES
End: 2018-03-07
Attending: HOSPITALIST
Payer: MEDICARE

## 2018-03-07 PROCEDURE — 40000125 ZZHC STATISTIC OT OUTPT VISIT: Performed by: OCCUPATIONAL THERAPIST

## 2018-03-07 PROCEDURE — 40000211 ZZHC STATISTIC SLP  DEPARTMENT VISIT: Performed by: SPEECH-LANGUAGE PATHOLOGIST

## 2018-03-07 PROCEDURE — 97535 SELF CARE MNGMENT TRAINING: CPT | Mod: GO | Performed by: OCCUPATIONAL THERAPIST

## 2018-03-07 PROCEDURE — 92507 TX SP LANG VOICE COMM INDIV: CPT | Mod: GN | Performed by: SPEECH-LANGUAGE PATHOLOGIST

## 2018-03-08 ENCOUNTER — HOSPITAL ENCOUNTER (OUTPATIENT)
Dept: PHYSICAL THERAPY | Facility: CLINIC | Age: 73
Setting detail: THERAPIES SERIES
End: 2018-03-08
Attending: HOSPITALIST
Payer: MEDICARE

## 2018-03-08 PROCEDURE — 97112 NEUROMUSCULAR REEDUCATION: CPT | Mod: GP | Performed by: PHYSICAL THERAPIST

## 2018-03-08 PROCEDURE — 97110 THERAPEUTIC EXERCISES: CPT | Mod: GP | Performed by: PHYSICAL THERAPIST

## 2018-03-08 PROCEDURE — 40000719 ZZHC STATISTIC PT DEPARTMENT NEURO VISIT: Performed by: PHYSICAL THERAPIST

## 2018-03-08 PROCEDURE — 97116 GAIT TRAINING THERAPY: CPT | Mod: GP | Performed by: PHYSICAL THERAPIST

## 2018-03-12 ENCOUNTER — HOSPITAL ENCOUNTER (OUTPATIENT)
Dept: SPEECH THERAPY | Facility: CLINIC | Age: 73
Setting detail: THERAPIES SERIES
End: 2018-03-12
Attending: HOSPITALIST
Payer: MEDICARE

## 2018-03-12 ENCOUNTER — HOSPITAL ENCOUNTER (OUTPATIENT)
Dept: OCCUPATIONAL THERAPY | Facility: CLINIC | Age: 73
Setting detail: THERAPIES SERIES
End: 2018-03-12
Attending: HOSPITALIST
Payer: MEDICARE

## 2018-03-12 PROCEDURE — 92507 TX SP LANG VOICE COMM INDIV: CPT | Mod: GN | Performed by: SPEECH-LANGUAGE PATHOLOGIST

## 2018-03-12 PROCEDURE — 40000211 ZZHC STATISTIC SLP  DEPARTMENT VISIT: Performed by: SPEECH-LANGUAGE PATHOLOGIST

## 2018-03-12 PROCEDURE — 97535 SELF CARE MNGMENT TRAINING: CPT | Mod: GO | Performed by: OCCUPATIONAL THERAPIST

## 2018-03-12 PROCEDURE — 40000125 ZZHC STATISTIC OT OUTPT VISIT: Performed by: OCCUPATIONAL THERAPIST

## 2018-03-14 ENCOUNTER — HOSPITAL ENCOUNTER (OUTPATIENT)
Dept: SPEECH THERAPY | Facility: CLINIC | Age: 73
Setting detail: THERAPIES SERIES
End: 2018-03-14
Attending: HOSPITALIST
Payer: MEDICARE

## 2018-03-14 ENCOUNTER — HOSPITAL ENCOUNTER (OUTPATIENT)
Dept: OCCUPATIONAL THERAPY | Facility: CLINIC | Age: 73
Setting detail: THERAPIES SERIES
End: 2018-03-14
Attending: HOSPITALIST
Payer: MEDICARE

## 2018-03-14 ENCOUNTER — HOSPITAL ENCOUNTER (OUTPATIENT)
Dept: PHYSICAL THERAPY | Facility: CLINIC | Age: 73
Setting detail: THERAPIES SERIES
End: 2018-03-14
Attending: HOSPITALIST
Payer: MEDICARE

## 2018-03-14 PROCEDURE — 40000125 ZZHC STATISTIC OT OUTPT VISIT: Performed by: OCCUPATIONAL THERAPIST

## 2018-03-14 PROCEDURE — 92507 TX SP LANG VOICE COMM INDIV: CPT | Mod: GN | Performed by: SPEECH-LANGUAGE PATHOLOGIST

## 2018-03-14 PROCEDURE — 40000211 ZZHC STATISTIC SLP  DEPARTMENT VISIT: Performed by: SPEECH-LANGUAGE PATHOLOGIST

## 2018-03-14 PROCEDURE — 97112 NEUROMUSCULAR REEDUCATION: CPT | Mod: GP,XU | Performed by: PHYSICAL THERAPIST

## 2018-03-14 PROCEDURE — 97750 PHYSICAL PERFORMANCE TEST: CPT | Mod: GP | Performed by: PHYSICAL THERAPIST

## 2018-03-14 PROCEDURE — 40000719 ZZHC STATISTIC PT DEPARTMENT NEURO VISIT: Performed by: PHYSICAL THERAPIST

## 2018-03-14 PROCEDURE — 97535 SELF CARE MNGMENT TRAINING: CPT | Mod: GO | Performed by: OCCUPATIONAL THERAPIST

## 2018-03-15 NOTE — PROGRESS NOTES
03/14/18 1000   Signing Clinician's Name / Credentials   Signing clinician's name / credentials Rosa Sanchez PT   Dynamic Gait Index (Carlos and Pickering [place], 1995)   Gait Level Surface 3   Change in Gait Speed 3   Gait and Horizontal Head Turns 2   Gait with Vertical Head Turns 3   Gait and Pivot Turns 3   Step Over Obstacle 2   Step Around Obstacles 3   Steps 3   Total Dynamic Gait Index Score  (A score of 19 or less has been correlated to an increased risk of falls in community dwelling older adults, patients with vestibular disorders, and patients with MS.)   Total Score (out of 24) 22     Dynamic Gait Index (DGI):The DGI is a measure of balance during gait that is reliable and valid for the elderly and individuals with Parkinson's disease, MS, vestibular disorders, or s/p stroke. Gait assistive device used:none    Patient score: 22/24  Scores ?19/24 indicate an increased risk for falls according to Bianka et al 2000  Minimal Detectable Change = 2.9 in community dwelling elderly according to Schaefer et al 2011    Assessment (rationale for performing, application to patient s function & care plan): assess progress  Minutes billed as physical performance test: 16

## 2018-03-19 ENCOUNTER — HOSPITAL ENCOUNTER (OUTPATIENT)
Dept: OCCUPATIONAL THERAPY | Facility: CLINIC | Age: 73
Setting detail: THERAPIES SERIES
End: 2018-03-19
Attending: HOSPITALIST
Payer: MEDICARE

## 2018-03-19 ENCOUNTER — HOSPITAL ENCOUNTER (OUTPATIENT)
Dept: SPEECH THERAPY | Facility: CLINIC | Age: 73
Setting detail: THERAPIES SERIES
End: 2018-03-19
Attending: HOSPITALIST
Payer: MEDICARE

## 2018-03-19 ENCOUNTER — DOCUMENTATION ONLY (OUTPATIENT)
Dept: CARDIOLOGY | Facility: CLINIC | Age: 73
End: 2018-03-19

## 2018-03-19 PROCEDURE — 92507 TX SP LANG VOICE COMM INDIV: CPT | Mod: GN | Performed by: SPEECH-LANGUAGE PATHOLOGIST

## 2018-03-19 PROCEDURE — 40000211 ZZHC STATISTIC SLP  DEPARTMENT VISIT: Performed by: SPEECH-LANGUAGE PATHOLOGIST

## 2018-03-19 PROCEDURE — 40000125 ZZHC STATISTIC OT OUTPT VISIT: Performed by: OCCUPATIONAL THERAPIST

## 2018-03-19 PROCEDURE — 97535 SELF CARE MNGMENT TRAINING: CPT | Mod: GO | Performed by: OCCUPATIONAL THERAPIST

## 2018-03-21 ENCOUNTER — OFFICE VISIT (OUTPATIENT)
Dept: CARDIOLOGY | Facility: CLINIC | Age: 73
End: 2018-03-21
Attending: NURSE PRACTITIONER
Payer: MEDICARE

## 2018-03-21 ENCOUNTER — HOSPITAL ENCOUNTER (OUTPATIENT)
Dept: SPEECH THERAPY | Facility: CLINIC | Age: 73
Setting detail: THERAPIES SERIES
End: 2018-03-21
Attending: HOSPITALIST
Payer: MEDICARE

## 2018-03-21 ENCOUNTER — HOSPITAL ENCOUNTER (OUTPATIENT)
Dept: OCCUPATIONAL THERAPY | Facility: CLINIC | Age: 73
Setting detail: THERAPIES SERIES
End: 2018-03-21
Attending: HOSPITALIST
Payer: MEDICARE

## 2018-03-21 VITALS
HEIGHT: 74 IN | OXYGEN SATURATION: 97 % | BODY MASS INDEX: 25.93 KG/M2 | SYSTOLIC BLOOD PRESSURE: 120 MMHG | WEIGHT: 202 LBS | HEART RATE: 72 BPM | DIASTOLIC BLOOD PRESSURE: 72 MMHG

## 2018-03-21 DIAGNOSIS — I63.412 CEREBROVASCULAR ACCIDENT (CVA) DUE TO EMBOLISM OF LEFT MIDDLE CEREBRAL ARTERY (H): ICD-10-CM

## 2018-03-21 DIAGNOSIS — I49.9 CARDIAC ARRHYTHMIA: ICD-10-CM

## 2018-03-21 DIAGNOSIS — R94.39 ABNORMAL STRESS TEST: Primary | ICD-10-CM

## 2018-03-21 DIAGNOSIS — R94.39 ABNORMAL CARDIOVASCULAR STRESS TEST: Primary | ICD-10-CM

## 2018-03-21 DIAGNOSIS — I48.0 PAROXYSMAL ATRIAL FIBRILLATION (H): ICD-10-CM

## 2018-03-21 DIAGNOSIS — I47.29 PAROXYSMAL VENTRICULAR TACHYCARDIA (H): ICD-10-CM

## 2018-03-21 DIAGNOSIS — I47.20 VT (VENTRICULAR TACHYCARDIA) (H): ICD-10-CM

## 2018-03-21 DIAGNOSIS — R94.39 ABNORMAL STRESS TEST: ICD-10-CM

## 2018-03-21 LAB
ANION GAP SERPL CALCULATED.3IONS-SCNC: 5 MMOL/L (ref 3–14)
APTT PPP: 31 SEC (ref 22–37)
BUN SERPL-MCNC: 17 MG/DL (ref 7–30)
CALCIUM SERPL-MCNC: 9.3 MG/DL (ref 8.5–10.1)
CHLORIDE SERPL-SCNC: 108 MMOL/L (ref 94–109)
CO2 SERPL-SCNC: 29 MMOL/L (ref 20–32)
CREAT SERPL-MCNC: 0.84 MG/DL (ref 0.66–1.25)
ERYTHROCYTE [DISTWIDTH] IN BLOOD BY AUTOMATED COUNT: 14.5 % (ref 10–15)
GFR SERPL CREATININE-BSD FRML MDRD: >90 ML/MIN/1.7M2
GLUCOSE SERPL-MCNC: 191 MG/DL (ref 70–99)
HCT VFR BLD AUTO: 43.7 % (ref 40–53)
HGB BLD-MCNC: 14.2 G/DL (ref 13.3–17.7)
INR PPP: 1.14 (ref 0.86–1.14)
MCH RBC QN AUTO: 31.7 PG (ref 26.5–33)
MCHC RBC AUTO-ENTMCNC: 32.5 G/DL (ref 31.5–36.5)
MCV RBC AUTO: 98 FL (ref 78–100)
PLATELET # BLD AUTO: 256 10E9/L (ref 150–450)
POTASSIUM SERPL-SCNC: 4.1 MMOL/L (ref 3.4–5.3)
RBC # BLD AUTO: 4.48 10E12/L (ref 4.4–5.9)
SODIUM SERPL-SCNC: 142 MMOL/L (ref 133–144)
WBC # BLD AUTO: 8 10E9/L (ref 4–11)

## 2018-03-21 PROCEDURE — G9168 MEMORY CURRENT STATUS: HCPCS | Mod: GO,CI | Performed by: OCCUPATIONAL THERAPIST

## 2018-03-21 PROCEDURE — 92507 TX SP LANG VOICE COMM INDIV: CPT | Mod: GN | Performed by: SPEECH-LANGUAGE PATHOLOGIST

## 2018-03-21 PROCEDURE — 85730 THROMBOPLASTIN TIME PARTIAL: CPT | Performed by: INTERNAL MEDICINE

## 2018-03-21 PROCEDURE — 85027 COMPLETE CBC AUTOMATED: CPT | Performed by: INTERNAL MEDICINE

## 2018-03-21 PROCEDURE — 36415 COLL VENOUS BLD VENIPUNCTURE: CPT | Performed by: INTERNAL MEDICINE

## 2018-03-21 PROCEDURE — 80048 BASIC METABOLIC PNL TOTAL CA: CPT | Performed by: INTERNAL MEDICINE

## 2018-03-21 PROCEDURE — 40000125 ZZHC STATISTIC OT OUTPT VISIT: Performed by: OCCUPATIONAL THERAPIST

## 2018-03-21 PROCEDURE — G9163 LANG EXPRESS GOAL STATUS: HCPCS | Mod: GN,CI | Performed by: SPEECH-LANGUAGE PATHOLOGIST

## 2018-03-21 PROCEDURE — G9169 MEMORY GOAL STATUS: HCPCS | Mod: GO,CI | Performed by: OCCUPATIONAL THERAPIST

## 2018-03-21 PROCEDURE — 85610 PROTHROMBIN TIME: CPT | Performed by: INTERNAL MEDICINE

## 2018-03-21 PROCEDURE — G9162 LANG EXPRESS CURRENT STATUS: HCPCS | Mod: GN,CI | Performed by: SPEECH-LANGUAGE PATHOLOGIST

## 2018-03-21 PROCEDURE — 40000211 ZZHC STATISTIC SLP  DEPARTMENT VISIT: Performed by: SPEECH-LANGUAGE PATHOLOGIST

## 2018-03-21 PROCEDURE — 99214 OFFICE O/P EST MOD 30 MIN: CPT | Performed by: INTERNAL MEDICINE

## 2018-03-21 PROCEDURE — 97535 SELF CARE MNGMENT TRAINING: CPT | Mod: GO | Performed by: OCCUPATIONAL THERAPIST

## 2018-03-21 NOTE — PROGRESS NOTES
Outpatient Speech Language Pathology Progress Note/Medicare 10th Visit Note     Patient: Yanick Adorno  : 1945    Beginning/End Dates of Reporting Period:  2018 to 3/21/2018    Referring Provider: Dr. Peng MD     Therapy Diagnosis: Mild/Moderate Expressive/Receptive Aphasia.    Client Self Report: Mr. Adorno is a 72 y.o. Male with a PMH significant for L CVA. Pt was initially evaluated by speech on 2018, please see evaluation report in EPIC for more information. At this time Pt has completed 9 treatment sessions and 1 evaluation, he is demonstrating excellent progress towards all expressive and receptive language goals. 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 He reports that he is enjoying this new position and is very successful.     Objective Measurements: See below:        Goals:  Goal Identifier LTG 1- Language- Verbal Expression (V/E)   Goal Description Pt will learn, implement, and demonstrate use of 3 word-finding strategies with 80% accuracy during complex word finding tasks, provided 0-min cues in order to talk on the phone with customers.   Target Date 18   Date Met      Progress: Goal met with 80-90% accuracy depending on Pt familiarity with task. Though Pt continues to demonstrate need for moderate cues from SLP, and benefits from moderate increases in processing time. SLP continues to provide Pt with cues for use of V/E strategies PRN. SLP recommends Pt to continue targeting this goal in order to maintain accuracy and reduce level of cueing needed.      Goal Identifier LTG 2- Language- Written Expression (W/E)   Goal Description Patient will complete a complex writing task (e.g. generating an email for work) with 80% accuracy provided 0-min cues in order to take notes while on the phone with a customer.   Target Date 18   Date Met      Progress: Goal not met. Pt is demonstrating 80-85% accuracy for W/E tasks including generating  1-3 sentences given 3 non-related target words and generating emails regarding specified topic of interest. However, Pt continues to demonstrate errors in syntax and grammar. He continues to benefit from direct SLP models and cues for error ID and correct. SLP recommends continuing this goal to reduce cues and models as needed for correct grammar and syntax.      Goal Identifier LTG 3- Language- Reading Comprehension (R/C)   Goal Description Patient will complete a moderate to complex reading task with 80% accuracy provided 0-minimal cues in order to read a sports article.    Target Date 04/02/18   Date Met      Progress: Goal not met. For a moderate, 2 paragraph level, article Pt is averaging 70% accuracy for completion of comprehension questions. With mod to max cues Pt is able to improve his accuracy to 100%. Continue goal.        Progress Toward Goals:   Progress this reporting period: Pt has demonstrated excellent progress towards all 3 expressive and receptive language goals. Though he has met specified accuracy level for both V/E and R/C SLP recommends continued targeting of these skills for 2 additional sessions in order to solidify Pt independent use of strategies and self monitoring skills to aid in self correction.          Plan:  Continue therapy per current plan of care, for 2 more sessions (1 week).    Discharge:  No    Thank you for referring Yanick Adorno to outpatient therapy at [facility] in [place].  Please call Celeste Lee MA, SLP-CCC at [phone] or email [email or web address] with any questions or concerns.      Celeste Lee M.A., SLP-CCC  Speech-Language Pathologist

## 2018-03-21 NOTE — PROGRESS NOTES
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  He likes his well change so far.  Patient taking great care to eliminate distractions while he is completing his homework now which has helped his accuracy tremendously.  He is looking for a way to better keep track of updating/tracking list of people that he needs to make follow-up calls with so that he does not lose their details.     Objective Measurements:     Objective Measure: Dynavision   Details: GOAL MET 3/7/18--Mode A-64 hits/min (>WNL where Wnl is > 52). Mode B 58/73 (WNL where > 42 is WNL).  Mode B with number reading. gets 47/68 hits and reads 10/10 #'s ( now WNL where >35 hits and 9/10 # read is WNL).         Objective Measure: CAM   Details: Recheck of cognitive assessment of [place] (CAM)--patient is improved from moderate deficits to WNL in 5 areas: auditory memory and sequencing (now recalls 7 letters going forward and 4 in reverse), multiple digit math, simple and moderate problem-solving, and safety judgment.  Improved from mild deficit to WNL with abstract reasoning.  Complex problem-solving is still at a moderate deficit level, as he is still working on attention to details prioritizing and sequencing with good insight. Gets 8 of 9 details correct on errand scenario, leaving out ordering of the cake.  Also goes to drugstore before the doctor.  With specific cue he is able to add watering of the cake as his first step.Patient's overall confidence and speed of return with all of these tasks went much easier this time, much faster with his transitions between task types.  Showing more cognitive  flexibility and better ability to follow multiple step directions.  Does have some word retrieval issues requiring extra time to formulate his thoughts with precise words for describing the safety and abstract reasoning portions.\Gets 8 of 9 details correct leaving out ordering of the cake.  Also goes to drugstore before the doctor.  With specific cue he is able to add watering of the cake as his first step.     Objective Measure: SDMT  Central Vision Scan     Details: From 3/19/18--Symbol Digit Modalities Test:  Gets 41/42 shapes decoded into numbers for speed of processing; WNL where WNL 33 for his age group.  [facility]- Gets 9 #'s touched in 10 sec, WNL for his age.       Goals:     Goal Identifier Visual Attention 1   Goal Description OT to complete more in depth screening of cande visual skills as further deficits suspected; pt to demonstrate balanced visual awareness of all visual fields by scoring > 9/10 B on Scan Course Task and all modes of Dynavision to WNL.   Target Date 05/06/18   Date Met   3/7/18   Progress:GOAL MET 3/7/18     Goal Identifier STM 2   Goal Description Patient to demonstrate a short-term memory skills needed to recall turning off stove/oven and taking medications on time.   Target Date 05/06/18   Date Met   3/21/17   Progress:  See CAM results     Goal Identifier Money Management 3   Goal Description Patient will demonstrate the ability to complete simple to moderately complex money management tasks with 90% accuracy for increased attention to detail and problem solving skills to resume finances at home and manage money in the community.   Target Date 05/06/18   Date Met      Progress: Continuing goal for mastery and consistency.  Patient can do so with 90% accurate and if he minimizes his distractions and takes rest breaks before rechecking his work.     Goal Identifier CAM, IADL results 4   Goal Description Patient and family to verbalize understanding of  IADL screening, and Cognitive  Assessment of [place] results and identify 3 strategies to increase patient's safety and independence in the home and community setting.   Target Date 05/06/18   Date Met   3/21/2018   Progress: Goal met per above.     Goal Identifier Errands 5   Goal Description Pt to independently complete executive functioning task sequence of 10-12 step Errands checklist ( clinic based series of daily functioning tasks) with >90% accuracy for preparation, temporal awareness, organization/consideration of location, duration, phone/web/mailing/copying and sequence of tasks.   Target Date 05/06/18   Date Met      Progress: Patient gets 8 of 9 correct on errands checklist; working on attention to detail.     Goal Identifier FMC 6   Goal Description Pt to demonstrate improved fine motor coordination by performing nine hole peg test in 25 seconds for B hands to support improved ability to cut/chop/prepare foods and write legibly.   Target Date 05/06/18   Date Met      Progress: Goal met.       Progress Toward Goals:   Progress this reporting period: Patient been seen for 10 skilled occupational therapy visits since his initial evaluation.  Therapy is been focusing on building storage/retrieval/working memory, executive functioning, insight into deficits and visual reaction time to support upper level IADLs, return to work and driving.  Patient is met 4 of 6 functional goals above, and would benefit from further OT visits as scheduled to address the remaining upper level goals to be consistent with his performance.    Plan:  Continue therapy per current plan of care.    Discharge:  Pt likely to be discharged on the next 4-6 visits.  Thank you for this thoughtful referral! If you have any questions, please call me [phone].  Nice to share in this patient's care with you.    Sincerely,   Rosa Vega, OTR/l

## 2018-03-21 NOTE — LETTER
3/21/2018      Candi Guerra [facility]   [facility] [id number]      RE: Yanick BAZZI Tila       Dear Colleague,    I had the pleasure of seeing Yanick Adorno in the [facility].    Service Date: 03/21/2018      REASON FOR VISIT:  Post-hospitalization followup.      HISTORY OF PRESENT ILLNESS:  I had the pleasure of seeing Yanick Adorno at the [facility] in [place] this morning.  He is a very pleasant 72-year-old gentleman with history of type 2 diabetes, nicotine dependence, paroxysmal atrial fibrillation and probable coronary artery disease.  He was admitted to [facility] and in 01/2018 with confusion and altered speech.  He was found to have a subacute left frontal stroke of the left frontal lobe.  Additionally, during that hospitalization, he was found to have mildly elevated troponin.  Echocardiogram demonstrated mild LV systolic dysfunction with an EF of 40%-45% and mild aortic root dilatation.  The patient did not have chest pain and the pattern of troponin elevation did not suggest acute coronary syndrome.  However, given his risk factors, a stress test was performed for risk stratification.  The stress test was read as showing a small apical defect which is predominantly fixed, consistent with prior infarction with only trivial associated cande-infarct ischemia.  There was also a small area of ischemia in the inferolateral mid ventricle associated with either an inferior/inferolateral basal infarct or diaphragmatic attenuation.      Although the stress test did show some mild abnormality, given lack of symptoms and the recent stroke, medical therapy was recommended.      Since his discharge, the patient has done quite well from a cardiac point of view.  He has not had any significant symptoms, although he is not very active.  He occasionally does get a little short of breath but that is  improving.  The patient was also found to have paroxysmal atrial fibrillation during that hospitalization.  The cause of his stroke was thought to be due to embolism from his left atrial appendage.  He was commenced on Eliquis and he is currently taking this medication and has not had any complications.      The patient had a 14-day event monitor to assess the burden of his atrial fibrillation.  He returned the monitor earlier this month.  The monitor showed paroxysmal atrial fibrillation with a burden of approximately 3% of the total beats.  He also had 13 runs of nonsustained ventricular tachycardia, longest one being 13 beats.      His risk factors with the exception of ongoing tobacco use are well controlled.      IMPRESSION, REPORT AND PLAN:   1.  Paroxysmal atrial fibrillation, now in sinus rhythm.   2.  Recent left frontal stroke, stable.   3.  Elevated troponin and presumed coronary disease during recent admission.    4.  Abnormal stress test.   5.  Nicotine dependence.   6.  Nonsustained ventricular tachycardia.   7.  Cardiomyopathy, EF 40%-45%.      I reviewed the patient's previous stress test, echocardiogram and the recent 14-day event monitor.  He has done quite well from a cardiac point of view since recent hospitalization.  Unfortunately, the event monitor shows persistent but infrequent paroxysmal ventricular tachycardia.  He has had 13 runs of nonsustained VT during the monitoring and the longest being 13 beats.      Given the elevated troponin during recent hospitalization and the abnormality noted on the stress test, I would recommend proceeding with coronary angiogram to make sure he does not have significant obstructive coronary artery disease in light of the recurrent nonsustained VT.      We will schedule for the procedure within this week or early next week.  Given recent stroke, will communicate with Neurology to make sure that they are okay with us using a potent antiplatelet medication  such as Plavix if he undergoes revascularization.      I did discuss risks, benefits, goals, alternatives and complications of the angiogram and potential revascularization with him in detail.  He expressed understanding and would like to proceed.      I asked him to hold Eliquis 1 day prior to the procedure.         MARTÍNEZ CHOI MD             D: 2018   T: 2018   MT: DW      Name:     KWAME MULLEN   MRN:      [id number]        Account:      [id number]   :      1945           Service Date: 2018      Document: [id number]         Outpatient Encounter Prescriptions as of 3/21/2018   Medication Sig Dispense Refill     apixaban ANTICOAGULANT (ELIQUIS) 5 MG tablet Take 1 tablet (5 mg) by mouth 2 times daily 60 tablet 0     atorvastatin (LIPITOR) 40 MG tablet Take 1 tablet (40 mg) by mouth daily 10 tablet 0     metoprolol succinate (TOPROL-XL) 25 MG 24 hr tablet Take 1 tablet (25 mg) by mouth daily 10 tablet 0     nicotine (NICODERM CQ) 21 MG/24HR 24 hr patch Place 1 patch onto the skin daily 10 patch 0     insulin glargine (LANTUS) 100 UNIT/ML injection Inject 28 Units Subcutaneous every morning       GLIPIZIDE PO Take 10 mg by mouth 2 times daily (before meals)       METFORMIN HCL PO Take 500 mg by mouth 2 times daily (with meals)        [DISCONTINUED] aspirin 81 MG chewable tablet Take 1 tablet (81 mg) by mouth daily 30 tablet 0     No facility-administered encounter medications on file as of 3/21/2018.        Again, thank you for allowing me to participate in the care of your patient.      Sincerely,    Martínez Choi MD, MD     [facility]

## 2018-03-21 NOTE — LETTER
3/21/2018    Candi Guerra [facility]   [facility] [id number]    RE: Yanick Adrono       Dear Colleague,    I had the pleasure of seeing Yanick Adorno in the [facility].    HPI and Plan:   See dictation    No orders of the defined types were placed in this encounter.      No orders of the defined types were placed in this encounter.      Medications Discontinued During This Encounter   Medication Reason     aspirin 81 MG chewable tablet Stopped by Patient         Encounter Diagnoses   Name Primary?     Paroxysmal atrial fibrillation (H)      Cerebrovascular accident (CVA) due to embolism of left middle cerebral artery (H)      Abnormal cardiovascular stress test Yes     Paroxysmal ventricular tachycardia (H)        CURRENT MEDICATIONS:  Current Outpatient Prescriptions   Medication Sig Dispense Refill     apixaban ANTICOAGULANT (ELIQUIS) 5 MG tablet Take 1 tablet (5 mg) by mouth 2 times daily 60 tablet 0     atorvastatin (LIPITOR) 40 MG tablet Take 1 tablet (40 mg) by mouth daily 10 tablet 0     metoprolol succinate (TOPROL-XL) 25 MG 24 hr tablet Take 1 tablet (25 mg) by mouth daily 10 tablet 0     nicotine (NICODERM CQ) 21 MG/24HR 24 hr patch Place 1 patch onto the skin daily 10 patch 0     insulin glargine (LANTUS) 100 UNIT/ML injection Inject 28 Units Subcutaneous every morning       GLIPIZIDE PO Take 10 mg by mouth 2 times daily (before meals)       METFORMIN HCL PO Take 500 mg by mouth 2 times daily (with meals)          ALLERGIES     Allergies   Allergen Reactions     No Known Drug Allergies        PAST MEDICAL HISTORY:  Past Medical History:   Diagnosis Date     Cardiomyopathy (H)      CVA (cerebral vascular accident) (H) 01/21/2018    (L) frontal cerbral infarct     Esophageal reflux      New onset a-fib (H)      Tobacco abuse     quit when he had stroke     Type II or unspecified type diabetes mellitus without mention of complication, not  "stated as uncontrolled        PAST SURGICAL HISTORY:  History reviewed. No pertinent surgical history.    FAMILY HISTORY:  Family History   Problem Relation Age of Onset     DIABETES Father      Uterine Cancer Mother      DIABETES Brother      DIABETES Brother      Suicide Brother      DIABETES Brother        SOCIAL HISTORY:  Social History     Social History     Marital status:      Spouse name: N/A     Number of children: N/A     Years of education: N/A     Social History Main Topics     Smoking status: Former Smoker     Packs/day: 1.50     Years: 33.00     Quit date: 1/20/2018     Smokeless tobacco: Never Used     Alcohol use No     Drug use: None     Sexual activity: Not Asked     Other Topics Concern     None     Social History Narrative       Review of Systems:  Skin:  Positive for pigmentation red spots on arms and legs   Eyes:  Negative      ENT:  Positive for   phlem stuck in the back of throat   Respiratory:  Negative       Cardiovascular:  Negative      Gastroenterology: Negative      Genitourinary:  Positive for urinary frequency    Musculoskeletal:  Positive for joint pain left hip   Neurologic:  Positive for numbness or tingling of feet;stroke neuropathy  Psychiatric:  Negative      Heme/Lymph/Imm:  Negative      Endocrine:  Positive for diabetes      Physical Exam:  Vitals: /72 (BP Location: Right arm, Patient Position: Sitting, Cuff Size: Adult Regular)  Pulse 72  Ht 1.88 m (6' 2\")  Wt 91.6 kg (202 lb)  SpO2 97%  BMI 25.94 kg/m2    Constitutional:  cooperative;in no acute distress        Skin:  warm and dry to the touch          Head:  normocephalic        Eyes:  conjunctivae and lids unremarkable        Lymph:No Cervical lymphadenopathy present     ENT:  no pallor or cyanosis        Neck:  carotid pulses are full and equal bilaterally;no carotid bruit        Respiratory:  clear to auscultation         Cardiac: regular rhythm;normal S1 and S2       systolic murmur;grade 1            "                                      GI:  abdomen soft;no masses;non-tender        Extremities and Muscular Skeletal:  no edema              Neurological:  no gross motor deficits        Psych:  Alert and Oriented x 3        CC  ABENA Goff CNP  [facility]  [address]  [place] [address]                Thank you for allowing me to participate in the care of your patient.      Sincerely,     Edin Choi MD, MD     [facility]    cc:   ABENA Goff [facility]  [address]  [place] [address]

## 2018-03-21 NOTE — PROGRESS NOTES
Service Date: 03/21/2018      REASON FOR VISIT:  Post-hospitalization followup.      HISTORY OF PRESENT ILLNESS:  I had the pleasure of seeing Yanick Adorno at the [facility] in [place] this morning.  He is a very pleasant 72-year-old gentleman with history of type 2 diabetes, nicotine dependence, paroxysmal atrial fibrillation and probable coronary artery disease.  He was admitted to [facility] and in 01/2018 with confusion and altered speech.  He was found to have a subacute left frontal stroke of the left frontal lobe.  Additionally, during that hospitalization, he was found to have mildly elevated troponin.  Echocardiogram demonstrated mild LV systolic dysfunction with an EF of 40%-45% and mild aortic root dilatation.  The patient did not have chest pain and the pattern of troponin elevation did not suggest acute coronary syndrome.  However, given his risk factors, a stress test was performed for risk stratification.  The stress test was read as showing a small apical defect which is predominantly fixed, consistent with prior infarction with only trivial associated cande-infarct ischemia.  There was also a small area of ischemia in the inferolateral mid ventricle associated with either an inferior/inferolateral basal infarct or diaphragmatic attenuation.      Although the stress test did show some mild abnormality, given lack of symptoms and the recent stroke, medical therapy was recommended.      Since his discharge, the patient has done quite well from a cardiac point of view.  He has not had any significant symptoms, although he is not very active.  He occasionally does get a little short of breath but that is improving.  The patient was also found to have paroxysmal atrial fibrillation during that hospitalization.  The cause of his stroke was thought to be due to embolism from his left atrial appendage.  He was commenced on Eliquis and he is currently taking  this medication and has not had any complications.      The patient had a 14-day event monitor to assess the burden of his atrial fibrillation.  He returned the monitor earlier this month.  The monitor showed paroxysmal atrial fibrillation with a burden of approximately 3% of the total beats.  He also had 13 runs of nonsustained ventricular tachycardia, longest one being 13 beats.      His risk factors with the exception of ongoing tobacco use are well controlled.      IMPRESSION, REPORT AND PLAN:   1.  Paroxysmal atrial fibrillation, now in sinus rhythm.   2.  Recent left frontal stroke, stable.   3.  Elevated troponin and presumed coronary disease during recent admission.    4.  Abnormal stress test.   5.  Nicotine dependence.   6.  Nonsustained ventricular tachycardia.   7.  Cardiomyopathy, EF 40%-45%.      I reviewed the patient's previous stress test, echocardiogram and the recent 14-day event monitor.  He has done quite well from a cardiac point of view since recent hospitalization.  Unfortunately, the event monitor shows persistent but infrequent paroxysmal ventricular tachycardia.  He has had 13 runs of nonsustained VT during the monitoring and the longest being 13 beats.      Given the elevated troponin during recent hospitalization and the abnormality noted on the stress test, I would recommend proceeding with coronary angiogram to make sure he does not have significant obstructive coronary artery disease in light of the recurrent nonsustained VT.      We will schedule for the procedure within this week or early next week.  Given recent stroke, will communicate with Neurology to make sure that they are okay with us using a potent antiplatelet medication such as Plavix if he undergoes revascularization.      I did discuss risks, benefits, goals, alternatives and complications of the angiogram and potential revascularization with him in detail.  He expressed understanding and would like to proceed.      I  asked him to hold Eliquis 1 day prior to the procedure.         MARTÍNEZ HANNA MD             D: 2018   T: 2018   MT: JUANA      Name:     KWAME MULLEN   MRN:      [id number]        Account:      [id number]   :      1945           Service Date: 2018      Document: [id number]

## 2018-03-21 NOTE — PROGRESS NOTES
HPI and Plan:   See dictation    No orders of the defined types were placed in this encounter.      No orders of the defined types were placed in this encounter.      Medications Discontinued During This Encounter   Medication Reason     aspirin 81 MG chewable tablet Stopped by Patient         Encounter Diagnoses   Name Primary?     Paroxysmal atrial fibrillation (H)      Cerebrovascular accident (CVA) due to embolism of left middle cerebral artery (H)      Abnormal cardiovascular stress test Yes     Paroxysmal ventricular tachycardia (H)        CURRENT MEDICATIONS:  Current Outpatient Prescriptions   Medication Sig Dispense Refill     apixaban ANTICOAGULANT (ELIQUIS) 5 MG tablet Take 1 tablet (5 mg) by mouth 2 times daily 60 tablet 0     atorvastatin (LIPITOR) 40 MG tablet Take 1 tablet (40 mg) by mouth daily 10 tablet 0     metoprolol succinate (TOPROL-XL) 25 MG 24 hr tablet Take 1 tablet (25 mg) by mouth daily 10 tablet 0     nicotine (NICODERM CQ) 21 MG/24HR 24 hr patch Place 1 patch onto the skin daily 10 patch 0     insulin glargine (LANTUS) 100 UNIT/ML injection Inject 28 Units Subcutaneous every morning       GLIPIZIDE PO Take 10 mg by mouth 2 times daily (before meals)       METFORMIN HCL PO Take 500 mg by mouth 2 times daily (with meals)          ALLERGIES     Allergies   Allergen Reactions     No Known Drug Allergies        PAST MEDICAL HISTORY:  Past Medical History:   Diagnosis Date     Cardiomyopathy (H)      CVA (cerebral vascular accident) (H) 01/21/2018    (L) frontal cerbral infarct     Esophageal reflux      New onset a-fib (H)      Tobacco abuse     quit when he had stroke     Type II or unspecified type diabetes mellitus without mention of complication, not stated as uncontrolled        PAST SURGICAL HISTORY:  History reviewed. No pertinent surgical history.    FAMILY HISTORY:  Family History   Problem Relation Age of Onset     DIABETES Father      Uterine Cancer Mother      DIABETES Brother   "    DIABETES Brother      Suicide Brother      DIABETES Brother        SOCIAL HISTORY:  Social History     Social History     Marital status:      Spouse name: N/A     Number of children: N/A     Years of education: N/A     Social History Main Topics     Smoking status: Former Smoker     Packs/day: 1.50     Years: 33.00     Quit date: 1/20/2018     Smokeless tobacco: Never Used     Alcohol use No     Drug use: None     Sexual activity: Not Asked     Other Topics Concern     None     Social History Narrative       Review of Systems:  Skin:  Positive for pigmentation red spots on arms and legs   Eyes:  Negative      ENT:  Positive for   phlem stuck in the back of throat   Respiratory:  Negative       Cardiovascular:  Negative      Gastroenterology: Negative      Genitourinary:  Positive for urinary frequency    Musculoskeletal:  Positive for joint pain left hip   Neurologic:  Positive for numbness or tingling of feet;stroke neuropathy  Psychiatric:  Negative      Heme/Lymph/Imm:  Negative      Endocrine:  Positive for diabetes      Physical Exam:  Vitals: /72 (BP Location: Right arm, Patient Position: Sitting, Cuff Size: Adult Regular)  Pulse 72  Ht 1.88 m (6' 2\")  Wt 91.6 kg (202 lb)  SpO2 97%  BMI 25.94 kg/m2    Constitutional:  cooperative;in no acute distress        Skin:  warm and dry to the touch          Head:  normocephalic        Eyes:  conjunctivae and lids unremarkable        Lymph:No Cervical lymphadenopathy present     ENT:  no pallor or cyanosis        Neck:  carotid pulses are full and equal bilaterally;no carotid bruit        Respiratory:  clear to auscultation         Cardiac: regular rhythm;normal S1 and S2       systolic murmur;grade 1                                                 GI:  abdomen soft;no masses;non-tender        Extremities and Muscular Skeletal:  no edema              Neurological:  no gross motor deficits        Psych:  Alert and Oriented x 3        CC  Patt Walker" ABENA Moreno [facility]  [address]  [place] [id number]

## 2018-03-21 NOTE — MR AVS SNAPSHOT
After Visit Summary   3/21/2018    Yanick Adorno    MRN: [id number]           Patient Information     Date Of Birth          1945        Visit Information        Provider Department      3/21/2018 9:00 AM Edin Choi MD [facility]        Today's Diagnoses     Abnormal cardiovascular stress test    -  1    Paroxysmal atrial fibrillation (H)        Cerebrovascular accident (CVA) due to embolism of left middle cerebral artery (H)        Paroxysmal ventricular tachycardia (H)           Follow-ups after your visit        Your next 10 appointments already scheduled     Mar 21, 2018  1:00 PM CDT   Neuro Treatment with RADHA Nieves   [facility] Speech Therapy ([facility])    [address] [address]   [phone]            Mar 21, 2018  1:45 PM CDT   Neuro Treatment with CHILO Aldrich   [facility] Occupational Therapy ([facility])    [address] [address]   [phone]            Mar 26, 2018  8:45 AM CDT   Neuro Treatment with RADHA Nieves   [facility] ([facility])    [address] [address]   [phone]            Mar 28, 2018  8:00 AM CDT   Neuro Treatment with RADHA Nieves   [facility] ([facility])    [address] [address]   [phone]            Mar 28, 2018  9:00 AM CDT   Neuro Treatment with CHILO Aldrich   [facility] Occupational Therapy ([facility])    [address] [address]   [phone]            Mar 28, 2018 12:00 PM CDT   (Arrive by 10:30 AM)   Cath 90 Minute with BRIDGETTE CARPIO   [facility] Cardiac Cath Lab ([facility])    [address]  [facility] [address]   [phone]            Mar 30, 2018  8:00 AM CDT   LAB with CLEMENTE  LAB   [facility] ([facility])    [address]  [facility] [address] [phone]           Please do not eat 10-12 hours before your appointment if you are coming in fasting for labs on lipids, cholesterol, or glucose (sugar). This does not apply to pregnant women. Water, hot tea and black coffee (with nothing added) are okay. Do not drink other fluids, diet soda or chew gum.            Mar 30, 2018  9:00 AM CDT   Neuro Treatment with Rosa Vega, OTR   [facility] Occupational Therapy ([facility])    [address] [address]   [phone]            Apr 02, 2018  9:00 AM CDT   Neuro Treatment with Rosa Vega OTR   [facility] Occupational [facility] ([facility])    [address] [address]   [phone]              Future tests that were ordered for you today     Open Future Orders        Priority Expected Expires Ordered    Basic metabolic panel Routine 3/21/2018 3/21/2019 3/21/2018    CBC with platelets Routine 3/21/2018 3/21/2019 3/21/2018    PROTIME/INR Routine 3/21/2018 3/21/2019 3/21/2018    Partial thromboplastin time Routine 3/21/2018 3/21/2019 3/21/2018    Cardiac Cath: Coronary Angiography Adult Order Routine 3/28/2018 3/21/2019 3/21/2018            Who to contact     If you have questions or need follow up information about today's clinic visit or your schedule please contact [facility] directly at [phone].  Normal or non-critical lab and imaging results will be communicated to you by MyChart, letter or phone within 4 business days after the clinic has received the results. If you do not hear from us within 7 days, please contact the clinic through MyChart or phone. If you have a critical or abnormal lab result, we will notify you by phone as soon as possible.  Submit refill requests through [employer]  "or call your pharmacy and they will forward the refill request to us. Please allow 3 business days for your refill to be completed.          Additional Information About Your Visit        MyChart Information     [employer] lets you send messages to your doctor, view your test results, renew your prescriptions, schedule appointments and more. To sign up, go to [email or web address] . Click on \"Log in\" on the left side of the screen, which will take you to the Welcome page. Then click on \"Sign up Now\" on the right side of the page.     You will be asked to enter the access code listed below, as well as some personal information. Please follow the directions to create your username and password.     Your access code is: [email or web address]  Expires: 2018  9:10 PM     Your access code will  in 90 days. If you need help or a new code, please call your [facility] clinic or [phone].        Care EveryWhere ID     This is your Care EveryWhere ID. This could be used by other organizations to access your [facility] medical records  [id number]        Your Vitals Were     Pulse Height Pulse Oximetry BMI (Body Mass Index)          72 1.88 m (6' 2\") 97% 25.94 kg/m2         Blood Pressure from Last 3 Encounters:   18 120/72   18 118/72   18 135/84    Weight from Last 3 Encounters:   18 91.6 kg (202 lb)   18 90.4 kg (199 lb 6.4 oz)   18 86.8 kg (191 lb 6.4 oz)              We Performed the Following     Follow-Up with Cardiologist        Primary Care Provider Office Phone # Fax #    Candi Leon [phone]       [facility] [address] [facility]   [facility] [address]        Equal Access to Services     ALDO MARSHALL : Meek Bowling, gordy ram, yen rojas, germaine bautista. [facility] [phone].    ATENCIÓN: Si habla español, tiene a moeller disposición servicios gratuitos de asistencia lingüística. Llame al " [phone].    We comply with applicable federal civil rights laws and [place] laws. We do not discriminate on the basis of race, color, national origin, age, disability, sex, sexual orientation, or gender identity.            Thank you!     Thank you for choosing [facility]  for your care. Our goal is always to provide you with excellent care. Hearing back from our patients is one way we can continue to improve our services. Please take a few minutes to complete the written survey that you may receive in the mail after your visit with us. Thank you!             Your Updated Medication List - Protect others around you: Learn how to safely use, store and throw away your medicines at www.disposemymeds.org.          This list is accurate as of 3/21/18 10:10 AM.  Always use your most recent med list.                   Brand Name Dispense Instructions for use Diagnosis    apixaban ANTICOAGULANT 5 MG tablet    ELIQUIS    60 tablet    Take 1 tablet (5 mg) by mouth 2 times daily    Cerebrovascular accident (CVA) due to embolism of left middle cerebral artery (H), Paroxysmal atrial fibrillation (H)       atorvastatin 40 MG tablet    LIPITOR    10 tablet    Take 1 tablet (40 mg) by mouth daily    Cerebrovascular accident (CVA) due to embolism of left middle cerebral artery (H)       GLIPIZIDE PO      Take 10 mg by mouth 2 times daily (before meals)        insulin glargine 100 UNIT/ML injection    LANTUS     Inject 28 Units Subcutaneous every morning        METFORMIN HCL PO      Take 500 mg by mouth 2 times daily (with meals)        metoprolol succinate 25 MG 24 hr tablet    TOPROL-XL    10 tablet    Take 1 tablet (25 mg) by mouth daily    Paroxysmal atrial fibrillation (H), NSTEMI (non-ST elevated myocardial infarction) (H)       nicotine 21 MG/24HR 24 hr patch    NICODERM CQ    10 patch    Place 1 patch onto the skin daily    Tobacco abuse

## 2018-03-23 ENCOUNTER — CARE COORDINATION (OUTPATIENT)
Dept: CARDIOLOGY | Facility: CLINIC | Age: 73
End: 2018-03-23

## 2018-03-23 RX ORDER — POTASSIUM CHLORIDE 750 MG/1
20 TABLET, EXTENDED RELEASE ORAL
Status: CANCELLED | OUTPATIENT
Start: 2018-03-23

## 2018-03-23 RX ORDER — SODIUM CHLORIDE 9 MG/ML
INJECTION, SOLUTION INTRAVENOUS CONTINUOUS
Status: CANCELLED | OUTPATIENT
Start: 2018-03-23

## 2018-03-23 RX ORDER — LIDOCAINE 40 MG/G
CREAM TOPICAL
Status: CANCELLED | OUTPATIENT
Start: 2018-03-23

## 2018-03-23 RX ORDER — ASPIRIN 81 MG/1
325 TABLET ORAL DAILY
Status: CANCELLED | OUTPATIENT
Start: 2018-03-27

## 2018-03-23 NOTE — PROGRESS NOTES
Called pt and reviewed coronary angiogram/LHC prep instructions.     Patient is scheduled for a left heart cath/coronary angiogram at [facility], on 3/28/18.  Check in time is at 1030 and procedure time is at 1200.  Advised patient not eat or drink after midnight on 3/27/18.    Patient is not taking a diuretic.     Advised to discuss insulin dosing with his PMD.   Advised to hold Metformin the day of the procedure should continue to hold until after we can review results of BMP lab which is scheduled on 3/30/18 at 0800.The patient will be called and advised if they can resume their metformin.  Patient should hold glipizide the morning of the procedure, can resume after the procedure.     Advised to hold Eliquis for 1 days before the procedure (per Dr. Choi OV note 3/21/18), starting hold on 3/27/18. Patient can resume Eliquis after the procedure.    Advised to take 325mg of Aspirin the day before the procedure and the morning of the procedure. Advised to take their metoprolol the morning of the procedure with small sips of water. The rest of her daily medications (not noted above) can be taken after the procedure.     Verified patient does note have an allergy to contrast dye.   Verified patient has someone available to drive them home from the hospital and can stay with them for 24 hours after the procedure.   This is a same day procedure, however advised to should pack an overnight bag just in case he may need to stay overnight.     Confirmed pt has BMP lab appt 3/30/18 at 0800 and f/u appt 4/11/18 at 2:30pm w/ Tiffany Bowman PA-C.     Patient had no questions about their prep instructions. Advised to call back if questions, provided direct call back number.   Migue PERSAUD  [facility]

## 2018-03-26 ENCOUNTER — HOSPITAL ENCOUNTER (OUTPATIENT)
Dept: SPEECH THERAPY | Facility: CLINIC | Age: 73
Setting detail: THERAPIES SERIES
End: 2018-03-26
Attending: HOSPITALIST
Payer: MEDICARE

## 2018-03-26 PROCEDURE — 92507 TX SP LANG VOICE COMM INDIV: CPT | Mod: GN | Performed by: SPEECH-LANGUAGE PATHOLOGIST

## 2018-03-26 PROCEDURE — 40000211 ZZHC STATISTIC SLP  DEPARTMENT VISIT: Performed by: SPEECH-LANGUAGE PATHOLOGIST

## 2018-03-28 ENCOUNTER — APPOINTMENT (OUTPATIENT)
Dept: CARDIOLOGY | Facility: CLINIC | Age: 73
End: 2018-03-28
Attending: INTERNAL MEDICINE
Payer: MEDICARE

## 2018-03-28 ENCOUNTER — HOSPITAL ENCOUNTER (OUTPATIENT)
Facility: CLINIC | Age: 73
Discharge: HOME | End: 2018-03-28
Attending: INTERNAL MEDICINE | Admitting: INTERNAL MEDICINE
Payer: MEDICARE

## 2018-03-28 ENCOUNTER — HOSPITAL ENCOUNTER (OUTPATIENT)
Dept: SPEECH THERAPY | Facility: CLINIC | Age: 73
Setting detail: THERAPIES SERIES
End: 2018-03-28
Attending: HOSPITALIST
Payer: MEDICARE

## 2018-03-28 VITALS — OXYGEN SATURATION: 96 % | DIASTOLIC BLOOD PRESSURE: 87 MMHG | SYSTOLIC BLOOD PRESSURE: 159 MMHG

## 2018-03-28 DIAGNOSIS — R94.39 ABNORMAL CARDIOVASCULAR STRESS TEST: ICD-10-CM

## 2018-03-28 DIAGNOSIS — Z98.890 STATUS POST CORONARY ANGIOGRAM: Primary | ICD-10-CM

## 2018-03-28 DIAGNOSIS — I48.0 PAROXYSMAL ATRIAL FIBRILLATION (H): ICD-10-CM

## 2018-03-28 PROCEDURE — G9164 LANG EXPRESS D/C STATUS: HCPCS | Mod: GN,CI | Performed by: SPEECH-LANGUAGE PATHOLOGIST

## 2018-03-28 PROCEDURE — 99152 MOD SED SAME PHYS/QHP 5/>YRS: CPT | Performed by: INTERNAL MEDICINE

## 2018-03-28 PROCEDURE — 92507 TX SP LANG VOICE COMM INDIV: CPT | Mod: GN | Performed by: SPEECH-LANGUAGE PATHOLOGIST

## 2018-03-28 PROCEDURE — C1769 GUIDE WIRE: HCPCS

## 2018-03-28 PROCEDURE — 25000132 ZZH RX MED GY IP 250 OP 250 PS 637: Mod: GY | Performed by: INTERNAL MEDICINE

## 2018-03-28 PROCEDURE — C1894 INTRO/SHEATH, NON-LASER: HCPCS

## 2018-03-28 PROCEDURE — 25000128 H RX IP 250 OP 636

## 2018-03-28 PROCEDURE — 99153 MOD SED SAME PHYS/QHP EA: CPT

## 2018-03-28 PROCEDURE — 25000128 H RX IP 250 OP 636: Performed by: INTERNAL MEDICINE

## 2018-03-28 PROCEDURE — 27210742 ZZH CATH CR1

## 2018-03-28 PROCEDURE — 93010 ELECTROCARDIOGRAM REPORT: CPT | Performed by: INTERNAL MEDICINE

## 2018-03-28 PROCEDURE — 93571 IV DOP VEL&/PRESS C FLO 1ST: CPT | Mod: 52

## 2018-03-28 PROCEDURE — 93458 L HRT ARTERY/VENTRICLE ANGIO: CPT

## 2018-03-28 PROCEDURE — G9163 LANG EXPRESS GOAL STATUS: HCPCS | Mod: GN,CI | Performed by: SPEECH-LANGUAGE PATHOLOGIST

## 2018-03-28 PROCEDURE — 93571 IV DOP VEL&/PRESS C FLO 1ST: CPT | Mod: 26 | Performed by: INTERNAL MEDICINE

## 2018-03-28 PROCEDURE — 93458 L HRT ARTERY/VENTRICLE ANGIO: CPT | Mod: 26 | Performed by: INTERNAL MEDICINE

## 2018-03-28 PROCEDURE — 27210946 ZZH KIT HC TOTES DISP CR8

## 2018-03-28 PROCEDURE — 27210856 ZZH ACCESS HEART CATH CR2

## 2018-03-28 PROCEDURE — 99152 MOD SED SAME PHYS/QHP 5/>YRS: CPT

## 2018-03-28 PROCEDURE — 27210827 ZZH KIT ACIST INJECTOR CR6

## 2018-03-28 PROCEDURE — 40000211 ZZHC STATISTIC SLP  DEPARTMENT VISIT: Performed by: SPEECH-LANGUAGE PATHOLOGIST

## 2018-03-28 PROCEDURE — 25000125 ZZHC RX 250

## 2018-03-28 PROCEDURE — A9270 NON-COVERED ITEM OR SERVICE: HCPCS | Mod: GY | Performed by: INTERNAL MEDICINE

## 2018-03-28 RX ORDER — PROTAMINE SULFATE 10 MG/ML
25-100 INJECTION, SOLUTION INTRAVENOUS EVERY 5 MIN PRN
Status: DISCONTINUED | OUTPATIENT
Start: 2018-03-28 | End: 2018-03-29 | Stop reason: HOSPADM

## 2018-03-28 RX ORDER — NITROGLYCERIN 20 MG/100ML
.07-2 INJECTION INTRAVENOUS CONTINUOUS PRN
Status: DISCONTINUED | OUTPATIENT
Start: 2018-03-28 | End: 2018-03-29 | Stop reason: HOSPADM

## 2018-03-28 RX ORDER — ASPIRIN 81 MG/1
81-324 TABLET, CHEWABLE ORAL
Status: DISCONTINUED | OUTPATIENT
Start: 2018-03-28 | End: 2018-03-29 | Stop reason: HOSPADM

## 2018-03-28 RX ORDER — ENALAPRILAT 1.25 MG/ML
1.25-2.5 INJECTION INTRAVENOUS
Status: DISCONTINUED | OUTPATIENT
Start: 2018-03-28 | End: 2018-03-29 | Stop reason: HOSPADM

## 2018-03-28 RX ORDER — NALOXONE HYDROCHLORIDE 0.4 MG/ML
.1-.4 INJECTION, SOLUTION INTRAMUSCULAR; INTRAVENOUS; SUBCUTANEOUS
Status: DISCONTINUED | OUTPATIENT
Start: 2018-03-28 | End: 2018-03-29 | Stop reason: HOSPADM

## 2018-03-28 RX ORDER — FLUMAZENIL 0.1 MG/ML
0.2 INJECTION, SOLUTION INTRAVENOUS
Status: ACTIVE | OUTPATIENT
Start: 2018-03-28 | End: 2018-03-29

## 2018-03-28 RX ORDER — DEXTROSE MONOHYDRATE 25 G/50ML
12.5-5 INJECTION, SOLUTION INTRAVENOUS EVERY 30 MIN PRN
Status: DISCONTINUED | OUTPATIENT
Start: 2018-03-28 | End: 2018-03-29 | Stop reason: HOSPADM

## 2018-03-28 RX ORDER — METHYLPREDNISOLONE SODIUM SUCCINATE 125 MG/2ML
125 INJECTION, POWDER, LYOPHILIZED, FOR SOLUTION INTRAMUSCULAR; INTRAVENOUS
Status: DISCONTINUED | OUTPATIENT
Start: 2018-03-28 | End: 2018-03-29 | Stop reason: HOSPADM

## 2018-03-28 RX ORDER — FENTANYL CITRATE 50 UG/ML
25-50 INJECTION, SOLUTION INTRAMUSCULAR; INTRAVENOUS
Status: DISCONTINUED | OUTPATIENT
Start: 2018-03-28 | End: 2018-03-29 | Stop reason: HOSPADM

## 2018-03-28 RX ORDER — ONDANSETRON 2 MG/ML
4 INJECTION INTRAMUSCULAR; INTRAVENOUS EVERY 4 HOURS PRN
Status: DISCONTINUED | OUTPATIENT
Start: 2018-03-28 | End: 2018-03-29 | Stop reason: HOSPADM

## 2018-03-28 RX ORDER — HYDRALAZINE HYDROCHLORIDE 20 MG/ML
10-20 INJECTION INTRAMUSCULAR; INTRAVENOUS
Status: DISCONTINUED | OUTPATIENT
Start: 2018-03-28 | End: 2018-03-29 | Stop reason: HOSPADM

## 2018-03-28 RX ORDER — EPINEPHRINE 1 MG/ML
0.3 INJECTION, SOLUTION, CONCENTRATE INTRAVENOUS
Status: DISCONTINUED | OUTPATIENT
Start: 2018-03-28 | End: 2018-03-29 | Stop reason: HOSPADM

## 2018-03-28 RX ORDER — SODIUM NITROPRUSSIDE 25 MG/ML
100-200 INJECTION INTRAVENOUS
Status: DISCONTINUED | OUTPATIENT
Start: 2018-03-28 | End: 2018-03-29 | Stop reason: HOSPADM

## 2018-03-28 RX ORDER — LORAZEPAM 2 MG/ML
.5-2 INJECTION INTRAMUSCULAR EVERY 4 HOURS PRN
Status: DISCONTINUED | OUTPATIENT
Start: 2018-03-28 | End: 2018-03-29 | Stop reason: HOSPADM

## 2018-03-28 RX ORDER — NITROGLYCERIN 5 MG/ML
VIAL (ML) INTRAVENOUS
Status: COMPLETED
Start: 2018-03-28 | End: 2018-03-28

## 2018-03-28 RX ORDER — PRASUGREL 10 MG/1
10-60 TABLET, FILM COATED ORAL
Status: DISCONTINUED | OUTPATIENT
Start: 2018-03-28 | End: 2018-03-29 | Stop reason: HOSPADM

## 2018-03-28 RX ORDER — SODIUM CHLORIDE 9 MG/ML
INJECTION, SOLUTION INTRAVENOUS CONTINUOUS
Status: DISCONTINUED | OUTPATIENT
Start: 2018-03-28 | End: 2018-03-29 | Stop reason: HOSPADM

## 2018-03-28 RX ORDER — FENTANYL CITRATE 50 UG/ML
INJECTION, SOLUTION INTRAMUSCULAR; INTRAVENOUS
Status: COMPLETED
Start: 2018-03-28 | End: 2018-03-28

## 2018-03-28 RX ORDER — BUPIVACAINE HYDROCHLORIDE 2.5 MG/ML
1-10 INJECTION, SOLUTION EPIDURAL; INFILTRATION; INTRACAUDAL
Status: DISCONTINUED | OUTPATIENT
Start: 2018-03-28 | End: 2018-03-29 | Stop reason: HOSPADM

## 2018-03-28 RX ORDER — METOPROLOL TARTRATE 1 MG/ML
5 INJECTION, SOLUTION INTRAVENOUS EVERY 5 MIN PRN
Status: DISCONTINUED | OUTPATIENT
Start: 2018-03-28 | End: 2018-03-29 | Stop reason: HOSPADM

## 2018-03-28 RX ORDER — ASPIRIN 325 MG
325 TABLET ORAL
Status: DISCONTINUED | OUTPATIENT
Start: 2018-03-28 | End: 2018-03-29 | Stop reason: HOSPADM

## 2018-03-28 RX ORDER — PROMETHAZINE HYDROCHLORIDE 25 MG/ML
6.25-25 INJECTION, SOLUTION INTRAMUSCULAR; INTRAVENOUS EVERY 4 HOURS PRN
Status: DISCONTINUED | OUTPATIENT
Start: 2018-03-28 | End: 2018-03-29 | Stop reason: HOSPADM

## 2018-03-28 RX ORDER — NALOXONE HYDROCHLORIDE 0.4 MG/ML
0.4 INJECTION, SOLUTION INTRAMUSCULAR; INTRAVENOUS; SUBCUTANEOUS EVERY 5 MIN PRN
Status: DISCONTINUED | OUTPATIENT
Start: 2018-03-28 | End: 2018-03-29 | Stop reason: HOSPADM

## 2018-03-28 RX ORDER — LIDOCAINE 40 MG/G
CREAM TOPICAL
Status: DISCONTINUED | OUTPATIENT
Start: 2018-03-28 | End: 2018-03-29 | Stop reason: HOSPADM

## 2018-03-28 RX ORDER — POTASSIUM CHLORIDE 7.45 MG/ML
10 INJECTION INTRAVENOUS
Status: DISCONTINUED | OUTPATIENT
Start: 2018-03-28 | End: 2018-03-29 | Stop reason: HOSPADM

## 2018-03-28 RX ORDER — ATROPINE SULFATE 0.1 MG/ML
.5-1 INJECTION INTRAVENOUS
Status: DISCONTINUED | OUTPATIENT
Start: 2018-03-28 | End: 2018-03-29 | Stop reason: HOSPADM

## 2018-03-28 RX ORDER — DIPHENHYDRAMINE HYDROCHLORIDE 50 MG/ML
25-50 INJECTION INTRAMUSCULAR; INTRAVENOUS
Status: DISCONTINUED | OUTPATIENT
Start: 2018-03-28 | End: 2018-03-29 | Stop reason: HOSPADM

## 2018-03-28 RX ORDER — NITROGLYCERIN 5 MG/ML
100-200 VIAL (ML) INTRAVENOUS
Status: DISCONTINUED | OUTPATIENT
Start: 2018-03-28 | End: 2018-03-29 | Stop reason: HOSPADM

## 2018-03-28 RX ORDER — LIDOCAINE HYDROCHLORIDE 10 MG/ML
INJECTION, SOLUTION EPIDURAL; INFILTRATION; INTRACAUDAL; PERINEURAL
Status: DISCONTINUED
Start: 2018-03-28 | End: 2018-03-29 | Stop reason: HOSPADM

## 2018-03-28 RX ORDER — LIDOCAINE HYDROCHLORIDE 10 MG/ML
1-10 INJECTION, SOLUTION INFILTRATION; PERINEURAL
Status: DISCONTINUED | OUTPATIENT
Start: 2018-03-28 | End: 2018-03-29 | Stop reason: HOSPADM

## 2018-03-28 RX ORDER — MORPHINE SULFATE 2 MG/ML
1-2 INJECTION, SOLUTION INTRAMUSCULAR; INTRAVENOUS EVERY 5 MIN PRN
Status: DISCONTINUED | OUTPATIENT
Start: 2018-03-28 | End: 2018-03-29 | Stop reason: HOSPADM

## 2018-03-28 RX ORDER — NICARDIPINE HYDROCHLORIDE 2.5 MG/ML
100 INJECTION INTRAVENOUS
Status: DISCONTINUED | OUTPATIENT
Start: 2018-03-28 | End: 2018-03-29 | Stop reason: HOSPADM

## 2018-03-28 RX ORDER — DOBUTAMINE HYDROCHLORIDE 200 MG/100ML
2-20 INJECTION INTRAVENOUS CONTINUOUS PRN
Status: DISCONTINUED | OUTPATIENT
Start: 2018-03-28 | End: 2018-03-29 | Stop reason: HOSPADM

## 2018-03-28 RX ORDER — CLOPIDOGREL BISULFATE 75 MG/1
75 TABLET ORAL
Status: DISCONTINUED | OUTPATIENT
Start: 2018-03-28 | End: 2018-03-29 | Stop reason: HOSPADM

## 2018-03-28 RX ORDER — IOPAMIDOL 755 MG/ML
100 INJECTION, SOLUTION INTRAVASCULAR ONCE
Status: COMPLETED | OUTPATIENT
Start: 2018-03-28 | End: 2018-03-28

## 2018-03-28 RX ORDER — HYDROCODONE BITARTRATE AND ACETAMINOPHEN 5; 325 MG/1; MG/1
1-2 TABLET ORAL EVERY 4 HOURS PRN
Status: DISCONTINUED | OUTPATIENT
Start: 2018-03-28 | End: 2018-03-29 | Stop reason: HOSPADM

## 2018-03-28 RX ORDER — NITROGLYCERIN 5 MG/ML
100-500 VIAL (ML) INTRAVENOUS
Status: DISCONTINUED | OUTPATIENT
Start: 2018-03-28 | End: 2018-03-29 | Stop reason: HOSPADM

## 2018-03-28 RX ORDER — PROTAMINE SULFATE 10 MG/ML
1-5 INJECTION, SOLUTION INTRAVENOUS
Status: DISCONTINUED | OUTPATIENT
Start: 2018-03-28 | End: 2018-03-29 | Stop reason: HOSPADM

## 2018-03-28 RX ORDER — LIDOCAINE HYDROCHLORIDE 10 MG/ML
30 INJECTION, SOLUTION EPIDURAL; INFILTRATION; INTRACAUDAL; PERINEURAL
Status: DISCONTINUED | OUTPATIENT
Start: 2018-03-28 | End: 2018-03-29 | Stop reason: HOSPADM

## 2018-03-28 RX ORDER — NIFEDIPINE 10 MG/1
10 CAPSULE ORAL
Status: DISCONTINUED | OUTPATIENT
Start: 2018-03-28 | End: 2018-03-29 | Stop reason: HOSPADM

## 2018-03-28 RX ORDER — POTASSIUM CHLORIDE 29.8 MG/ML
20 INJECTION INTRAVENOUS
Status: DISCONTINUED | OUTPATIENT
Start: 2018-03-28 | End: 2018-03-29 | Stop reason: HOSPADM

## 2018-03-28 RX ORDER — ADENOSINE 3 MG/ML
12-12000 INJECTION, SOLUTION INTRAVENOUS
Status: DISCONTINUED | OUTPATIENT
Start: 2018-03-28 | End: 2018-03-29 | Stop reason: HOSPADM

## 2018-03-28 RX ORDER — ACETAMINOPHEN 325 MG/1
325-650 TABLET ORAL EVERY 4 HOURS PRN
Status: DISCONTINUED | OUTPATIENT
Start: 2018-03-28 | End: 2018-03-29 | Stop reason: HOSPADM

## 2018-03-28 RX ORDER — POTASSIUM CHLORIDE 1500 MG/1
20 TABLET, EXTENDED RELEASE ORAL
Status: DISCONTINUED | OUTPATIENT
Start: 2018-03-28 | End: 2018-03-29 | Stop reason: HOSPADM

## 2018-03-28 RX ORDER — FLUMAZENIL 0.1 MG/ML
0.2 INJECTION, SOLUTION INTRAVENOUS
Status: DISCONTINUED | OUTPATIENT
Start: 2018-03-28 | End: 2018-03-29 | Stop reason: HOSPADM

## 2018-03-28 RX ORDER — NITROGLYCERIN 0.4 MG/1
0.4 TABLET SUBLINGUAL EVERY 5 MIN PRN
Status: DISCONTINUED | OUTPATIENT
Start: 2018-03-28 | End: 2018-03-29 | Stop reason: HOSPADM

## 2018-03-28 RX ORDER — FUROSEMIDE 10 MG/ML
20-100 INJECTION INTRAMUSCULAR; INTRAVENOUS
Status: DISCONTINUED | OUTPATIENT
Start: 2018-03-28 | End: 2018-03-29 | Stop reason: HOSPADM

## 2018-03-28 RX ORDER — VERAPAMIL HYDROCHLORIDE 2.5 MG/ML
1-2.5 INJECTION, SOLUTION INTRAVENOUS
Status: COMPLETED | OUTPATIENT
Start: 2018-03-28 | End: 2018-03-28

## 2018-03-28 RX ORDER — NALOXONE HYDROCHLORIDE 0.4 MG/ML
.2-.4 INJECTION, SOLUTION INTRAMUSCULAR; INTRAVENOUS; SUBCUTANEOUS
Status: ACTIVE | OUTPATIENT
Start: 2018-03-28 | End: 2018-03-29

## 2018-03-28 RX ORDER — VERAPAMIL HYDROCHLORIDE 2.5 MG/ML
INJECTION, SOLUTION INTRAVENOUS
Status: COMPLETED
Start: 2018-03-28 | End: 2018-03-28

## 2018-03-28 RX ORDER — HEPARIN SODIUM 1000 [USP'U]/ML
1000-10000 INJECTION, SOLUTION INTRAVENOUS; SUBCUTANEOUS EVERY 5 MIN PRN
Status: DISCONTINUED | OUTPATIENT
Start: 2018-03-28 | End: 2018-03-29 | Stop reason: HOSPADM

## 2018-03-28 RX ORDER — PHENYLEPHRINE HCL IN 0.9% NACL 1 MG/10 ML
20-100 SYRINGE (ML) INTRAVENOUS
Status: DISCONTINUED | OUTPATIENT
Start: 2018-03-28 | End: 2018-03-29 | Stop reason: HOSPADM

## 2018-03-28 RX ORDER — DOPAMINE HYDROCHLORIDE 160 MG/100ML
2-20 INJECTION, SOLUTION INTRAVENOUS CONTINUOUS PRN
Status: DISCONTINUED | OUTPATIENT
Start: 2018-03-28 | End: 2018-03-29 | Stop reason: HOSPADM

## 2018-03-28 RX ORDER — FENTANYL CITRATE 50 UG/ML
25-50 INJECTION, SOLUTION INTRAMUSCULAR; INTRAVENOUS
Status: ACTIVE | OUTPATIENT
Start: 2018-03-28 | End: 2018-03-29

## 2018-03-28 RX ORDER — CLOPIDOGREL 300 MG/1
300-600 TABLET, FILM COATED ORAL
Status: DISCONTINUED | OUTPATIENT
Start: 2018-03-28 | End: 2018-03-29 | Stop reason: HOSPADM

## 2018-03-28 RX ORDER — ATROPINE SULFATE 0.1 MG/ML
0.5 INJECTION INTRAVENOUS EVERY 5 MIN PRN
Status: ACTIVE | OUTPATIENT
Start: 2018-03-28 | End: 2018-03-29

## 2018-03-28 RX ORDER — HEPARIN SODIUM 1000 [USP'U]/ML
INJECTION, SOLUTION INTRAVENOUS; SUBCUTANEOUS
Status: COMPLETED
Start: 2018-03-28 | End: 2018-03-28

## 2018-03-28 RX ADMIN — MIDAZOLAM 1 MG: 1 INJECTION INTRAMUSCULAR; INTRAVENOUS at 12:49

## 2018-03-28 RX ADMIN — FENTANYL CITRATE 50 MCG: 50 INJECTION INTRAMUSCULAR; INTRAVENOUS at 12:48

## 2018-03-28 RX ADMIN — SODIUM CHLORIDE: 9 INJECTION, SOLUTION INTRAVENOUS at 10:41

## 2018-03-28 RX ADMIN — SODIUM CHLORIDE: 9 INJECTION, SOLUTION INTRAVENOUS at 15:07

## 2018-03-28 RX ADMIN — ASPIRIN 325 MG: 325 TABLET, DELAYED RELEASE ORAL at 10:29

## 2018-03-28 RX ADMIN — Medication 300 MCG: at 12:28

## 2018-03-28 RX ADMIN — NITROGLYCERIN 300 MCG: 5 INJECTION, SOLUTION INTRAVENOUS at 12:28

## 2018-03-28 RX ADMIN — HEPARIN SODIUM 1000 UNITS: 1000 INJECTION, SOLUTION INTRAVENOUS; SUBCUTANEOUS at 12:36

## 2018-03-28 RX ADMIN — VERAPAMIL HYDROCHLORIDE 2.5 MG: 2.5 INJECTION, SOLUTION INTRAVENOUS at 12:27

## 2018-03-28 RX ADMIN — IOPAMIDOL 113 ML: 755 INJECTION, SOLUTION INTRAVASCULAR at 11:45

## 2018-03-28 RX ADMIN — HEPARIN SODIUM 4000 UNITS: 1000 INJECTION, SOLUTION INTRAVENOUS; SUBCUTANEOUS at 12:29

## 2018-03-28 NOTE — DISCHARGE INSTRUCTIONS
Going Home after an Angioplasty       Patient Name: Yanick Adorno  Date of Procedure: March 28, 2018    Doctor: Jimbo    After you go home:  Have an adult stay with you for 24 hours.  Drink plenty of fluids.  You may eat your normal diet, unless your doctor tells you otherwise.  For 24 hours:  Relax and take it easy.  Do NOT smoke.  Do NOT make any important or legal decisions.  Do NOT drive or operate machines at home or at work.  Do NOT drink alcohol.  Remove the Band-Aid after 24 hours. If there is minor oozing, apply another Band-aid and remove it after 12 hours.  For 2 days, do NOT have sex or do any heavy exercise.  Do NOT take a bath, or use a hot tub or pool for at least 3 days. You may shower.    Care of wrist or arm site  It is normal to have soreness at the puncture site and mild tingling in your hand for up to 3 days.  For 2 days, do not use your hand or arm to support your weight (such as rising from a chair) or bend your wrist (such as lifting a garage door).  For 2 days, do not lift more than 5 pounds or exercise your arm (tennis, golf or bowling).    If you start bleeding from the site in your arm:  Sit down and press firmly on the site with your fingers for 10 minutes. Call your doctor as soon as you can.  If the bleeding stops, sit still and keep your wrist straight for 2 hours.    Medicines  If you have started taking Plavix or Effient, do not stop taking it until you talk to your heart doctor (cardiologist).  If you are on metformin (Glucophage), do not restart it until you have blood tests (within 2 to 3 days after discharge). When your doctor tells you it is safe, you may restart the metformin.  If you have stopped any other medicines, check with your nurse or provider about when to restart them.    Call 911 right away if you have bleeding that is heavy or does not stop.    Call your doctor if:  You have a large or growing hard lump around the site.  The site is red,  swollen, hot or tender.  Blood or fluid is draining from the site.  You have chills or a fever greater than 101 F (38 C).  Your leg or arm feels numb or cool.  You have hives, a rash or unusual itching.      [facility]:  [phone] (7 days a week)                                                             __________________________________________________

## 2018-03-28 NOTE — PROGRESS NOTES
Pt and spouse stated understanding of D/C instructions, right radial access site clean and dry, good CWMS to right hand, denies any pain or discomfort, to home

## 2018-03-28 NOTE — IP AVS SNAPSHOT
MRN:[id number]                      After Visit Summary   3/28/2018    Yanick Adorno    MRN: [id number]           Visit Information        Department      3/28/2018 10:15 AM [facility]          Review of your medicines      UNREVIEWED medicines. Ask your doctor about these medicines        Dose / Directions    apixaban ANTICOAGULANT 5 MG tablet   Commonly known as:  ELIQUIS   Used for:  Cerebrovascular accident (CVA) due to embolism of left middle cerebral artery (H), Paroxysmal atrial fibrillation (H)        Dose:  5 mg   Take 1 tablet (5 mg) by mouth 2 times daily   Quantity:  60 tablet   Refills:  0       atorvastatin 40 MG tablet   Commonly known as:  LIPITOR   Used for:  Cerebrovascular accident (CVA) due to embolism of left middle cerebral artery (H)        Dose:  40 mg   Take 1 tablet (40 mg) by mouth daily   Quantity:  10 tablet   Refills:  0       GLIPIZIDE PO        Dose:  10 mg   Take 10 mg by mouth 2 times daily (before meals)   Refills:  0       insulin glargine 100 UNIT/ML injection   Commonly known as:  LANTUS        Dose:  28 Units   Inject 28 Units Subcutaneous every morning   Refills:  0       METFORMIN HCL PO        Dose:  500 mg   Take 500 mg by mouth 2 times daily (with meals)   Refills:  0       metoprolol succinate 25 MG 24 hr tablet   Commonly known as:  TOPROL-XL   Used for:  Paroxysmal atrial fibrillation (H), NSTEMI (non-ST elevated myocardial infarction) (H)        Dose:  25 mg   Take 1 tablet (25 mg) by mouth daily   Quantity:  10 tablet   Refills:  0       nicotine 21 MG/24HR 24 hr patch   Commonly known as:  NICODERM CQ   Used for:  Tobacco abuse        Dose:  1 patch   Place 1 patch onto the skin daily   Quantity:  10 patch   Refills:  0                Protect others around you: Learn how to safely use, store and throw away your medicines at www.disposemymeds.org.         Follow-ups after your visit        Your next 10 appointments already scheduled      Mar 30, 2018  8:00 AM CDT   LAB with RU LAB   [facility] [place] ([facility])    [address]  [facility] [id number] [phone]           Please do not eat 10-12 hours before your appointment if you are coming in fasting for labs on lipids, cholesterol, or glucose (sugar). This does not apply to pregnant women. Water, hot tea and black coffee (with nothing added) are okay. Do not drink other fluids, diet soda or chew gum.            Apr 02, 2018  9:00 AM CDT   Neuro Treatment with Rosaluciano Vega, CHILO   [facility] Occupational Therapy ([facility])    [address] [address]   [phone]            Apr 04, 2018  9:45 AM CDT   Neuro Treatment with Rosa Silvia, NOELLER   [facility] Occupational Therapy ([facility])    [address] [address]   [phone]            Apr 09, 2018  9:45 AM CDT   Neuro Treatment with Rosa CHILO Vega   [facility] Occupational Therapy ([facility])    [address] [address]   [phone]            Apr 11, 2018  9:45 AM CDT   Neuro Treatment with Rosa Silvia, NOELLER   [facility] Occupational Therapy ([facility])    [address] [address]   [phone]            Apr 11, 2018  2:30 PM CDT   Return Visit with Tiffany Bowman PA-C   [facility] ([facility])    [address]  [facility] [address] [phone]            Apr 16, 2018  9:45 AM CDT   Neuro Treatment with Rosa CHILO Vega   [facility] Occupational Therapy ([facility])    [address] [address]   [phone]            Apr 18, 2018  9:45 AM CDT   Neuro Treatment with Rosa CHILO Vgea   [facility] Occupational Therapy ([facility])    [address]  [facility] [address]   [phone]            Apr 23, 2018  9:45 AM CDT   Neuro Treatment with CHILO [place] [facility] Occupational Therapy ([facility])    [address]  [facility] [address]   [phone]            Apr 25, 2018  9:45 AM CDT   Neuro Treatment with CHILO [place] [facility] Occupational Therapy ([facility])    [address]  [facility] [address]   [phone]               Care Instructions        After Care Instructions     Discharge Instructions - IF on Metformin (Glucophage or Glucovance) or Metformin containing medications       IF on Metformin (Glucophage or Glucovance) or Metformin containing medications , schedule a Basic Metabolic Panel at [facility] or Primary Clinic in 48 - 72 hours post procedure and PRIOR TO resuming the Metformin or Metformin containing medications.  Hold Metformin (Glucophage or Glucovance) or Metformin containing medications until after the Basic Metabolic Panel on the 2nd or 3rd day following the procedure.  May resume after blood draw is complete.                  Further instructions from your care team                          Going Home after an Angioplasty       Patient Name: Yanick Adorno  Date of Procedure: March 28, 2018    Doctor: Jimbo    After you go home:  Have an adult stay with you for 24 hours.  Drink plenty of fluids.  You may eat your normal diet, unless your doctor tells you otherwise.  For 24 hours:  Relax and take it easy.  Do NOT smoke.  Do NOT make any important or legal decisions.  Do NOT drive or operate machines at home or at work.  Do NOT drink alcohol.  Remove the Band-Aid after 24 hours. If there is minor oozing, apply another Band-aid and remove it after 12 hours.  For 2 days, do NOT have sex or do any heavy exercise.  Do NOT take a bath, or use a hot tub or pool for at least 3 days. You may shower.    Care of wrist or arm site  It is normal to have  "soreness at the puncture site and mild tingling in your hand for up to 3 days.  For 2 days, do not use your hand or arm to support your weight (such as rising from a chair) or bend your wrist (such as lifting a garage door).  For 2 days, do not lift more than 5 pounds or exercise your arm (tennis, golf or bowling).    If you start bleeding from the site in your arm:  Sit down and press firmly on the site with your fingers for 10 minutes. Call your doctor as soon as you can.  If the bleeding stops, sit still and keep your wrist straight for 2 hours.    Medicines  If you have started taking Plavix or Effient, do not stop taking it until you talk to your heart doctor (cardiologist).  If you are on metformin (Glucophage), do not restart it until you have blood tests (within 2 to 3 days after discharge). When your doctor tells you it is safe, you may restart the metformin.  If you have stopped any other medicines, check with your nurse or provider about when to restart them.    Call 911 right away if you have bleeding that is heavy or does not stop.    Call your doctor if:  You have a large or growing hard lump around the site.  The site is red, swollen, hot or tender.  Blood or fluid is draining from the site.  You have chills or a fever greater than 101 F (38 C).  Your leg or arm feels numb or cool.  You have hives, a rash or unusual itching.      [facility] [place]:  [phone] (7 days a week)                                                             __________________________________________________     Additional Information About Your Visit        [employer] Information     [employer] lets you send messages to your doctor, view your test results, renew your prescriptions, schedule appointments and more. To sign up, go to [email or web address] . Click on \"Log in\" on the left side of the screen, which will take you to the Welcome page. Then click on \"Sign up Now\" on the right side " of the page.     You will be asked to enter the access code listed below, as well as some personal information. Please follow the directions to create your username and password.     Your access code is: [email or web address]  Expires: 2018  9:10 PM     Your access code will  in 90 days. If you need help or a new code, please call your [place] clinic or [phone].        Care EveryWhere ID     This is your Care EveryWhere ID. This could be used by other organizations to access your [place] medical records  [id number]        Your Vitals Were     Blood Pressure Pulse Oximetry                137/79 97%           Primary Care Provider Office Phone # Fax #    Candi Leon [phone]      Equal Access to Services     ALDO MARSHALL : Hadii aad ku hadasho Soomaali, waaxda luqadaha, qaybta kaalmada adeegyada, germaine velez . So [facility] [phone].    ATENCIÓN: Si habla español, tiene a moeller disposición servicios gratuitos de asistencia lingüística. Llame al [phone].    We comply with applicable federal civil rights laws and [place] laws. We do not discriminate on the basis of race, color, national origin, age, disability, sex, sexual orientation, or gender identity.            Thank you!     Thank you for choosing [facility] for your care. Our goal is always to provide you with excellent care. Hearing back from our patients is one way we can continue to improve our services. Please take a few minutes to complete the written survey that you may receive in the mail after you visit. If you would like to speak to someone directly about your visit please contact Patient Relations at [phone]. Thank you!               Medication List: This is a list of all your medications and when to take them. Check marks below indicate your daily home schedule. Keep this list as a reference.      Medications           Morning Afternoon Evening Bedtime As Needed     apixaban ANTICOAGULANT 5 MG tablet   Commonly known as:  ELIQUIS   Take 1 tablet (5 mg) by mouth 2 times daily                                atorvastatin 40 MG tablet   Commonly known as:  LIPITOR   Take 1 tablet (40 mg) by mouth daily                                GLIPIZIDE PO   Take 10 mg by mouth 2 times daily (before meals)                                insulin glargine 100 UNIT/ML injection   Commonly known as:  LANTUS   Inject 28 Units Subcutaneous every morning                                METFORMIN HCL PO   Take 500 mg by mouth 2 times daily (with meals)                                metoprolol succinate 25 MG 24 hr tablet   Commonly known as:  TOPROL-XL   Take 1 tablet (25 mg) by mouth daily                                nicotine 21 MG/24HR 24 hr patch   Commonly known as:  NICODERM CQ   Place 1 patch onto the skin daily

## 2018-03-28 NOTE — PROGRESS NOTES
Outpatient Speech Language Pathology Discharge Note     Patient: Yanick Adorno  : 1945    Beginning/End Dates of Reporting Period:  3/21/2018 to 3/28/2018    Referring Provider: Dr. Peng MD    Therapy Diagnosis: Mild/Moderate Expressive/Receptive Aphasia     Client Self Report: Mr. Adorno is a 72 y.o. Male with a PMH significant for L CVA. Pt was initially evaluated by speech on 2018, please see evaluation report in EPIC for more information. At this time, Pt has completed 2 treatment sessions during this treatment period with a total of 11 treatment session for POC and 1 evaluation. Mr. Adorno is continuing to demonstrate excellent progress towards all expressive and receptive language goals. Pt shared he is continuing to enjoy his new position at work and has experienced minimal expressive language deficits while in the workplace.     Objective Measurements: See below     Goals:  Goal Identifier LTG 1- Language- Verbal Expression (V/E)   Goal Description Pt will learn, implement, and demonstrate use of 3 word-finding strategies with 80% accuracy during complex word finding tasks, provided 0-min cues in order to talk on the phone with customers.   Target Date 18   Date Met  2018   Progress: Goal Met with 100% accuracy given min-mod clinician cues and Pt's familiarity of the task. In conversation, Pt noted to have % accuracy in verbal expression and word-finding skills. Pt shared at his workplace, he experiences minimal difficulty in his verbal expressions with customers.        Goal Identifier LTG 2- Language- Written Expression (W/E)   Goal Description Patient will complete a complex writing task (e.g. generating an email for work) with 80% accuracy provided 0-min cues in order to take notes while on the phone with a customer.   Target Date 18   Date Met  2018   Progress: Goal Met with 100% accuracy given minimal-moderate clinician cues. Pt demonstrates increase in  formulating sentences to achieve specific purposes outlined during moderate-complex written tasks. Pt benefits from rereading work and paying attention to syntax of his writing. Pt states he has little to no difficulty with this when at work or writing his brother via e-mail.      Goal Identifier LTG 3- Language- Reading Comprehension (R/C)   Goal Description Patient will complete a moderate to complex reading task with 80% accuracy provided 0-minimal cues in order to read a sports article.    Target Date 04/02/18   Date Met  03/28/2018   Progress: Goal Met with 85% accuracy given 0-cues of moderately complex 2-5 paragraph length reading tasks. Pt is averaging around 80% accuracy given minimal cues, however shows improvement in progress given min-mod clinician cues. Pt demonstrating use of reading comprehension strategies independently of underlining important pieces of a passage and slowing down reading rate. Pt states he does not experience any R/C difficulties when at work or reading the morning newspaper.          Progress Toward Goals:   Progress this reporting period: Pt has met all goals. Appropriate for discharge.      Plan:  Discharge from therapy.    Discharge: Yes.    Reason for Discharge: Patient has met all goals.    Discharge Plan: Patient to continue home program.    JOSE DANIEL Golden.     Thank you for referring Yanick Adorno to outpatient therapy at [facility] in [place].  Please call Celeste Lee MA, SLP-CCC at [phone] or email [email or web address] with any questions or concerns.      Celeste Lee M.A., SLP-CCC  Speech-Language Pathologist

## 2018-03-28 NOTE — IP AVS SNAPSHOT
[facility] [place]note 2[place]    [address]    [facility] [address]    Phone:  [phone]                                       After Visit Summary   3/28/2018    Yanick Adorno    MRN: [id number]           After Visit Summary Signature Page     I have received my discharge instructions, and my questions have been answered. I have discussed any challenges I see with this plan with the nurse or doctor.    ..........................................................................................................................................  Patient/Patient Representative Signature      ..........................................................................................................................................  Patient Representative Print Name and Relationship to Patient    ..................................................               ................................................  Date                                            Time    ..........................................................................................................................................  Reviewed by Signature/Title    ...................................................              ..............................................  Date                                                            Time

## 2018-03-29 DIAGNOSIS — I25.10 CAD (CORONARY ARTERY DISEASE): Primary | ICD-10-CM

## 2018-03-29 LAB — INTERPRETATION ECG - MUSE: NORMAL

## 2018-03-30 DIAGNOSIS — I25.10 CAD (CORONARY ARTERY DISEASE): ICD-10-CM

## 2018-03-30 LAB
ANION GAP SERPL CALCULATED.3IONS-SCNC: 2 MMOL/L (ref 3–14)
BUN SERPL-MCNC: 14 MG/DL (ref 7–30)
CALCIUM SERPL-MCNC: 8.6 MG/DL (ref 8.5–10.1)
CHLORIDE SERPL-SCNC: 105 MMOL/L (ref 94–109)
CO2 SERPL-SCNC: 31 MMOL/L (ref 20–32)
CREAT SERPL-MCNC: 0.92 MG/DL (ref 0.66–1.25)
GFR SERPL CREATININE-BSD FRML MDRD: 81 ML/MIN/1.7M2
GLUCOSE SERPL-MCNC: 248 MG/DL (ref 70–99)
POTASSIUM SERPL-SCNC: 4.2 MMOL/L (ref 3.4–5.3)
SODIUM SERPL-SCNC: 138 MMOL/L (ref 133–144)

## 2018-03-30 PROCEDURE — 36415 COLL VENOUS BLD VENIPUNCTURE: CPT | Performed by: INTERNAL MEDICINE

## 2018-03-30 PROCEDURE — 80048 BASIC METABOLIC PNL TOTAL CA: CPT | Performed by: INTERNAL MEDICINE

## 2018-04-03 ENCOUNTER — TELEPHONE (OUTPATIENT)
Dept: CARDIOLOGY | Facility: CLINIC | Age: 73
End: 2018-04-03

## 2018-04-03 NOTE — TELEPHONE ENCOUNTER
Patient called wondering if he could restart his metformin.  Reviewed BMP from 3/30/18 and renal function is normal.  Patient is going to restart the metformin.  Patient follows up April 11. No further questions.

## 2018-04-04 ENCOUNTER — HOSPITAL ENCOUNTER (OUTPATIENT)
Dept: OCCUPATIONAL THERAPY | Facility: CLINIC | Age: 73
Setting detail: THERAPIES SERIES
End: 2018-04-04
Attending: HOSPITALIST
Payer: MEDICARE

## 2018-04-04 PROCEDURE — 97535 SELF CARE MNGMENT TRAINING: CPT | Mod: GO | Performed by: OCCUPATIONAL THERAPIST

## 2018-04-04 PROCEDURE — 40000125 ZZHC STATISTIC OT OUTPT VISIT: Performed by: OCCUPATIONAL THERAPIST

## 2018-04-11 ENCOUNTER — OFFICE VISIT (OUTPATIENT)
Dept: CARDIOLOGY | Facility: CLINIC | Age: 73
End: 2018-04-11
Payer: MEDICARE

## 2018-04-11 ENCOUNTER — HOSPITAL ENCOUNTER (OUTPATIENT)
Dept: OCCUPATIONAL THERAPY | Facility: CLINIC | Age: 73
Setting detail: THERAPIES SERIES
End: 2018-04-11
Attending: HOSPITALIST
Payer: MEDICARE

## 2018-04-11 VITALS
OXYGEN SATURATION: 100 % | DIASTOLIC BLOOD PRESSURE: 82 MMHG | WEIGHT: 206 LBS | HEART RATE: 80 BPM | HEIGHT: 74 IN | SYSTOLIC BLOOD PRESSURE: 138 MMHG | BODY MASS INDEX: 26.44 KG/M2

## 2018-04-11 DIAGNOSIS — I21.4 NSTEMI (NON-ST ELEVATED MYOCARDIAL INFARCTION) (H): ICD-10-CM

## 2018-04-11 DIAGNOSIS — I42.8 NONISCHEMIC CARDIOMYOPATHY (H): Primary | ICD-10-CM

## 2018-04-11 DIAGNOSIS — I10 BENIGN ESSENTIAL HYPERTENSION: ICD-10-CM

## 2018-04-11 DIAGNOSIS — I48.0 PAROXYSMAL ATRIAL FIBRILLATION (H): ICD-10-CM

## 2018-04-11 PROCEDURE — 99214 OFFICE O/P EST MOD 30 MIN: CPT | Performed by: PHYSICIAN ASSISTANT

## 2018-04-11 PROCEDURE — 97535 SELF CARE MNGMENT TRAINING: CPT | Mod: GO | Performed by: OCCUPATIONAL THERAPIST

## 2018-04-11 PROCEDURE — 40000125 ZZHC STATISTIC OT OUTPT VISIT: Performed by: OCCUPATIONAL THERAPIST

## 2018-04-11 RX ORDER — METOPROLOL SUCCINATE 25 MG/1
25 TABLET, EXTENDED RELEASE ORAL DAILY
Qty: 30 TABLET | Refills: 11 | Status: SHIPPED | OUTPATIENT
Start: 2018-04-11 | End: 2021-03-23

## 2018-04-11 RX ORDER — LISINOPRIL 2.5 MG/1
2.5 TABLET ORAL DAILY
Qty: 30 TABLET | Refills: 11 | Status: SHIPPED | OUTPATIENT
Start: 2018-04-11 | End: 2018-08-30

## 2018-04-11 NOTE — PROGRESS NOTES
CARDIOLOGY CLINIC PROGRESS NOTE    DOS: 2018    Yanick Adorno  : 1945, 72 year old  MRN: [id number]      History:  I had the pleasure of following up with Yanick Adorno today in the cardiology clinic after a recent coronary angiogram. He was accompanied by his wife, Cindy.     Yanick is a 72 year old male with past medical history significant for T2DM, tobacco abuse, who was admitted on 2018 with with confusion and altered speech. He was found to have a subacute left frontal stroke of the left frontal lobe. In addition, troponin was mildly elevated. There was no history of chest pain. Echo showed LVEF 40-45%, trace to mild MR, trace to mild AI, mild aortic root dilatation. He underwent a Lexiscan stress test that was read as showing a small apical defect which is predominantly fixed, consistent with prior infarction with only trivial associated cande-infarct ischemia.  There was also a small area of ischemia in the inferolateral mid ventricle associated with either an inferior/inferolateral basal infarct or diaphragmatic attenuation. Although the stress test did show some mild abnormality, given lack of symptoms and the recent stroke, medical therapy was recommended.  He was started on ASA and statin for presumed CAD. He also went into afib with controlled rate during his admission and was started on Eliquis and BB.      In follow up, a cardiac monitor was done to evaluate afib burden, afib rate, and NSVT. The 14-day event monitor showed paroxysmal atrial fibrillation with a burden of approximately 3% of the total beats.  average afib rate 81 bpm.  He also had 13 runs of nonsustained ventricular tachycardia, longest one being 13 beats.     Due to the recently elevated troponin, runs of NSVT and abnormal stress test, Dr. Choi referred Yanick on to a coronary angiogram.    This was done 3/28/18 via the right radial artery. This showed mild to moderate, nonobstructive CAD. The mid RCA had 40-50%  stenosis. LVEDP 10 mmHg, LVEF 45%.     He presents today for follow up.   [facility] and he takes half, so still 40 mg. They had him decrease Toprol XL to 12.5 mg daily.   Still doing occupational therapy. Has finished speech therapy. Still needs a little PT.   Back at work full time. Smoking a few cigarettes.   No chest pain.   Some increased phlegm in the back of his throat. Occasional cough. No SOB.  No orthopnea, no PND, no edema. Weight is up a few lbs.   No bleeding concerns. Did have some bruising after the cath.       ROS:  Skin:  Positive for pigmentation   Eyes:  Negative for    ENT:  Negative for    Respiratory:  Negative    Cardiovascular:  Negative    Gastroenterology:      Genitourinary:  not assessed    Musculoskeletal:  Positive for back pain  Neurologic:  Positive for numbness or tingling of feet  Psychiatric:  Negative    Heme/Lymph/Imm:       Endocrine:  Positive for diabetes    PAST MEDICAL HISTORY:  Past Medical History:   Diagnosis Date     Cardiomyopathy (H)      CVA (cerebral vascular accident) (H) 01/21/2018    (L) frontal cerbral infarct     Esophageal reflux      Hypertension      New onset a-fib (H)      Tobacco abuse     quit when he had stroke     Type II or unspecified type diabetes mellitus without mention of complication, not stated as uncontrolled        PAST SURGICAL HISTORY:  Past Surgical History:   Procedure Laterality Date     HEAD & NECK SURGERY      sinus       SOCIAL HISTORY:  Social History     Social History     Marital status:      Spouse name: N/A     Number of children: N/A     Years of education: N/A     Social History Main Topics     Smoking status: Former Smoker     Packs/day: 1.50     Years: 33.00     Quit date: 1/20/2018     Smokeless tobacco: Never Used     Alcohol use No     Drug use: None     Sexual activity: Not Asked     Other Topics Concern     None     Social History Narrative       FAMILY HISTORY:  Family  "History   Problem Relation Age of Onset     DIABETES Father      Uterine Cancer Mother      DIABETES Brother      DIABETES Brother      Suicide Brother      DIABETES Brother        MEDS:   Current Outpatient Prescriptions on File Prior to Visit:  apixaban ANTICOAGULANT (ELIQUIS) 5 MG tablet Take 1 tablet (5 mg) by mouth 2 times daily   atorvastatin (LIPITOR) 40 MG tablet Take 1 tablet (40 mg) by mouth daily   nicotine (NICODERM CQ) 21 MG/24HR 24 hr patch Place 1 patch onto the skin daily   insulin glargine (LANTUS) 100 UNIT/ML injection Inject 28 Units Subcutaneous every morning   GLIPIZIDE PO Take 10 mg by mouth 2 times daily (before meals)   METFORMIN HCL PO Take 500 mg by mouth 2 times daily (with meals)    [DISCONTINUED] metoprolol succinate (TOPROL-XL) 25 MG 24 hr tablet Take 1 tablet (25 mg) by mouth daily     No current facility-administered medications on file prior to visit.     ALLERGIES:   Allergies   Allergen Reactions     No Known Drug Allergies        PHYSICAL EXAM:  Vitals: /82 (BP Location: Right arm)  Pulse 80  Ht 1.88 m (6' 2\")  Wt 93.4 kg (206 lb)  SpO2 100%  BMI 26.45 kg/m2  Constitutional:  cooperative;well developed;well nourished;in no acute distress        Skin:  warm and dry to the touch   scattered nonraised erythematous lesion on LEs (patient says chronic), superficial abrasion (dog bite) right shin that has mild erythema (reports chronic) and scab without oozing or warmth    Head:  normocephalic        Eyes:  pupils equal and round;conjunctivae and lids unremarkable        ENT:  no pallor or cyanosis        Neck:  JVP normal        Respiratory:  clear to auscultation        Cardiac: regular rhythm       systolic murmur;grade 1          GI:  abdomen soft;BS normoactive;non-tender        Vascular:                                   RRA site: reverse won test normal, mild ecchymosis    Extremities and Musculoskeletal:  no deformities, clubbing, cyanosis, erythema observed   no " pitting edema    Neurological:  no gross motor deficits;affect appropriate        DIAGNOSTICS:  Cath reviewed.       ASSESSMENT/PLAN:  1. CAD.    During admission for CVA, he had elevated troponin. Echo showed LVEF 40-45% with mild to moderate LVH and mild to moderate global hypokinesia of the LV. Lexiscan showed small fixed apical defect with only trival associated cande-infarct ischemia. Small area of ischemia in the inferolateral mid ventricle like diaphragmatic attenuation. No chest pain. Medical management with ASA, BB, statin.    Cardiac event monitor showed he was having runs of NSVT.   Due to this and the elevated trop and abnormal stress test, Dr. Choi ordered a coronary angiogram for definitive assessment. This showed mild-mod nonobstructive CAD.    2. Acute left frontal stroke: Likely embolic per neurology. ASA 81 mg, Eliquis 5 mg BID. OT and PT. No apparent deficit.   3. Paroxysmal atrial fibrillation/flutter: Noted on tele during admission. Cardiac monitor shows 3% afib burden, average afib rate 81 bpm. On Toprol XL and Eliquis. Increasing Toprol XL back to 25 mg.    4. NSVT: 6 beats noted during admission. Cardiac monitor showed 13 runs of NSVT up to 13 beats. Cor angio without obstructive CAD. Continue Toprol XL as noted above.   5. Tobacco abuse: occasional cigarette. Counseled on complete cessation today.   6. Type 2 DM: on insulin.  7. Cardiomyopathy, nonischemic. Echo showed LVEF 40-45%. LV gram shows LVEF 45% with mild global HK. Weight is up about 6 lbs since February. He has mild cough, no SOB, no orthopnea, no PND, no edema.  No obvious edema or JVD on exam. Continue Toprol XL though increasing back to 25 mg today. Start lisinopril 2.5 mg. No Lasix as of now, but will consider in future.  Discussed daily weights, low sodium diet, and when to call in.     Follow up 1 month with BMP - reassess sxs, weight, vitals.     Tiffany Bowman PA-C

## 2018-04-11 NOTE — LETTER
2018    Candi Guerra [facility] [address]    RE: Yanick Adorno       Dear Colleague,    I had the pleasure of seeing Yanick Adorno in the [facility].    CARDIOLOGY CLINIC PROGRESS NOTE    DOS: 2018    Yanick Adorno  : 1945, 72 year old  MRN: [id number]      History:  I had the pleasure of following up with Yanick Adorno today in the cardiology clinic after a recent coronary angiogram. He was accompanied by his wife, Cindy.     Yanick is a 72 year old male with past medical history significant for T2DM, tobacco abuse, who was admitted on 2018 with with confusion and altered speech. He was found to have a subacute left frontal stroke of the left frontal lobe. In addition, troponin was mildly elevated. There was no history of chest pain. Echo showed LVEF 40-45%, trace to mild MR, trace to mild AI, mild aortic root dilatation. He underwent a Lexiscan stress test that was read as showing a small apical defect which is predominantly fixed, consistent with prior infarction with only trivial associated cande-infarct ischemia.  There was also a small area of ischemia in the inferolateral mid ventricle associated with either an inferior/inferolateral basal infarct or diaphragmatic attenuation. Although the stress test did show some mild abnormality, given lack of symptoms and the recent stroke, medical therapy was recommended.  He was started on ASA and statin for presumed CAD. He also went into afib with controlled rate during his admission and was started on Eliquis and BB.      In follow up, a cardiac monitor was done to evaluate afib burden, afib rate, and NSVT. The 14-day event monitor showed paroxysmal atrial fibrillation with a burden of approximately 3% of the total beats.   average afib rate 81 bpm.  He also had 13 runs of nonsustained ventricular tachycardia, longest one being 13 beats.     Due to the  recently elevated troponin, runs of NSVT and abnormal stress test, Dr. Choi referred Yanick on to a coronary angiogram.    This was done 3/28/18 via the right radial artery. This showed mild to moderate, nonobstructive CAD. The mid RCA had 40-50% stenosis. LVEDP 10 mmHg, LVEF 45%.     He presents today for follow up.   [facility] and he takes half, so still 40 mg. They had him decrease Toprol XL to 12.5 mg daily.   Still doing occupational therapy. Has finished speech therapy. Still needs a little PT.   Back at work full time. Smoking a few cigarettes.   No chest pain.   Some increased phlegm in the back of his throat. Occasional cough. No SOB.  No orthopnea, no PND, no edema. Weight is up a few lbs.   No bleeding concerns. Did have some bruising after the cath.       ROS:  Skin:  Positive for pigmentation   Eyes:  Negative for    ENT:  Negative for    Respiratory:  Negative    Cardiovascular:  Negative    Gastroenterology:      Genitourinary:  not assessed    Musculoskeletal:  Positive for back pain  Neurologic:  Positive for numbness or tingling of feet  Psychiatric:  Negative    Heme/Lymph/Imm:       Endocrine:  Positive for diabetes    PAST MEDICAL HISTORY:  Past Medical History:   Diagnosis Date     Cardiomyopathy (H)      CVA (cerebral vascular accident) (H) 01/21/2018    (L) frontal cerbral infarct     Esophageal reflux      Hypertension      New onset a-fib (H)      Tobacco abuse     quit when he had stroke     Type II or unspecified type diabetes mellitus without mention of complication, not stated as uncontrolled        PAST SURGICAL HISTORY:  Past Surgical History:   Procedure Laterality Date     HEAD & NECK SURGERY      sinus       SOCIAL HISTORY:  Social History     Social History     Marital status:      Spouse name: N/A     Number of children: N/A     Years of education: N/A     Social History Main Topics     Smoking status: Former Smoker     Packs/day:  "1.50     Years: 33.00     Quit date: 1/20/2018     Smokeless tobacco: Never Used     Alcohol use No     Drug use: None     Sexual activity: Not Asked     Other Topics Concern     None     Social History Narrative       FAMILY HISTORY:  Family History   Problem Relation Age of Onset     DIABETES Father      Uterine Cancer Mother      DIABETES Brother      DIABETES Brother      Suicide Brother      DIABETES Brother        MEDS:   Current Outpatient Prescriptions on File Prior to Visit:  apixaban ANTICOAGULANT (ELIQUIS) 5 MG tablet Take 1 tablet (5 mg) by mouth 2 times daily   atorvastatin (LIPITOR) 40 MG tablet Take 1 tablet (40 mg) by mouth daily   nicotine (NICODERM CQ) 21 MG/24HR 24 hr patch Place 1 patch onto the skin daily   insulin glargine (LANTUS) 100 UNIT/ML injection Inject 28 Units Subcutaneous every morning   GLIPIZIDE PO Take 10 mg by mouth 2 times daily (before meals)   METFORMIN HCL PO Take 500 mg by mouth 2 times daily (with meals)    [DISCONTINUED] metoprolol succinate (TOPROL-XL) 25 MG 24 hr tablet Take 1 tablet (25 mg) by mouth daily     No current facility-administered medications on file prior to visit.     ALLERGIES:   Allergies   Allergen Reactions     No Known Drug Allergies        PHYSICAL EXAM:  Vitals: /82 (BP Location: Right arm)  Pulse 80  Ht 1.88 m (6' 2\")  Wt 93.4 kg (206 lb)  SpO2 100%  BMI 26.45 kg/m2  Constitutional:  cooperative;well developed;well nourished;in no acute distress        Skin:  warm and dry to the touch   scattered nonraised erythematous lesion on LEs (patient says chronic), superficial abrasion (dog bite) right shin that has mild erythema (reports chronic) and scab without oozing or warmth    Head:  normocephalic        Eyes:  pupils equal and round;conjunctivae and lids unremarkable        ENT:  no pallor or cyanosis        Neck:  JVP normal        Respiratory:  clear to auscultation        Cardiac: regular rhythm       systolic murmur;grade 1          GI: "  abdomen soft;BS normoactive;non-tender        Vascular:                                   RRA site: reverse won test normal, mild ecchymosis    Extremities and Musculoskeletal:  no deformities, clubbing, cyanosis, erythema observed   no pitting edema    Neurological:  no gross motor deficits;affect appropriate        DIAGNOSTICS:  Cath reviewed.       ASSESSMENT/PLAN:  1. CAD.    During admission for CVA, he had elevated troponin. Echo showed LVEF 40-45% with mild to moderate LVH and mild to moderate global hypokinesia of the LV. Lexiscan showed small fixed apical defect with only trival associated cande-infarct ischemia. Small area of ischemia in the inferolateral mid ventricle like diaphragmatic attenuation. No chest pain. Medical management with ASA, BB, statin.    Cardiac event monitor showed he was having runs of NSVT.   Due to this and the elevated trop and abnormal stress test, Dr. Choi ordered a coronary angiogram for definitive assessment. This showed mild-mod nonobstructive CAD.    2. Acute left frontal stroke: Likely embolic per neurology. ASA 81 mg, Eliquis 5 mg BID. OT and PT. No apparent deficit.   3. Paroxysmal atrial fibrillation/flutter: Noted on tele during admission. Cardiac monitor shows 3% afib burden, average afib rate 81 bpm. On Toprol XL and Eliquis. Increasing Toprol XL back to 25 mg.    4. NSVT: 6 beats noted during admission. Cardiac monitor showed 13 runs of NSVT up to 13 beats. Cor angio without obstructive CAD. Continue Toprol XL as noted above.   5. Tobacco abuse: occasional cigarette. Counseled on complete cessation today.   6. Type 2 DM: on insulin.  7. Cardiomyopathy, nonischemic. Echo showed LVEF 40-45%. LV gram shows LVEF 45% with mild global HK. Weight is up about 6 lbs since February. He has mild cough, no SOB, no orthopnea, no PND, no edema.  No obvious edema or JVD on exam. Continue Toprol XL though increasing back to 25 mg today. Start lisinopril 2.5 mg. No Lasix as of now,  but will consider in future.  Discussed daily weights, low sodium diet, and when to call in.     Follow up 1 month with BMP - reassess sxs, weight, vitals.               Thank you for allowing me to participate in the care of your patient.    Sincerely,     Tiffany Bowman PA-C     [facility]

## 2018-04-11 NOTE — MR AVS SNAPSHOT
After Visit Summary   4/11/2018    Yanick Adorno    MRN: [id number]           Patient Information     Date Of Birth          1945        Visit Information        Provider Department      4/11/2018 2:30 PM Tiffany Bowman PA-C [facility]        Today's Diagnoses     Nonischemic cardiomyopathy (H)    -  1    Paroxysmal atrial fibrillation (H)        NSTEMI (non-ST elevated myocardial infarction) (H)        Benign essential hypertension          Care Instructions    Increase the Toprol XL (metoprolol succinate) back to a full tablet or 25 mg.  This is for the pumping of your heart, the heart arrhythmias and the blood pressure.     Start lisinopril 2.5 mg once daily.  This is for the pumping of your heart, blood pressure and because you have diabetes.     [facility] your [facility] from [facility] They will need to update [facility] med list.     Quit smoking completely.     Limit the sodium in your diet.     Weigh yourself daily, so you can see if there is any weight changes.     See me in 1 month with a lab test before.           Follow-ups after your visit        Additional Services     Follow-Up with Cardiac Advanced Practice Provider                 Your next 10 appointments already scheduled     Apr 18, 2018  9:45 AM CDT   Neuro Treatment with CHILO Aldrich   [facility] Occupational Therapy ([facility])    [address] [address] [phone]            Apr 25, 2018  9:45 AM CDT   Neuro Treatment with Rosa Vega, CHILO   [facility] Occupational Therapy ([facility])    [address] [address]   [phone]            May 02, 2018  9:45 AM CDT   Neuro Treatment with Rosa Vega, CHILO   [facility] Occupational Therapy ([facility])    [address] [address]   [phone]             May 09, 2018  9:45 AM CDT   Neuro Treatment with CHILO Aldrich   [facility] Occupational Therapy ([facility])    [address]  [facility] [address] [phone]            May 16, 2018 11:30 AM CDT   LAB with RU LAB   [facility] [place] ([facility])    [address]  [facility] [address] [phone]           Please do not eat 10-12 hours before your appointment if you are coming in fasting for labs on lipids, cholesterol, or glucose (sugar). This does not apply to pregnant women. Water, hot tea and black coffee (with nothing added) are okay. Do not drink other fluids, diet soda or chew gum.            May 16, 2018 12:30 PM CDT   Return Visit with Tiffany Bowman PA-C   [facility] ([facility])    [address]  [facility] [address] [phone]              Future tests that were ordered for you today     Open Future Orders        Priority Expected Expires Ordered    Basic metabolic panel Routine 5/11/2018 4/11/2019 4/11/2018    Follow-Up with Cardiac Advanced Practice Provider Routine 5/11/2018 4/11/2019 4/11/2018            Who to contact     If you have questions or need follow up information about today's clinic visit or your schedule please contact [facility] directly at [phone].  Normal or non-critical lab and imaging results will be communicated to you by MyChart, letter or phone within 4 business days after the clinic has received the results. If you do not hear from us within 7 days, please contact the clinic through MyChart or phone. If you have a critical or abnormal lab result, we will notify you by phone as soon as possible.  Submit refill requests through [employer] or call your pharmacy and they will forward the refill request to us. Please allow 3 business days for your refill to  "be completed.          Additional Information About Your Visit        [employer] Information     [employer] lets you send messages to your doctor, view your test results, renew your prescriptions, schedule appointments and more. To sign up, go to [email or web address] . Click on \"Log in\" on the left side of the screen, which will take you to the Welcome page. Then click on \"Sign up Now\" on the right side of the page.     You will be asked to enter the access code listed below, as well as some personal information. Please follow the directions to create your username and password.     Your access code is: [email or web address]  Expires: 2018  9:10 PM     Your access code will  in 90 days. If you need help or a new code, please call your [facility] clinic or [phone].        Care EveryWhere ID     This is your Care EveryWhere ID. This could be used by other organizations to access your [facility] medical records  [id number]        Your Vitals Were     Pulse Height Pulse Oximetry BMI (Body Mass Index)          80 1.88 m (6' 2\") 100% 26.45 kg/m2         Blood Pressure from Last 3 Encounters:   18 138/82   18 159/87   18 120/72    Weight from Last 3 Encounters:   18 93.4 kg (206 lb)   18 91.6 kg (202 lb)   18 90.4 kg (199 lb 6.4 oz)                 Today's Medication Changes          These changes are accurate as of 18  3:17 PM.  If you have any questions, ask your nurse or doctor.               Start taking these medicines.        Dose/Directions    lisinopril 2.5 MG tablet   Commonly known as:  PRINIVIL/Zestril   Used for:  NSTEMI (non-ST elevated myocardial infarction) (H), Nonischemic cardiomyopathy (H), Benign essential hypertension   Started by:  Tiffany Bowman PA-C        Dose:  2.5 mg   Take 1 tablet (2.5 mg) by mouth daily   Quantity:  30 tablet   Refills:  11            Where to get your medicines      These medications were sent to [facility] " [facility] [address] AT [place] [address]  [address] [place] [address]    Hours:  24-hours Phone:  [phone]     lisinopril 2.5 MG tablet    metoprolol succinate 25 MG 24 hr tablet                Primary Care Provider Office Phone # Fax #    Candi Leon [phone]       [facility] [address] [facility] [address]        Equal Access to Services     ALDO MARSHALL : Hadii aad ku hadasho Soomaali, waaxda luqadaha, qaybta kaalmada adeegyada, waxay idiin hayaan adeeg kharash laalejandra bautista. So [facility] [phone].    ATENCIÓN: Si habla español, tiene a moeller disposición servicios gratuitos de asistencia lingüística. [facility] [phone].    We comply with applicable federal civil rights laws and [place] laws. We do not discriminate on the basis of race, color, national origin, age, disability, sex, sexual orientation, or gender identity.            Thank you!     Thank you for choosing [facility]  for your care. Our goal is always to provide you with excellent care. Hearing back from our patients is one way we can continue to improve our services. Please take a few minutes to complete the written survey that you may receive in the mail after your visit with us. Thank you!             Your Updated Medication List - Protect others around you: Learn how to safely use, store and throw away your medicines at www.disposemymeds.org.          This list is accurate as of 4/11/18  3:17 PM.  Always use your most recent med list.                   Brand Name Dispense Instructions for use Diagnosis    apixaban ANTICOAGULANT 5 MG tablet    ELIQUIS    60 tablet    Take 1 tablet (5 mg) by mouth 2 times daily    Cerebrovascular accident (CVA) due to embolism of left middle cerebral artery (H), Paroxysmal atrial fibrillation (H)       ASPIRIN PO      Take 81 mg by mouth        atorvastatin 40 MG tablet    LIPITOR    10 tablet     Take 1 tablet (40 mg) by mouth daily    Cerebrovascular accident (CVA) due to embolism of left middle cerebral artery (H)       GLIPIZIDE PO      Take 10 mg by mouth 2 times daily (before meals)        insulin glargine 100 UNIT/ML injection    LANTUS     Inject 28 Units Subcutaneous every morning        lisinopril 2.5 MG tablet    PRINIVIL/Zestril    30 tablet    Take 1 tablet (2.5 mg) by mouth daily    NSTEMI (non-ST elevated myocardial infarction) (H), Nonischemic cardiomyopathy (H), Benign essential hypertension       METFORMIN HCL PO      Take 500 mg by mouth 2 times daily (with meals)        metoprolol succinate 25 MG 24 hr tablet    TOPROL-XL    30 tablet    Take 1 tablet (25 mg) by mouth daily    Paroxysmal atrial fibrillation (H), NSTEMI (non-ST elevated myocardial infarction) (H)       nicotine 21 MG/24HR 24 hr patch    NICODERM CQ    10 patch    Place 1 patch onto the skin daily    Tobacco abuse

## 2018-04-18 ENCOUNTER — HOSPITAL ENCOUNTER (OUTPATIENT)
Dept: OCCUPATIONAL THERAPY | Facility: CLINIC | Age: 73
Setting detail: THERAPIES SERIES
End: 2018-04-18
Attending: HOSPITALIST
Payer: MEDICARE

## 2018-04-18 PROCEDURE — 40000125 ZZHC STATISTIC OT OUTPT VISIT: Performed by: OCCUPATIONAL THERAPIST

## 2018-04-18 PROCEDURE — 97535 SELF CARE MNGMENT TRAINING: CPT | Mod: GO | Performed by: OCCUPATIONAL THERAPIST

## 2018-04-25 ENCOUNTER — HOSPITAL ENCOUNTER (OUTPATIENT)
Dept: OCCUPATIONAL THERAPY | Facility: CLINIC | Age: 73
Setting detail: THERAPIES SERIES
End: 2018-04-25
Attending: HOSPITALIST
Payer: MEDICARE

## 2018-04-25 PROCEDURE — 97535 SELF CARE MNGMENT TRAINING: CPT | Mod: GO | Performed by: OCCUPATIONAL THERAPIST

## 2018-04-25 PROCEDURE — 40000125 ZZHC STATISTIC OT OUTPT VISIT: Performed by: OCCUPATIONAL THERAPIST

## 2018-05-09 PROBLEM — I63.9 CVA (CEREBRAL VASCULAR ACCIDENT) (H): Status: ACTIVE | Noted: 2018-01-21

## 2018-05-16 ENCOUNTER — OFFICE VISIT (OUTPATIENT)
Dept: CARDIOLOGY | Facility: CLINIC | Age: 73
End: 2018-05-16
Attending: PHYSICIAN ASSISTANT
Payer: MEDICARE

## 2018-05-16 VITALS
OXYGEN SATURATION: 98 % | BODY MASS INDEX: 26.56 KG/M2 | WEIGHT: 207 LBS | HEIGHT: 74 IN | HEART RATE: 67 BPM | DIASTOLIC BLOOD PRESSURE: 78 MMHG | SYSTOLIC BLOOD PRESSURE: 132 MMHG

## 2018-05-16 DIAGNOSIS — I10 BENIGN ESSENTIAL HYPERTENSION: ICD-10-CM

## 2018-05-16 DIAGNOSIS — I48.0 PAROXYSMAL ATRIAL FIBRILLATION (H): ICD-10-CM

## 2018-05-16 DIAGNOSIS — I42.8 NONISCHEMIC CARDIOMYOPATHY (H): ICD-10-CM

## 2018-05-16 DIAGNOSIS — I21.4 NSTEMI (NON-ST ELEVATED MYOCARDIAL INFARCTION) (H): ICD-10-CM

## 2018-05-16 LAB
ANION GAP SERPL CALCULATED.3IONS-SCNC: 5 MMOL/L (ref 3–14)
BUN SERPL-MCNC: 17 MG/DL (ref 7–30)
CALCIUM SERPL-MCNC: 8.5 MG/DL (ref 8.5–10.1)
CHLORIDE SERPL-SCNC: 110 MMOL/L (ref 94–109)
CO2 SERPL-SCNC: 28 MMOL/L (ref 20–32)
CREAT SERPL-MCNC: 1.09 MG/DL (ref 0.66–1.25)
GFR SERPL CREATININE-BSD FRML MDRD: 67 ML/MIN/1.7M2
GLUCOSE SERPL-MCNC: 203 MG/DL (ref 70–99)
POTASSIUM SERPL-SCNC: 4.7 MMOL/L (ref 3.4–5.3)
SODIUM SERPL-SCNC: 143 MMOL/L (ref 133–144)

## 2018-05-16 PROCEDURE — 80048 BASIC METABOLIC PNL TOTAL CA: CPT | Performed by: PHYSICIAN ASSISTANT

## 2018-05-16 PROCEDURE — 36415 COLL VENOUS BLD VENIPUNCTURE: CPT | Performed by: PHYSICIAN ASSISTANT

## 2018-05-16 PROCEDURE — 99214 OFFICE O/P EST MOD 30 MIN: CPT | Performed by: PHYSICIAN ASSISTANT

## 2018-05-16 NOTE — PATIENT INSTRUCTIONS
Work on quitting smoking.   Try being in contact with:  For free help visit www.quitplan.com or call [phone] ([phone]).    Continue your current medications.    See me in 3 months.     See Dr. Choi in 9 months.

## 2018-05-16 NOTE — LETTER
2018    Candi Guerra [facility] [address]    RE: Yanick Adorno       Dear Colleague,    I had the pleasure of seeing Yanick Adorno in the [facility].    CARDIOLOGY CLINIC PROGRESS NOTE    DOS: 2018    Yanick Adorno  : 1945, 72 year old  MRN: [id number]      History:  I had the pleasure of following up with Yanick Adorno today in the cardiology clinic. He was accompanied by his wife, Cindy.  He is a patient of Dr. Choi.      Yanick is a 72 year old male with past medical history significant for T2DM, tobacco abuse, who was admitted on 2018 with with confusion and altered speech. He was found to have a subacute left frontal stroke of the left frontal lobe. In addition, troponin was mildly elevated. There was no history of chest pain. Echo showed LVEF 40-45%, trace to mild MR, trace to mild AI, mild aortic root dilatation. He underwent a Lexiscan stress test that was read as showing a small apical defect which is predominantly fixed, consistent with prior infarction with only trivial associated cande-infarct ischemia.  There was also a small area of ischemia in the inferolateral mid ventricle associated with either an inferior/inferolateral basal infarct or diaphragmatic attenuation. Although the stress test did show some mild abnormality, given lack of symptoms and the recent stroke, medical therapy was recommended.  He was started on ASA and statin for presumed CAD. He also went into afib with controlled rate during his admission and was started on Eliquis and BB.      In follow up, a cardiac monitor was done to evaluate afib burden, afib rate, and NSVT. The 14-day event monitor showed paroxysmal atrial fibrillation with a burden of approximately 3% of the total beats.  average afib rate 81 bpm.  He also had 13 runs of nonsustained ventricular tachycardia, longest one being 13 beats.     Due to the recently  elevated troponin, runs of NSVT and abnormal stress test, Dr. Choi referred Yanick on to a coronary angiogram.  This was done 3/28/18 via the right radial artery. This showed mild to moderate, nonobstructive CAD. The mid RCA had 40-50% stenosis. LVEDP 10 mmHg, LVEF 45%.     I saw him in follow up 4/11/18.  We increased his Toprol XL back to 25 mg (the [facility] had decreased to 12.5 mg).  I added in lisinopril 2.5 mg.    He presents today for follow up.   Has finished speech therapy and occupational therapy and PT.   Back at work full time. Smoking a few cigarettes, 4-5 in a day.  He has the patch, but wonders if there is something else. We discsused lozenges, gum, pills. He will use [employer] to see what is available, but then also discuss pills with his PCP if needed.   No chest pain. Occasional cough. No SOB.  No orthopnea, no PND, no edema. Weight continues to be up a few lbs. He thinks it is due to increased calories and less activity. He is more sedentary with a different job at work. 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 No bleeding concerns.        ROS:  Skin:    pigmentation   Eyes:  Negative    ENT:  Positive for postnasal drainage  Respiratory:    cough;wheezing  Cardiovascular:  Negative    Gastroenterology: Positive for abdominal pain  Genitourinary:  Positive for urinary frequency  Musculoskeletal:  Negative    Neurologic:  Positive for numbness or tingling of feet  Psychiatric:  Negative    Heme/Lymph/Imm:  Negative    Endocrine:  Positive for diabetes    PAST MEDICAL HISTORY:  Past Medical History:   Diagnosis Date     Cardiomyopathy (H)      Coronary artery disease      CVA (cerebral vascular accident) (H) 01/21/2018    (L) frontal cerbral infarct     Esophageal reflux      Hypertension      New onset a-fib (H)      Nonischemic cardiomyopathy (H)      NSVT (nonsustained ventricular tachycardia) (H)      Paroxysmal atrial fibrillation  (H)      Tobacco abuse     quit when he had stroke     Type II or unspecified type diabetes mellitus without mention of complication, not stated as uncontrolled        PAST SURGICAL HISTORY:  Past Surgical History:   Procedure Laterality Date     HEAD & NECK SURGERY      sinus       SOCIAL HISTORY:  Social History     Social History     Marital status:      Spouse name: N/A     Number of children: N/A     Years of education: N/A     Social History Main Topics     Smoking status: Former Smoker     Packs/day: 1.50     Years: 33.00     Quit date: 1/20/2018     Smokeless tobacco: Never Used     Alcohol use No     Drug use: None     Sexual activity: Not Asked     Other Topics Concern     None     Social History Narrative       FAMILY HISTORY:  Family History   Problem Relation Age of Onset     DIABETES Father      Uterine Cancer Mother      DIABETES Brother      DIABETES Brother      Suicide Brother      DIABETES Brother        MEDS:   Current Outpatient Prescriptions on File Prior to Visit:  apixaban ANTICOAGULANT (ELIQUIS) 5 MG tablet Take 1 tablet (5 mg) by mouth 2 times daily   ASPIRIN PO Take 81 mg by mouth   atorvastatin (LIPITOR) 40 MG tablet Take 1 tablet (40 mg) by mouth daily   GLIPIZIDE PO Take 10 mg by mouth 2 times daily (before meals)   insulin glargine (LANTUS) 100 UNIT/ML injection Inject 28 Units Subcutaneous every morning   lisinopril (PRINIVIL/ZESTRIL) 2.5 MG tablet Take 1 tablet (2.5 mg) by mouth daily   METFORMIN HCL PO Take 500 mg by mouth 2 times daily (with meals)    metoprolol succinate (TOPROL-XL) 25 MG 24 hr tablet Take 1 tablet (25 mg) by mouth daily   nicotine (NICODERM CQ) 21 MG/24HR 24 hr patch Place 1 patch onto the skin daily     No current facility-administered medications on file prior to visit.     ALLERGIES:   Allergies   Allergen Reactions     No Known Drug Allergies        PHYSICAL EXAM:  Vitals: /78 (BP Location: Right arm, Patient Position: Sitting, Cuff Size: Adult  "Regular)  Pulse 67  Ht 1.88 m (6' 2\")  Wt 93.9 kg (207 lb)  SpO2 98%  BMI 26.58 kg/m2  Constitutional:  cooperative;well developed;well nourished;in no acute distress        Skin:  warm and dry to the touch   scattered nonraised erythematous lesion on LEs (patient says chronic), superficial abrasion (dog bite) right shin that has mild erythema (reports chronic) and scab without oozing or warmth    Head:  normocephalic        Eyes:  pupils equal and round;conjunctivae and lids unremarkable        ENT:  no pallor or cyanosis poor dentition      Neck:  JVP normal        Respiratory:  clear to auscultation        Cardiac:   irregularly irregular rhythm     systolic murmur;grade 1          GI:  abdomen soft;BS normoactive;non-tender        Vascular:                                        Extremities and Musculoskeletal:  no deformities, clubbing, cyanosis, erythema observed;no edema        Neurological:  no gross motor deficits;affect appropriate        DIAGNOSTICS:  Component      Latest Ref Rng & Units 5/16/2018   Sodium      133 - 144 mmol/L 143   Potassium      3.4 - 5.3 mmol/L 4.7   Chloride      94 - 109 mmol/L 110 (H)   Carbon Dioxide      20 - 32 mmol/L 28   Anion Gap      3 - 14 mmol/L 5   Glucose      70 - 99 mg/dL 203 (H)   Urea Nitrogen      7 - 30 mg/dL 17   Creatinine      0.66 - 1.25 mg/dL 1.09   GFR Estimate      >60 mL/min/1.7m2 67   GFR Estimate If Black      >60 mL/min/1.7m2 81   Calcium      8.5 - 10.1 mg/dL 8.5         ASSESSMENT/PLAN:  1. CAD.    During admission for CVA, he had elevated troponin. Echo showed LVEF 40-45% with mild to moderate LVH and mild to moderate global hypokinesia of the LV. Lexiscan showed small fixed apical defect with only trival associated cande-infarct ischemia. Small area of ischemia in the inferolateral mid ventricle like diaphragmatic attenuation. No chest pain.   Cardiac event monitor showed he was having runs of NSVT.   Due to event monitor findings, the elevated " trop, and abnormal stress test, Dr. Choi ordered a coronary angiogram for definitive assessment. This showed mild-mod nonobstructive CAD.      Medical management with ASA, BB, statin.    2. Acute left frontal stroke: Likely embolic per neurology. ASA 81 mg, Eliquis 5 mg BID. No apparent deficit.   3. Paroxysmal atrial fibrillation/flutter: Noted on tele during admission. Cardiac monitor shows 3% afib burden, average afib rate 81 bpm. On Toprol XL and Eliquis.     4. NSVT: 6 beats noted during admission. Cardiac monitor showed 13 runs of NSVT up to 13 beats. Cor angio without obstructive CAD. Continue Toprol XL.   5. Tobacco abuse: occasional cigarette. Counseled on complete cessation.   6. Type 2 DM: on insulin.  7. Cardiomyopathy, nonischemic. Echo showed LVEF 40-45%. LV gram shows LVEF 45% with mild global HK. Rate controlled on monitor.  Weight is up about 7 lbs since February. He has mild cough, no SOB, no orthopnea, no PND, no edema.  No obvious edema or JVD on exam. Continue Toprol XL 25 mg, lisinopril 2.5 mg. No Lasix as of now, but will consider in future.  Daily weights, low sodium diet, and when to call in.   8. BP.  BPs have been controlled, but more recently they are up a bit. He is on Toprol XL and lisinopril. These can be titrated as needed in the future. But we also discussed weight loss.     Follow up:  3 months with BMP with Tiffany Bowman  9 months with BMP and FLP/ALT with Dr. Jimbo Bowman PA-C    Thank you for allowing me to participate in the care of your patient.      Sincerely,     Tiffany Bowman PA-C     [facility] [place]    cc:   Tiffany Bowman PA-C  [address] [place] [address]

## 2018-05-16 NOTE — MR AVS SNAPSHOT
After Visit Summary   5/16/2018    Yanick Adorno    MRN: [id number]           Patient Information     Date Of Birth          1945        Visit Information        Provider Department      5/16/2018 12:30 PM Tiffany Bowman PA-C [facility]        Today's Diagnoses     Paroxysmal atrial fibrillation (H)        NSTEMI (non-ST elevated myocardial infarction) (H)        Nonischemic cardiomyopathy (H)        Benign essential hypertension          Care Instructions    Work on quitting smoking.   Try being in contact with:  For free help visit [email or web address] or call [phone] ([phone]).    Continue your current medications.    See me in 3 months.     See Dr. Choi in 9 months.                          Follow-ups after your visit        Additional Services     Follow-Up with Cardiac Advanced Practice Provider           Follow-Up with Cardiologist                 Your next 10 appointments already scheduled     Aug 16, 2018  7:30 AM CDT   LAB with RU LAB   [facility] [place] ([facility])    [address] [facility] [address] [facility] [address]  [facility] [id number] [phone]           Please do not eat 10-12 hours before your appointment if you are coming in fasting for labs on lipids, cholesterol, or glucose (sugar). This does not apply to pregnant women. Water, hot tea and black coffee (with nothing added) are okay. Do not drink other fluids, diet soda or chew gum.            Aug 16, 2018  8:30 AM CDT   Return Visit with Tiffany Bowman PA-C   [facility] ([facility])    [address] [facility] [address] [facility] [address]  [facility] [id number] [phone]              Future tests that were ordered for you today     Open Future Orders        Priority Expected Expires Ordered    Basic metabolic panel Routine 2/10/2019 5/16/2019 5/16/2018    Follow-Up with Cardiologist Routine 2/10/2019 5/16/2019  "2018    Lipid Profile Routine 2/10/2019 2019 2018    ALT Routine 2/10/2019 2019 2018    Basic metabolic panel Routine 2018    Follow-Up with Cardiac Advanced Practice Provider Routine 2018            Who to contact     If you have questions or need follow up information about today's clinic visit or your schedule please contact [facility] directly at [phone].  Normal or non-critical lab and imaging results will be communicated to you by [employer], letter or phone within 4 business days after the clinic has received the results. If you do not hear from us within 7 days, please contact the clinic through [employer] or phone. If you have a critical or abnormal lab result, we will notify you by phone as soon as possible.  Submit refill requests through [email or web address] or call your pharmacy and they will forward the refill request to us. Please allow 3 business days for your refill to be completed.          Additional Information About Your Visit        MyChart Information     [email or web address] lets you send messages to your doctor, view your test results, renew your prescriptions, schedule appointments and more. To sign up, go to [email or web address] . Click on \"Log in\" on the left side of the screen, which will take you to the Welcome page. Then click on \"Sign up Now\" on the right side of the page.     You will be asked to enter the access code listed below, as well as some personal information. Please follow the directions to create your username and password.     Your access code is: [email or web address]  Expires: 2018  1:06 PM     Your access code will  in 90 days. If you need help or a new code, please call your [facility] clinic or [phone].        Care EveryWhere ID     This is your Care EveryWhere ID. This could be used by other organizations to access your [facility] medical records  [id number]      " "  Your Vitals Were     Pulse Height Pulse Oximetry BMI (Body Mass Index)          67 1.88 m (6' 2\") 98% 26.58 kg/m2         Blood Pressure from Last 3 Encounters:   05/16/18 132/78   04/11/18 138/82   03/28/18 159/87    Weight from Last 3 Encounters:   05/16/18 93.9 kg (207 lb)   04/11/18 93.4 kg (206 lb)   03/21/18 91.6 kg (202 lb)              We Performed the Following     Follow-Up with Cardiac Advanced Practice Provider        Primary Care Provider Office Phone # Fax #    Candi Leon [phone]       [facility] [address]        Equal Access to Services     ALDO MARSHALL : Meek honeycutto Sobull, waaxda luqadaha, qaybta kaalmada adeegyada, germaine bautista. [facility] [phone].    ATENCIÓN: Si habla español, tiene a moeller disposición servicios gratuitos de asistencia lingüística. [facility] [phone].    We comply with applicable federal civil rights laws and [place] laws. We do not discriminate on the basis of race, color, national origin, age, disability, sex, sexual orientation, or gender identity.            Thank you!     Thank you for choosing [facility]  for your care. Our goal is always to provide you with excellent care. Hearing back from our patients is one way we can continue to improve our services. Please take a few minutes to complete the written survey that you may receive in the mail after your visit with us. Thank you!             Your Updated Medication List - Protect others around you: Learn how to safely use, store and throw away your medicines at www.disposemymeds.org.          This list is accurate as of 5/16/18  1:06 PM.  Always use your most recent med list.                   Brand Name Dispense Instructions for use Diagnosis    apixaban ANTICOAGULANT 5 MG tablet    ELIQUIS    60 tablet    Take 1 tablet (5 mg) by mouth 2 times daily    Cerebrovascular " accident (CVA) due to embolism of left middle cerebral artery (H), Paroxysmal atrial fibrillation (H)       ASPIRIN PO      Take 81 mg by mouth        atorvastatin 40 MG tablet    LIPITOR    10 tablet    Take 1 tablet (40 mg) by mouth daily    Cerebrovascular accident (CVA) due to embolism of left middle cerebral artery (H)       GLIPIZIDE PO      Take 10 mg by mouth 2 times daily (before meals)        insulin glargine 100 UNIT/ML injection    LANTUS     Inject 28 Units Subcutaneous every morning        lisinopril 2.5 MG tablet    PRINIVIL/Zestril    30 tablet    Take 1 tablet (2.5 mg) by mouth daily    NSTEMI (non-ST elevated myocardial infarction) (H), Nonischemic cardiomyopathy (H), Benign essential hypertension       METFORMIN HCL PO      Take 500 mg by mouth 2 times daily (with meals)        metoprolol succinate 25 MG 24 hr tablet    TOPROL-XL    30 tablet    Take 1 tablet (25 mg) by mouth daily    Paroxysmal atrial fibrillation (H), NSTEMI (non-ST elevated myocardial infarction) (H)       nicotine 21 MG/24HR 24 hr patch    NICODERM CQ    10 patch    Place 1 patch onto the skin daily    Tobacco abuse

## 2018-05-16 NOTE — LETTER
2018    Candi Guerra [facility] [id number]    RE: Yanick Adorno       Dear Colleague,    I had the pleasure of seeing Yanick Adorno in the [facility].    CARDIOLOGY CLINIC PROGRESS NOTE    DOS: 2018    Yanick Adorno  : 1945, 72 year old  MRN: [id number]      History:  I had the pleasure of following up with Yanick Adorno today in the cardiology clinic. He was accompanied by his wife, Cindy.  He is a patient of Dr. Choi.      Yanick is a 72 year old male with past medical history significant for T2DM, tobacco abuse, who was admitted on 2018 with with confusion and altered speech. He was found to have a subacute left frontal stroke of the left frontal lobe. In addition, troponin was mildly elevated. There was no history of chest pain. Echo showed LVEF 40-45%, trace to mild MR, trace to mild AI, mild aortic root dilatation. He underwent a Lexiscan stress test that was read as showing a small apical defect which is predominantly fixed, consistent with prior infarction with only trivial associated cande-infarct ischemia.  There was also a small area of ischemia in the inferolateral mid ventricle associated with either an inferior/inferolateral basal infarct or diaphragmatic attenuation. Although the stress test did show some mild abnormality, given lack of symptoms and the recent stroke, medical therapy was recommended.  He was started on ASA and statin for presumed CAD. He also went into afib with controlled rate during his admission and was started on Eliquis and BB.      In follow up, a cardiac monitor was done to evaluate afib burden, afib rate, and NSVT. The 14-day event monitor showed paroxysmal atrial fibrillation with a burden of approximately 3% of the total beats.  average afib rate 81 bpm.  He also had 13 runs of nonsustained ventricular tachycardia, longest one being 13 beats.     Due to the recently  elevated troponin, runs of NSVT and abnormal stress test, Dr. Choi referred Yanick on to a coronary angiogram.  This was done 3/28/18 via the right radial artery. This showed mild to moderate, nonobstructive CAD. The mid RCA had 40-50% stenosis. LVEDP 10 mmHg, LVEF 45%.     I saw him in follow up 4/11/18.  We increased his Toprol XL back to 25 mg ([facility] 12.5 [facility]).  I added in lisinopril 2.5 mg.    He presents today for follow up.   Has finished speech therapy and occupational therapy and PT.   Back at work full time. Smoking a few cigarettes, 4-5 in a day.  He has the patch, but wonders if there is something else. We discsused lozenges, gum, pills. He will use [employer] to see what is available, but then also discuss pills with his PCP if needed.   No chest pain. Occasional cough. No SOB.  No orthopnea, no PND, no edema. Weight continues to be up a few lbs. He thinks it is due to increased calories and less activity. He is more sedentary with a different job at work. 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 No bleeding concerns.        ROS:  Skin:    pigmentation   Eyes:  Negative    ENT:  Positive for postnasal drainage  Respiratory:    cough;wheezing  Cardiovascular:  Negative    Gastroenterology: Positive for abdominal pain  Genitourinary:  Positive for urinary frequency  Musculoskeletal:  Negative    Neurologic:  Positive for numbness or tingling of feet  Psychiatric:  Negative    Heme/Lymph/Imm:  Negative    Endocrine:  Positive for diabetes    PAST MEDICAL HISTORY:  Past Medical History:   Diagnosis Date     Cardiomyopathy (H)      Coronary artery disease      CVA (cerebral vascular accident) (H) 01/21/2018    (L) frontal cerbral infarct     Esophageal reflux      Hypertension      New onset a-fib (H)      Nonischemic cardiomyopathy (H)      NSVT (nonsustained ventricular tachycardia) (H)      Paroxysmal atrial fibrillation  (H)      Tobacco abuse     quit when he had stroke     Type II or unspecified type diabetes mellitus without mention of complication, not stated as uncontrolled        PAST SURGICAL HISTORY:  Past Surgical History:   Procedure Laterality Date     HEAD & NECK SURGERY      sinus       SOCIAL HISTORY:  Social History     Social History     Marital status:      Spouse name: N/A     Number of children: N/A     Years of education: N/A     Social History Main Topics     Smoking status: Former Smoker     Packs/day: 1.50     Years: 33.00     Quit date: 1/20/2018     Smokeless tobacco: Never Used     Alcohol use No     Drug use: None     Sexual activity: Not Asked     Other Topics Concern     None     Social History Narrative       FAMILY HISTORY:  Family History   Problem Relation Age of Onset     DIABETES Father      Uterine Cancer Mother      DIABETES Brother      DIABETES Brother      Suicide Brother      DIABETES Brother        MEDS:   Current Outpatient Prescriptions on File Prior to Visit:  apixaban ANTICOAGULANT (ELIQUIS) 5 MG tablet Take 1 tablet (5 mg) by mouth 2 times daily   ASPIRIN PO Take 81 mg by mouth   atorvastatin (LIPITOR) 40 MG tablet Take 1 tablet (40 mg) by mouth daily   GLIPIZIDE PO Take 10 mg by mouth 2 times daily (before meals)   insulin glargine (LANTUS) 100 UNIT/ML injection Inject 28 Units Subcutaneous every morning   lisinopril (PRINIVIL/ZESTRIL) 2.5 MG tablet Take 1 tablet (2.5 mg) by mouth daily   METFORMIN HCL PO Take 500 mg by mouth 2 times daily (with meals)    metoprolol succinate (TOPROL-XL) 25 MG 24 hr tablet Take 1 tablet (25 mg) by mouth daily   nicotine (NICODERM CQ) 21 MG/24HR 24 hr patch Place 1 patch onto the skin daily     No current facility-administered medications on file prior to visit.     ALLERGIES:   Allergies   Allergen Reactions     No Known Drug Allergies        PHYSICAL EXAM:  Vitals: /78 (BP Location: Right arm, Patient Position: Sitting, Cuff Size: Adult  "Regular)  Pulse 67  Ht 1.88 m (6' 2\")  Wt 93.9 kg (207 lb)  SpO2 98%  BMI 26.58 kg/m2  Constitutional:  cooperative;well developed;well nourished;in no acute distress        Skin:  warm and dry to the touch   scattered nonraised erythematous lesion on LEs (patient says chronic), superficial abrasion (dog bite) right shin that has mild erythema (reports chronic) and scab without oozing or warmth    Head:  normocephalic        Eyes:  pupils equal and round;conjunctivae and lids unremarkable        ENT:  no pallor or cyanosis poor dentition      Neck:  JVP normal        Respiratory:  clear to auscultation        Cardiac:   irregularly irregular rhythm     systolic murmur;grade 1          GI:  abdomen soft;BS normoactive;non-tender        Vascular:                                        Extremities and Musculoskeletal:  no deformities, clubbing, cyanosis, erythema observed;no edema        Neurological:  no gross motor deficits;affect appropriate        DIAGNOSTICS:  Component      Latest Ref Rng & Units 5/16/2018   Sodium      133 - 144 mmol/L 143   Potassium      3.4 - 5.3 mmol/L 4.7   Chloride      94 - 109 mmol/L 110 (H)   Carbon Dioxide      20 - 32 mmol/L 28   Anion Gap      3 - 14 mmol/L 5   Glucose      70 - 99 mg/dL 203 (H)   Urea Nitrogen      7 - 30 mg/dL 17   Creatinine      0.66 - 1.25 mg/dL 1.09   GFR Estimate      >60 mL/min/1.7m2 67   GFR Estimate If Black      >60 mL/min/1.7m2 81   Calcium      8.5 - 10.1 mg/dL 8.5         ASSESSMENT/PLAN:  1. CAD.    During admission for CVA, he had elevated troponin. Echo showed LVEF 40-45% with mild to moderate LVH and mild to moderate global hypokinesia of the LV. Lexiscan showed small fixed apical defect with only trival associated cande-infarct ischemia. Small area of ischemia in the inferolateral mid ventricle like diaphragmatic attenuation. No chest pain.   Cardiac event monitor showed he was having runs of NSVT.   Due to event monitor findings, the elevated " trop, and abnormal stress test, Dr. Choi ordered a coronary angiogram for definitive assessment. This showed mild-mod nonobstructive CAD.      Medical management with ASA, BB, statin.    2. Acute left frontal stroke: Likely embolic per neurology. ASA 81 mg, Eliquis 5 mg BID. No apparent deficit.   3. Paroxysmal atrial fibrillation/flutter: Noted on tele during admission. Cardiac monitor shows 3% afib burden, average afib rate 81 bpm. On Toprol XL and Eliquis.     4. NSVT: 6 beats noted during admission. Cardiac monitor showed 13 runs of NSVT up to 13 beats. Cor angio without obstructive CAD. Continue Toprol XL.   5. Tobacco abuse: occasional cigarette. Counseled on complete cessation.   6. Type 2 DM: on insulin.  7. Cardiomyopathy, nonischemic. Echo showed LVEF 40-45%. LV gram shows LVEF 45% with mild global HK. Rate controlled on monitor.  Weight is up about 7 lbs since February. He has mild cough, no SOB, no orthopnea, no PND, no edema.  No obvious edema or JVD on exam. Continue Toprol XL 25 mg, lisinopril 2.5 mg. No Lasix as of now, but will consider in future.  Daily weights, low sodium diet, and when to call in.   8. BP.  BPs have been controlled, but more recently they are up a bit. He is on Toprol XL and lisinopril. These can be titrated as needed in the future. But we also discussed weight loss.     Follow up:  3 months with BMP with Tiffany Bowman  9 months with BMP and FLP/ALT with Dr. Choi      Thank you for allowing me to participate in the care of your patient.    Sincerely,     Tiffany Bowman PA-C     [facility]

## 2018-05-16 NOTE — PROGRESS NOTES
CARDIOLOGY CLINIC PROGRESS NOTE    DOS: 2018    Yanick Adorno  : 1945, 72 year old  MRN: [id number]      History:  I had the pleasure of following up with Yanick Adorno today in the cardiology clinic. He was accompanied by his wife, Cindy.  He is a patient of Dr. Choi.      Yanick is a 72 year old male with past medical history significant for T2DM, tobacco abuse, who was admitted on 2018 with with confusion and altered speech. He was found to have a subacute left frontal stroke of the left frontal lobe. In addition, troponin was mildly elevated. There was no history of chest pain. Echo showed LVEF 40-45%, trace to mild MR, trace to mild AI, mild aortic root dilatation. He underwent a Lexiscan stress test that was read as showing a small apical defect which is predominantly fixed, consistent with prior infarction with only trivial associated cande-infarct ischemia.  There was also a small area of ischemia in the inferolateral mid ventricle associated with either an inferior/inferolateral basal infarct or diaphragmatic attenuation. Although the stress test did show some mild abnormality, given lack of symptoms and the recent stroke, medical therapy was recommended.  He was started on ASA and statin for presumed CAD. He also went into afib with controlled rate during his admission and was started on Eliquis and BB.      In follow up, a cardiac monitor was done to evaluate afib burden, afib rate, and NSVT. The 14-day event monitor showed paroxysmal atrial fibrillation with a burden of approximately 3% of the total beats.  average afib rate 81 bpm.  He also had 13 runs of nonsustained ventricular tachycardia, longest one being 13 beats.     Due to the recently elevated troponin, runs of NSVT and abnormal stress test, Dr. Choi referred Yanick on to a coronary angiogram.  This was done 3/28/18 via the right radial artery. This showed mild to moderate, nonobstructive CAD. The mid RCA had 40-50%  stenosis. LVEDP 10 mmHg, LVEF 45%.     I saw him in follow up 4/11/18.  We increased his Toprol XL back to 25 mg ([facility] 12[facility]).  I added in lisinopril 2.5 mg.    He presents today for follow up.   Has finished speech therapy and occupational therapy and PT.   Back at work full time. Smoking a few cigarettes, 4-5 in a day.  He has the patch, but wonders if there is something else. We discsused lozenges, gum, pills. He will use [employer] to see what is available, but then also discuss pills with his PCP if needed.   No chest pain. Occasional cough. No SOB.  No orthopnea, no PND, no edema. Weight continues to be up a few lbs. He thinks it is due to increased calories and less activity. He is more sedentary with a different job at work. 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 No bleeding concerns.        ROS:  Skin:    pigmentation   Eyes:  Negative    ENT:  Positive for postnasal drainage  Respiratory:    cough;wheezing  Cardiovascular:  Negative    Gastroenterology: Positive for abdominal pain  Genitourinary:  Positive for urinary frequency  Musculoskeletal:  Negative    Neurologic:  Positive for numbness or tingling of feet  Psychiatric:  Negative    Heme/Lymph/Imm:  Negative    Endocrine:  Positive for diabetes    PAST MEDICAL HISTORY:  Past Medical History:   Diagnosis Date     Cardiomyopathy (H)      Coronary artery disease      CVA (cerebral vascular accident) (H) 01/21/2018    (L) frontal cerbral infarct     Esophageal reflux      Hypertension      New onset a-fib (H)      Nonischemic cardiomyopathy (H)      NSVT (nonsustained ventricular tachycardia) (H)      Paroxysmal atrial fibrillation (H)      Tobacco abuse     quit when he had stroke     Type II or unspecified type diabetes mellitus without mention of complication, not stated as uncontrolled        PAST SURGICAL HISTORY:  Past Surgical History:   Procedure  "Laterality Date     HEAD & NECK SURGERY      sinus       SOCIAL HISTORY:  Social History     Social History     Marital status:      Spouse name: N/A     Number of children: N/A     Years of education: N/A     Social History Main Topics     Smoking status: Former Smoker     Packs/day: 1.50     Years: 33.00     Quit date: 1/20/2018     Smokeless tobacco: Never Used     Alcohol use No     Drug use: None     Sexual activity: Not Asked     Other Topics Concern     None     Social History Narrative       FAMILY HISTORY:  Family History   Problem Relation Age of Onset     DIABETES Father      Uterine Cancer Mother      DIABETES Brother      DIABETES Brother      Suicide Brother      DIABETES Brother        MEDS:   Current Outpatient Prescriptions on File Prior to Visit:  apixaban ANTICOAGULANT (ELIQUIS) 5 MG tablet Take 1 tablet (5 mg) by mouth 2 times daily   ASPIRIN PO Take 81 mg by mouth   atorvastatin (LIPITOR) 40 MG tablet Take 1 tablet (40 mg) by mouth daily   GLIPIZIDE PO Take 10 mg by mouth 2 times daily (before meals)   insulin glargine (LANTUS) 100 UNIT/ML injection Inject 28 Units Subcutaneous every morning   lisinopril (PRINIVIL/ZESTRIL) 2.5 MG tablet Take 1 tablet (2.5 mg) by mouth daily   METFORMIN HCL PO Take 500 mg by mouth 2 times daily (with meals)    metoprolol succinate (TOPROL-XL) 25 MG 24 hr tablet Take 1 tablet (25 mg) by mouth daily   nicotine (NICODERM CQ) 21 MG/24HR 24 hr patch Place 1 patch onto the skin daily     No current facility-administered medications on file prior to visit.     ALLERGIES:   Allergies   Allergen Reactions     No Known Drug Allergies        PHYSICAL EXAM:  Vitals: /78 (BP Location: Right arm, Patient Position: Sitting, Cuff Size: Adult Regular)  Pulse 67  Ht 1.88 m (6' 2\")  Wt 93.9 kg (207 lb)  SpO2 98%  BMI 26.58 kg/m2  Constitutional:  cooperative;well developed;well nourished;in no acute distress        Skin:  warm and dry to the touch   scattered " nonraised erythematous lesion on LEs (patient says chronic), superficial abrasion (dog bite) right shin that has mild erythema (reports chronic) and scab without oozing or warmth    Head:  normocephalic        Eyes:  pupils equal and round;conjunctivae and lids unremarkable        ENT:  no pallor or cyanosis poor dentition      Neck:  JVP normal        Respiratory:  clear to auscultation        Cardiac:   irregularly irregular rhythm     systolic murmur;grade 1          GI:  abdomen soft;BS normoactive;non-tender        Vascular:                                        Extremities and Musculoskeletal:  no deformities, clubbing, cyanosis, erythema observed;no edema        Neurological:  no gross motor deficits;affect appropriate        DIAGNOSTICS:  Component      Latest Ref Rng & Units 5/16/2018   Sodium      133 - 144 mmol/L 143   Potassium      3.4 - 5.3 mmol/L 4.7   Chloride      94 - 109 mmol/L 110 (H)   Carbon Dioxide      20 - 32 mmol/L 28   Anion Gap      3 - 14 mmol/L 5   Glucose      70 - 99 mg/dL 203 (H)   Urea Nitrogen      7 - 30 mg/dL 17   Creatinine      0.66 - 1.25 mg/dL 1.09   GFR Estimate      >60 mL/min/1.7m2 67   GFR Estimate If Black      >60 mL/min/1.7m2 81   Calcium      8.5 - 10.1 mg/dL 8.5         ASSESSMENT/PLAN:  1. CAD.    During admission for CVA, he had elevated troponin. Echo showed LVEF 40-45% with mild to moderate LVH and mild to moderate global hypokinesia of the LV. Lexiscan showed small fixed apical defect with only trival associated cande-infarct ischemia. Small area of ischemia in the inferolateral mid ventricle like diaphragmatic attenuation. No chest pain.   Cardiac event monitor showed he was having runs of NSVT.   Due to event monitor findings, the elevated trop, and abnormal stress test, Dr. Choi ordered a coronary angiogram for definitive assessment. This showed mild-mod nonobstructive CAD.      Medical management with ASA, BB, statin.    2. Acute left frontal stroke: Likely  embolic per neurology. ASA 81 mg, Eliquis 5 mg BID. No apparent deficit.   3. Paroxysmal atrial fibrillation/flutter: Noted on tele during admission. Cardiac monitor shows 3% afib burden, average afib rate 81 bpm. On Toprol XL and Eliquis.     4. NSVT: 6 beats noted during admission. Cardiac monitor showed 13 runs of NSVT up to 13 beats. Cor angio without obstructive CAD. Continue Toprol XL.   5. Tobacco abuse: occasional cigarette. Counseled on complete cessation.   6. Type 2 DM: on insulin.  7. Cardiomyopathy, nonischemic. Echo showed LVEF 40-45%. LV gram shows LVEF 45% with mild global HK. Rate controlled on monitor.  Weight is up about 7 lbs since February. He has mild cough, no SOB, no orthopnea, no PND, no edema.  No obvious edema or JVD on exam. Continue Toprol XL 25 mg, lisinopril 2.5 mg. No Lasix as of now, but will consider in future.  Daily weights, low sodium diet, and when to call in.   8. BP.  BPs have been controlled, but more recently they are up a bit. He is on Toprol XL and lisinopril. These can be titrated as needed in the future. But we also discussed weight loss.     Follow up:  3 months with BMP with Tiffany Bowman  9 months with BMP and FLP/ALT with Dr. Jimbo Bowman, [facility]

## 2018-05-30 NOTE — ADDENDUM NOTE
Encounter addended by: Rosa Vega OTR on: 5/30/2018  2:01 PM<BR>     Actions taken: Sign clinical note

## 2018-05-30 NOTE — PROGRESS NOTES
Outpatient Occupational Therapy Discharge Note     Patient: Yanick Adorno  : 1945     Beginning/End Dates of Reporting Period:  18 to 18     Referring Provider: Poornima Khoury (Pt's PCP is  Candi Dumont of the [facility].)     Therapy Diagnosis: Decreased ADL/IADL       Client Self Report: (P) Pt is gratful for the help he recieved in OT for getting back to work.  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  He is feeling good about his new role and llikes the challenge.       Objective Measurements:     Objective Measure: Dynavision   Details: GOAL MET 3/7/18--Mode A-64 hits/min (>WNL where Wnl is > 52). Mode B 58/73 (WNL where > 42 is WNL).  Mode B with number reading. gets 47/68 hits and reads 10/10 #'s ( now WNL where >35 hits and 9/10 # read is WNL).       Objective Measure: Scan Course   Details: From 3/21/18-- GOAL MET .2 trials, encouraged to go slower for better accuracy. 1st: 8/10 L and 9/10 R.  2nd: 8/10 L and 10/10 R.  Takes :45-50 sec, doing better with accuracy.   Objective Measure: Vision       Objective Measure: CAM   Details: Recheck of cognitive assessment of [place] (CAM)--patient is improved from moderate deficits to WNL in 5 areas: auditory memory and sequencing (now recalls 7 letters going forward and 4 in reverse), multiple digit math, simple and moderate problem-solving, and safety judgment.  Improved from mild deficit to WNL with abstract reasoning.  Complex problem-solving is still at a moderate deficit level, as he is still working on attention to details prioritizing and sequencing with good insight. Gets 8 of 9 details correct on errand scenario, leaving out ordering of the cake.  Also goes to drugstore before the doctor.  With specific cue he is able to add watering of the cake as his first step.Patient's overall confidence and speed of return with all of these tasks  went much easier this time, much faster with his transitions between task types.  Showing more cognitive flexibility and better ability to follow multiple step directions.  Does have some word retrieval issues requiring extra time to formulate his thoughts with precise words for describing the safety and abstract reasoning portions.\Gets 8 of 9 details correct leaving out ordering of the cake.  Also goes to drugstore before the doctor.  With specific cue he is able to add watering of the cake as his first step.     Objective Measure: SDMT    > WNL, Goal met. Scores 41/41 where > 33 is WNL.       Objective Measure: Errands   Details: 85% on eleven item list #1 as of 4/4/18.  Requires cues to underline deitals as he preares to complete.        Goals:     Goal Identifier Visual Attention   Goal Description OT to complete more in depth screening of cande visual skills as further deficits suspected; pt to demonstrate balanced visual awareness of all visual fields by scoring > 9/10 B on Scan Course Task and all modes of Dynavision to WNL.   Target Date 05/06/18   Date Met   3/7/18   Progress: GOAL MET per above     Goal Identifier Money Management   Goal Description Patient will demonstrate the ability to complete simple to moderately complex money management tasks with 90% accuracy for increased attention to detail and problem solving skills to resume finances at home and manage money in the community.   Target Date 05/06/18   Date Met      Progress:  Pt is able to manage 2-3 step daily functional math scenarios.  At times, will make errors if he is not sitting at a well lit table, without his dog.  Reports he is not responsible for numbers and reports at his marketing role.  Feels confident with the detailed tasks he performs in the work setting.   Wife rechecks any bill paying, large transactions.     Goal Identifier CAM, IADL results   Goal Description Patient and family to verbalize understanding of  IADL screening,  and Cognitive Assessment of [place] results and identify 3 strategies to increase patient's safety and independence in the home and community setting.   Target Date 05/06/18   Date Met      Progress: GOAL MET.     Goal Identifier Errands   Goal Description Pt to independently complete executive functioning task sequence of 10-12 step Errands checklist ( clinic based series of daily functioning tasks) with >90% accuracy for preparation, temporal awareness, organization/consideration of location, duration, phone/web/mailing/copying and sequence of tasks.   Target Date 05/06/18   Date Met      Progress: Does well with making plan of errand task sequencing with good prioritization. Is 85% accurate with managing details, but compensates by underlining/highlighting details.     Goal Identifier FMC   Goal Description Pt to demonstrate improved fine motor coordination by performing nine hole peg test in 25 seconds for B hands to support improved ability to cut/chop/prepare foods and write legibly.   Target Date 05/06/18   Date Met      Progress: GOAL MET.       Progress Toward Goals:   Progress this reporting period: Pt has met 4 of 5 goals during his 14 visits of skilled OT. Therapy has focussed most on return to safety with higher level financial management tasks. ....    Plan:  Continue therapy per current plan of care.    Discharge: Pt had cancelled his final OT visit in early May and OTR called pt to check in.  Agrees he is feeling ready for d/c.  Work is going well so far. Not seeing any needs for further OT and feels it would be challenging to fit in with his current work schedule.    Reason for Discharge: Patient has met majority of goals.      Discharge Plan: Patient to continue home program.    Thank you for this thoughtful referral! If you have any questions, please call me [phone].  Nice to share in this patient's care with you.    Sincerely,   Rosa Vega, OTR/cece

## 2018-05-30 NOTE — ADDENDUM NOTE
Encounter addended by: Rosa Vega OTR on: 5/30/2018  1:47 PM<BR>     Actions taken: Flowsheet data copied forward, Flowsheet accepted, Pend clinical note

## 2018-05-30 NOTE — ADDENDUM NOTE
Encounter addended by: Rosa Vega, OTR on: 5/30/2018  2:01 PM<BR>     Actions taken: Episode resolved

## 2018-06-19 ENCOUNTER — TRANSFERRED RECORDS (OUTPATIENT)
Dept: HEALTH INFORMATION MANAGEMENT | Facility: CLINIC | Age: 73
End: 2018-06-19

## 2018-08-17 ENCOUNTER — OFFICE VISIT (OUTPATIENT)
Dept: CARDIOLOGY | Facility: CLINIC | Age: 73
End: 2018-08-17
Attending: PHYSICIAN ASSISTANT
Payer: MEDICARE

## 2018-08-17 VITALS
SYSTOLIC BLOOD PRESSURE: 140 MMHG | OXYGEN SATURATION: 95 % | BODY MASS INDEX: 26.64 KG/M2 | WEIGHT: 207.6 LBS | HEIGHT: 74 IN | DIASTOLIC BLOOD PRESSURE: 72 MMHG | HEART RATE: 64 BPM

## 2018-08-17 DIAGNOSIS — I10 BENIGN ESSENTIAL HYPERTENSION: ICD-10-CM

## 2018-08-17 DIAGNOSIS — I42.8 NONISCHEMIC CARDIOMYOPATHY (H): ICD-10-CM

## 2018-08-17 DIAGNOSIS — I48.0 PAROXYSMAL ATRIAL FIBRILLATION (H): ICD-10-CM

## 2018-08-17 DIAGNOSIS — I21.4 NSTEMI (NON-ST ELEVATED MYOCARDIAL INFARCTION) (H): ICD-10-CM

## 2018-08-17 LAB
ANION GAP SERPL CALCULATED.3IONS-SCNC: 4 MMOL/L (ref 3–14)
BUN SERPL-MCNC: 18 MG/DL (ref 7–30)
CALCIUM SERPL-MCNC: 8.7 MG/DL (ref 8.5–10.1)
CHLORIDE SERPL-SCNC: 108 MMOL/L (ref 94–109)
CO2 SERPL-SCNC: 30 MMOL/L (ref 20–32)
CREAT SERPL-MCNC: 0.85 MG/DL (ref 0.66–1.25)
GFR SERPL CREATININE-BSD FRML MDRD: 89 ML/MIN/1.7M2
GLUCOSE SERPL-MCNC: 158 MG/DL (ref 70–99)
POTASSIUM SERPL-SCNC: 4.2 MMOL/L (ref 3.4–5.3)
SODIUM SERPL-SCNC: 142 MMOL/L (ref 133–144)

## 2018-08-17 PROCEDURE — 36415 COLL VENOUS BLD VENIPUNCTURE: CPT | Performed by: PHYSICIAN ASSISTANT

## 2018-08-17 PROCEDURE — 99214 OFFICE O/P EST MOD 30 MIN: CPT | Performed by: PHYSICIAN ASSISTANT

## 2018-08-17 PROCEDURE — 80048 BASIC METABOLIC PNL TOTAL CA: CPT | Performed by: PHYSICIAN ASSISTANT

## 2018-08-17 NOTE — PATIENT INSTRUCTIONS
Blood pressure is up just a little today.  But you just took your medications 1 hour ago.     Instead of increasing lisinopril now, please see my nurse in about 2 weeks for a blood pressure check.  Make sure your medications are taken at least 2 hours before the blood pressure check.       CONGRATS on quitting smoking!!!!!

## 2018-08-17 NOTE — LETTER
2018    Candi Guerra [facility] [address]  [facility] [address]    RE: Yanick Adorno       Dear Colleague,    I had the pleasure of seeing Yanick Adorno in the [facility].    CARDIOLOGY CLINIC PROGRESS NOTE    DOS: 18    Yanick Adorno  : 1945, 72 year old  MRN: [id number]      History:  I had the pleasure of following up with Yanick Adorno today in the cardiology clinic.   He is a patient of Dr. Choi.      Yanick is a 72 year old male with past medical history significant for T2DM, tobacco abuse, CVA, PAF, NSVT, mild cardiomyopathy, mild to moderate nonobstructive CAD.     He was admitted on 2018 with with confusion and altered speech. He was found to have a subacute left frontal stroke of the left frontal lobe. In addition, troponin was mildly elevated. There was no history of chest pain. Echo showed LVEF 40-45%, trace to mild MR, trace to mild AI, mild aortic root dilatation. He underwent a Lexiscan stress test that was read as showing a small apical defect which is predominantly fixed, consistent with prior infarction with only trivial associated cande-infarct ischemia.  There was also a small area of ischemia in the inferolateral mid ventricle associated with either an inferior/inferolateral basal infarct or diaphragmatic attenuation. Although the stress test did show some mild abnormality, given lack of symptoms and the recent stroke, medical therapy was recommended.  He was started on ASA and statin for presumed CAD. He also went into afib with controlled rate during his admission and was started on Eliquis and BB.      In follow up, a cardiac monitor was done to evaluate afib burden, afib rate, and NSVT. The 14-day event monitor showed paroxysmal atrial fibrillation with a burden of approximately 3% of the total beats.  average afib rate 81 bpm.  He also had 13 runs of nonsustained ventricular tachycardia, longest one  being 13 beats.     Due to the recently elevated troponin, runs of NSVT and abnormal stress test, Dr. Choi referred Yanick on to a coronary angiogram.  This was done 3/28/18 via the right radial artery. This showed mild to moderate, nonobstructive CAD. The mid RCA had 40-50% stenosis. LVEDP 10 mmHg, LVEF 45%.     I saw him in follow up 4/11/18.  We increased his Toprol XL back to 25 mg (the [facility] had decreased to 12.5 mg).  I added in lisinopril 2.5 mg.    He presents today for follow up.   He is doing quite well. He has quit smoking since the last time I saw him!  He does crave a cigarette, however.   Back at work full time. But again, this job is a bit more sedentary.  He has gained some weight since early 2018, but it has now stabilized.  No SOB.  No orthopnea, no PND, no edema.   No bleeding concerns.    Occasional right, sharp, but mild chest discomfort.  No exertional sxs.     ROS:  Skin:  Positive for pigmentation   Eyes:  Negative    ENT:  Positive for postnasal drainage  Respiratory:  Negative    Cardiovascular:    Positive for;chest pain right side, sharp pain but mild, non exertional   Gastroenterology: Negative    Genitourinary:  Positive for urinary frequency;nocturia  Musculoskeletal:  Positive for back pain  Neurologic:  Positive for numbness or tingling of feet  Psychiatric:  Negative    Heme/Lymph/Imm:  Negative    Endocrine:  Positive for diabetes    PAST MEDICAL HISTORY:  Past Medical History:   Diagnosis Date     Cardiomyopathy (H)      Coronary artery disease      CVA (cerebral vascular accident) (H) 01/21/2018    (L) frontal cerbral infarct     Esophageal reflux      Hypertension      New onset a-fib (H)      Nonischemic cardiomyopathy (H)      NSVT (nonsustained ventricular tachycardia) (H)      Paroxysmal atrial fibrillation (H)      Tobacco abuse     quit when he had stroke     Type II or unspecified type diabetes mellitus without mention of complication, not stated as uncontrolled        PAST  "SURGICAL HISTORY:  Past Surgical History:   Procedure Laterality Date     HEAD & NECK SURGERY      sinus       SOCIAL HISTORY:  Social History     Social History     Marital status:      Spouse name: N/A     Number of children: N/A     Years of education: N/A     Social History Main Topics     Smoking status: Former Smoker     Packs/day: 1.50     Years: 33.00     Quit date: 1/20/2018     Smokeless tobacco: Never Used     Alcohol use No     Drug use: None     Sexual activity: Not Asked     Other Topics Concern     None     Social History Narrative       FAMILY HISTORY:  Family History   Problem Relation Age of Onset     Diabetes Father      Uterine Cancer Mother      Diabetes Brother      Diabetes Brother      Suicide Brother      Diabetes Brother        MEDS:   Current Outpatient Prescriptions on File Prior to Visit:  apixaban ANTICOAGULANT (ELIQUIS) 5 MG tablet Take 1 tablet (5 mg) by mouth 2 times daily   ASPIRIN PO Take 81 mg by mouth   atorvastatin (LIPITOR) 40 MG tablet Take 1 tablet (40 mg) by mouth daily   GLIPIZIDE PO Take 10 mg by mouth 2 times daily (before meals)   insulin glargine (LANTUS) 100 UNIT/ML injection Inject 28 Units Subcutaneous every morning   lisinopril (PRINIVIL/ZESTRIL) 2.5 MG tablet Take 1 tablet (2.5 mg) by mouth daily   METFORMIN HCL PO Take 500 mg by mouth 2 times daily (with meals)    metoprolol succinate (TOPROL-XL) 25 MG 24 hr tablet Take 1 tablet (25 mg) by mouth daily     No current facility-administered medications on file prior to visit.     ALLERGIES:   Allergies   Allergen Reactions     No Known Drug Allergies        PHYSICAL EXAM:  Vitals: /72 (BP Location: Right arm, Patient Position: Chair, Cuff Size: Adult Large)  Pulse 64  Ht 1.88 m (6' 2\")  Wt 94.2 kg (207 lb 9.6 oz)  SpO2 95%  BMI 26.65 kg/m2  Constitutional:  cooperative;well developed;well nourished;in no acute distress        Skin:  warm and dry to the touch   scattered nonraised erythematous lesion " on LEs (patient says chronic), superficial abrasion (dog bite) right shin that has mild erythema (reports chronic) and scab without oozing or warmth    Head:  normocephalic        Eyes:  pupils equal and round;conjunctivae and lids unremarkable        ENT:  no pallor or cyanosis poor dentition      Neck:  JVP normal        Respiratory:  clear to auscultation        Cardiac: regular rhythm;normal S1 and S2       systolic murmur;grade 1          GI:  abdomen soft;BS normoactive;non-tender        Vascular:                                        Extremities and Musculoskeletal:  no deformities, clubbing, cyanosis, erythema observed;no edema        Neurological:  no gross motor deficits;affect appropriate        DIAGNOSTICS:  Component      Latest Ref Rng & Units 8/17/2018   Sodium      133 - 144 mmol/L 142   Potassium      3.4 - 5.3 mmol/L 4.2   Chloride      94 - 109 mmol/L 108   Carbon Dioxide      20 - 32 mmol/L 30   Anion Gap      3 - 14 mmol/L 4   Glucose      70 - 99 mg/dL 158 (H)   Urea Nitrogen      7 - 30 mg/dL 18   Creatinine      0.66 - 1.25 mg/dL 0.85   GFR Estimate      >60 mL/min/1.7m2 89   GFR Estimate If Black      >60 mL/min/1.7m2 >90   Calcium      8.5 - 10.1 mg/dL 8.7   Reviewed - Na/K and renal function WNL.       ASSESSMENT/PLAN:  1. CAD.    During admission for CVA, he had elevated troponin. Echo showed LVEF 40-45% with mild to moderate LVH and mild to moderate global hypokinesia of the LV. Lexiscan showed small fixed apical defect with only trival associated cande-infarct ischemia. Small area of ischemia in the inferolateral mid ventricle like diaphragmatic attenuation. No chest pain.   Cardiac event monitor showed he was having runs of NSVT.   Due to event monitor findings, the elevated trop, and abnormal stress test, Dr. Choi ordered a coronary angiogram for definitive assessment. This showed mild-mod nonobstructive CAD.      Medical management with ASA, BB, statin.    2. Acute left frontal  stroke: Likely embolic per neurology. ASA 81 mg, Eliquis 5 mg BID.  Completed therapy.  No apparent deficit.   3. Paroxysmal atrial fibrillation/flutter: Noted on tele during admission. Cardiac monitor shows 3% afib burden, average afib rate 81 bpm. On Toprol XL and Eliquis.     4. NSVT: 6 beats noted during admission. Cardiac monitor showed 13 runs of NSVT up to 13 beats. LVEF 40-45%.  Cor angio without obstructive CAD.  Continue Toprol XL.   5. Tobacco abuse:  Has quit!  Congratulated on this effort.    6. Type 2 DM: on insulin.  7. Cardiomyopathy, nonischemic. Echo showed LVEF 40-45%. LV gram shows LVEF 45% with mild global HK.  Rate controlled on monitor.  He has no SOB, no orthopnea, no PND, no edema.  No obvious edema or JVD on exam.  Continue Toprol XL 25 mg, lisinopril 2.5 mg.  No Lasix as of now, but will consider in future.  Daily weights, low sodium diet, and when to call in.   8. BP.  BPs have been controlled, but more recently they are up a bit.  He is on Toprol XL and lisinopril.  His BP today is 140/72 and recheck is actually 146/64.  He did just take his medications one hour ago.  He will come in for a nurse visit in 2 weeks to check BP.  If BP remains elevated, we will increase lisinopril to 5 mg daily.  Also weight loss and a low sodium diet will help.     Follow up:  2 weeks RN visit for BP check   9 months with AL and FLP/ALT with Dr. Jimbo Bowman PA-C    Thank you for allowing me to participate in the care of your patient.      Sincerely,     Tiffany Bowman PA-C     [facility]

## 2018-08-17 NOTE — MR AVS SNAPSHOT
After Visit Summary   8/17/2018    Yanick Adorno    MRN: [id number]           Patient Information     Date Of Birth          1945        Visit Information        Provider Department      8/17/2018 8:30 AM Tiffany Bowman PA-C [facility]        Today's Diagnoses     Paroxysmal atrial fibrillation (H)        NSTEMI (non-ST elevated myocardial infarction) (H)        Nonischemic cardiomyopathy (H)        Benign essential hypertension          Care Instructions    Blood pressure is up just a little today.  But you just took your medications 1 hour ago.     Instead of increasing lisinopril now, please see my nurse in about 2 weeks for a blood pressure check.  Make sure your medications are taken at least 2 hours before the blood pressure check.       CONGRATS on quitting smoking!!!!!          Follow-ups after your visit        Additional Services     Follow-Up with Nurse       BP check                  Your next 10 appointments already scheduled     Aug 29, 2018  9:00 AM CDT   Nurse Only with [facility] HRT NURSE   [facility] ([facility])    [id number] [place] [id number] [phone]              Future tests that were ordered for you today     Open Future Orders        Priority Expected Expires Ordered    Follow-Up with Nurse Routine 8/31/2018 8/17/2019 8/17/2018            Who to contact     If you have questions or need follow up information about today's clinic visit or your schedule please contact [facility] directly at [phone].  Normal or non-critical lab and imaging results will be communicated to you by MyChart, letter or phone within 4 business days after the clinic has received the results. If you do not hear from us within 7 days, please contact the clinic through MyChart or phone. If you have a critical or abnormal lab  "result, we will notify you by phone as soon as possible.  Submit refill requests through [employer] or call your pharmacy and they will forward the refill request to us. Please allow 3 business days for your refill to be completed.          Additional Information About Your Visit        [employer] Information     [employer] lets you send messages to your doctor, view your test results, renew your prescriptions, schedule appointments and more. To sign up, go to [email or web address] . Click on \"Log in\" on the left side of the screen, which will take you to the Welcome page. Then click on \"Sign up Now\" on the right side of the page.     You will be asked to enter the access code listed below, as well as some personal information. Please follow the directions to create your username and password.     Your access code is: [email or web address]  Expires: 11/15/2018  7:14 AM     Your access code will  in 90 days. If you need help or a new code, please call your [place] clinic or [phone].        Care EveryWhere ID     This is your Care EveryWhere ID. This could be used by other organizations to access your [place] medical records  [id number]        Your Vitals Were     Pulse Height Pulse Oximetry BMI (Body Mass Index)          64 1.88 m (6' 2\") 95% 26.65 kg/m2         Blood Pressure from Last 3 Encounters:   18 140/72   18 132/78   18 138/82    Weight from Last 3 Encounters:   18 94.2 kg (207 lb 9.6 oz)   18 93.9 kg (207 lb)   18 93.4 kg (206 lb)              We Performed the Following     Follow-Up with Cardiac Advanced Practice Provider          Today's Medication Changes          These changes are accurate as of 18  8:57 AM.  If you have any questions, ask your nurse or doctor.               Stop taking these medicines if you haven't already. Please contact your care team if you have questions.     nicotine 21 MG/24HR 24 hr patch   Commonly known as:  NICODERM CQ   Stopped by:  " Tiffany Bowman PA-C                    Primary Care Provider Office Phone # Fax #    Candi Leon [phone]       [facility] [address] [facility] [address]        Equal Access to Services     ALDO MARSHALL : Hadii aad ku hadasho Soomaali, waaxda luqadaha, qaybta kaalmada adeegyada, waxay idiin hayaan conorhenrique bearlisbet bautista. So [facility] [phone].    ATENCIÓN: Si habla español, tiene a moeller disposición servicios gratuitos de asistencia lingüística. Adam al [phone].    We comply with applicable federal civil rights laws and [place] laws. We do not discriminate on the basis of race, color, national origin, age, disability, sex, sexual orientation, or gender identity.            Thank you!     Thank you for choosing [facility]  for your care. Our goal is always to provide you with excellent care. Hearing back from our patients is one way we can continue to improve our services. Please take a few minutes to complete the written survey that you may receive in the mail after your visit with us. Thank you!             Your Updated Medication List - Protect others around you: Learn how to safely use, store and throw away your medicines at www.disposemymeds.org.          This list is accurate as of 8/17/18  8:57 AM.  Always use your most recent med list.                   Brand Name Dispense Instructions for use Diagnosis    apixaban ANTICOAGULANT 5 MG tablet    ELIQUIS    60 tablet    Take 1 tablet (5 mg) by mouth 2 times daily    Cerebrovascular accident (CVA) due to embolism of left middle cerebral artery (H), Paroxysmal atrial fibrillation (H)       ASPIRIN PO      Take 81 mg by mouth        atorvastatin 40 MG tablet    LIPITOR    10 tablet    Take 1 tablet (40 mg) by mouth daily    Cerebrovascular accident (CVA) due to embolism of left middle cerebral artery (H)       GLIPIZIDE PO      Take 10 mg by mouth 2 times daily (before  meals)        insulin glargine 100 UNIT/ML injection    LANTUS     Inject 28 Units Subcutaneous every morning        lisinopril 2.5 MG tablet    PRINIVIL/Zestril    30 tablet    Take 1 tablet (2.5 mg) by mouth daily    NSTEMI (non-ST elevated myocardial infarction) (H), Nonischemic cardiomyopathy (H), Benign essential hypertension       METFORMIN HCL PO      Take 500 mg by mouth 2 times daily (with meals)        metoprolol succinate 25 MG 24 hr tablet    TOPROL-XL    30 tablet    Take 1 tablet (25 mg) by mouth daily    Paroxysmal atrial fibrillation (H), NSTEMI (non-ST elevated myocardial infarction) (H)

## 2018-08-17 NOTE — PROGRESS NOTES
CARDIOLOGY CLINIC PROGRESS NOTE    DOS: 18    Yanick Adorno  : 1945, 72 year old  MRN: [id number]      History:  I had the pleasure of following up with Yanick Adorno today in the cardiology clinic.   He is a patient of Dr. Choi.      Yanick is a 72 year old male with past medical history significant for T2DM, tobacco abuse, CVA, PAF, NSVT, mild cardiomyopathy, mild to moderate nonobstructive CAD.     He was admitted on 2018 with with confusion and altered speech. He was found to have a subacute left frontal stroke of the left frontal lobe. In addition, troponin was mildly elevated. There was no history of chest pain. Echo showed LVEF 40-45%, trace to mild MR, trace to mild AI, mild aortic root dilatation. He underwent a Lexiscan stress test that was read as showing a small apical defect which is predominantly fixed, consistent with prior infarction with only trivial associated cande-infarct ischemia.  There was also a small area of ischemia in the inferolateral mid ventricle associated with either an inferior/inferolateral basal infarct or diaphragmatic attenuation. Although the stress test did show some mild abnormality, given lack of symptoms and the recent stroke, medical therapy was recommended.  He was started on ASA and statin for presumed CAD. He also went into afib with controlled rate during his admission and was started on Eliquis and BB.      In follow up, a cardiac monitor was done to evaluate afib burden, afib rate, and NSVT. The 14-day event monitor showed paroxysmal atrial fibrillation with a burden of approximately 3% of the total beats.  average afib rate 81 bpm.  He also had 13 runs of nonsustained ventricular tachycardia, longest one being 13 beats.     Due to the recently elevated troponin, runs of NSVT and abnormal stress test, Dr. Choi referred Yanick on to a coronary angiogram.  This was done 3/28/18 via the right radial artery. This showed mild to moderate,  nonobstructive CAD. The mid RCA had 40-50% stenosis. LVEDP 10 mmHg, LVEF 45%.     I saw him in follow up 4/11/18.  We increased his Toprol XL back to 25 mg (the [facility] had decreased to 12.5 mg).  I added in lisinopril 2.5 mg.    He presents today for follow up.   He is doing quite well. He has quit smoking since the last time I saw him!  He does crave a cigarette, however.   Back at work full time. But again, this job is a bit more sedentary.  He has gained some weight since early 2018, but it has now stabilized.  No SOB.  No orthopnea, no PND, no edema.   No bleeding concerns.    Occasional right, sharp, but mild chest discomfort.  No exertional sxs.     ROS:  Skin:  Positive for pigmentation   Eyes:  Negative    ENT:  Positive for postnasal drainage  Respiratory:  Negative    Cardiovascular:    Positive for;chest pain right side, sharp pain but mild, non exertional   Gastroenterology: Negative    Genitourinary:  Positive for urinary frequency;nocturia  Musculoskeletal:  Positive for back pain  Neurologic:  Positive for numbness or tingling of feet  Psychiatric:  Negative    Heme/Lymph/Imm:  Negative    Endocrine:  Positive for diabetes    PAST MEDICAL HISTORY:  Past Medical History:   Diagnosis Date     Cardiomyopathy (H)      Coronary artery disease      CVA (cerebral vascular accident) (H) 01/21/2018    (L) frontal cerbral infarct     Esophageal reflux      Hypertension      New onset a-fib (H)      Nonischemic cardiomyopathy (H)      NSVT (nonsustained ventricular tachycardia) (H)      Paroxysmal atrial fibrillation (H)      Tobacco abuse     quit when he had stroke     Type II or unspecified type diabetes mellitus without mention of complication, not stated as uncontrolled        PAST SURGICAL HISTORY:  Past Surgical History:   Procedure Laterality Date     HEAD & NECK SURGERY      sinus       SOCIAL HISTORY:  Social History     Social History     Marital status:      Spouse name: N/A     Number of  "children: N/A     Years of education: N/A     Social History Main Topics     Smoking status: Former Smoker     Packs/day: 1.50     Years: 33.00     Quit date: 1/20/2018     Smokeless tobacco: Never Used     Alcohol use No     Drug use: None     Sexual activity: Not Asked     Other Topics Concern     None     Social History Narrative       FAMILY HISTORY:  Family History   Problem Relation Age of Onset     Diabetes Father      Uterine Cancer Mother      Diabetes Brother      Diabetes Brother      Suicide Brother      Diabetes Brother        MEDS:   Current Outpatient Prescriptions on File Prior to Visit:  apixaban ANTICOAGULANT (ELIQUIS) 5 MG tablet Take 1 tablet (5 mg) by mouth 2 times daily   ASPIRIN PO Take 81 mg by mouth   atorvastatin (LIPITOR) 40 MG tablet Take 1 tablet (40 mg) by mouth daily   GLIPIZIDE PO Take 10 mg by mouth 2 times daily (before meals)   insulin glargine (LANTUS) 100 UNIT/ML injection Inject 28 Units Subcutaneous every morning   lisinopril (PRINIVIL/ZESTRIL) 2.5 MG tablet Take 1 tablet (2.5 mg) by mouth daily   METFORMIN HCL PO Take 500 mg by mouth 2 times daily (with meals)    metoprolol succinate (TOPROL-XL) 25 MG 24 hr tablet Take 1 tablet (25 mg) by mouth daily     No current facility-administered medications on file prior to visit.     ALLERGIES:   Allergies   Allergen Reactions     No Known Drug Allergies        PHYSICAL EXAM:  Vitals: /72 (BP Location: Right arm, Patient Position: Chair, Cuff Size: Adult Large)  Pulse 64  Ht 1.88 m (6' 2\")  Wt 94.2 kg (207 lb 9.6 oz)  SpO2 95%  BMI 26.65 kg/m2  Constitutional:  cooperative;well developed;well nourished;in no acute distress        Skin:  warm and dry to the touch   scattered nonraised erythematous lesion on LEs (patient says chronic), superficial abrasion (dog bite) right shin that has mild erythema (reports chronic) and scab without oozing or warmth    Head:  normocephalic        Eyes:  pupils equal and round;conjunctivae " and lids unremarkable        ENT:  no pallor or cyanosis poor dentition      Neck:  JVP normal        Respiratory:  clear to auscultation        Cardiac: regular rhythm;normal S1 and S2       systolic murmur;grade 1          GI:  abdomen soft;BS normoactive;non-tender        Vascular:                                        Extremities and Musculoskeletal:  no deformities, clubbing, cyanosis, erythema observed;no edema        Neurological:  no gross motor deficits;affect appropriate        DIAGNOSTICS:  Component      Latest Ref Rng & Units 8/17/2018   Sodium      133 - 144 mmol/L 142   Potassium      3.4 - 5.3 mmol/L 4.2   Chloride      94 - 109 mmol/L 108   Carbon Dioxide      20 - 32 mmol/L 30   Anion Gap      3 - 14 mmol/L 4   Glucose      70 - 99 mg/dL 158 (H)   Urea Nitrogen      7 - 30 mg/dL 18   Creatinine      0.66 - 1.25 mg/dL 0.85   GFR Estimate      >60 mL/min/1.7m2 89   GFR Estimate If Black      >60 mL/min/1.7m2 >90   Calcium      8.5 - 10.1 mg/dL 8.7   Reviewed - Na/K and renal function WNL.       ASSESSMENT/PLAN:  1. CAD.    During admission for CVA, he had elevated troponin. Echo showed LVEF 40-45% with mild to moderate LVH and mild to moderate global hypokinesia of the LV. Lexiscan showed small fixed apical defect with only trival associated cande-infarct ischemia. Small area of ischemia in the inferolateral mid ventricle like diaphragmatic attenuation. No chest pain.   Cardiac event monitor showed he was having runs of NSVT.   Due to event monitor findings, the elevated trop, and abnormal stress test, Dr. Choi ordered a coronary angiogram for definitive assessment. This showed mild-mod nonobstructive CAD.      Medical management with ASA, BB, statin.    2. Acute left frontal stroke: Likely embolic per neurology. ASA 81 mg, Eliquis 5 mg BID.  Completed therapy.  No apparent deficit.   3. Paroxysmal atrial fibrillation/flutter: Noted on tele during admission. Cardiac monitor shows 3% afib burden,  average afib rate 81 bpm. On Toprol XL and Eliquis.     4. NSVT: 6 beats noted during admission. Cardiac monitor showed 13 runs of NSVT up to 13 beats. LVEF 40-45%.  Cor angio without obstructive CAD.  Continue Toprol XL.   5. Tobacco abuse:  Has quit!  Congratulated on this effort.    6. Type 2 DM: on insulin.  7. Cardiomyopathy, nonischemic. Echo showed LVEF 40-45%. LV gram shows LVEF 45% with mild global HK.  Rate controlled on monitor.  He has no SOB, no orthopnea, no PND, no edema.  No obvious edema or JVD on exam.  Continue Toprol XL 25 mg, lisinopril 2.5 mg.  No Lasix as of now, but will consider in future.  Daily weights, low sodium diet, and when to call in.   8. BP.  BPs have been controlled, but more recently they are up a bit.  He is on Toprol XL and lisinopril.  His BP today is 140/72 and recheck is actually 146/64.  He did just take his medications one hour ago.  He will come in for a nurse visit in 2 weeks to check BP.  If BP remains elevated, we will increase lisinopril to 5 mg daily.  Also weight loss and a low sodium diet will help.     Follow up:  2 weeks RN visit for BP check   9 months with BMP and FLP/ALT with Dr. Jimbo Bowman PA-C

## 2018-08-29 ENCOUNTER — ALLIED HEALTH/NURSE VISIT (OUTPATIENT)
Dept: CARDIOLOGY | Facility: CLINIC | Age: 73
End: 2018-08-29
Attending: PHYSICIAN ASSISTANT
Payer: MEDICARE

## 2018-08-29 ENCOUNTER — TELEPHONE (OUTPATIENT)
Dept: CARDIOLOGY | Facility: CLINIC | Age: 73
End: 2018-08-29

## 2018-08-29 VITALS — DIASTOLIC BLOOD PRESSURE: 80 MMHG | HEART RATE: 69 BPM | SYSTOLIC BLOOD PRESSURE: 148 MMHG

## 2018-08-29 DIAGNOSIS — I10 BENIGN ESSENTIAL HYPERTENSION: Primary | ICD-10-CM

## 2018-08-29 DIAGNOSIS — I10 BENIGN ESSENTIAL HYPERTENSION: ICD-10-CM

## 2018-08-29 PROCEDURE — 99207 ZZC NO CHARGE LOS: CPT | Performed by: PHYSICIAN ASSISTANT

## 2018-08-29 NOTE — MR AVS SNAPSHOT
"              After Visit Summary   2018    Yanick Adorno    MRN: [id number]           Patient Information     Date Of Birth          1945        Visit Information        Provider Department      2018 9:00 AM [facility] HRT NURSE [facility]        Today's Diagnoses     Benign essential hypertension          Care Instructions    See telephone encounter for nurse only BP check. CORI Bhatia            Follow-ups after your visit        Who to contact     If you have questions or need follow up information about today's clinic visit or your schedule please contact [facility] directly at [phone].  Normal or non-critical lab and imaging results will be communicated to you by RealDeckhart, letter or phone within 4 business days after the clinic has received the results. If you do not hear from us within 7 days, please contact the clinic through RealDeckhart or phone. If you have a critical or abnormal lab result, we will notify you by phone as soon as possible.  Submit refill requests through TranslateMedia or call your pharmacy and they will forward the refill request to us. Please allow 3 business days for your refill to be completed.          Additional Information About Your Visit        MyChart Information     TranslateMedia lets you send messages to your doctor, view your test results, renew your prescriptions, schedule appointments and more. To sign up, go to [email or web address] . Click on \"Log in\" on the left side of the screen, which will take you to the Welcome page. Then click on \"Sign up Now\" on the right side of the page.     You will be asked to enter the access code listed below, as well as some personal information. Please follow the directions to create your username and password.     Your access code is: [email or web address]  Expires: 11/15/2018  7:14 AM     Your access code will  in 90 days. If you need help or a new " code, please call your [place] clinic or [phone].        Care EveryWhere ID     This is your Care EveryWhere ID. This could be used by other organizations to access your [place] medical records  [id number]        Your Vitals Were     Pulse                   69            Blood Pressure from Last 3 Encounters:   08/29/18 148/80   08/17/18 140/72   05/16/18 132/78    Weight from Last 3 Encounters:   08/17/18 94.2 kg (207 lb 9.6 oz)   05/16/18 93.9 kg (207 lb)   04/11/18 93.4 kg (206 lb)              We Performed the Following     Follow-Up with Nurse        Primary Care Provider Office Phone # Fax #    Candi Guerra Narenyesenia [phone]       [facility] [address]        Equal Access to Services     ALDO MARSHALL : Hadii aad ku hadasho Soomaali, waaxda luqadaha, qaybta kaalmada adehenriqueyaanival, germaine velez . So [facility] [phone].    ATENCIÓN: Si habla español, tiene a moeller disposición servicios gratuitos de asistencia lingüística. Adam al [phone].    We comply with applicable federal civil rights laws and [place] laws. We do not discriminate on the basis of race, color, national origin, age, disability, sex, sexual orientation, or gender identity.            Thank you!     Thank you for choosing [facility]  for your care. Our goal is always to provide you with excellent care. Hearing back from our patients is one way we can continue to improve our services. Please take a few minutes to complete the written survey that you may receive in the mail after your visit with us. Thank you!             Your Updated Medication List - Protect others around you: Learn how to safely use, store and throw away your medicines at www.disposemymeds.org.          This list is accurate as of 8/29/18  9:09 AM.  Always use your most recent med list.                   Brand Name Dispense Instructions for use  Diagnosis    apixaban ANTICOAGULANT 5 MG tablet    ELIQUIS    60 tablet    Take 1 tablet (5 mg) by mouth 2 times daily    Cerebrovascular accident (CVA) due to embolism of left middle cerebral artery (H), Paroxysmal atrial fibrillation (H)       ASPIRIN PO      Take 81 mg by mouth        atorvastatin 40 MG tablet    LIPITOR    10 tablet    Take 1 tablet (40 mg) by mouth daily    Cerebrovascular accident (CVA) due to embolism of left middle cerebral artery (H)       GLIPIZIDE PO      Take 10 mg by mouth 2 times daily (before meals)        insulin glargine 100 UNIT/ML injection    LANTUS     Inject 28 Units Subcutaneous every morning        lisinopril 2.5 MG tablet    PRINIVIL/Zestril    30 tablet    Take 1 tablet (2.5 mg) by mouth daily    NSTEMI (non-ST elevated myocardial infarction) (H), Nonischemic cardiomyopathy (H), Benign essential hypertension       METFORMIN HCL PO      Take 500 mg by mouth 2 times daily (with meals)        metoprolol succinate 25 MG 24 hr tablet    TOPROL-XL    30 tablet    Take 1 tablet (25 mg) by mouth daily    Paroxysmal atrial fibrillation (H), NSTEMI (non-ST elevated myocardial infarction) (H)

## 2018-08-29 NOTE — TELEPHONE ENCOUNTER
[facility] NURSE VISIT    On 8/17/18, Patient's blood pressure was 140/72 and 146/64, pulse was 64. No changes were made.     Patient is here today for a blood pressure check. Patient took Lisinopril 2.5 mg and Metoprolol 25 mg at 6:30am.  Blood pressure today was taken at 9am.     Office blood pressure and pulse  Site: RA  Position: sitting  Cuff size: adult reg  BP: 146/76    Pulse: 69 bpm    2nd BP: 148/80     Visit ordered by: Tiffany Bowman PA-C   Provider in clinic today:   Patient's cardiologist: Dr.Jama Jannette PERSAUD  [facility]

## 2018-08-30 DIAGNOSIS — I10 BENIGN ESSENTIAL HYPERTENSION: ICD-10-CM

## 2018-08-30 DIAGNOSIS — I21.4 NSTEMI (NON-ST ELEVATED MYOCARDIAL INFARCTION) (H): ICD-10-CM

## 2018-08-30 DIAGNOSIS — I42.8 NONISCHEMIC CARDIOMYOPATHY (H): ICD-10-CM

## 2018-08-30 RX ORDER — LISINOPRIL 5 MG/1
5 TABLET ORAL DAILY
Qty: 30 TABLET | Refills: 0 | Status: SHIPPED | OUTPATIENT
Start: 2018-08-30 | End: 2018-10-22

## 2018-08-30 RX ORDER — LISINOPRIL 2.5 MG/1
5 TABLET ORAL DAILY
Refills: 0 | COMMUNITY
Start: 2018-08-30 | End: 2018-08-30 | Stop reason: ALTCHOICE

## 2018-08-30 NOTE — TELEPHONE ENCOUNTER
Called pt, reviewed Tiffany Bowman PA-C recommendations. No questions. Pt needs refill of lisinopril in a couple days. Advised to use up his 2.5mg tabs (2 tabs daily) then start new script for 5 mg tabs (one tab daily).   Scheduled for BP check and BMP 9/13/18.   Migue PERSAUD

## 2018-08-30 NOTE — TELEPHONE ENCOUNTER
Increase lisinopril to 5 mg.   BMP and BP check in 2 weeks.   Thanks.   Tiffany Bowman PA-C 8/30/2018 7:26 AM

## 2018-09-13 ENCOUNTER — CARE COORDINATION (OUTPATIENT)
Dept: CARDIOLOGY | Facility: CLINIC | Age: 73
End: 2018-09-13

## 2018-09-13 ENCOUNTER — ALLIED HEALTH/NURSE VISIT (OUTPATIENT)
Dept: CARDIOLOGY | Facility: CLINIC | Age: 73
End: 2018-09-13
Payer: MEDICARE

## 2018-09-13 VITALS — DIASTOLIC BLOOD PRESSURE: 78 MMHG | SYSTOLIC BLOOD PRESSURE: 138 MMHG | HEART RATE: 72 BPM

## 2018-09-13 DIAGNOSIS — I10 BENIGN ESSENTIAL HYPERTENSION: ICD-10-CM

## 2018-09-13 DIAGNOSIS — I42.8 NONISCHEMIC CARDIOMYOPATHY (H): ICD-10-CM

## 2018-09-13 LAB
ANION GAP SERPL CALCULATED.3IONS-SCNC: 4 MMOL/L (ref 3–14)
BUN SERPL-MCNC: 13 MG/DL (ref 7–30)
CALCIUM SERPL-MCNC: 8.4 MG/DL (ref 8.5–10.1)
CHLORIDE SERPL-SCNC: 108 MMOL/L (ref 94–109)
CO2 SERPL-SCNC: 29 MMOL/L (ref 20–32)
CREAT SERPL-MCNC: 0.88 MG/DL (ref 0.66–1.25)
GFR SERPL CREATININE-BSD FRML MDRD: 85 ML/MIN/1.7M2
GLUCOSE SERPL-MCNC: 144 MG/DL (ref 70–99)
POTASSIUM SERPL-SCNC: 4.3 MMOL/L (ref 3.4–5.3)
SODIUM SERPL-SCNC: 141 MMOL/L (ref 133–144)

## 2018-09-13 PROCEDURE — 36415 COLL VENOUS BLD VENIPUNCTURE: CPT | Performed by: PHYSICIAN ASSISTANT

## 2018-09-13 PROCEDURE — 80048 BASIC METABOLIC PNL TOTAL CA: CPT | Performed by: PHYSICIAN ASSISTANT

## 2018-09-13 NOTE — PROGRESS NOTES
[facility] NURSE VISIT    On 8/17/18, Patient's blood pressure was 140/72 HR 64, on recheck 146/64, on lisinopril 2.5 mg daily and Toprol XL 25 mg daily.   On 8/29/18, Patient's blood pressure was 146/76 HR 69, on recheck 148/80, on lisinopril 2.5 mg daily and Toprol XL 25 mg daily. Tiffany Bowman PA-C advised to increase lisinopril to 5 mg daily.     Patient is here today for a blood pressure check. Patient took his lisinopril 5 mg and Toprol XL 25 mg this morning at 0700 AM.  Blood pressure today was taken at 0909 AM.     Manual office blood pressure and pulse:  Site: Right Arm   Position: Chair    Cuff size: Adult Regular  BP: 138/78    Pulse: 72 bpm    Tolerance: Patient is asymptomatic.   States he is still eating [employer] every morning for breakfast, will go out to eat sometimes for dinner. Advised to limit sodium intake as this will also help BP. Pt had coffee this morning. Drinks several cups of coffee every day and several soda's. Advised to start substituting water for coffee and soda.     BMP results below compared to previous:  Component      Latest Ref Rng & Units 8/17/2018 9/13/2018   Sodium      133 - 144 mmol/L 142 141   Potassium      3.4 - 5.3 mmol/L 4.2 4.3   Chloride      94 - 109 mmol/L 108 108   Carbon Dioxide      20 - 32 mmol/L 30 29   Anion Gap      3 - 14 mmol/L 4 4   Glucose      70 - 99 mg/dL 158 (H) 144 (H)   Urea Nitrogen      7 - 30 mg/dL 18 13   Creatinine      0.66 - 1.25 mg/dL 0.85 0.88   GFR Estimate      >60 mL/min/1.7m2 89 85   GFR Estimate If Black      >60 mL/min/1.7m2 >90 >90   Calcium      8.5 - 10.1 mg/dL 8.7 8.4 (L)       Routing to ordering provider: Tiffany Bowman PA-C    Provider in clinic today: Dr. Murphy  Patient's cardiologist: Dr. Jimbo Camarena RN  [facility]

## 2018-09-14 NOTE — PROGRESS NOTES
BMP ok.    BP at goal.     Continue lisinopril 5 mg.     Limit sodium as you discussed, as well as other dietary changes.     Will continue to follow at next appointment with Dr. Choi.   Call if concerns prior.

## 2018-09-14 NOTE — PROGRESS NOTES
Called pt , reviewed Tiffany QUANC recommendations. Pt pleased. Will f/u as planned 2/2019 w/ Dr. Choi. Sooner if any concerns.   Migue PERSAUD

## 2018-10-22 DIAGNOSIS — I10 BENIGN ESSENTIAL HYPERTENSION: ICD-10-CM

## 2018-10-22 RX ORDER — LISINOPRIL 5 MG/1
5 TABLET ORAL DAILY
Qty: 90 TABLET | Refills: 1 | Status: SHIPPED | OUTPATIENT
Start: 2018-10-22 | End: 2019-03-27

## 2018-10-22 NOTE — TELEPHONE ENCOUNTER
Received refill request for:  Lisinopril  Last OV was: 8/17/2018 with OLAYINKA Serrano  Labs/EKG: last BMP 9/13/2018  F/U scheduled: orders in Epic for 2/2019  New script sent to: [facility]

## 2018-12-11 ENCOUNTER — TRANSFERRED RECORDS (OUTPATIENT)
Dept: HEALTH INFORMATION MANAGEMENT | Facility: CLINIC | Age: 73
End: 2018-12-11

## 2018-12-28 ENCOUNTER — TRANSFERRED RECORDS (OUTPATIENT)
Dept: HEALTH INFORMATION MANAGEMENT | Facility: CLINIC | Age: 73
End: 2018-12-28

## 2019-03-27 ENCOUNTER — OFFICE VISIT (OUTPATIENT)
Dept: CARDIOLOGY | Facility: CLINIC | Age: 74
End: 2019-03-27
Attending: PHYSICIAN ASSISTANT
Payer: MEDICARE

## 2019-03-27 VITALS
WEIGHT: 203 LBS | HEIGHT: 74 IN | DIASTOLIC BLOOD PRESSURE: 80 MMHG | BODY MASS INDEX: 26.05 KG/M2 | HEART RATE: 56 BPM | SYSTOLIC BLOOD PRESSURE: 139 MMHG

## 2019-03-27 DIAGNOSIS — I10 BENIGN ESSENTIAL HYPERTENSION: ICD-10-CM

## 2019-03-27 DIAGNOSIS — I21.4 NSTEMI (NON-ST ELEVATED MYOCARDIAL INFARCTION) (H): ICD-10-CM

## 2019-03-27 DIAGNOSIS — I48.0 PAROXYSMAL ATRIAL FIBRILLATION (H): ICD-10-CM

## 2019-03-27 DIAGNOSIS — I42.8 NONISCHEMIC CARDIOMYOPATHY (H): ICD-10-CM

## 2019-03-27 DIAGNOSIS — I42.8 NONISCHEMIC CARDIOMYOPATHY (H): Primary | ICD-10-CM

## 2019-03-27 LAB
ALT SERPL W P-5'-P-CCNC: 17 U/L (ref 0–70)
ANION GAP SERPL CALCULATED.3IONS-SCNC: 1 MMOL/L (ref 3–14)
BUN SERPL-MCNC: 13 MG/DL (ref 7–30)
CALCIUM SERPL-MCNC: 8.6 MG/DL (ref 8.5–10.1)
CHLORIDE SERPL-SCNC: 110 MMOL/L (ref 94–109)
CHOLEST SERPL-MCNC: 113 MG/DL
CO2 SERPL-SCNC: 31 MMOL/L (ref 20–32)
CREAT SERPL-MCNC: 0.84 MG/DL (ref 0.66–1.25)
GFR SERPL CREATININE-BSD FRML MDRD: 88 ML/MIN/{1.73_M2}
GLUCOSE SERPL-MCNC: 110 MG/DL (ref 70–99)
HDLC SERPL-MCNC: 57 MG/DL
LDLC SERPL CALC-MCNC: 47 MG/DL
NONHDLC SERPL-MCNC: 56 MG/DL
POTASSIUM SERPL-SCNC: 3.9 MMOL/L (ref 3.4–5.3)
SODIUM SERPL-SCNC: 142 MMOL/L (ref 133–144)
TRIGL SERPL-MCNC: 44 MG/DL

## 2019-03-27 PROCEDURE — 36415 COLL VENOUS BLD VENIPUNCTURE: CPT | Performed by: PHYSICIAN ASSISTANT

## 2019-03-27 PROCEDURE — 80048 BASIC METABOLIC PNL TOTAL CA: CPT | Performed by: PHYSICIAN ASSISTANT

## 2019-03-27 PROCEDURE — 84460 ALANINE AMINO (ALT) (SGPT): CPT | Performed by: PHYSICIAN ASSISTANT

## 2019-03-27 PROCEDURE — 99214 OFFICE O/P EST MOD 30 MIN: CPT | Performed by: INTERNAL MEDICINE

## 2019-03-27 PROCEDURE — 80061 LIPID PANEL: CPT | Performed by: PHYSICIAN ASSISTANT

## 2019-03-27 RX ORDER — LISINOPRIL 10 MG/1
10 TABLET ORAL DAILY
Qty: 90 TABLET | Refills: 3 | Status: SHIPPED | OUTPATIENT
Start: 2019-03-27 | End: 2019-04-08

## 2019-03-27 ASSESSMENT — MIFFLIN-ST. JEOR: SCORE: 1745.55

## 2019-03-27 NOTE — LETTER
3/27/2019      Candi Guerra [facility] [address]      RE: Yanick BAZZI Tila       Dear Colleague,    I had the pleasure of seeing Yanick Adorno in the [facility].    Service Date: 03/27/2019      REASON FOR VISIT:  Followup for nonischemic cardiomyopathy.      HISTORY OF PRESENT ILLNESS:  I had the pleasure of seeing Yanick Adorno at the [facility] in [place] this morning.  He is a very pleasant 73-year-old gentleman with a history of nonobstructive coronary artery disease, type 2 diabetes, paroxysmal atrial fibrillation and nonischemic cardiomyopathy who is here for followup.      He was hospitalized here approximately a year ago with a subacute left frontal stroke.  During that hospitalization, he was found to be in atrial fibrillation.  He also had mild troponin elevation as well as new cardiomyopathy with an EF of 40%-45%.  He had a stress test at that time which showed fixed apical defect.  He was discharged on appropriate medical therapy and 14-day event monitoring.      I subsequently saw him for followup in the outpatient setting.  His event monitor was reviewed with him.  That demonstrated paroxysmal atrial fibrillation with a burden of 3%.  He also had nonsustained VT.  Given the event monitor findings as well as his prior abnormal stress test, I recommended a coronary angiogram.  The coronary angiogram was performed on 03/28/2018.  It demonstrated mild to moderate nonobstructive coronary artery disease.      Since his last visit, the patient has remained stable from a cardiac point of view.  He currently denies any significant cardiopulmonary symptoms.  Specifically, no chest pain or exertional shortness of breath.      IMPRESSION, REPORT AND PLAN:   1.  Nonischemic cardiomyopathy, EF 45%.   2.  Nonobstructive coronary artery disease.   3.  Type 2 diabetes.   4.  Hypertension.   5.   Hyperlipidemia.      He remains quite stable from a cardiac point of view and has not had any symptoms since his last visit with us.  He is on an excellent medical program.  His labs from today show excellent lipid numbers.      His blood pressure is borderline elevated.  As such, he will increase his lisinopril to 10 mg daily.  He will continue the metoprolol as well as aspirin alone.  He will also continue the atorvastatin.      6.  Paroxysmal atrial fibrillation.  He is now in sinus rhythm.  Given prior stroke, he would continue anticoagulation with Eliquis.      We will see him in approximately 1 year for followup but sooner if he develops symptoms.  I appreciate the opportunity to participate in this patient's care.         MARTÍNEZ HANNA MD             D: 2019   T: 2019   MT: IRENA      Name:     KWAME MULLEN   MRN:      [id number]        Account:      [id number]   :      1945           Service Date: 2019      Document: [id number]         Outpatient Encounter Medications as of 3/27/2019   Medication Sig Dispense Refill     apixaban ANTICOAGULANT (ELIQUIS) 5 MG tablet Take 1 tablet (5 mg) by mouth 2 times daily 60 tablet 0     ASPIRIN PO Take 81 mg by mouth       atorvastatin (LIPITOR) 40 MG tablet Take 1 tablet (40 mg) by mouth daily 10 tablet 0     GLIPIZIDE PO Take 10 mg by mouth 2 times daily (before meals)       insulin glargine (LANTUS) 100 UNIT/ML injection Inject 28 Units Subcutaneous every morning       lisinopril (PRINIVIL/ZESTRIL) 10 MG tablet Take 1 tablet (10 mg) by mouth daily 90 tablet 3     METFORMIN HCL PO Take 500 mg by mouth 2 times daily (with meals)        metoprolol succinate (TOPROL-XL) 25 MG 24 hr tablet Take 1 tablet (25 mg) by mouth daily 30 tablet 11     [DISCONTINUED] lisinopril (PRINIVIL/ZESTRIL) 5 MG tablet Take 1 tablet (5 mg) by mouth daily 90 tablet 1     No facility-administered encounter medications on file as of 3/27/2019.        Again, thank you  for allowing me to participate in the care of your patient.      Sincerely,    Edin Choi MD, MD     [facility]

## 2019-03-27 NOTE — PROGRESS NOTES
Service Date: 03/27/2019      REASON FOR VISIT:  Followup for nonischemic cardiomyopathy.      HISTORY OF PRESENT ILLNESS:  I had the pleasure of seeing Yanick Adorno at the [facility] in [place] this morning.  He is a very pleasant 73-year-old gentleman with a history of nonobstructive coronary artery disease, type 2 diabetes, paroxysmal atrial fibrillation and nonischemic cardiomyopathy who is here for followup.      He was hospitalized here approximately a year ago with a subacute left frontal stroke.  During that hospitalization, he was found to be in atrial fibrillation.  He also had mild troponin elevation as well as new cardiomyopathy with an EF of 40%-45%.  He had a stress test at that time which showed fixed apical defect.  He was discharged on appropriate medical therapy and 14-day event monitoring.      I subsequently saw him for followup in the outpatient setting.  His event monitor was reviewed with him.  That demonstrated paroxysmal atrial fibrillation with a burden of 3%.  He also had nonsustained VT.  Given the event monitor findings as well as his prior abnormal stress test, I recommended a coronary angiogram.  The coronary angiogram was performed on 03/28/2018.  It demonstrated mild to moderate nonobstructive coronary artery disease.      Since his last visit, the patient has remained stable from a cardiac point of view.  He currently denies any significant cardiopulmonary symptoms.  Specifically, no chest pain or exertional shortness of breath.      IMPRESSION, REPORT AND PLAN:   1.  Nonischemic cardiomyopathy, EF 45%.   2.  Nonobstructive coronary artery disease.   3.  Type 2 diabetes.   4.  Hypertension.   5.  Hyperlipidemia.      He remains quite stable from a cardiac point of view and has not had any symptoms since his last visit with us.  He is on an excellent medical program.  His labs from today show excellent lipid numbers.      His blood pressure is  borderline elevated.  As such, he will increase his lisinopril to 10 mg daily.  He will continue the metoprolol as well as aspirin alone.  He will also continue the atorvastatin.      6.  Paroxysmal atrial fibrillation.  He is now in sinus rhythm.  Given prior stroke, he would continue anticoagulation with Eliquis.      We will see him in approximately 1 year for followup but sooner if he develops symptoms.  I appreciate the opportunity to participate in this patient's care.         MARTÍNEZ HANNA MD             D: 2019   T: 2019   MT: IRENA      Name:     KWAME MULLEN   MRN:      [id number]        Account:      [id number]   :      1945           Service Date: 2019      Document: [id number]

## 2019-03-27 NOTE — PROGRESS NOTES
HPI and Plan:   See dictation    Orders Placed This Encounter   Procedures     Follow-Up with Cardiac Advanced Practice Provider       Orders Placed This Encounter   Medications     lisinopril (PRINIVIL/ZESTRIL) 10 MG tablet     Sig: Take 1 tablet (10 mg) by mouth daily     Dispense:  90 tablet     Refill:  3       Medications Discontinued During This Encounter   Medication Reason     lisinopril (PRINIVIL/ZESTRIL) 5 MG tablet          Encounter Diagnoses   Name Primary?     Paroxysmal atrial fibrillation (H)      NSTEMI (non-ST elevated myocardial infarction) (H)      Nonischemic cardiomyopathy (H) Yes     Benign essential hypertension        CURRENT MEDICATIONS:  Current Outpatient Medications   Medication Sig Dispense Refill     apixaban ANTICOAGULANT (ELIQUIS) 5 MG tablet Take 1 tablet (5 mg) by mouth 2 times daily 60 tablet 0     ASPIRIN PO Take 81 mg by mouth       atorvastatin (LIPITOR) 40 MG tablet Take 1 tablet (40 mg) by mouth daily 10 tablet 0     GLIPIZIDE PO Take 10 mg by mouth 2 times daily (before meals)       insulin glargine (LANTUS) 100 UNIT/ML injection Inject 28 Units Subcutaneous every morning       lisinopril (PRINIVIL/ZESTRIL) 10 MG tablet Take 1 tablet (10 mg) by mouth daily 90 tablet 3     METFORMIN HCL PO Take 500 mg by mouth 2 times daily (with meals)        metoprolol succinate (TOPROL-XL) 25 MG 24 hr tablet Take 1 tablet (25 mg) by mouth daily 30 tablet 11       ALLERGIES     Allergies   Allergen Reactions     No Known Drug Allergies        PAST MEDICAL HISTORY:  Past Medical History:   Diagnosis Date     Cardiomyopathy (H)      Coronary artery disease      CVA (cerebral vascular accident) (H) 01/21/2018    (L) frontal cerbral infarct     Esophageal reflux      Hypertension      New onset a-fib (H)      Nonischemic cardiomyopathy (H)      NSVT (nonsustained ventricular tachycardia) (H)      Paroxysmal atrial fibrillation (H)      Stroke (H) 1/21/2018     Tobacco abuse     quit when he had  stroke     Type II or unspecified type diabetes mellitus without mention of complication, not stated as uncontrolled        PAST SURGICAL HISTORY:  Past Surgical History:   Procedure Laterality Date     HEAD & NECK SURGERY      sinus       FAMILY HISTORY:  Family History   Problem Relation Age of Onset     Diabetes Father      Uterine Cancer Mother      Diabetes Brother      Diabetes Brother      Suicide Brother      Diabetes Brother        SOCIAL HISTORY:  Social History     Socioeconomic History     Marital status:      Spouse name: None     Number of children: None     Years of education: None     Highest education level: None   Occupational History     None   Social Needs     Financial resource strain: None     Food insecurity:     Worry: None     Inability: None     Transportation needs:     Medical: None     Non-medical: None   Tobacco Use     Smoking status: Former Smoker     Packs/day: 1.50     Years: 33.00     Pack years: 49.50     Last attempt to quit: 2018     Years since quittin.8     Smokeless tobacco: Never Used   Substance and Sexual Activity     Alcohol use: No     Drug use: None     Sexual activity: None   Lifestyle     Physical activity:     Days per week: None     Minutes per session: None     Stress: None   Relationships     Social connections:     Talks on phone: None     Gets together: None     Attends Caodaism service: None     Active member of club or organization: None     Attends meetings of clubs or organizations: None     Relationship status: None     Intimate partner violence:     Fear of current or ex partner: None     Emotionally abused: None     Physically abused: None     Forced sexual activity: None   Other Topics Concern     Parent/sibling w/ CABG, MI or angioplasty before 65F 55M? Not Asked   Social History Narrative     None       Review of Systems:  Skin:  Positive for rash;itching     Eyes:  Negative for      ENT:  Negative for      Respiratory:  Positive for  "cough     Cardiovascular:  Negative      Gastroenterology: Negative for      Genitourinary:  not assessed      Musculoskeletal:  Positive for back pain    Neurologic:  Positive for   neuropathy  Psychiatric:  Negative for      Heme/Lymph/Imm:  Negative      Endocrine:  Positive for diabetes      Physical Exam:  Vitals: /80   Pulse 56   Ht 1.88 m (6' 2\")   Wt 92.1 kg (203 lb)   BMI 26.06 kg/m      Constitutional:  cooperative;in no acute distress        Skin:  warm and dry to the touch          Head:  normocephalic        Eyes:  conjunctivae and lids unremarkable        Lymph:No Cervical lymphadenopathy present     ENT:  no pallor or cyanosis        Neck:  carotid pulses are full and equal bilaterally;no carotid bruit        Respiratory:  clear to auscultation         Cardiac: regular rhythm;normal S1 and S2       systolic murmur;grade 1                                                 GI:  abdomen soft;no masses;non-tender        Extremities and Muscular Skeletal:  no edema              Neurological:  no gross motor deficits        Psych:  Alert and Oriented x 3        CC  Tiffany Bowman PA-C  [address] [place] [address]              "

## 2019-03-27 NOTE — LETTER
3/27/2019    Candi Guerra [facility] [address]  [facility] [id number]    RE: Yanick Adorno       Dear Colleague,    I had the pleasure of seeing Yanick Adorno in the [facility].    HPI and Plan:   See dictation    Orders Placed This Encounter   Procedures     Follow-Up with Cardiac Advanced Practice Provider       Orders Placed This Encounter   Medications     lisinopril (PRINIVIL/ZESTRIL) 10 MG tablet     Sig: Take 1 tablet (10 mg) by mouth daily     Dispense:  90 tablet     Refill:  3       Medications Discontinued During This Encounter   Medication Reason     lisinopril (PRINIVIL/ZESTRIL) 5 MG tablet          Encounter Diagnoses   Name Primary?     Paroxysmal atrial fibrillation (H)      NSTEMI (non-ST elevated myocardial infarction) (H)      Nonischemic cardiomyopathy (H) Yes     Benign essential hypertension        CURRENT MEDICATIONS:  Current Outpatient Medications   Medication Sig Dispense Refill     apixaban ANTICOAGULANT (ELIQUIS) 5 MG tablet Take 1 tablet (5 mg) by mouth 2 times daily 60 tablet 0     ASPIRIN PO Take 81 mg by mouth       atorvastatin (LIPITOR) 40 MG tablet Take 1 tablet (40 mg) by mouth daily 10 tablet 0     GLIPIZIDE PO Take 10 mg by mouth 2 times daily (before meals)       insulin glargine (LANTUS) 100 UNIT/ML injection Inject 28 Units Subcutaneous every morning       lisinopril (PRINIVIL/ZESTRIL) 10 MG tablet Take 1 tablet (10 mg) by mouth daily 90 tablet 3     METFORMIN HCL PO Take 500 mg by mouth 2 times daily (with meals)        metoprolol succinate (TOPROL-XL) 25 MG 24 hr tablet Take 1 tablet (25 mg) by mouth daily 30 tablet 11       ALLERGIES     Allergies   Allergen Reactions     No Known Drug Allergies        PAST MEDICAL HISTORY:  Past Medical History:   Diagnosis Date     Cardiomyopathy (H)      Coronary artery disease      CVA (cerebral vascular accident) (H) 01/21/2018    (L) frontal cerbral infarct     Esophageal  reflux      Hypertension      New onset a-fib (H)      Nonischemic cardiomyopathy (H)      NSVT (nonsustained ventricular tachycardia) (H)      Paroxysmal atrial fibrillation (H)      Stroke (H) 2018     Tobacco abuse     quit when he had stroke     Type II or unspecified type diabetes mellitus without mention of complication, not stated as uncontrolled        PAST SURGICAL HISTORY:  Past Surgical History:   Procedure Laterality Date     HEAD & NECK SURGERY      sinus       FAMILY HISTORY:  Family History   Problem Relation Age of Onset     Diabetes Father      Uterine Cancer Mother      Diabetes Brother      Diabetes Brother      Suicide Brother      Diabetes Brother        SOCIAL HISTORY:  Social History     Socioeconomic History     Marital status:      Spouse name: None     Number of children: None     Years of education: None     Highest education level: None   Occupational History     None   Social Needs     Financial resource strain: None     Food insecurity:     Worry: None     Inability: None     Transportation needs:     Medical: None     Non-medical: None   Tobacco Use     Smoking status: Former Smoker     Packs/day: 1.50     Years: 33.00     Pack years: 49.50     Last attempt to quit: 2018     Years since quittin.8     Smokeless tobacco: Never Used   Substance and Sexual Activity     Alcohol use: No     Drug use: None     Sexual activity: None   Lifestyle     Physical activity:     Days per week: None     Minutes per session: None     Stress: None   Relationships     Social connections:     Talks on phone: None     Gets together: None     Attends Jewish service: None     Active member of club or organization: None     Attends meetings of clubs or organizations: None     Relationship status: None     Intimate partner violence:     Fear of current or ex partner: None     Emotionally abused: None     Physically abused: None     Forced sexual activity: None   Other Topics Concern      "Parent/sibling w/ CABG, MI or angioplasty before 65F 55M? Not Asked   Social History Narrative     None       Review of Systems:  Skin:  Positive for rash;itching     Eyes:  Negative for      ENT:  Negative for      Respiratory:  Positive for cough     Cardiovascular:  Negative      Gastroenterology: Negative for      Genitourinary:  not assessed      Musculoskeletal:  Positive for back pain    Neurologic:  Positive for   neuropathy  Psychiatric:  Negative for      Heme/Lymph/Imm:  Negative      Endocrine:  Positive for diabetes      Physical Exam:  Vitals: /80   Pulse 56   Ht 1.88 m (6' 2\")   Wt 92.1 kg (203 lb)   BMI 26.06 kg/m       Constitutional:  cooperative;in no acute distress        Skin:  warm and dry to the touch          Head:  normocephalic        Eyes:  conjunctivae and lids unremarkable        Lymph:No Cervical lymphadenopathy present     ENT:  no pallor or cyanosis        Neck:  carotid pulses are full and equal bilaterally;no carotid bruit        Respiratory:  clear to auscultation         Cardiac: regular rhythm;normal S1 and S2       systolic murmur;grade 1                                                 GI:  abdomen soft;no masses;non-tender        Extremities and Muscular Skeletal:  no edema              Neurological:  no gross motor deficits        Psych:  Alert and Oriented x 3        CC  Tiffany Bowman PA-C  [address] [place] [address]                Thank you for allowing me to participate in the care of your patient.      Sincerely,     Edin Choi MD, MD     [facility]    cc:   Tiffany Bowman PA-C  [address] [place] [address]        "

## 2019-04-08 ENCOUNTER — TELEPHONE (OUTPATIENT)
Dept: CARDIOLOGY | Facility: CLINIC | Age: 74
End: 2019-04-08

## 2019-04-08 DIAGNOSIS — I10 BENIGN ESSENTIAL HYPERTENSION: ICD-10-CM

## 2019-04-08 RX ORDER — LISINOPRIL 10 MG/1
10 TABLET ORAL DAILY
Qty: 90 TABLET | Refills: 3 | Status: SHIPPED | OUTPATIENT
Start: 2019-04-08 | End: 2021-03-23

## 2019-04-08 NOTE — TELEPHONE ENCOUNTER
Patient wife called stating Dr. Choi increased his Lisinopril and [facility] but [facility]. [facility]. Pts wife updated.

## 2019-10-21 ENCOUNTER — NURSE TRIAGE (OUTPATIENT)
Dept: NURSING | Facility: CLINIC | Age: 74
End: 2019-10-21

## 2019-10-22 NOTE — TELEPHONE ENCOUNTER
"\"My blood sugar tonight is 407.I don't have any other sx. \" [facility]. Triaged and advised ER. Patient does not want to go in. He states he is currently taking 2 medications and insulin. But his blood sugar seems to run higher(usually 200's ) at night. Tonight for dinner he had [place] fried chicken . If he doesn't go to ER he will call PCP in am. He is also asking for an endocrinology referral. Call back if needed.  Sultana José RN [facility]        Reason for Disposition    Blood glucose > 400 mg/dl (22 mmol/l)    Additional Information    Negative: Unconscious or difficult to awaken    Negative: Acting confused (e.g., disoriented, slurred speech)    Negative: Very weak (e.g., can't stand)    Negative: Sounds like a life-threatening emergency to the triager    Negative: [1] Vomiting AND [2] signs of dehydration (e.g., very dry mouth, lightheaded, etc.)    Negative: [1] Blood glucose > 240 mg/dl (13 mmol/l) AND [2] rapid breathing    Negative: Blood glucose > 500 mg/dl (27.5 mmol/l)    Negative: [1] Blood glucose > 240 mg/dl (13 mmol/l) AND [2] urine ketones moderate-large (or more than 1+)    Negative: [1] Blood glucose > 240 mg/dl (13 mmol/l) AND [2] blood ketones > 1.5 mmol/l    Negative: [1] Blood glucose > 240 mg/dl (13 mmol/l) AND [2] vomiting AND [3] unable to check for ketones (in blood or urine)    Negative: [1] New onset Diabetes suspected (e.g., frequent urination, weak, weight loss) AND [2] vomiting or rapid breathing    Negative: Vomiting lasts > 4 hours    Negative: Patient sounds very sick or weak to the triager    Negative: Fever > 100.5 F (38.1 C)    Protocols used: DIABETES - HIGH BLOOD SUGAR-A-      "

## 2021-03-23 ENCOUNTER — HOSPITAL ENCOUNTER (INPATIENT)
Facility: CLINIC | Age: 76
LOS: 8 days | Discharge: NURSING, REHAB OR LONG-TERM CARE | DRG: 177 | End: 2021-03-31
Attending: PHYSICIAN ASSISTANT | Admitting: INTERNAL MEDICINE
Payer: MEDICARE

## 2021-03-23 ENCOUNTER — APPOINTMENT (OUTPATIENT)
Dept: CT IMAGING | Facility: CLINIC | Age: 76
DRG: 177 | End: 2021-03-23
Attending: PHYSICIAN ASSISTANT
Payer: MEDICARE

## 2021-03-23 ENCOUNTER — APPOINTMENT (OUTPATIENT)
Dept: MRI IMAGING | Facility: CLINIC | Age: 76
DRG: 177 | End: 2021-03-23
Attending: INTERNAL MEDICINE
Payer: MEDICARE

## 2021-03-23 DIAGNOSIS — R91.1 INCIDENTAL PULMONARY NODULE: ICD-10-CM

## 2021-03-23 DIAGNOSIS — Z86.79 HISTORY OF ATRIAL FIBRILLATION: ICD-10-CM

## 2021-03-23 DIAGNOSIS — Z86.73 HISTORY OF CVA (CEREBROVASCULAR ACCIDENT): ICD-10-CM

## 2021-03-23 DIAGNOSIS — U07.1 2019 NOVEL CORONAVIRUS DISEASE (COVID-19): ICD-10-CM

## 2021-03-23 DIAGNOSIS — E11.69 TYPE 2 DIABETES MELLITUS WITH OTHER SPECIFIED COMPLICATION, WITH LONG-TERM CURRENT USE OF INSULIN (H): Primary | ICD-10-CM

## 2021-03-23 DIAGNOSIS — Z79.01 ANTICOAGULATED: ICD-10-CM

## 2021-03-23 DIAGNOSIS — Z79.4 TYPE 2 DIABETES MELLITUS WITH OTHER SPECIFIED COMPLICATION, WITH LONG-TERM CURRENT USE OF INSULIN (H): Primary | ICD-10-CM

## 2021-03-23 DIAGNOSIS — I48.0 PAROXYSMAL ATRIAL FIBRILLATION (H): ICD-10-CM

## 2021-03-23 DIAGNOSIS — R79.89 ELEVATED TROPONIN: ICD-10-CM

## 2021-03-23 DIAGNOSIS — J96.01 ACUTE RESPIRATORY FAILURE WITH HYPOXIA (H): ICD-10-CM

## 2021-03-23 DIAGNOSIS — M62.81 GENERALIZED MUSCLE WEAKNESS: ICD-10-CM
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
## 2021-03-23 DIAGNOSIS — S09.90XA HEAD INJURY, INITIAL ENCOUNTER: ICD-10-CM

## 2021-03-23 LAB
ALBUMIN SERPL-MCNC: 2.9 G/DL (ref 3.4–5)
ALBUMIN UR-MCNC: 70 MG/DL
ALP SERPL-CCNC: 69 U/L (ref 40–150)
ALT SERPL W P-5'-P-CCNC: 26 U/L (ref 0–70)
ANION GAP SERPL CALCULATED.3IONS-SCNC: 9 MMOL/L (ref 3–14)
APPEARANCE UR: CLEAR
AST SERPL W P-5'-P-CCNC: 30 U/L (ref 0–45)
BASOPHILS # BLD AUTO: 0 10E9/L (ref 0–0.2)
BASOPHILS NFR BLD AUTO: 0.3 %
BILIRUB SERPL-MCNC: 0.9 MG/DL (ref 0.2–1.3)
BILIRUB UR QL STRIP: NEGATIVE
BUN SERPL-MCNC: 20 MG/DL (ref 7–30)
CALCIUM SERPL-MCNC: 8.6 MG/DL (ref 8.5–10.1)
CHLORIDE SERPL-SCNC: 102 MMOL/L (ref 94–109)
CK SERPL-CCNC: 237 U/L (ref 30–300)
CO2 SERPL-SCNC: 26 MMOL/L (ref 20–32)
COLOR UR AUTO: YELLOW
CREAT SERPL-MCNC: 1.12 MG/DL (ref 0.66–1.25)
CRP SERPL-MCNC: 89.8 MG/L (ref 0–8)
DIFFERENTIAL METHOD BLD: NORMAL
EOSINOPHIL # BLD AUTO: 0 10E9/L (ref 0–0.7)
EOSINOPHIL NFR BLD AUTO: 0 %
ERYTHROCYTE [DISTWIDTH] IN BLOOD BY AUTOMATED COUNT: 14 % (ref 10–15)
FLUAV RNA RESP QL NAA+PROBE: NEGATIVE
FLUBV RNA RESP QL NAA+PROBE: NEGATIVE
GFR SERPL CREATININE-BSD FRML MDRD: 65 ML/MIN/{1.73_M2}
GLUCOSE BLDC GLUCOMTR-MCNC: 166 MG/DL (ref 70–99)
GLUCOSE SERPL-MCNC: 208 MG/DL (ref 70–99)
GLUCOSE UR STRIP-MCNC: 300 MG/DL
HBA1C MFR BLD: 9.1 % (ref 0–5.6)
HCT VFR BLD AUTO: 44.3 % (ref 40–53)
HGB BLD-MCNC: 14.4 G/DL (ref 13.3–17.7)
HGB UR QL STRIP: ABNORMAL
IMM GRANULOCYTES # BLD: 0 10E9/L (ref 0–0.4)
IMM GRANULOCYTES NFR BLD: 0.2 %
KETONES UR STRIP-MCNC: 10 MG/DL
LABORATORY COMMENT REPORT: ABNORMAL
LEUKOCYTE ESTERASE UR QL STRIP: NEGATIVE
LYMPHOCYTES # BLD AUTO: 1.4 10E9/L (ref 0.8–5.3)
LYMPHOCYTES NFR BLD AUTO: 24.9 %
MCH RBC QN AUTO: 31.3 PG (ref 26.5–33)
MCHC RBC AUTO-ENTMCNC: 32.5 G/DL (ref 31.5–36.5)
MCV RBC AUTO: 96 FL (ref 78–100)
MONOCYTES # BLD AUTO: 0.3 10E9/L (ref 0–1.3)
MONOCYTES NFR BLD AUTO: 5.2 %
MUCOUS THREADS #/AREA URNS LPF: PRESENT /LPF
NEUTROPHILS # BLD AUTO: 4 10E9/L (ref 1.6–8.3)
NEUTROPHILS NFR BLD AUTO: 69.4 %
NITRATE UR QL: NEGATIVE
NRBC # BLD AUTO: 0 10*3/UL
NRBC BLD AUTO-RTO: 0 /100
NT-PROBNP SERPL-MCNC: 3397 PG/ML (ref 0–900)
PH UR STRIP: 5.5 PH (ref 5–7)
PLATELET # BLD AUTO: 186 10E9/L (ref 150–450)
POTASSIUM SERPL-SCNC: 3.8 MMOL/L (ref 3.4–5.3)
PROT SERPL-MCNC: 7.5 G/DL (ref 6.8–8.8)
RBC # BLD AUTO: 4.6 10E12/L (ref 4.4–5.9)
RBC #/AREA URNS AUTO: 4 /HPF (ref 0–2)
RSV RNA SPEC QL NAA+PROBE: ABNORMAL
SARS-COV-2 RNA RESP QL NAA+PROBE: POSITIVE
SODIUM SERPL-SCNC: 137 MMOL/L (ref 133–144)
SOURCE: ABNORMAL
SP GR UR STRIP: 1.02 (ref 1–1.03)
SPECIMEN SOURCE: ABNORMAL
SQUAMOUS #/AREA URNS AUTO: <1 /HPF (ref 0–1)
TROPONIN I SERPL-MCNC: 0.05 UG/L (ref 0–0.04)
TROPONIN I SERPL-MCNC: 0.06 UG/L (ref 0–0.04)
UROBILINOGEN UR STRIP-MCNC: NORMAL MG/DL (ref 0–2)
WBC # BLD AUTO: 5.8 10E9/L (ref 4–11)
WBC #/AREA URNS AUTO: 2 /HPF (ref 0–5)

## 2021-03-23 PROCEDURE — 70553 MRI BRAIN STEM W/O & W/DYE: CPT | Mod: ME

## 2021-03-23 PROCEDURE — 80053 COMPREHEN METABOLIC PANEL: CPT | Performed by: PHYSICIAN ASSISTANT

## 2021-03-23 PROCEDURE — 85025 COMPLETE CBC W/AUTO DIFF WBC: CPT | Performed by: PHYSICIAN ASSISTANT

## 2021-03-23 PROCEDURE — 999N001017 HC STATISTIC GLUCOSE BY METER IP

## 2021-03-23 PROCEDURE — 93005 ELECTROCARDIOGRAM TRACING: CPT

## 2021-03-23 PROCEDURE — 85379 FIBRIN DEGRADATION QUANT: CPT | Performed by: INTERNAL MEDICINE

## 2021-03-23 PROCEDURE — 96374 THER/PROPH/DIAG INJ IV PUSH: CPT

## 2021-03-23 PROCEDURE — 83036 HEMOGLOBIN GLYCOSYLATED A1C: CPT | Performed by: PHYSICIAN ASSISTANT

## 2021-03-23 PROCEDURE — 70496 CT ANGIOGRAPHY HEAD: CPT | Mod: MG

## 2021-03-23 PROCEDURE — 87636 SARSCOV2 & INF A&B AMP PRB: CPT | Performed by: PHYSICIAN ASSISTANT

## 2021-03-23 PROCEDURE — 250N000011 HC RX IP 250 OP 636: Performed by: PHYSICIAN ASSISTANT

## 2021-03-23 PROCEDURE — 258N000003 HC RX IP 258 OP 636: Performed by: PHYSICIAN ASSISTANT

## 2021-03-23 PROCEDURE — 84484 ASSAY OF TROPONIN QUANT: CPT | Performed by: PHYSICIAN ASSISTANT

## 2021-03-23 PROCEDURE — 86140 C-REACTIVE PROTEIN: CPT | Performed by: PHYSICIAN ASSISTANT

## 2021-03-23 PROCEDURE — 82550 ASSAY OF CK (CPK): CPT | Performed by: PHYSICIAN ASSISTANT

## 2021-03-23 PROCEDURE — 255N000002 HC RX 255 OP 636: Performed by: INTERNAL MEDICINE

## 2021-03-23 PROCEDURE — 120N000001 HC R&B MED SURG/OB

## 2021-03-23 PROCEDURE — 250N000013 HC RX MED GY IP 250 OP 250 PS 637: Performed by: PHYSICIAN ASSISTANT

## 2021-03-23 PROCEDURE — 99285 EMERGENCY DEPT VISIT HI MDM: CPT | Mod: 25

## 2021-03-23 PROCEDURE — 81001 URINALYSIS AUTO W/SCOPE: CPT | Performed by: PHYSICIAN ASSISTANT

## 2021-03-23 PROCEDURE — 99223 1ST HOSP IP/OBS HIGH 75: CPT | Mod: AI | Performed by: INTERNAL MEDICINE

## 2021-03-23 PROCEDURE — 83615 LACTATE (LD) (LDH) ENZYME: CPT | Performed by: INTERNAL MEDICINE

## 2021-03-23 PROCEDURE — 36415 COLL VENOUS BLD VENIPUNCTURE: CPT | Performed by: INTERNAL MEDICINE

## 2021-03-23 PROCEDURE — 96361 HYDRATE IV INFUSION ADD-ON: CPT

## 2021-03-23 PROCEDURE — 250N000009 HC RX 250: Performed by: PHYSICIAN ASSISTANT

## 2021-03-23 PROCEDURE — 83880 ASSAY OF NATRIURETIC PEPTIDE: CPT | Performed by: PHYSICIAN ASSISTANT

## 2021-03-23 PROCEDURE — 84484 ASSAY OF TROPONIN QUANT: CPT | Performed by: INTERNAL MEDICINE

## 2021-03-23 PROCEDURE — C9803 HOPD COVID-19 SPEC COLLECT: HCPCS

## 2021-03-23 PROCEDURE — A9585 GADOBUTROL INJECTION: HCPCS | Performed by: INTERNAL MEDICINE

## 2021-03-23 PROCEDURE — 70450 CT HEAD/BRAIN W/O DYE: CPT | Mod: MG

## 2021-03-23 PROCEDURE — 71260 CT THORAX DX C+: CPT | Mod: MG

## 2021-03-23 PROCEDURE — 250N000013 HC RX MED GY IP 250 OP 250 PS 637: Performed by: INTERNAL MEDICINE

## 2021-03-23 PROCEDURE — 96375 TX/PRO/DX INJ NEW DRUG ADDON: CPT

## 2021-03-23 RX ORDER — TRIAMCINOLONE ACETONIDE 1 MG/G
CREAM TOPICAL 2 TIMES DAILY
COMMUNITY
Start: 2021-02-15 | End: 2023-04-04

## 2021-03-23 RX ORDER — ATORVASTATIN CALCIUM 80 MG/1
40 TABLET, FILM COATED ORAL AT BEDTIME
COMMUNITY

## 2021-03-23 RX ORDER — TACROLIMUS 1 MG/G
OINTMENT TOPICAL 2 TIMES DAILY
COMMUNITY
Start: 2020-12-01 | End: 2023-04-04

## 2021-03-23 RX ORDER — ONDANSETRON 2 MG/ML
4 INJECTION INTRAMUSCULAR; INTRAVENOUS ONCE
Status: COMPLETED | OUTPATIENT
Start: 2021-03-23 | End: 2021-03-23

## 2021-03-23 RX ORDER — GLIPIZIDE 5 MG/1
10 TABLET ORAL
Status: DISCONTINUED | OUTPATIENT
Start: 2021-03-24 | End: 2021-03-31 | Stop reason: HOSPADM

## 2021-03-23 RX ORDER — METFORMIN HCL 500 MG
1000 TABLET, EXTENDED RELEASE 24 HR ORAL 2 TIMES DAILY WITH MEALS
Status: ON HOLD | COMMUNITY
End: 2023-04-08

## 2021-03-23 RX ORDER — ASPIRIN 81 MG/1
81 TABLET ORAL DAILY
Status: DISCONTINUED | OUTPATIENT
Start: 2021-03-24 | End: 2021-03-31 | Stop reason: HOSPADM

## 2021-03-23 RX ORDER — PROCHLORPERAZINE MALEATE 5 MG
5 TABLET ORAL EVERY 6 HOURS PRN
Status: DISCONTINUED | OUTPATIENT
Start: 2021-03-23 | End: 2021-03-31 | Stop reason: HOSPADM

## 2021-03-23 RX ORDER — LISINOPRIL 20 MG/1
10 TABLET ORAL DAILY
COMMUNITY
End: 2023-04-04

## 2021-03-23 RX ORDER — DEXTROSE MONOHYDRATE 25 G/50ML
25-50 INJECTION, SOLUTION INTRAVENOUS
Status: DISCONTINUED | OUTPATIENT
Start: 2021-03-23 | End: 2021-03-31 | Stop reason: HOSPADM

## 2021-03-23 RX ORDER — METOPROLOL TARTRATE 25 MG/1
12.5 TABLET, FILM COATED ORAL 2 TIMES DAILY
Status: ON HOLD | COMMUNITY
End: 2021-03-31

## 2021-03-23 RX ORDER — PROCHLORPERAZINE 25 MG
12.5 SUPPOSITORY, RECTAL RECTAL EVERY 12 HOURS PRN
Status: DISCONTINUED | OUTPATIENT
Start: 2021-03-23 | End: 2021-03-31 | Stop reason: HOSPADM

## 2021-03-23 RX ORDER — POLYETHYLENE GLYCOL 3350 17 G/17G
17 POWDER, FOR SOLUTION ORAL DAILY PRN
Status: DISCONTINUED | OUTPATIENT
Start: 2021-03-23 | End: 2021-03-31 | Stop reason: HOSPADM

## 2021-03-23 RX ORDER — IOPAMIDOL 755 MG/ML
500 INJECTION, SOLUTION INTRAVASCULAR ONCE
Status: COMPLETED | OUTPATIENT
Start: 2021-03-23 | End: 2021-03-23

## 2021-03-23 RX ORDER — FAMOTIDINE 20 MG/1
20 TABLET, FILM COATED ORAL 2 TIMES DAILY
Status: DISCONTINUED | OUTPATIENT
Start: 2021-03-23 | End: 2021-03-31 | Stop reason: HOSPADM

## 2021-03-23 RX ORDER — ACETAMINOPHEN 325 MG/1
650 TABLET ORAL EVERY 4 HOURS PRN
Status: DISCONTINUED | OUTPATIENT
Start: 2021-03-23 | End: 2021-03-25

## 2021-03-23 RX ORDER — NICOTINE POLACRILEX 4 MG
15-30 LOZENGE BUCCAL
Status: DISCONTINUED | OUTPATIENT
Start: 2021-03-23 | End: 2021-03-31 | Stop reason: HOSPADM

## 2021-03-23 RX ORDER — ATORVASTATIN CALCIUM 40 MG/1
40 TABLET, FILM COATED ORAL EVERY EVENING
Status: DISCONTINUED | OUTPATIENT
Start: 2021-03-24 | End: 2021-03-23

## 2021-03-23 RX ORDER — DEXAMETHASONE SODIUM PHOSPHATE 10 MG/ML
6 INJECTION, SOLUTION INTRAMUSCULAR; INTRAVENOUS ONCE
Status: COMPLETED | OUTPATIENT
Start: 2021-03-23 | End: 2021-03-23

## 2021-03-23 RX ORDER — ACETAMINOPHEN 650 MG/1
650 SUPPOSITORY RECTAL EVERY 4 HOURS PRN
Status: DISCONTINUED | OUTPATIENT
Start: 2021-03-23 | End: 2021-03-25

## 2021-03-23 RX ORDER — ONDANSETRON 2 MG/ML
4 INJECTION INTRAMUSCULAR; INTRAVENOUS EVERY 6 HOURS PRN
Status: DISCONTINUED | OUTPATIENT
Start: 2021-03-23 | End: 2021-03-31 | Stop reason: HOSPADM

## 2021-03-23 RX ORDER — LIDOCAINE 40 MG/G
CREAM TOPICAL
Status: DISCONTINUED | OUTPATIENT
Start: 2021-03-23 | End: 2021-03-31 | Stop reason: HOSPADM

## 2021-03-23 RX ORDER — LISINOPRIL 10 MG/1
10 TABLET ORAL DAILY
Status: DISCONTINUED | OUTPATIENT
Start: 2021-03-24 | End: 2021-03-31 | Stop reason: HOSPADM

## 2021-03-23 RX ORDER — ATORVASTATIN CALCIUM 40 MG/1
40 TABLET, FILM COATED ORAL DAILY
Status: DISCONTINUED | OUTPATIENT
Start: 2021-03-24 | End: 2021-03-31 | Stop reason: HOSPADM

## 2021-03-23 RX ORDER — AMOXICILLIN 250 MG
1 CAPSULE ORAL 2 TIMES DAILY PRN
Status: DISCONTINUED | OUTPATIENT
Start: 2021-03-23 | End: 2021-03-31 | Stop reason: HOSPADM

## 2021-03-23 RX ORDER — ACETAMINOPHEN 500 MG
1000 TABLET ORAL ONCE
Status: COMPLETED | OUTPATIENT
Start: 2021-03-23 | End: 2021-03-23

## 2021-03-23 RX ORDER — AMOXICILLIN 250 MG
2 CAPSULE ORAL 2 TIMES DAILY PRN
Status: DISCONTINUED | OUTPATIENT
Start: 2021-03-23 | End: 2021-03-31 | Stop reason: HOSPADM

## 2021-03-23 RX ORDER — ONDANSETRON 4 MG/1
4 TABLET, ORALLY DISINTEGRATING ORAL EVERY 6 HOURS PRN
Status: DISCONTINUED | OUTPATIENT
Start: 2021-03-23 | End: 2021-03-31 | Stop reason: HOSPADM

## 2021-03-23 RX ORDER — GADOBUTROL 604.72 MG/ML
7.5 INJECTION INTRAVENOUS ONCE
Status: COMPLETED | OUTPATIENT
Start: 2021-03-23 | End: 2021-03-23

## 2021-03-23 RX ADMIN — GADOBUTROL 7.5 ML: 604.72 INJECTION INTRAVENOUS at 23:23

## 2021-03-23 RX ADMIN — SODIUM CHLORIDE 80 ML: 9 INJECTION, SOLUTION INTRAVENOUS at 16:22

## 2021-03-23 RX ADMIN — IOPAMIDOL 70 ML: 755 INJECTION, SOLUTION INTRAVENOUS at 16:22

## 2021-03-23 RX ADMIN — Medication 12.5 MG: at 21:54

## 2021-03-23 RX ADMIN — FAMOTIDINE 20 MG: 20 TABLET ORAL at 21:55

## 2021-03-23 RX ADMIN — ACETAMINOPHEN 1000 MG: 500 TABLET, FILM COATED ORAL at 18:37

## 2021-03-23 RX ADMIN — ONDANSETRON 4 MG: 2 INJECTION INTRAMUSCULAR; INTRAVENOUS at 18:49

## 2021-03-23 RX ADMIN — APIXABAN 5 MG: 5 TABLET, FILM COATED ORAL at 21:54

## 2021-03-23 RX ADMIN — SODIUM CHLORIDE 1000 ML: 9 INJECTION, SOLUTION INTRAVENOUS at 18:37

## 2021-03-23 RX ADMIN — DEXAMETHASONE SODIUM PHOSPHATE 6 MG: 10 INJECTION, SOLUTION INTRAMUSCULAR; INTRAVENOUS at 19:15

## 2021-03-23 ASSESSMENT — ENCOUNTER SYMPTOMS
SPEECH DIFFICULTY: 1
VOMITING: 0
NAUSEA: 0
NUMBNESS: 0
WEAKNESS: 0
HEADACHES: 0

## 2021-03-23 ASSESSMENT — MIFFLIN-ST. JEOR: SCORE: 1675.23

## 2021-03-23 NOTE — ED PROVIDER NOTES
Emergency Department Attending Supervision Note  3/23/2021  6:48 PM      I evaluated this patient in conjunction with Lara Link PA-C      Briefly, the patient presented for evaluation following a fall. Patient reports he tripped and fell 2 days ago on his way to the [facility]. He states hitting his head and is unable to remember what happened after that. Since then, he has been experiencing left rib tenderness. Per patient's daughter, patient has been having some difficulty thinking and speaking over the past 2 days, similar to when he had a stroke. Patient denies any extremity numbness/weakness, headache, nausea, vomiting or chest pain. He further denies alcohol use. He currently denies any symptoms aside from the mild rib pain, which is present with movement or pushing on the area. He reports he takes his eliquis daily.    Medications  Eliquis  Aspirin  Lipitor  Glipizide  Insulin glargine  Lisinopril  Metformin  Toprol-XL     Past Medical History  CAD  Stroke  GERD  Hypertension  Paroxsymal atrial fibrillation  NSVT  Type II diabetes     Past Surgical History  Sinus surgery    Family History  Diabetes  Uterine Cancer     Social History  Presents to the ED with his daughter.   Negative for alcohol use.    Results:  CTA Head Neck with Contrast:   1. No flow limiting stenosis or large vessel occlusion involving the  major craniocervical arterial vasculature.  2. Mild atherosclerotic disease involving the bilateral carotid  bifurcations and carotid siphons without significant stenosis.  3. Scattered atherosclerotic disease involving the bilateral  cervicovertebral arteries. There is a moderate focal stenosis  involving the V1 segment of the right vertebral artery with  minimal/mild stenosis elsewhere.  4. Partially visualized patchy groundglass opacities in the right lung  with mediastinal and right hilar lymphadenopathy. This is better  characterized on chest CT of same date. Please see that separate  report for  "additional details.  As per radiology.     CT Chest w/ IV contrast, TRAUMA/DISSECTION:   1.  No evidence of acute rib abnormality or pneumothorax.   2.  Patchy bilateral groundglass opacities are nonspecific. Imaging   features can be seen with (COVID-19)  pneumonia, though are   nonspecific and can occur with a variety of infectious and   noninfectious processes.   3.  Severe coronary calcifications.   4.  Mild mediastinal and hilar adenopathy is likely reactive to the   pulmonary process.   5.  Incidental 6 mm pulmonary nodule.   6.  Fatty infiltration of the liver.   7.  Partial visualization of an infrarenal abdominal aortic aneurysm.   Nonemergent follow-up with ultrasound or abdominal CT recommended.   8.  Cholelithiasis.  As per radiology.      CT Head without contrast:   1. No CT findings of acute intracranial abnormality.   2. Chronic left middle cerebral artery territory infarct.   3. Brain atrophy and presumed chronic small vessel ischemic disease, as described. as per radiology.    CBC: WBC: 5.8, HGB: 14.4, PLT: 186  CMP: Glucose 208 (H), Albumin 2.9 (L), o/w WNL (Creatinine: 1.12)  1423    Troponin I: 0.064 (H)  Repeat Troponin I: Pending  BNP: 3397 (H)  CK: Pending     UA with Microscopic: pending     Symptomatic Influenza A/B & SARS-CoV2 (COVID-19) Virus PCR Multiplex: Positive for COVID    On my exam,   /71 (BP Location: Left arm)   Pulse 76   Temp 97.8  F (36.6  C) (Temporal)   Resp 24   Ht 1.854 m (6' 1\")   Wt 87.6 kg (193 lb 3.2 oz)   SpO2 93%   BMI 25.49 kg/m      General: Elderly male sitting upright  Eyes: PERRL, Conjunctive within normal limits. No scleral icterus.  ENT: Moist mucous membranes, oropharynx clear.   CV: Normal S1S2, no murmur, rub or gallop. Regular rate and rhythm with occasional irregular beats.  Resp: Clear to auscultation bilaterally, no wheezes, rales or rhonchi. Normal respiratory effort.  GI: Abdomen is soft, nontender and nondistended. No palpable masses. No " "rebound or guarding.  MSK: Mild tenderness over the lateral lower ribs left chest wall. No crepitus or palpable deformity. No edema. Nontender. Normal active range of motion.  Skin: Warm and dry. Scattered extremity bruising and abrasions.   Neuro: Alert and oriented. Responds appropriately to all questions and commands. No focal findings appreciated. Normal muscle tone. Speech is normal. No facial asymmetry. FNF intact. Negative pronator drift.   Psych: Normal mood and affect. Pleasant.    Interventions:   1849: Zofran 4 mg IV  1837: NaCl 1000 ml IV  1837 Tylenol 1000 mg PO            Decadron 6 mg IV    ED course:  1900: Lara Link requested to staff the patient with me. Please see their note for details.    Patient reassessed. He had a large BM while in bed, noting \"I just couldn't wait\". He denies any new concerns.       Diagnosis    ICD-10-CM    1. Generalized muscle weakness  M62.81 UA with Microscopic   2. Fall, initial encounter  W19.XXXA    3. Head injury, initial encounter  S09.90XA    4. History of CVA (cerebrovascular accident)  Z86.73    5. History of atrial fibrillation  Z86.79    6. Anticoagulated  Z79.01    7. 2019 novel coronavirus disease (COVID-19)  U07.1    8. Incidental pulmonary nodule  R91.1    9. Elevated troponin  R77.8              Impression & Plan       Medical Decision Making:  Yanick Adorno is a 75-year-old male on Eliquis for atrial fibrillation who presents to the emergency department with concerns for a fall resulting in left sided chest wall tenderness as well as episodes of speech difficulty and confusion, similar to terms with past CVA.  This could represent TIA.  If taking Eliquis daily, this would seem less likely.  Advanced neuroimaging did not show evidence of acute CVA.  MRI brain may be helpful on admission.  He did not have any acute somatic chest pathology on CT chest.  PE seems unlikely given anticoagulated status.  ACS seems unlikely.  Troponin was slightly elevated " but pain in the chest he was having did not seem consistent with ACS and he has had slightly elevated troponins in the past.  ECG also not consistent with ACS.  Patient was hemodynamically stable and did not have any focal neurologic deficits throughout his stay in the ED.  He incidentally tested positive for Covid and did have findings on CT chest consistent with Covid infection.  He was not initially hypoxic and he was not in any respiratory distress with no respiratory symptoms reported.  This infection certainly could have contributed to his fall.  At this time he will be admitted for ongoing care and further assessment.  This was discussed with the patient.  All questions were answered prior to admission.    Scribe Disclosure:  I, Demi Riddle, am serving as a scribe at 7:35 PM on 3/23/2021 to document services personally performed by Radha Saldaña MD  based on my observations and the provider's statements to me.          Radha Saldaña MD  03/23/21 [id number]

## 2021-03-23 NOTE — ED TRIAGE NOTES
Sunday went to [facility] and fell. Unsure of the cause of his fall. Concerned that he may have had a stroke or blacked out.  Was able to get up on his own at that time and went home. Hx of stroke in the past with speech problems. Reports decreased appetite but drinking plenty of water. Hx of diabetes with controlled blood sugars.

## 2021-03-23 NOTE — ED PROVIDER NOTES
History   Chief Complaint  Fall    HPI  Yanick Adorno is a 75 year old male with a history of CAD, paroxsymal atrial fibrillation on Eliquis, hypertension, NSVT, stroke, and type II diabetes on Insulin and Metformin, who presents for evaluation following a fall. The patient reports that he was going to the [employer] two days ago when he tripped and fell. He reports hitting his head and is unable to remember what happened after that. He eventually came to and reports that he was able to continue with his night at the [employer] as planned. He has only been experiencing left rib tenderness since the fall. Per his daughter, the patient has been having some difficulty thinking and speaking over the past two days, similar to when he had a stroke. She does note that he looks better here in the ED. He denies any extremity numbness/weakness, headache, nausea, vomiting or chest pain. Denies any ETOH use.     Review of Systems   Cardiovascular: Negative for chest pain.   Gastrointestinal: Negative for nausea and vomiting.   Musculoskeletal:        Left rib pain   Neurological: Positive for syncope and speech difficulty (per daughter). Negative for weakness, numbness and headaches.   All other systems reviewed and are negative.    Allergies  The patient has no known allergies.     Medications  Eliquis  Aspirin  Lipitor  Glipizide  Insulin glargine  Lisinopril  Metformin  Toprol-XL    Past Medical History  CAD  Stroke  GERD  Hypertension  Paroxsymal atrial fibrillation  NSVT  Type II diabetes    Past Surgical History  Sinus surgery    Family History  Diabetes  Uterine Cancer    Social History  Presents to the ED with his daughter.   Negative for alcohol use.    Physical Exam     Patient Vitals for the past 24 hrs:   BP Temp Temp src Pulse Resp SpO2 Weight   03/23/21 1853 -- -- -- -- -- 95 % --   03/23/21 1852 -- -- -- 101 12 (!) 83 % --   03/23/21 1845 (!) 158/110 -- -- 100 15 95 % --   03/23/21 1815 (!) 128/99 -- -- 107 24 -- --    21 1745 (!) 152/89 -- -- 92 26 94 % --   21 1715 (!) 159/98 -- -- 70 17 96 % --   21 1700 (!) 159/103 -- -- 85 12 93 % --   21 1545 134/73 -- -- 87 11 99 % --   21 1530 119/70 -- -- 82 20 99 % --   21 1515 120/64 -- -- 86 18 95 % --   21 1500 128/75 -- -- 91 8 95 % --   21 1430 113/83 -- -- 86 (!) 32 97 % --   21 1415 111/69 -- -- 87 29 94 % --   21 1358 93/65 -- -- 85 -- 97 % 86.2 kg (190 lb)   21 1355 -- 97.8  F (36.6  C) Temporal -- -- -- --     Physical Exam  General: Alert and interactive. Appears well; slightly disheveled.   Eyes: The pupils are equal and round. EOMs intact. No scleral icterus.  ENT: No abnormalities to the external nose or ears. Mucous membranes moist. Posterior oropharynx is non-erythematous.  Neck: Trachea is in the midline. No nuchal rigidity. No midline neck tenderness to palpation.    CV: Regular rate and rhythm. S1 and S2 normal without murmur, click, gallop or rub.   Resp: Breath sounds are clear bilaterally, without rhonchi, wheezes, rales. Non-labored, no retractions or accessory muscle use.     GI: Abdomen is soft without distension. No tenderness to palpation. No peritoneal signs.    MS: Moving all extremities well. Good muscle tone.   Skin: Warm and dry. Scattered bruising. No open lacerations.   Neuro: Slightly tremulous.   Speech is normal and fluent.   Face is symmetric without droop. CNs II-XII intact. Negative pronator drift. Finger to nose intact.  Right Arm: Good  strength. 5/5 elbow flexion. 5/5 elbow extension. Sensation intact to light touch.   Left Arm: Good  strength. 5/5 elbow flexion. 5/5 elbow extension. Sensation intact to light touch.   Right Le/5 straight leg raise, 5/5 knee flexion, 5/5 knee extension, 5/5 dorsiflexion, 5/5 plantar flexion. Sensation intact to light touch.   Left Le/5 straight leg raise, 5/5 knee flexion, 5/5 knee extension, 5/5 dorsiflexion, 5/5 plantar flexion.  Sensation intact to light touch.            Gait: Slow but steady.   Psych: Awake. Alert.  Normal affect. Appropriate interactions.  Lymph: No anterior or posterior cervical lymphadenopathy noted.    Emergency Department Course   ECG:   ECG taken at 1440, Read by Dr. Lora.  Sinus rhythm with occasional premature ventricular complexes and fusion complexes. Left axis deviation. Inferior infarct, age undetermined. Abnormal ECG.   New PVC's as compared to prior, dated 3/28/18.   Rate 89 bpm. SD interval 112 ms. QRS duration 96 ms. QT/QTc 406/493 ms. P-R-T axes 54 -56 -14.    Imaging:  CTA Head Neck with Contrast:   1. No flow limiting stenosis or large vessel occlusion involving the  major craniocervical arterial vasculature.  2. Mild atherosclerotic disease involving the bilateral carotid  bifurcations and carotid siphons without significant stenosis.  3. Scattered atherosclerotic disease involving the bilateral  cervicovertebral arteries. There is a moderate focal stenosis  involving the V1 segment of the right vertebral artery with  minimal/mild stenosis elsewhere.  4. Partially visualized patchy groundglass opacities in the right lung  with mediastinal and right hilar lymphadenopathy. This is better  characterized on chest CT of same date. Please see that separate  report for additional details.  As per radiology.    CT Chest w/ IV contrast, TRAUMA/DISSECTION:   1.  No evidence of acute rib abnormality or pneumothorax.   2.  Patchy bilateral groundglass opacities are nonspecific. Imaging   features can be seen with (COVID-19)  pneumonia, though are   nonspecific and can occur with a variety of infectious and   noninfectious processes.   3.  Severe coronary calcifications.   4.  Mild mediastinal and hilar adenopathy is likely reactive to the   pulmonary process.   5.  Incidental 6 mm pulmonary nodule.   6.  Fatty infiltration of the liver.   7.  Partial visualization of an infrarenal abdominal aortic aneurysm.    Nonemergent follow-up with ultrasound or abdominal CT recommended.   8.  Cholelithiasis.  As per radiology.     CT Head without contrast:   1. No CT findings of acute intracranial abnormality.   2. Chronic left middle cerebral artery territory infarct.   3. Brain atrophy and presumed chronic small vessel ischemic disease, as described. as per radiology.    Laboratory:  CBC: WBC: 5.8, HGB: 14.4, PLT: 186  CMP: Glucose 208 (H), Albumin 2.9 (L), o/w WNL (Creatinine: 1.12)  1423 Troponin I: 0.064 (H)  Repeat Troponin I: Pending  BNP: 3397 (H)  CK: Pending    UA with Microscopic: pending    Symptomatic Influenza A/B & SARS-CoV2 (COVID-19) Virus PCR Multiplex: Positive for COVID    Interventions:   1849: Zofran 4 mg IV  1837: NaCl 1000 ml IV  1837 Tylenol 1000 mg PO            Decadron 6 mg IV    Emergency Department Course:  Reviewed:  I reviewed nursing notes, vitals and past medical history    Assessments:  1437 I physically examined the patient as documented above.     I rechecked the patient multiple times. Discussed COVID-19.     1830 I rechecked patient. He is not hypoxic in room but feels nauseated.     Consults:   1850 I consulted with Dr. Brooks, hospitalist medicine.   Staffed with Dr. Saldaña.     Disposition:  The patient was admitted to the hospital under the care of Dr. Brooks.     Impression & Plan   Medical Decision Making:  Yanick Adorno is a 75 year old male who presents for evaluation of head injury with possible syncope and left rib pain. The patient has a history of atrial fibrillation on Eliquis, CVA, CAD. CT, CTA head/neck, CT chest obtained. No neck tenderness to palpation of signs of cervical spinous injury. CT shows no ICH but did show signs of previous CVA. CTA is largely negative, without large vessel occlusion or flow limiting stenosis. EKG shows sinus rhythm with occasional PVCs and no other signs of ischemia. UA shows blood but no signs of infection. Troponin detectable but he denies  chest pain to suggest ACS. Doubtful of PE given that he is properly anticoagulated on Eliquis. Vitally stable here and neurologically stable without any focal neurologic deficits to suggest CVA. CT Chest study shows groundglass opacity suggestive of COVID-19.  He has no signs of rib fracture, pneumothorax, or hemothorax.  His Covid test did return positive.  Initially he was vitally stable but near the end of his stay in the emergency room did become hypoxic. Initiated on Decadron. He has many medical issues that would make him a poor candidate for transfer to [facility]. At this point will admit to Dr. Brooks, hospitalist medicine for further work-up of his weakness, COVID-19 hypoxia, possible TIA, and troponin elevation.    Diagnosis:    ICD-10-CM    1. Generalized muscle weakness  M62.81 UA with Microscopic     Troponin I     CK total     CK total     CANCELED: CK total   2. Fall, initial encounter  W19.XXXA    3. Head injury, initial encounter  S09.90XA    4. History of CVA (cerebrovascular accident)  Z86.73    5. History of atrial fibrillation  Z86.79    6. Anticoagulated  Z79.01    7. 2019 novel coronavirus disease (COVID-19)  U07.1    8. Incidental pulmonary nodule  R91.1    9. Elevated troponin  R77.8    10. Acute respiratory failure with hypoxia (H)  J96.01      Scribe Disclosure:  I, Humphrey Cary, am serving as a scribe at 2:19 PM on 3/23/2021 to document services personally performed by Lara Link, * based on my observations and the provider's statements to me.      Lara Link PA-C  03/23/21 1915

## 2021-03-23 NOTE — ED NOTES
[facility]  ED Nurse Handoff Report    Yanick Adorno is a 75 year old male   ED Chief complaint: No chief complaint on file.  . ED Diagnosis:   Final diagnoses:   Generalized muscle weakness   Fall, initial encounter   Head injury, initial encounter   History of CVA (cerebrovascular accident)   History of atrial fibrillation   Anticoagulated     Allergies:   Allergies   Allergen Reactions     No Known Drug Allergies        Code Status: Full Code  Activity level - Baseline/Home:  Independent. Activity Level - Current:   Stand by Assist. Lift room needed: No. Bariatric: No   Needed: No   Isolation: Yes. Infection: Not Applicable  COVID r/o and special precautions.     Vital Signs:   Vitals:    03/23/21 1815 03/23/21 1845 03/23/21 1852 03/23/21 1853   BP: (!) 128/99 (!) 158/110     Pulse: 107 100 101    Resp: 24 15 12    Temp:       TempSrc:       SpO2:  95% (!) 83% 95%   Weight:           Cardiac Rhythm:  ,   Cardiac  Cardiac Rhythm: Normal sinus rhythm(with occasional PVCs)  Pain level:    Patient confused: No. Patient Falls Risk: Yes.   Elimination Status: Has voided   Patient Report - Initial Complaint:   13:56 ED Triage Notes Addendum Sunday went to [employer] and fell. Unsure of the cause of his fall. Concerned that he may have had a stroke or blacked out.  Was able to get up on his own at that time and went home. Hx of stroke in the past with speech problems. Reports decreased appetite but drinking plenty of water. Hx of diabetes with controlled blood sugars.      . Focused Assessment:   14:30 Neurological Cognitive - Cognitive/Neuro/Behavioral WDL: orientation; WDL  Orientation: oriented x 4  KA        14:31 Skin Color/Condition Skin - Skin WDL: .WDL except  Skin Integrity/Characteristics: rub/scrape  Skin Comment: Left hand abrasions, Right sided abdominal bruising  KA     14:30 Cardiac Cardiac - Cardiac WDL: .WDL except   Cardiac Monitoring - EKG Monitoring: Yes  Cardiac Rhythm: NSR  (with occasional PVCs)        Tests Performed: CT, Labs, EKG. Abnormal Results:   Labs Ordered and Resulted from Time of ED Arrival Up to the Time of Departure from the ED   COMPREHENSIVE METABOLIC PANEL - Abnormal; Notable for the following components:       Result Value    Glucose 208 (*)     Albumin 2.9 (*)     All other components within normal limits   TROPONIN I - Abnormal; Notable for the following components:    Troponin I ES 0.064 (*)     All other components within normal limits   ROUTINE UA WITH MICROSCOPIC - Abnormal; Notable for the following components:    Glucose Urine 300 (*)     Ketones Urine 10 (*)     Blood Urine Moderate (*)     Protein Albumin Urine 70 (*)     RBC Urine 4 (*)     Mucous Urine Present (*)     All other components within normal limits   INFLUENZA A/B & SARS-COV2 PCR MULTIPLEX - Abnormal; Notable for the following components:    SARS-CoV-2 PCR Result POSITIVE (*)     All other components within normal limits   NT PROBNP INPATIENT - Abnormal; Notable for the following components:    N-Terminal Pro BNP Inpatient 3,397 (*)     All other components within normal limits   CBC WITH PLATELETS DIFFERENTIAL   TROPONIN I   CK TOTAL   PERIPHERAL IV CATHETER     Chest CT w IV contrast only, TRAUMA / DISSECTION   Final Result   IMPRESSION:    1.  No evidence of acute rib abnormality or pneumothorax.   2.  Patchy bilateral groundglass opacities are nonspecific. Imaging   features can be seen with (COVID-19)  pneumonia, though are   nonspecific and can occur with a variety of infectious and   noninfectious processes.   3.  Severe coronary calcifications.   4.  Mild mediastinal and hilar adenopathy is likely reactive to the   pulmonary process.   5.  Incidental 6 mm pulmonary nodule.   6.  Fatty infiltration of the liver.   7.  Partial visualization of an infrarenal abdominal aortic aneurysm.   Nonemergent follow-up with ultrasound or abdominal CT recommended.   8.  Cholelithiasis.       Recommendations for an incidental lung nodule = or > 6mm to 8mm:     Low risk patients: Initial follow-up CT at 6-12 months, then   consider CT at 18-24 months if no change.     High risk patients: Initial follow-up CT at 6-12 months, then CT at   18-24 months if no change.      *Low Risk: Minimal or absent history of smoking or other known risk   factors.   *Nonsolid (ground-glass) or partly solid nodules may require longer   follow-up to exclude indolent adenocarcinoma.   *Recommendations based on Guidelines for the Management of Incidental   Pulmonary Nodules Detected at CT: From the Fleischner Society 2017,   Radiology 2017.      RED CAMPOVERDE MD      CTA Head Neck with Contrast   Preliminary Result   IMPRESSION:   1. No flow limiting stenosis or large vessel occlusion involving the   major craniocervical arterial vasculature.   2. Mild atherosclerotic disease involving the bilateral carotid   bifurcations and carotid siphons without significant stenosis.   3. Scattered atherosclerotic disease involving the bilateral   cervicovertebral arteries. There is a moderate focal stenosis   involving the V1 segment of the right vertebral artery with   minimal/mild stenosis elsewhere.   4. Partially visualized patchy groundglass opacities in the right lung   with mediastinal and right hilar lymphadenopathy. This is better   characterized on chest CT of same date. Please see that separate   report for additional details.      CT Head w/o Contrast   Final Result   IMPRESSION:   1. No CT findings of acute intracranial abnormality.   2. Chronic left middle cerebral artery territory infarct.   3. Brain atrophy and presumed chronic small vessel ischemic disease,   as described.      GINI PRICE MD        .   Treatments provided: nasal cannula 2 Lt, see MAR  Family Comments: DaughterInna was at bedside  OBS brochure/video discussed/provided to patient:  N/A  ED Medications:   Medications   CT Scan Flush (80 mLs  Intravenous Given 3/23/21 1622)   iopamidol (ISOVUE-370) solution 500 mL (70 mLs Intravenous Given 3/23/21 1622)     Drips infusing:  No  For the majority of the shift, the patient's behavior Green.     Sepsis treatment initiated: No     Patient tested for COVID 19 prior to admission: YES    ED Nurse Name/Phone Number: Coretta Odell RN,   6:01 PM    RECEIVING UNIT ED HANDOFF REVIEW    Above ED Nurse Handoff Report was reviewed: Yes  Reviewed by: Galen Jose RN on March 23, 2021 at 8:31 PM

## 2021-03-24 ENCOUNTER — APPOINTMENT (OUTPATIENT)
Dept: SPEECH THERAPY | Facility: CLINIC | Age: 76
DRG: 177 | End: 2021-03-24
Attending: INTERNAL MEDICINE
Payer: MEDICARE

## 2021-03-24 ENCOUNTER — APPOINTMENT (OUTPATIENT)
Dept: CARDIOLOGY | Facility: CLINIC | Age: 76
DRG: 177 | End: 2021-03-24
Attending: INTERNAL MEDICINE
Payer: MEDICARE

## 2021-03-24 ENCOUNTER — APPOINTMENT (OUTPATIENT)
Dept: PHYSICAL THERAPY | Facility: CLINIC | Age: 76
DRG: 177 | End: 2021-03-24
Attending: INTERNAL MEDICINE
Payer: MEDICARE

## 2021-03-24 ENCOUNTER — APPOINTMENT (OUTPATIENT)
Dept: OCCUPATIONAL THERAPY | Facility: CLINIC | Age: 76
DRG: 177 | End: 2021-03-24
Attending: INTERNAL MEDICINE
Payer: MEDICARE

## 2021-03-24 LAB
ANION GAP SERPL CALCULATED.3IONS-SCNC: 7 MMOL/L (ref 3–14)
BUN SERPL-MCNC: 21 MG/DL (ref 7–30)
CALCIUM SERPL-MCNC: 8.7 MG/DL (ref 8.5–10.1)
CHLORIDE SERPL-SCNC: 106 MMOL/L (ref 94–109)
CO2 SERPL-SCNC: 27 MMOL/L (ref 20–32)
CREAT SERPL-MCNC: 0.9 MG/DL (ref 0.66–1.25)
CRP SERPL-MCNC: 107 MG/L (ref 0–8)
D DIMER PPP FEU-MCNC: 1.4 UG/ML FEU (ref 0–0.5)
ERYTHROCYTE [DISTWIDTH] IN BLOOD BY AUTOMATED COUNT: 13.9 % (ref 10–15)
GFR SERPL CREATININE-BSD FRML MDRD: 84 ML/MIN/{1.73_M2}
GLUCOSE BLDC GLUCOMTR-MCNC: 176 MG/DL (ref 70–99)
GLUCOSE BLDC GLUCOMTR-MCNC: 224 MG/DL (ref 70–99)
GLUCOSE BLDC GLUCOMTR-MCNC: 249 MG/DL (ref 70–99)
GLUCOSE BLDC GLUCOMTR-MCNC: 313 MG/DL (ref 70–99)
GLUCOSE BLDC GLUCOMTR-MCNC: 365 MG/DL (ref 70–99)
GLUCOSE SERPL-MCNC: 210 MG/DL (ref 70–99)
HCT VFR BLD AUTO: 46.2 % (ref 40–53)
HGB BLD-MCNC: 14.3 G/DL (ref 13.3–17.7)
INTERPRETATION ECG - MUSE: NORMAL
LDH SERPL L TO P-CCNC: 301 U/L (ref 85–227)
MCH RBC QN AUTO: 30.8 PG (ref 26.5–33)
MCHC RBC AUTO-ENTMCNC: 31 G/DL (ref 31.5–36.5)
MCV RBC AUTO: 100 FL (ref 78–100)
PLATELET # BLD AUTO: 195 10E9/L (ref 150–450)
POTASSIUM SERPL-SCNC: 4 MMOL/L (ref 3.4–5.3)
RBC # BLD AUTO: 4.64 10E12/L (ref 4.4–5.9)
SODIUM SERPL-SCNC: 140 MMOL/L (ref 133–144)
TROPONIN I SERPL-MCNC: 0.06 UG/L (ref 0–0.04)
WBC # BLD AUTO: 7.6 10E9/L (ref 4–11)

## 2021-03-24 PROCEDURE — 93308 TTE F-UP OR LMTD: CPT | Mod: 26 | Performed by: INTERNAL MEDICINE

## 2021-03-24 PROCEDURE — 85027 COMPLETE CBC AUTOMATED: CPT | Performed by: INTERNAL MEDICINE

## 2021-03-24 PROCEDURE — 97165 OT EVAL LOW COMPLEX 30 MIN: CPT | Mod: GO

## 2021-03-24 PROCEDURE — 80048 BASIC METABOLIC PNL TOTAL CA: CPT | Performed by: INTERNAL MEDICINE

## 2021-03-24 PROCEDURE — 250N000013 HC RX MED GY IP 250 OP 250 PS 637: Performed by: INTERNAL MEDICINE

## 2021-03-24 PROCEDURE — 97530 THERAPEUTIC ACTIVITIES: CPT | Mod: GP | Performed by: PHYSICAL THERAPIST

## 2021-03-24 PROCEDURE — 97535 SELF CARE MNGMENT TRAINING: CPT | Mod: GO

## 2021-03-24 PROCEDURE — 999N001017 HC STATISTIC GLUCOSE BY METER IP

## 2021-03-24 PROCEDURE — 93325 DOPPLER ECHO COLOR FLOW MAPG: CPT

## 2021-03-24 PROCEDURE — 250N000012 HC RX MED GY IP 250 OP 636 PS 637: Performed by: INTERNAL MEDICINE

## 2021-03-24 PROCEDURE — 97162 PT EVAL MOD COMPLEX 30 MIN: CPT | Mod: GP | Performed by: PHYSICAL THERAPIST

## 2021-03-24 PROCEDURE — 93321 DOPPLER ECHO F-UP/LMTD STD: CPT | Mod: 26 | Performed by: INTERNAL MEDICINE

## 2021-03-24 PROCEDURE — 36415 COLL VENOUS BLD VENIPUNCTURE: CPT | Performed by: INTERNAL MEDICINE

## 2021-03-24 PROCEDURE — 120N000001 HC R&B MED SURG/OB

## 2021-03-24 PROCEDURE — 86140 C-REACTIVE PROTEIN: CPT | Performed by: INTERNAL MEDICINE

## 2021-03-24 PROCEDURE — 97116 GAIT TRAINING THERAPY: CPT | Mod: GP | Performed by: PHYSICAL THERAPIST

## 2021-03-24 PROCEDURE — 99233 SBSQ HOSP IP/OBS HIGH 50: CPT | Performed by: INTERNAL MEDICINE

## 2021-03-24 PROCEDURE — 92610 EVALUATE SWALLOWING FUNCTION: CPT | Mod: GN

## 2021-03-24 PROCEDURE — 97110 THERAPEUTIC EXERCISES: CPT | Mod: GP | Performed by: PHYSICAL THERAPIST

## 2021-03-24 PROCEDURE — 93325 DOPPLER ECHO COLOR FLOW MAPG: CPT | Mod: 26 | Performed by: INTERNAL MEDICINE

## 2021-03-24 RX ORDER — LABETALOL HYDROCHLORIDE 5 MG/ML
20 INJECTION, SOLUTION INTRAVENOUS EVERY 4 HOURS PRN
Status: DISCONTINUED | OUTPATIENT
Start: 2021-03-24 | End: 2021-03-31 | Stop reason: HOSPADM

## 2021-03-24 RX ADMIN — GLIPIZIDE 10 MG: 5 TABLET ORAL at 15:44

## 2021-03-24 RX ADMIN — Medication 12.5 MG: at 20:29

## 2021-03-24 RX ADMIN — ASPIRIN 81 MG: 81 TABLET ORAL at 08:55

## 2021-03-24 RX ADMIN — DEXAMETHASONE 6 MG: 2 TABLET ORAL at 14:22

## 2021-03-24 RX ADMIN — ACETAMINOPHEN 650 MG: 325 TABLET, FILM COATED ORAL at 08:54

## 2021-03-24 RX ADMIN — APIXABAN 5 MG: 5 TABLET, FILM COATED ORAL at 20:29

## 2021-03-24 RX ADMIN — FAMOTIDINE 20 MG: 20 TABLET ORAL at 08:54

## 2021-03-24 RX ADMIN — ATORVASTATIN CALCIUM 40 MG: 40 TABLET, FILM COATED ORAL at 08:54

## 2021-03-24 RX ADMIN — Medication 12.5 MG: at 08:55

## 2021-03-24 RX ADMIN — GLIPIZIDE 10 MG: 5 TABLET ORAL at 08:55

## 2021-03-24 RX ADMIN — INSULIN GLARGINE 35 UNITS: 100 INJECTION, SOLUTION SUBCUTANEOUS at 09:13

## 2021-03-24 RX ADMIN — FAMOTIDINE 20 MG: 20 TABLET ORAL at 20:31

## 2021-03-24 RX ADMIN — INSULIN ASPART 4 UNITS: 100 INJECTION, SOLUTION INTRAVENOUS; SUBCUTANEOUS at 21:30

## 2021-03-24 RX ADMIN — ACETAMINOPHEN 650 MG: 325 TABLET, FILM COATED ORAL at 18:33

## 2021-03-24 RX ADMIN — APIXABAN 5 MG: 5 TABLET, FILM COATED ORAL at 08:54

## 2021-03-24 RX ADMIN — LISINOPRIL 10 MG: 10 TABLET ORAL at 08:55

## 2021-03-24 ASSESSMENT — ACTIVITIES OF DAILY LIVING (ADL)
PREVIOUS_RESPONSIBILITIES: MEAL PREP;HOUSEKEEPING;LAUNDRY;SHOPPING;MEDICATION MANAGEMENT;FINANCES;DRIVING
ADLS_ACUITY_SCORE: 15
ADLS_ACUITY_SCORE: 16
ADLS_ACUITY_SCORE: 15
ADLS_ACUITY_SCORE: 16
ADLS_ACUITY_SCORE: 15
ADLS_ACUITY_SCORE: 15

## 2021-03-24 NOTE — PLAN OF CARE
Admitted with COVID. Alert, oriented x 2-3. Forgetful. Difficulty with date, time and word find. Unable to stand safely. Gait belt needed. Alarms on. Incontinent  BB. No discharge plans noted at this time.

## 2021-03-24 NOTE — PLAN OF CARE
Vital signs stable, 94-96% on 1.5 liters of oxygen.  Tele is SR with first degree AVB.  .  Patient did have one incontinent soft stool and set bed alarm off trying to get up to the bathroom.  Reminded of the need to call for help when trying to get out of bed.  Verbalized understanding but can be forgetful at times.  Plan is for and Echo, PT/OT consults today.  Up with assist of one and a gait belt.  Continue with POC.

## 2021-03-24 NOTE — ED NOTES
Pt had a bowel movement in bed. Assisted RN in changing pts linens, clothes, and briefs. Pt tolerated well. RN notified.

## 2021-03-24 NOTE — PLAN OF CARE
VSS on 1.5L O2 NC.  A/Ox4, forgetful.  Denies pain.  LS dim, denies SOB.  Tele: SR, denies CP.  Trending trops.   BLE +1 edema.  Abrasions on bilat knees and L hand.  Inc of bowel and bladder at times.  Up A1 w/ GB.  Mod CHO diet.  .   R PIV SL.  Getting MRI now.  Discharge tbd.  Continue POC.

## 2021-03-24 NOTE — PHARMACY-ADMISSION MEDICATION HISTORY
Admission medication history interview status for this patient is complete. See EPIC admission navigator for allergy information, prior to admission medications and immunization status.     Medication history interview done, indicate source(s): Patient  Medication history resources (including written lists, pill bottles, clinic record):   Pharmacy: [facility] - [place], [place] - [address] [phone]    Changes made to PTA medication list:  Added: Metoprolol tartrate; Tacrolimus oint; Triamcinolone cream;  Deleted: Metoprolol succinate;   Changed:   1. Metformin 500mg daily --> 1000mg BID (ER)  2. Lantus 28 units daily --> 35 units QAM      Actions taken by pharmacist (provider contacted, etc):None     Additional medication history information:None    Medication reconciliation/reorder completed by provider prior to medication history?  Yes           Prior to Admission medications    Medication Sig Last Dose Taking? Auth Provider   apixaban ANTICOAGULANT (ELIQUIS) 5 MG tablet Take 1 tablet (5 mg) by mouth 2 times daily 3/23/2021 at x1 Yes Poornima Khoury MD   ASPIRIN PO Take 81 mg by mouth 3/23/2021 at AM Yes Reported, Patient   atorvastatin (LIPITOR) 80 MG tablet Take 40 mg by mouth daily  3/23/2021 at AM Yes Unknown, Entered By History   glipiZIDE (GLUCOTROL) 10 MG tablet Take 10 mg by mouth 2 times daily (before meals)  3/23/2021 at x1 Yes Unknown, Entered By History   insulin glargine (LANTUS) 100 UNIT/ML injection Inject 35 Units Subcutaneous every morning  3/23/2021 at AM Yes Unknown, Entered By History   lisinopril (ZESTRIL) 20 MG tablet Take 10 mg by mouth daily 3/23/2021 at AM Yes Unknown, Entered By History   metFORMIN (GLUCOPHAGE-XR) 500 MG 24 hr tablet Take 1,000 mg by mouth 2 times daily (with meals)  3/23/2021 at x1 Yes Unknown, Entered By History   metoprolol tartrate (LOPRESSOR) 25 MG tablet Take 12.5 mg by mouth 2 times daily 3/23/2021 at x1 Yes Unknown, Entered By History    tacrolimus (PROTOPIC) 0.1 % external ointment Apply topically 2 times daily More than a month at Unknown time  Unknown, Entered By History   triamcinolone (KENALOG) 0.1 % external cream Apply topically 2 times daily More than a month at Unknown time  Unknown, Entered By History

## 2021-03-24 NOTE — PROGRESS NOTES
03/24/21 1504   Living Environment   People in home friend(s)   Current Living Arrangements apartment   Home Accessibility stairs to enter home;stairs within home   Number of Stairs, Main Entrance 8   Stair Railings, Main Entrance railing on right side (ascending)   Number of Stairs, Within Home, Primary 8   Stair Railings, Within Home, Primary railing on right side (ascending)   Transportation Anticipated car, drives self;family or friend will provide   Living Environment Comments Pt lives in apartment with friends. Has walk in shower, grab bars and shower chair. Pt very confused at time of eval, therefore pt may not be an accurate historian.    Self-Care   Usual Activity Tolerance good   Current Activity Tolerance good   Activity/Exercise/Self-Care Comment Pt reports being IND w/ ADL at baseline   Disability/Function   Hearing Difficulty or Deaf no   Wear Glasses or Blind no   Concentrating, Remembering or Making Decisions Difficulty no  ((per pt))   Difficulty Communicating no   Difficulty Eating/Swallowing no   Walking or Climbing Stairs Difficulty no   Dressing/Bathing Difficulty no   Toileting issues no   Doing Errands Independently Difficulty (such as shopping) no   Fall history within last six months yes   Number of times patient has fallen within last six months 1   Change in Functional Status Since Onset of Current Illness/Injury yes   General Information   Onset of Illness/Injury or Date of Surgery 03/23/21   Referring Physician Glen Brooks MD   Patient/Family Therapy Goal Statement (OT) None stated   Additional Occupational Profile Info/Pertinent History of Current Problem Yanick Adorno is a 75 year old male former smoker with PMH including IDDM type II, HTN, paroxysmal A. fib on Eliquis, left MCA CVA in 2018, mild to moderate nonobstructive CAD, and a nonischemic cardiomyopathy with EF 40-45% in 2018 who presents with left rib pain and slurred speech following a fall 3 days ago.  [place] on his left side.  He is not sure why he fell whether he tripped or if he was having any lightheadedness prior to falling.  Over the last 2 days he and his daughter is noticed that he has had some slurred speech and word finding difficulties.  It completely resolved while in the ER.    Existing Precautions/Restrictions fall   Limitations/Impairments safety/cognitive   General Observations and Info Activity: Ambulate in room   Cognitive Status Examination   Orientation Status person   Affect/Mental Status (Cognitive) flat/blunted affect   Follows Commands follows one-step commands   Safety Deficit moderate deficit;awareness of need for assistance;impulsivity;insight into deficits/self-awareness;judgment;problem-solving   Memory Deficit moderate deficit   Attention Deficit moderate deficit   Executive Function Deficit moderate deficit;insight/awareness of deficits;judgment;organization/sequencing;planning/decision-making;problem-solving/reasoning;self-monitoring/self-correction   Cognitive Status Comments Pt oriented to self only, stating it was December 24th, 1927. Pt appearing overall unaware of deficits, though after significant prompting pt agreeing that he feels a little confused. Word finding difficulty noted throughout.    Visual Perception   Visual Impairment/Limitations WFL   Range of Motion Comprehensive   General Range of Motion bilateral upper extremity ROM WFL   Strength Comprehensive (MMT)   Comment, General Manual Muscle Testing (MMT) Assessment wfl   Instrumental Activities of Daily Living (IADL)   Previous Responsibilities meal prep;housekeeping;laundry;shopping;medication management;finances;driving   IADL Comments Pt reports being IND at baseline, reports no assist available   Clinical Impression   Criteria for Skilled Therapeutic Interventions Met (OT) yes;meets criteria;skilled treatment is necessary   OT Diagnosis Decreased ADL/IADL I   OT Problem  List-Impairments impacting ADL problems related to;activity tolerance impaired;cognition;strength   Assessment of Occupational Performance 5 or more Performance Deficits   Identified Performance Deficits Dressing, bathing, toileting, home mgmt, med mgmt, financial mgmt   Planned Therapy Interventions (OT) ADL retraining;IADL retraining;cognition;strengthening;home program guidelines;progressive activity/exercise;risk factor education   Clinical Decision Making Complexity (OT) low complexity   Therapy Frequency (OT) 5x/week   Predicted Duration of Therapy 1 week   Anticipated Equipment Needs Upon Discharge (OT) other (see comments)  (TBD)   Risk & Benefits of therapy have been explained evaluation/treatment results reviewed;care plan/treatment goals reviewed;current/potential barriers reviewed;participants voiced agreement with care plan;participants included;patient   Comment-Clinical Impression Pt will benefit from skilled OT services to progress ADL/IADL I and support safe discharge plan   OT Discharge Planning    OT Discharge Recommendation (DC Rec) Transitional Care Facility   OT Rationale for DC Rec Pt is significantly below baseline, presenting with limitations in functional cognition and activity tolerance. At this time, due to cognitive deficits and level of assist needed for functional mobility, pt would not be safe to discharge home. Recommend pt discharge to TCU to progress activity tolerance and functional cognition.    Total Evaluation Time (Minutes)   Total Evaluation Time (Minutes) 5

## 2021-03-24 NOTE — PROGRESS NOTES
[facility]    Hospitalist Progress Note      Assessment & Plan   Yanick Adorno is a 75 year old male who was admitted on 3/23/2021.    Summary of Stay:   Yanick Adorno is a 75 year old male former smoker with PMH including IDDM type II, HTN, paroxysmal A. fib on Eliquis, left MCA CVA in 2018, mild to moderate nonobstructive CAD, and a nonischemic cardiomyopathy with EF 40-45% in 2018 who presents with left rib pain and slurred speech following a fall 3 days ago.  [place] on his left side.  He is not sure why he fell whether he tripped or if he was having any lightheadedness prior to falling.  Over the last 2 days he and his daughter is noticed that he has had some slurred speech and word finding difficulties.  It completely resolved while in the ER.   In the ER, he was noted to be hypoxic.  He was started on oxygen.  He is COVID-19 PCR came back positive.  Negative influenza.    He also underwent a stroke work-up in the ER. Noncontrast head CT shows his old left MCA infarct, but nothing else acute.  CTA of the head neck shows moderate focal stenosis of the P1 segment of the right vertebral artery and also comments on some patchy groundglass opacities in the right lung.      Due to this and the positive COVID-19 test he underwent a CT of the chest which shows some patchy bilateral groundglass opacities which would be consistent with COVID-19 pneumonia along with mild mediastinal and hilar adenopathy that is likely reactive.  Incidentally there is a 6 mm pulmonary nodule in the right middle lobe.    MRI brain did not reveal any acute stroke.    Echocardiogram: The visual ejection fraction is estimated at 35-40%. LVEF was 40-45% in 2018. Grade I or early diastolic dysfunction. The left atrium is mild to moderately dilated.    Plan:    Acute hypoxic respiratory failure.  COVID-19 pneumonia.  -Started on dexamethasone.  Remdesivir was not started due to  uncertain timing of the symptoms.  -Continue oxygen support.  -On apixaban, home medication.    Difficulty swallowing.  -This morning, the nurse noted that patient was having difficulty swallowing.  He kept her medications in his mouth for almost 7 minutes.  -Speech therapy has been consulted.    Paroxysmal A. fib  -On metoprolol and Eliquis, continue.    Diabetes  -Hold Metformin due to hypoxia.  On glipizide.  On Lantus and sliding scale insulin.  Expect hyperglycemia in the setting of dexamethasone use.    cardiomyopathy.  Heart failure with reduced ejection fraction.  -Repeat echocardiogram shows worsening of EF, 35 to 40%.  It was 40 to 45% in 2018.  -From CHF point of view he is probably euvolemic at this point.  -Nonobstructive coronary artery disease in 2018.  -On aspirin, apixaban, beta-blocker, ACE inhibitor, statin.    Elevated troponin  -In the setting of hypoxia.  Patient denies any chest pain.  No wall motion abnormality documented.  Given worsening of ejection fraction, it would be reasonable to refer him to cardiology in the outpatient setting.    Incidental finding, pulmonary nodule:   -6 mm pulmonary nodule noted in the right middle lobe.  He is a former smoker.  Recommend repeat CT scan at 6-month interval.      DVT Prophylaxis: eliquis  Code Status: Full Code  Expected discharge: 2-3 days    Vahid Hamilton MD  Text Page (7am - 6pm, M-F)    Interval History   Patient was evaluated with nursing staff. Overnight issues discussed.    Review of systems:  No nausea or vomiting.  No abdominal pain.  No diarrhea.  No chest pain/palpitations.  No new cough/shortness of breath.  No headache/visual disturbance/new weakness.    -Data reviewed today: Labs and medications.    Physical Exam   Temp: 99.4  F (37.4  C) Temp src: Oral BP: (!) 148/78 Pulse: 109   Resp: 18 SpO2: 90 % O2 Device: None (Room air) Oxygen Delivery: 3 LPM  Vitals:    03/23/21 1358 03/23/21 2046   Weight: 86.2 kg (190 lb) 87.6 kg (193 lb 3.2  oz)     Vital Signs with Ranges  Temp:  [97.9  F (36.6  C)-102.4  F (39.1  C)] 99.4  F (37.4  C)  Pulse:  [] 109  Resp:  [11-26] 18  BP: (119-234)/() 148/78  SpO2:  [83 %-99 %] 90 %  I/O last 3 completed shifts:  In: 3 [I.V.:3]  Out: 250 [Urine:250]    Constitutional: Awake, alert, cooperative, no apparent distress  HEENT: Trachea midline, sclera is clear   Respiratory: No crackles. No wheezing. Equal breath sounds bilaterally.  Cardiovascular: Regular rate and rhythm, normal S1 and S2, and no murmur noted  GI: Normal bowel sounds, soft, non-distended, non-tender  Neurologic:  No focal deficit.  Slow to respond.  Oriented to place, year, name of the president.  But unable to tell me why he is here in the hospital.    Medications     - MEDICATION INSTRUCTIONS -         apixaban ANTICOAGULANT  5 mg Oral BID     aspirin  81 mg Oral Daily     atorvastatin  40 mg Oral Daily     dexamethasone  6 mg Oral Daily     famotidine  20 mg Oral BID     glipiZIDE  10 mg Oral BID AC     insulin aspart  1-7 Units Subcutaneous TID AC     insulin aspart  1-5 Units Subcutaneous At Bedtime     insulin aspart   Subcutaneous TID w/meals     insulin glargine  35 Units Subcutaneous QAM AC     lisinopril  10 mg Oral Daily     metoprolol tartrate  12.5 mg Oral BID     sodium chloride (PF)  3 mL Intracatheter Q8H       Data   Recent Labs   Lab 03/24/21  0756 03/23/21  2358 03/23/21  1836 03/23/21  1423   WBC 7.6  --   --  5.8   HGB 14.3  --   --  14.4     --   --  96     --   --  186     --   --  137   POTASSIUM 4.0  --   --  3.8   CHLORIDE 106  --   --  102   CO2 27  --   --  26   BUN 21  --   --  20   CR 0.90  --   --  1.12   ANIONGAP 7  --   --  9   ROSA 8.7  --   --  8.6   *  --   --  208*   ALBUMIN  --   --   --  2.9*   PROTTOTAL  --   --   --  7.5   BILITOTAL  --   --   --  0.9   ALKPHOS  --   --   --  69   ALT  --   --   --  26   AST  --   --   --  30   TROPI  --  0.057* 0.054* 0.064*       Recent  Results (from the past 24 hour(s))   CT Head w/o Contrast    Narrative    CT SCAN OF THE HEAD WITHOUT CONTRAST   3/23/2021 4:48 PM     HISTORY: Recent fall, altered mental status.    TECHNIQUE:  Axial images of the head and coronal reformations without  IV contrast material. Radiation dose for this scan was reduced using  automated exposure control, adjustment of the mA and/or kV according  to patient size, or iterative reconstruction technique.    COMPARISON: MRI of the brain dated 1/21/2018.    FINDINGS: Chronic left middle cerebral artery territory ischemic  infarct centered in the left inferior frontal gyrus and its respective  subcortical white matter. Moderate to advanced patchy and confluent  areas of hypoattenuation in the periventricular and deep cerebral  white matter and also in the subcortical white matter, nonspecific,  but presumably related to chronic small vessel ischemic disease. Mild  to moderate generalized brain parenchymal volume loss. The ventricles  are within normal limits in size and configuration. No acute  intracranial hemorrhage, extra-axial fluid collection, mass lesion or  herniation.    Visualized aspects of the orbits appear within normal limits. Mild to  moderate scattered paranasal sinus trace fluid in the bilateral  mastoid air cells. The middle ear cavities appear grossly clear.  Mucosal thickening. The bony calvarium and bones of the skull base  appear intact.       Impression    IMPRESSION:  1. No CT findings of acute intracranial abnormality.  2. Chronic left middle cerebral artery territory infarct.  3. Brain atrophy and presumed chronic small vessel ischemic disease,  as described.    GINI PRICE MD   CTA Head Neck with Contrast    Narrative    CT ANGIOGRAM OF THE HEAD AND NECK WITH CONTRAST  3/23/2021 4:50 PM     HISTORY: Fall, on Eliquis, per daughter, recent unsteady gait and  difficulty with word finding, now resolved, rule out CVA.    TECHNIQUE: CT angiography with an  injection of 70mL Isovue-370 IV with  scans through the head and neck. Images were transferred to a separate  3-D workstation where multiplanar reformations and 3-D images were  created. Estimates of carotid stenoses are made relative to the distal  internal carotid artery diameters except as noted. Radiation dose for  this scan was reduced using automated exposure control, adjustment of  the mA and/or kV according to patient size, or iterative  reconstruction technique.    COMPARISON: CT head dated 3/23/2021. MRI head dated 1/21/2018.    CT HEAD FINDINGS: No contrast enhancing lesions. Cerebral blood flow  is grossly normal.     CT ANGIOGRAM HEAD FINDINGS: Mild nonflow-limiting atherosclerosis  involving the bilateral carotid siphons. The major intracranial  arteries including the proximal branches of the anterior cerebral,  middle cerebral, and posterior cerebral arteries appear patent without  vascular cutoff. No aneurysm identified. No significant stenosis.  Venous circulation is unremarkable.     CT ANGIOGRAM NECK FINDINGS:   There is mild scattered atherosclerosis involving the aortic arch  without significant stenosis of the origins of the great vessels.  Common origin of the brachycephalic and left common carotid arteries,  a normal variant.     Right carotid artery: The right common and internal carotid arteries  are patent. Mild atherosclerotic disease at the carotid bifurcation  and proximal internal carotid artery without significant stenosis by  NASCET criteria.     Left carotid artery: The left common and internal carotid arteries are  patent. Mild atherosclerotic disease at the carotid bifurcation and  proximal internal carotid artery without significant stenosis by  NASCET criteria.     Vertebral arteries: Mild scattered atherosclerosis involving the  bilateral cervical vertebral arteries. Moderate focal stenosis  involving the V1 segment of the right vertebral artery (series 6 image  197).  Elsewhere, there is only minimal/mild scattered stenosis of the  cervical vertebral arteries.    Other findings: Scattered irregular groundglass opacities seen in the  visualized right upper lung zone (for example, see series 7 image  14-17), likely infectious/inflammatory in nature. Multiple enlarged  right hilar/mediastinal lymph nodes, better seen and characterized on  chest CT of same date. Please see that separate report for additional  details. Moderate to severe degenerative disc space narrowing at  C6-C7. Scattered uncovertebral and facet arthropathy of the cervical  spine.      Impression    IMPRESSION:  1. No flow limiting stenosis or large vessel occlusion involving the  major craniocervical arterial vasculature.  2. Mild atherosclerotic disease involving the bilateral carotid  bifurcations and carotid siphons without significant stenosis.  3. Scattered atherosclerotic disease involving the bilateral  cervicovertebral arteries. There is a moderate focal stenosis  involving the V1 segment of the right vertebral artery with  minimal/mild stenosis elsewhere.  4. Partially visualized patchy groundglass opacities in the right lung  with mediastinal and right hilar lymphadenopathy. This is better  characterized on chest CT of same date. Please see that separate  report for additional details.    GINI PRICE MD   Chest CT w IV contrast only, TRAUMA / DISSECTION    Narrative    CT CHEST WITH CONTRAST 3/23/2021 4:51 PM    CLINICAL HISTORY: Left-sided rib pain after trauma 48 hours ago, rule  out rib fracture, pneumothorax.    TECHNIQUE: CT chest with IV contrast. Multiplanar reformats were  obtained. Dose reduction techniques were used.    CONTRAST: 70mL Isovue-370    COMPARISON: None.    FINDINGS:   LUNGS AND PLEURA: There are patchy bilateral groundglass opacities  with peripheral and basilar predominance. No pneumothorax or pleural  effusion. Noncalcified pulmonary nodule in the right middle lobe  measures 6  mm (series 7, 159).    MEDIASTINUM/AXILLAE: Severe coronary calcifications are present.  Enlarged right hilar lymph node measures 1.8 cm in short axis (series  4, image 56). There are several prominent mediastinal and left hilar  lymph nodes as well. No axillary adenopathy. Unremarkable thyroid.    UPPER ABDOMEN: Fatty infiltration of the liver. Cholelithiasis.  Partial visualization of an infrarenal abdominal aortic aneurysm  measures up to 3.3 cm at the lowest margin of this study.    MUSCULOSKELETAL: Unremarkable.      Impression    IMPRESSION:   1.  No evidence of acute rib abnormality or pneumothorax.  2.  Patchy bilateral groundglass opacities are nonspecific. Imaging  features can be seen with (COVID-19)  pneumonia, though are  nonspecific and can occur with a variety of infectious and  noninfectious processes.  3.  Severe coronary calcifications.  4.  Mild mediastinal and hilar adenopathy is likely reactive to the  pulmonary process.  5.  Incidental 6 mm pulmonary nodule.  6.  Fatty infiltration of the liver.  7.  Partial visualization of an infrarenal abdominal aortic aneurysm.  Nonemergent follow-up with ultrasound or abdominal CT recommended.  8.  Cholelithiasis.    Recommendations for an incidental lung nodule = or > 6mm to 8mm:    Low risk patients: Initial follow-up CT at 6-12 months, then  consider CT at 18-24 months if no change.    High risk patients: Initial follow-up CT at 6-12 months, then CT at  18-24 months if no change.    *Low Risk: Minimal or absent history of smoking or other known risk  factors.  *Nonsolid (ground-glass) or partly solid nodules may require longer  follow-up to exclude indolent adenocarcinoma.  *Recommendations based on Guidelines for the Management of Incidental  Pulmonary Nodules Detected at CT: From the Fleischner Society 2017,  Radiology 2017.    RED CAMPOVERDE MD   MR Brain w/o & w Contrast    Narrative    EXAM: MR BRAIN W/O and W CONTRAST  LOCATION: [facility]  Services  DATE/TIME: 3/23/2021 10:24 PM    INDICATION: Transient ischemic attack (TIA)  COMPARISON: CTA head and neck of 2021 and brain MRI 2018  CONTRAST: 7.5 mL Gadavist  TECHNIQUE: Routine multiplanar multisequence head MRI without and with intravenous contrast.    FINDINGS:  INTRACRANIAL CONTENTS: No acute or subacute infarct. No mass, acute hemorrhage, or extra-axial fluid collections. Mild to moderate volume loss and moderate presumed chronic small vessel ischemia mildly progressive since 2018. Interval evolutionary   changes of the now chronic left frontal infarct imaged in its acute phase 2018. No pathologic contrast enhancement.    SELLA: No abnormality accounting for technique.    OSSEOUS STRUCTURES/SOFT TISSUES: Normal marrow signal. The major intracranial vascular flow voids are maintained.     ORBITS: No abnormality accounting for technique.     SINUSES/MASTOIDS: Mild to moderate mucosal thickening ethmoid air cells and maxillary sinuses. Small right and trace left mastoid effusions.      Impression    IMPRESSION:  1.  No acute intracranial abnormality.    2.  Mildly progressive age-related changes compared to 2018.    3.  Chronic left frontal infarct.   Echocardiogram Limited    Narrative    [id number]  [id number]  [id number]  [id number]^WILLAM^HEIDE^SATNAM     [facility]  Echocardiography Laboratory  [address] [place] [address]     Name: KWAME MULLEN  MRN: [id number]  : 1945  Study Date: 2021 07:18 AM  Age: 75 yrs  Gender: Male  Patient Location: [facility]  Reason For Study: TIA  Ordering Physician: HEIDE QUARLES  Referring Physician: Candi Dumont  Performed By: Katie Schmidt RDCS     BSA: 2.1 m2  Height: 71 in  Weight: 190 lb  HR: 60  BP: 141/72 mmHg  ______________________________________________________________________________  Procedure  Limited Portable Echo  Adult.  ______________________________________________________________________________  Interpretation Summary     The visual ejection fraction is estimated at 35-40%. LVEF was 40-45% in 2018  Grade I or early diastolic dysfunction.  The left atrium is mild to moderately dilated.  ______________________________________________________________________________  Left Ventricle  The left ventricle is normal in size. There is normal left ventricular wall  thickness. The visual ejection fraction is estimated at 35-40%. Grade I or  early diastolic dysfunction.     Right Ventricle  The right ventricle is normal in structure, function and size.     Atria  The left atrium is mild to moderately dilated. Right atrial size is normal.     Mitral Valve  The mitral valve is normal in structure and function. There is trace mitral  regurgitation.     Tricuspid Valve  Normal tricuspid valve.     Aortic Valve  The aortic valve is normal in structure and function.     Pulmonic Valve  Normal pulmonic valve.     Vessels  The aortic root is normal size. The inferior vena cava is normal.     Pericardium  There is no pericardial effusion.     Rhythm  Sinus rhythm was noted.  ______________________________________________________________________________  Doppler Measurements & Calculations  MV E max callie: 59.7 cm/sec  MV A max callie: 93.1 cm/sec  MV E/A: 0.64  MV dec time: 0.12 sec     ______________________________________________________________________________  Report approved by: Evette Sahu 03/24/2021 11:03 AM

## 2021-03-24 NOTE — PROGRESS NOTES
I was called to review MRI and labs. MRI showed no stroke or acute finding. Markers of COVID severity labs reviewed and consistent with significant COVID-19 infection. CT chest showed no PE earlier- but did show extensive COVID pneumonia.

## 2021-03-24 NOTE — H&P
[facility]  Hospitalist Admission Note  Name: Yanick Adorno    MRN: [id number]  YOB: 1945    Age: 75 year old  Date of admission: 3/23/2021  Primary care provider: Candi Dumont    Chief Complaint: Fall, left rib pain, slurred speech    Assessment and Plan:   Suspect acute TIA, history CVA: History of a left MCA infarct in 2018.  He quit smoking then.  He remains on Eliquis for paroxysmal A. fib.  He is coming after a fall while walking at the [employer] 3 days ago although he does not know why he fell or if he lost consciousness.  He and his daughter noted some mild dysarthria and some word finding difficulties that day which seems to have improved.  Noncontrast head CT does not show any acute intracranial hemorrhage.  CTA of the head neck shows moderate focal stenosis of the V1 segment of the right vertebral artery, but no other severe narrowing.  There is nothing remarkable on my exam.  He does not think he hit his head, but he does not really remember the fall so concussion possible.  Concern for TIA versus small acute CVA that would be better picked up on MRI.  He has some mild left 12th rib pain worse with deep inspiration, for no fracture seen on CT chest.  -Obtain brain MRI  -PT/OT/SLP consults  -continue his Eliquis, aspirin, atorvastatin  -Acetaminophen as needed for left rib pain    Acute hypoxemic respiratory failure secondary to COVID-19 pneumonia: Hypoxic down to 87% on room air while I was in the room.  Now low 90s on 1-2 L via nasal cannula.  Has not really noticed any increased shortness of breath, cough, or fevers.  His COVID-19 PCR is positive and CT of the chest shows some bilateral groundglass opacities consistent with COVID-19 pneumonia.  -Given his hypoxia noted on room air and CT findings of bilateral groundglass opacities so will start dexamethasone 6 mg p.o. daily.  Does not appear critically ill and unclear timing of symptoms hold off on  Remdesevir    Paroxysmal A. fib: PTA on metoprolol tartrate 12.5 mg twice daily [facility] and Eliquis.  He has a tremor and it is difficult to determine if his EKG is NSR versus A. fib.  Rate is roughly .  -Continue PTA Eliquis and metoprolol  -Telemetry    IDDM type II: Need formal med rec but per [facility] he is on glargine 35 units daily in the morning, glipizide 10 mg twice daily, and Metformin  mg daily.  Most recent A1c on file is 8.4, but this is from 2018.  Blood glucose 208 in the ER.    -Anticipate hyperglycemia with dexamethasone initiation  -Hold Metformin, but continue glipizide  -Ordered glargine 35 units to start tomorrow morning along with 1 unit per 15 g carb aspart and medium dose line scale insulin    Troponin elevation, nonobstructive CAD, NICM, HTN: He is a history of a nonischemic cardiomyopathy with EF of 40-45% in 2018.  Coronary angiogram at that time when he had an NSTEMI in the setting of acute CVA showed mild to moderate nonobstructive CAD.  Denies any recent chest pain or pressure sensation.  His troponin is slightly elevated 0.064 and BNP is elevated at 3397.  He has trace right lower extremity edema.  No other significant CHF findings although there are some scattered groundglass opacities on CT which is more likely infectious from COVID-19 as opposed to pulmonary edema.  He is not on any diuretics at baseline.  Doubt ACS and troponin elevation more likely from demand ischemia.  [facility] shows lisinopril 10 mg daily, aspirin 81 mg daily, Eliquis 5 mg twice daily, and metoprolol tartrate 12.5 mg twice daily  -Serial troponin  -Obtain TTE, no bubble study needed as this is been done previously and was negative  -Telemetry  -Continue his aspirin, statin, metoprolol, Eliquis    Former smoker:  49-pack-year history.  May still be taking bupropion for this per [facility] med [facility], need to confirm with patient and order if he is  taking.    Incidental finding, pulmonary nodule: 6 mm pulmonary nodule noted in the right middle lobe.  He is a former smoker.  Recommend repeat CT scan at 6-month interval.    DVT Prophylaxis: Eliquis  Code Status: Full Code  FEN: Moderate consistent carbohydrate  Discharge Dispo: PT/OT/SLP consult  Estimated Disch Date / # of Days until Disch: Admit inpatient for COVID-19 pneumonia and hypoxia along with possible TIA.  Anticipate minimal 2 night hospitalization.      History of Present Illness:  Yanick Adorno is a 75 year old male former smoker with PMH including IDDM type II, HTN, paroxysmal A. fib on Eliquis, left MCA CVA in 2018, mild to moderate nonobstructive CAD, and a nonischemic cardiomyopathy with EF 40-45% in 2018 who presents with left rib pain and slurred speech following a fall 3 days ago.  [place] his left side.  He is not sure why he fell whether he tripped or if he was having any lightheadedness prior to falling.  He does not believe that he hit his head and he is not sure if he lost consciousness.  He has not been having any other falls although has felt some generalized weakness.  Since then he has had some left lateral lower chest wall pain that is worse with deep inspiration.  He rates it mild to moderate in intensity.  Over the last 2 days he and his daughter is noticed that he has had some slurred speech and word finding difficulties.  He says this is now completely resolved while in the ER.  He denies any focal weakness or numbness or tingling.  Has not been having any headache.  Denies any vision changes.  He reports taking his Eliquis as prescribed.  On review of systems he denies any fevers, chills, cough, shortness of breath, chest pain or pressure sensation.  He did have an episode of diarrhea a few days ago which is resolved.  He is feeling nauseous in the ER, but has not had any vomiting.  Denies any abdominal pain or flank  pain.    History obtained from patient, medical record, and from ROSALEE Chavez in the emergency department.  Initial blood pressure 93/65 with repeat 128/99.  Heart rate is been 85-1 10.  Temperature is 97.8  F.  Oxygen has been variable, but while I was in the room he drifted down to 87% on room air which improved to 92% on 2 L via nasal cannula.  Initial labs show CMP within normal limits except for albumin 2.9 and creatinine up slightly at 1.12.  His BNP is elevated at 3397.  Troponin mildly elevated at 0.064.  His blood glucose is 208.  CBC is within normal limits.  His urinalysis shows 2 WBCs, 4 RBCs, but moderate blood.  Influenza A/B PCR is negative, however his COVID-19 PCR is positive.  Noncontrast head CT shows his old left MCA infarct, but nothing else acute.  CTA of the head neck shows moderate focal stenosis of the P1 segment of the right vertebral artery and also comments on some patchy groundglass opacities in the right lung.  Due to this and the positive COVID-19 test he underwent a CT of the chest which shows some patchy bilateral groundglass opacities which would be consistent with COVID-19 pneumonia along with mild mediastinal and hilar adenopathy that is likely reactive.  Incidentally there is a 6 mm pulmonary nodule in the right middle lobe.  Concern for possible TIA versus small stroke so he needs evaluation for this.  Additionally due to the hypoxia recommend admission and starting on dexamethasone for COVID-19 pneumonia.  I asked CK level to be elevated on.     Past Medical History reviewed:  Past Medical History:   Diagnosis Date     Cardiomyopathy (H)      Coronary artery disease      CVA (cerebral vascular accident) (H) 01/21/2018    (L) frontal cerbral infarct     Esophageal reflux      Hypertension      New onset a-fib (H)      Nonischemic cardiomyopathy (H)      NSVT (nonsustained ventricular tachycardia) (H)      Paroxysmal atrial fibrillation (H)      Stroke (H) 1/21/2018      Tobacco abuse     quit when he had stroke     Type II or unspecified type diabetes mellitus without mention of complication, not stated as uncontrolled      Past Surgical History reviewed:  Past Surgical History:   Procedure Laterality Date     HEAD & NECK SURGERY      sinus     Social History reviewed:  Social History     Tobacco Use     Smoking status: Former Smoker     Packs/day: 1.50     Years: 33.00     Pack years: 49.50     Quit date: 2018     Years since quittin.8     Smokeless tobacco: Never Used   Substance Use Topics     Alcohol use: No     Social History     Social History Narrative     Not on file     Family History reviewed:  Family History   Problem Relation Age of Onset     Diabetes Father      Uterine Cancer Mother      Diabetes Brother      Diabetes Brother      Suicide Brother      Diabetes Brother      Allergies:  Allergies   Allergen Reactions     No Known Drug Allergies      Medications:  Prior to Admission medications    Medication Sig Last Dose Taking? Auth Provider   apixaban ANTICOAGULANT (ELIQUIS) 5 MG tablet Take 1 tablet (5 mg) by mouth 2 times daily   Poornima Khoury MD   ASPIRIN PO Take 81 mg by mouth   Reported, Patient   atorvastatin (LIPITOR) 40 MG tablet Take 1 tablet (40 mg) by mouth daily   Poornima Khoury MD   GLIPIZIDE PO Take 10 mg by mouth 2 times daily (before meals)   Unknown, Entered By History   insulin glargine (LANTUS) 100 UNIT/ML injection Inject 28 Units Subcutaneous every morning   Unknown, Entered By History   lisinopril (PRINIVIL/ZESTRIL) 10 MG tablet Take 1 tablet (10 mg) by mouth daily   Edin Choi MD   METFORMIN HCL PO Take 500 mg by mouth 2 times daily (with meals)    Reported, Patient   metoprolol succinate (TOPROL-XL) 25 MG 24 hr tablet Take 1 tablet (25 mg) by mouth daily   Tiffany Bowman PA-C     Review of Systems:  A Comprehensive greater than 10 system review of systems was carried out.  Pertinent positives and negatives are noted  above.  Otherwise negative.     Physical Exam:  Blood pressure (!) 158/110, pulse 101, temperature 97.8  F (36.6  C), temperature source Temporal, resp. rate 12, weight 86.2 kg (190 lb), SpO2 95 %.  Wt Readings from Last 1 Encounters:   03/23/21 86.2 kg (190 lb)     Exam:  Constitutional: Awake, NAD  Eyes: sclera white  HEENT: atraumatic, MMM  Respiratory: Few crackles on the right side, not on the left, no wheeze.  Appears comfortable on 1-2 L via nasal cannula  Cardiovascular: RRR.  1/6 systolic murmur  GI: non-tender, not distended, bowel sounds present  Skin:  Few skin tears on arms and legs   Musculoskeletal/extremities: Trace right lower extremity edema.  Mild left 12th rib tenderness to palpation  Neurologic: A&O, speech clear without any dysarthria or word finding difficulty, strength 5/5 in all extremities, light touch sensation intact peripherally, cranial nerves II-XII intact  Psychiatric: calm, cooperative, normal affect    Lab and imaging data personally reviewed:  Labs:  Recent Labs   Lab 03/23/21  1423   WBC 5.8   HGB 14.4   HCT 44.3   MCV 96        Recent Labs   Lab 03/23/21  1423      POTASSIUM 3.8   CHLORIDE 102   CO2 26   ANIONGAP 9   *   BUN 20   CR 1.12   GFRESTIMATED 65   GFRESTBLACK 75   ROSA 8.6   PROTTOTAL 7.5   ALBUMIN 2.9*   BILITOTAL 0.9   ALKPHOS 69   AST 30   ALT 26     Recent Labs   Lab 03/23/21  1423   NTBNPI 3,397*     Recent Labs   Lab 03/23/21  1423   TROPI 0.064*     Recent Labs   Lab 03/23/21  1741   COLOR Yellow   APPEARANCE Clear   URINEGLC 300*   URINEBILI Negative   URINEKETONE 10*   SG 1.020   UBLD Moderate*   URINEPH 5.5   PROTEIN 70*   NITRITE Negative   LEUKEST Negative   RBCU 4*   WBCU 2     Negative influenza A/B PCR  Positive COVID-19 PCR    EKG: Difficult to say if NSR versus A. fib due to artifact, no obvious ST elevation or depression    Imaging:  Recent Results (from the past 24 hour(s))   CT Head w/o Contrast    Narrative    CT SCAN OF THE HEAD  WITHOUT CONTRAST   3/23/2021 4:48 PM     HISTORY: Recent fall, altered mental status.    TECHNIQUE:  Axial images of the head and coronal reformations without  IV contrast material. Radiation dose for this scan was reduced using  automated exposure control, adjustment of the mA and/or kV according  to patient size, or iterative reconstruction technique.    COMPARISON: MRI of the brain dated 1/21/2018.    FINDINGS: Chronic left middle cerebral artery territory ischemic  infarct centered in the left inferior frontal gyrus and its respective  subcortical white matter. Moderate to advanced patchy and confluent  areas of hypoattenuation in the periventricular and deep cerebral  white matter and also in the subcortical white matter, nonspecific,  but presumably related to chronic small vessel ischemic disease. Mild  to moderate generalized brain parenchymal volume loss. The ventricles  are within normal limits in size and configuration. No acute  intracranial hemorrhage, extra-axial fluid collection, mass lesion or  herniation.    Visualized aspects of the orbits appear within normal limits. Mild to  moderate scattered paranasal sinus trace fluid in the bilateral  mastoid air cells. The middle ear cavities appear grossly clear.  Mucosal thickening. The bony calvarium and bones of the skull base  appear intact.       Impression    IMPRESSION:  1. No CT findings of acute intracranial abnormality.  2. Chronic left middle cerebral artery territory infarct.  3. Brain atrophy and presumed chronic small vessel ischemic disease,  as described.    GINI PRICE MD   CTA Head Neck with Contrast    Narrative    CT ANGIOGRAM OF THE HEAD AND NECK WITH CONTRAST  3/23/2021 4:50 PM     HISTORY: Fall, on Eliquis, per daughter, recent unsteady gait and  difficulty with word finding, now resolved, rule out CVA.    TECHNIQUE: CT angiography with an injection of 70mL Isovue-370 IV with  scans through the head and neck. Images were  transferred to a separate  3-D workstation where multiplanar reformations and 3-D images were  created. Estimates of carotid stenoses are made relative to the distal  internal carotid artery diameters except as noted. Radiation dose for  this scan was reduced using automated exposure control, adjustment of  the mA and/or kV according to patient size, or iterative  reconstruction technique.    COMPARISON: CT head dated 3/23/2021. MRI head dated 1/21/2018.    CT HEAD FINDINGS: No contrast enhancing lesions. Cerebral blood flow  is grossly normal.     CT ANGIOGRAM HEAD FINDINGS: Mild nonflow-limiting atherosclerosis  involving the bilateral carotid siphons. The major intracranial  arteries including the proximal branches of the anterior cerebral,  middle cerebral, and posterior cerebral arteries appear patent without  vascular cutoff. No aneurysm identified. No significant stenosis.  Venous circulation is unremarkable.     CT ANGIOGRAM NECK FINDINGS:   There is mild scattered atherosclerosis involving the aortic arch  without significant stenosis of the origins of the great vessels.  Common origin of the brachycephalic and left common carotid arteries,  a normal variant.     Right carotid artery: The right common and internal carotid arteries  are patent. Mild atherosclerotic disease at the carotid bifurcation  and proximal internal carotid artery without significant stenosis by  NASCET criteria.     Left carotid artery: The left common and internal carotid arteries are  patent. Mild atherosclerotic disease at the carotid bifurcation and  proximal internal carotid artery without significant stenosis by  NASCET criteria.     Vertebral arteries: Mild scattered atherosclerosis involving the  bilateral cervical vertebral arteries. Moderate focal stenosis  involving the V1 segment of the right vertebral artery (series 6 image  197). Elsewhere, there is only minimal/mild scattered stenosis of the  cervical vertebral  arteries.    Other findings: Scattered irregular groundglass opacities seen in the  visualized right upper lung zone (for example, see series 7 image  14-17), likely infectious/inflammatory in nature. Multiple enlarged  right hilar/mediastinal lymph nodes, better seen and characterized on  chest CT of same date. Please see that separate report for additional  details. Moderate to severe degenerative disc space narrowing at  C6-C7. Scattered uncovertebral and facet arthropathy of the cervical  spine.      Impression    IMPRESSION:  1. No flow limiting stenosis or large vessel occlusion involving the  major craniocervical arterial vasculature.  2. Mild atherosclerotic disease involving the bilateral carotid  bifurcations and carotid siphons without significant stenosis.  3. Scattered atherosclerotic disease involving the bilateral  cervicovertebral arteries. There is a moderate focal stenosis  involving the V1 segment of the right vertebral artery with  minimal/mild stenosis elsewhere.  4. Partially visualized patchy groundglass opacities in the right lung  with mediastinal and right hilar lymphadenopathy. This is better  characterized on chest CT of same date. Please see that separate  report for additional details.   Chest CT w IV contrast only, TRAUMA / DISSECTION    Narrative    CT CHEST WITH CONTRAST 3/23/2021 4:51 PM    CLINICAL HISTORY: Left-sided rib pain after trauma 48 hours ago, rule  out rib fracture, pneumothorax.    TECHNIQUE: CT chest with IV contrast. Multiplanar reformats were  obtained. Dose reduction techniques were used.    CONTRAST: 70mL Isovue-370    COMPARISON: None.    FINDINGS:   LUNGS AND PLEURA: There are patchy bilateral groundglass opacities  with peripheral and basilar predominance. No pneumothorax or pleural  effusion. Noncalcified pulmonary nodule in the right middle lobe  measures 6 mm (series 7, 159).    MEDIASTINUM/AXILLAE: Severe coronary calcifications are present.  Enlarged right  hilar lymph node measures 1.8 cm in short axis (series  4, image 56). There are several prominent mediastinal and left hilar  lymph nodes as well. No axillary adenopathy. Unremarkable thyroid.    UPPER ABDOMEN: Fatty infiltration of the liver. Cholelithiasis.  Partial visualization of an infrarenal abdominal aortic aneurysm  measures up to 3.3 cm at the lowest margin of this study.    MUSCULOSKELETAL: Unremarkable.      Impression    IMPRESSION:   1.  No evidence of acute rib abnormality or pneumothorax.  2.  Patchy bilateral groundglass opacities are nonspecific. Imaging  features can be seen with (COVID-19)  pneumonia, though are  nonspecific and can occur with a variety of infectious and  noninfectious processes.  3.  Severe coronary calcifications.  4.  Mild mediastinal and hilar adenopathy is likely reactive to the  pulmonary process.  5.  Incidental 6 mm pulmonary nodule.  6.  Fatty infiltration of the liver.  7.  Partial visualization of an infrarenal abdominal aortic aneurysm.  Nonemergent follow-up with ultrasound or abdominal CT recommended.  8.  Cholelithiasis.    Recommendations for an incidental lung nodule = or > 6mm to 8mm:    Low risk patients: Initial follow-up CT at 6-12 months, then  consider CT at 18-24 months if no change.    High risk patients: Initial follow-up CT at 6-12 months, then CT at  18-24 months if no change.    *Low Risk: Minimal or absent history of smoking or other known risk  factors.  *Nonsolid (ground-glass) or partly solid nodules may require longer  follow-up to exclude indolent adenocarcinoma.  *Recommendations based on Guidelines for the Management of Incidental  Pulmonary Nodules Detected at CT: From the Fleischner Society 2017,  Radiology 2017.    MD Glne CABELLO MD  Hospitalist  [facility]

## 2021-03-24 NOTE — PROGRESS NOTES
03/24/21 1300   Quick Adds   Type of Visit Initial PT Evaluation   Living Environment   People in home friend(s)   Current Living Arrangements apartment   Home Accessibility stairs to enter home;stairs within home   Number of Stairs, Main Entrance 8   Stair Railings, Main Entrance railing on right side (ascending)   Number of Stairs, Within Home, Primary 8   Stair Railings, Within Home, Primary railing on right side (ascending)   Transportation Anticipated car, drives self;family or friend will provide   Self-Care   Usual Activity Tolerance good   Current Activity Tolerance good   Regular Exercise No   Equipment Currently Used at Home none   Disability/Function   Difficulty Communicating no   Walking or Climbing Stairs Difficulty no   Dressing/Bathing Difficulty no   Toileting issues no   Doing Errands Independently Difficulty (such as shopping) no   Fall history within last six months yes   Number of times patient has fallen within last six months 1   General Information   Onset of Illness/Injury or Date of Surgery 03/21/21   Referring Physician Glen Polanco   Patient/Family Therapy Goals Statement (PT) Pt would like to return home at [place]   Pertinent History of Current Problem (include personal factors and/or comorbidities that impact the POC) admitted for COVID pneumonia   Existing Precautions/Restrictions fall;oxygen therapy device and L/min  (3 lpm via NC)   Weight-Bearing Status - LUE full weight-bearing   Weight-Bearing Status - RUE full weight-bearing   Weight-Bearing Status - LLE full weight-bearing   Weight-Bearing Status - RLE full weight-bearing   Cognition   Orientation Status (Cognition) oriented x 3   Affect/Mental Status (Cognition) WFL   Follows Commands (Cognition) follows one-step commands;increased processing time needed;repetition of directions required;verbal cues/prompting required   Safety Deficit (Cognition) moderate deficit;at risk behavior observed;awareness of need for  assistance;insight into deficits/self-awareness;impulsivity;safety precautions awareness;safety precautions follow-through/compliance   Memory Deficit (Cognition) short-term memory;recall, recent events   Pain Assessment   Patient Currently in Pain Yes, see Vital Sign flowsheet  (L flank from fall)   Integumentary/Edema   Integumentary/Edema Comments Noted abrasions B knees and L UE, from fall   Strength   Strength Comments decreaed B LE strength for sit to stand and mm endurance for functional mob   Bed Mobility   Bed Mobility supine-sit   Supine-Sit Grant (Bed Mobility) minimum assist (75% patient effort);verbal cues;nonverbal cues (demo/gesture)   Bed Mobility Limitations decreased ability to use arms for pushing/pulling;decreased ability to use legs for bridging/pushing;impaired ability to control trunk for mobility;cognitive deficits   Impairments Contributing to Impaired Bed Mobility impaired balance;impaired postural control;decreased strength   Transfers   Transfers transfer safety analysis;sit-stand transfer   Transfer Safety Concerns Noted decreased balance during turns;losing balance backward   Impairments Contributing to Impaired Transfers impaired balance;impaired postural control;decreased strength   Sit-Stand Transfer   Sit-Stand Grant (Transfers) verbal cues;minimum assist (75% patient effort)   Sit/Stand Transfer Comments Pt leans post, decreased control for descent, LOB x 2   Gait/Stairs (Locomotion)   Grant Level (Gait) contact guard;verbal cues;nonverbal cues (demo/gesture)   Distance in Feet (Required for LE Total Joints) 60   Pattern (Gait) swing-to   Deviations/Abnormal Patterns (Gait) festinating/shuffling   Balance   Balance Comments decreased sitting balance, falls post, decreased balance in sit to stand with post lean, decreased stability with turns, LOB x 2   Clinical Impression   Criteria for Skilled Therapeutic Intervention yes, treatment indicated   PT Diagnosis  (PT) difficulty walking   Influenced by the following impairments decreased strength, balance, act ba, SOB, cog deficits   Functional limitations due to impairments impaired functional mob I and safety   Clinical Presentation Evolving/Changing   Clinical Presentation Rationale 3-5 deficits   Clinical Decision Making (Complexity) moderate complexity   Therapy Frequency (PT) 5x/week   Predicted Duration of Therapy Intervention (days/wks) 4 days   Planned Therapy Interventions (PT) bed mobility training;balance training;gait training;strengthening;transfer training   Risk & Benefits of therapy have been explained evaluation/treatment results reviewed;care plan/treatment goals reviewed;risks/benefits reviewed;current/potential barriers reviewed;patient;participants included   PT Discharge Planning    PT Discharge Recommendation (DC Rec) Transitional Care Facility   PT Rationale for DC Rec Pt currently below baseline for functional mob, would benefit from cont skilled PT to progress strength and act ba and further safety training, currently will need FWW for impraired transfers and gait to decrease fall, pt with cog deficits, forgetful for safety awareness   PT Brief overview of current status  Pt requires min A for amb and transfers, start with FWW   Total Evaluation Time   Total Evaluation Time (Minutes) 15

## 2021-03-24 NOTE — PROGRESS NOTES
Clinical Swallow Evaluation:      03/24/21 1021   General Information   Onset of Illness/Injury or Date of Surgery 03/23/21   Referring Physician Dr. Hamilton   Patient/Family Therapy Goal Statement (SLP) To eat/drink   Pertinent History of Current Problem Pt admitted 3/23 s/p a fall, hitting head associated with slurred speech/word finding difficulties. Also found to have COVID-19 in ED. SLP consulted for speech and swallowing (given difficulty with pills this AM); of note pt does have a hx of mild-moderate expressive and receptive aphasia s/p L MCA CVA in 2018, recieved SLP services in past.    General Observations Pt alert, pleasant, cooperative, mild confusion but no overt difficulty with communication at this time   Past History of Dysphagia N/A    Type of Evaluation   Type of Evaluation Swallow Evaluation   Oral Motor   Oral Musculature generally intact   Structural Abnormalities none present   Mucosal Quality dry   Dentition (Oral Motor)   Dentition (Oral Motor)   (dentures not present, pt does not always use to eat)   Facial Symmetry (Oral Motor)   Facial Symmetry (Oral Motor) WNL   Lip Function (Oral Motor)   Lip Range of Motion (Oral Motor) WNL   Tongue Function (Oral Motor)   Tongue ROM (Oral Motor) WNL   Jaw Function (Oral Motor)   Jaw Function (Oral Motor) WNL   Cough/Swallow/Gag Reflex (Oral Motor)   Soft Palate/Velum (Oral Motor) WNL   Volitional Swallow (Oral Motor) WNL   Comment, Cough/Swallow/Gag Reflex (Oral Motor) cough not assessed d/t COVID-19 infection   Vocal Quality/Secretion Management (Oral Motor)   Vocal Quality (Oral Motor) WNL   Secretion Management (Oral Motor) WNL   General Swallowing Observations   Current Diet/Method of Nutritional Intake (General Swallowing Observations, NIS) NPO   Respiratory Support (General Swallowing Observations) nasal cannula   Swallowing Evaluation Clinical swallow evaluation   Clinical Swallow Evaluation   Feeding Assistance no assistance needed   Clinical  Swallow Evaluation Textures Trialed Thin Liquids;Puree Textures;Solid Foods   Clinical Swallow Eval: Thin Liquid Texture Trial   Mode of Presentation, Thin Liquids cup;straw;self-fed   Volume of Liquid or Food Presented 8 oz   Oral Phase of Swallow WFL   Pharyngeal Phase of Swallow intact   Diagnostic Statement Impulsive, though demonstrated adequate oral control, timely swallow, no overt signs/sx aspiration noted   Clinical Swallow Evaluation: Puree Solid Texture Trial   Mode of Presentation, Puree spoon;self-fed   Volume of Puree Presented 4 oz   Oral Phase, Puree WFL   Pharyngeal Phase, Puree intact   Diagnostic Statement Impulsive, though demonstrated adequate oral control, timely swallow, no overt signs/sx aspiration noted   Clinical Swallow Evaluation: Solid Food Texture Trial   Mode of Presentation, Solid self-fed   Volume of Solid Food Presented 3 terence crackers   Oral Phase, Solid WFL   Pharyngeal Phase, Solid intact   Diagnostic Statement Impulsive taking extremely large bites - mild prolonged mastication however complete with time; no oral residuals.    Esophageal Phase of Swallow   Esophageal comments belching at end of meal   Swallowing Recommendations   Diet Consistency Recommendations dysphagia level 3 (advanced);thin liquids   Swallowing Maneuver Recommendations alternate food and liquid intake   Monitoring/Assistance Required (Eating/Swallowing) stop eating activities when fatigue is present   Recommended Feeding/Eating Techniques (Swallow Eval) maintain upright sitting position for eating;maintain upright posture during/after eating for 30 minutes;minimize distractions during oral intake   Medication Administration Recommendations, Swallowing (SLP) Given oral holding with whole pills/water, can trial whole with puree vs crush if needed.   General Therapy Interventions   Planned Therapy Interventions Dysphagia Treatment   Intervention Comments monitor need for full comm/cog assessment   SLP Therapy  Assessment/Plan   Criteria for Skilled Therapeutic Interventions Met (SLP Eval) yes;treatment indicated   SLP Diagnosis minimal dysphagia'   Rehab Potential (SLP Eval) good, to achieve stated therapy goals   Therapy Frequency (SLP Eval) 3 times/wk   Predicted Duration of Therapy Intervention (SLP Eval) 1 week   Comment, Therapy Assessment/Plan (SLP) Pt presents with minimal oropharyngeal dysphagia at bedside 2/2 to impulsivity, mild reduced swallow/respiratory coordination 2/2 COVID-19 infection. Pt edentulous (has dentures at home, though reports he does not always use dentures to eat/drink) - mildly prolonged mastication though complete; no oral residuals. Timely swallow with consistent laryngeal elevation. No overt signs/sx aspiraiton noted across PO. Recommend DD3 solids given impulsivity, minimal oral phase deficits, mild SOB; OK for thin liquids. SLP to follow 3x/week to monitor tolerance, trial for upgrades as needed, monitor need for cognitive communication assessment.    Therapy Plan Review/Discharge Plan (SLP)   Therapy Plan Review (SLP) evaluation/treatment results reviewed;care plan/treatment goals reviewed;participants voiced agreement with care plan;participants included;patient   SLP Discharge Planning    SLP Discharge Recommendation (DC Rec) home   SLP Rationale for DC Rec Pt near baseline, anticipate only short term SLP needs   SLP Brief overview of current status  Minimal dysphagia at bedside - recommend DD3, thin liquids when upright; medications whole as tolerated (can trial with applesauce if significant oral holding), crush if needed.    Total Evaluation Time   Total Evaluation Time (Minutes) 49

## 2021-03-25 ENCOUNTER — APPOINTMENT (OUTPATIENT)
Dept: PHYSICAL THERAPY | Facility: CLINIC | Age: 76
DRG: 177 | End: 2021-03-25
Payer: MEDICARE

## 2021-03-25 LAB
ANION GAP SERPL CALCULATED.3IONS-SCNC: 8 MMOL/L (ref 3–14)
BUN SERPL-MCNC: 21 MG/DL (ref 7–30)
CALCIUM SERPL-MCNC: 8.4 MG/DL (ref 8.5–10.1)
CHLORIDE SERPL-SCNC: 107 MMOL/L (ref 94–109)
CO2 SERPL-SCNC: 23 MMOL/L (ref 20–32)
CREAT SERPL-MCNC: 0.85 MG/DL (ref 0.66–1.25)
CRP SERPL-MCNC: 121 MG/L (ref 0–8)
ERYTHROCYTE [DISTWIDTH] IN BLOOD BY AUTOMATED COUNT: 13.8 % (ref 10–15)
GFR SERPL CREATININE-BSD FRML MDRD: 86 ML/MIN/{1.73_M2}
GLUCOSE BLDC GLUCOMTR-MCNC: 177 MG/DL (ref 70–99)
GLUCOSE BLDC GLUCOMTR-MCNC: 228 MG/DL (ref 70–99)
GLUCOSE BLDC GLUCOMTR-MCNC: 257 MG/DL (ref 70–99)
GLUCOSE BLDC GLUCOMTR-MCNC: 263 MG/DL (ref 70–99)
GLUCOSE BLDC GLUCOMTR-MCNC: 286 MG/DL (ref 70–99)
GLUCOSE SERPL-MCNC: 254 MG/DL (ref 70–99)
HCT VFR BLD AUTO: 43.5 % (ref 40–53)
HGB BLD-MCNC: 14.2 G/DL (ref 13.3–17.7)
LACTATE BLD-SCNC: 1.2 MMOL/L (ref 0.7–2)
MCH RBC QN AUTO: 31.1 PG (ref 26.5–33)
MCHC RBC AUTO-ENTMCNC: 32.6 G/DL (ref 31.5–36.5)
MCV RBC AUTO: 95 FL (ref 78–100)
PLATELET # BLD AUTO: 172 10E9/L (ref 150–450)
POTASSIUM SERPL-SCNC: 3.8 MMOL/L (ref 3.4–5.3)
RBC # BLD AUTO: 4.57 10E12/L (ref 4.4–5.9)
SODIUM SERPL-SCNC: 138 MMOL/L (ref 133–144)
WBC # BLD AUTO: 8 10E9/L (ref 4–11)

## 2021-03-25 PROCEDURE — 250N000013 HC RX MED GY IP 250 OP 250 PS 637: Performed by: INTERNAL MEDICINE

## 2021-03-25 PROCEDURE — 36415 COLL VENOUS BLD VENIPUNCTURE: CPT | Performed by: INTERNAL MEDICINE

## 2021-03-25 PROCEDURE — 250N000012 HC RX MED GY IP 250 OP 636 PS 637: Performed by: INTERNAL MEDICINE

## 2021-03-25 PROCEDURE — XW033E5 INTRODUCTION OF REMDESIVIR ANTI-INFECTIVE INTO PERIPHERAL VEIN, PERCUTANEOUS APPROACH, NEW TECHNOLOGY GROUP 5: ICD-10-PCS | Performed by: PHYSICIAN ASSISTANT

## 2021-03-25 PROCEDURE — 250N000013 HC RX MED GY IP 250 OP 250 PS 637: Performed by: PHYSICIAN ASSISTANT

## 2021-03-25 PROCEDURE — 97530 THERAPEUTIC ACTIVITIES: CPT | Mod: GP | Performed by: PHYSICAL THERAPIST

## 2021-03-25 PROCEDURE — 250N000009 HC RX 250: Performed by: PHYSICIAN ASSISTANT

## 2021-03-25 PROCEDURE — 99207 PR APP CREDIT; MD BILLING SHARED VISIT: CPT | Performed by: PHYSICIAN ASSISTANT

## 2021-03-25 PROCEDURE — 97110 THERAPEUTIC EXERCISES: CPT | Mod: GP | Performed by: PHYSICAL THERAPIST

## 2021-03-25 PROCEDURE — 99232 SBSQ HOSP IP/OBS MODERATE 35: CPT | Performed by: INTERNAL MEDICINE

## 2021-03-25 PROCEDURE — 83605 ASSAY OF LACTIC ACID: CPT | Performed by: INTERNAL MEDICINE

## 2021-03-25 PROCEDURE — 85027 COMPLETE CBC AUTOMATED: CPT | Performed by: INTERNAL MEDICINE

## 2021-03-25 PROCEDURE — 120N000001 HC R&B MED SURG/OB

## 2021-03-25 PROCEDURE — 86140 C-REACTIVE PROTEIN: CPT | Performed by: INTERNAL MEDICINE

## 2021-03-25 PROCEDURE — 250N000012 HC RX MED GY IP 250 OP 636 PS 637: Performed by: PHYSICIAN ASSISTANT

## 2021-03-25 PROCEDURE — 999N001017 HC STATISTIC GLUCOSE BY METER IP

## 2021-03-25 PROCEDURE — 258N000003 HC RX IP 258 OP 636: Performed by: PHYSICIAN ASSISTANT

## 2021-03-25 PROCEDURE — 80048 BASIC METABOLIC PNL TOTAL CA: CPT | Performed by: INTERNAL MEDICINE

## 2021-03-25 RX ORDER — ACETAMINOPHEN 650 MG/1
650 SUPPOSITORY RECTAL EVERY 4 HOURS PRN
Status: DISCONTINUED | OUTPATIENT
Start: 2021-03-25 | End: 2021-03-31 | Stop reason: HOSPADM

## 2021-03-25 RX ORDER — ATORVASTATIN CALCIUM 40 MG/1
40 TABLET, FILM COATED ORAL DAILY
Status: DISCONTINUED | OUTPATIENT
Start: 2021-03-25 | End: 2021-03-25

## 2021-03-25 RX ORDER — IBUPROFEN 200 MG
400-600 TABLET ORAL EVERY 4 HOURS PRN
Status: DISCONTINUED | OUTPATIENT
Start: 2021-03-25 | End: 2021-03-31 | Stop reason: HOSPADM

## 2021-03-25 RX ORDER — ACETAMINOPHEN 325 MG/1
650 TABLET ORAL EVERY 4 HOURS PRN
Status: DISCONTINUED | OUTPATIENT
Start: 2021-03-25 | End: 2021-03-31 | Stop reason: HOSPADM

## 2021-03-25 RX ORDER — METFORMIN HCL 500 MG
1000 TABLET, EXTENDED RELEASE 24 HR ORAL 2 TIMES DAILY WITH MEALS
Status: DISCONTINUED | OUTPATIENT
Start: 2021-03-25 | End: 2021-03-31 | Stop reason: HOSPADM

## 2021-03-25 RX ORDER — METOPROLOL TARTRATE 25 MG/1
25 TABLET, FILM COATED ORAL 2 TIMES DAILY
Status: DISCONTINUED | OUTPATIENT
Start: 2021-03-25 | End: 2021-03-31 | Stop reason: HOSPADM

## 2021-03-25 RX ADMIN — ACETAMINOPHEN 650 MG: 325 TABLET, FILM COATED ORAL at 09:23

## 2021-03-25 RX ADMIN — APIXABAN 5 MG: 5 TABLET, FILM COATED ORAL at 09:17

## 2021-03-25 RX ADMIN — FAMOTIDINE 20 MG: 20 TABLET ORAL at 09:17

## 2021-03-25 RX ADMIN — GLIPIZIDE 10 MG: 5 TABLET ORAL at 18:40

## 2021-03-25 RX ADMIN — GLIPIZIDE 10 MG: 5 TABLET ORAL at 09:17

## 2021-03-25 RX ADMIN — FAMOTIDINE 20 MG: 20 TABLET ORAL at 21:46

## 2021-03-25 RX ADMIN — ATORVASTATIN CALCIUM 40 MG: 40 TABLET, FILM COATED ORAL at 09:17

## 2021-03-25 RX ADMIN — INSULIN ASPART 2 UNITS: 100 INJECTION, SOLUTION INTRAVENOUS; SUBCUTANEOUS at 21:48

## 2021-03-25 RX ADMIN — SODIUM CHLORIDE 50 ML: 9 INJECTION, SOLUTION INTRAVENOUS at 11:28

## 2021-03-25 RX ADMIN — METFORMIN ER 500 MG 1000 MG: 500 TABLET ORAL at 11:31

## 2021-03-25 RX ADMIN — METOPROLOL TARTRATE 25 MG: 25 TABLET, FILM COATED ORAL at 21:46

## 2021-03-25 RX ADMIN — APIXABAN 5 MG: 5 TABLET, FILM COATED ORAL at 21:45

## 2021-03-25 RX ADMIN — ASPIRIN 81 MG: 81 TABLET ORAL at 09:17

## 2021-03-25 RX ADMIN — LISINOPRIL 10 MG: 10 TABLET ORAL at 09:17

## 2021-03-25 RX ADMIN — DEXAMETHASONE 6 MG: 2 TABLET ORAL at 13:47

## 2021-03-25 RX ADMIN — REMDESIVIR 200 MG: 100 INJECTION, POWDER, LYOPHILIZED, FOR SOLUTION INTRAVENOUS at 11:24

## 2021-03-25 RX ADMIN — METFORMIN ER 500 MG 1000 MG: 500 TABLET ORAL at 18:40

## 2021-03-25 RX ADMIN — Medication 12.5 MG: at 09:17

## 2021-03-25 RX ADMIN — INSULIN GLARGINE 42 UNITS: 100 INJECTION, SOLUTION SUBCUTANEOUS at 11:28

## 2021-03-25 ASSESSMENT — ACTIVITIES OF DAILY LIVING (ADL)
ADLS_ACUITY_SCORE: 16

## 2021-03-25 NOTE — PLAN OF CARE
pt tachypneic this morning, pt on 1 LPM NC sating at 95%, t-max-102.1, tylenol given, temp now 99.5, pt triggered sepsis this AM, Lactic-1.2, A-Fib CVR on tele, LS dim, denies pain, disoriented to time and forgetful, discharge possible in several days to TCU.

## 2021-03-25 NOTE — PROGRESS NOTES
[facility]  Medicine Progress Note - Hospitalist Service       Date of Admission:  3/23/2021    Assessment & Plan   Yanick Adorno is a 75 year old male with IDDM type II, HFrEF, HTN, PAF on Eliquis, left MCA CVA in 2018 with residual mild-mod expressive and receptive aphasia, nonobstructive ASCVD, and NICM (EF 40-45%) who presented to [facility] ED on 3/23/2021 three days after a mechanical fall with resulting left rib pain and 48 hours slurred speech and word-finding difficulties.  Speech changes had completely resolved while in the ER, however, he was noted to hypoxic with sats into the mid-80s.  COVID19 positive.  CMP notable for Cr 1.12 and glucose 208.  CBC within normal limits.  UA bland. DDimer 1.4.  BNP 3397.  Trop 0.064.  EKG showed AFib vs sinus dysrhythmia with CVR.  Stroke work-up negative (CT Head, CTA Head/Neck, MRI Brain without acute process).  CT Chest showed patchy GGO in the right lung with reactive mediastinal and hilar adenopathy consistent with COVID19 PNA.  Patient admitted for further work-up and management.       #COVID19 PNA with resulting Acute Hypoxic Respiratory Failure.   Sats remain in mid-80s on RA; requires 2L NC to keep them above 90.  Denies dyspnea at rest.  Remains intermittently febrile with TMax 102.4 over 24*.  Becomes confused, tachypneic, and (more) hypoxic with fevers.  CRP continues to climb (89.8 >> 107 >> 121).    - Day 2/10 dexamethasone  - Start remdesivir (day 1/5)  - Continue to monitor and treat fever  - Continue oxygen support, wean as tolerated  - Anticoagulated with PTA apixaban   - Therapies recommending TCU     #Ongoing Deficits in Strength, Endurance, and Cognition.  #Dysphagia.  Etiology likely multifactorial:  Acute systemic illness +/- exacerbation of prior deficits.  Known to therapies from stroke 2018 when he suffered from residual mild-mod expressive and receptive aphasia.  - PT/OT following and recommmending TCU.    - SLP following,  Recommending DD3 with thins    #PAF.  [id number] of 7.  Heart rates ~60s-110s.   - Increase PTA metoprolol from 12.5 >> 25 BID  - Continue PTA Eliquis  - Tele     #IDDM2, poorly controlled, complicated by Steroid-induced Hyperglycemia.  [PTA Lantus 35u qAM, metformin XR 1000 BID, glipizide 10 BID]  HgbA1c 9.1.  Quite hyperglycemic in setting of acute illness and dexamethasone use.    - Continue PTA metformin, glipizide.  - Increase Lantus to 42u qAM.  Continue ISS and carb coverage (1:15).     #NICM, EF 35-40% / HFrEF, compensated.  #Nonobstructive ASCVD (per cardiac cath 3/2018).  #HTN / DLD  BNP elevated upon admission at 3397.  Troponins cycled detectable but stable 0.064 >> 0.054 >> 0.057, likely related to demand.   - Appears compensated from a CV standpoint  - Echocardiogram 3/24/2021 revealed decreased LVEF from 40-45% >> 35-40% with G1LVDD, normal RVSF, and no significant valvular dysfunction.   - Continue PTA metoprolol, lisinopril, atorvastatin, ASA 81, and Eliquis     #Incidental pulmonary nodule.  #History of tobacco abuse.  6 mm RML nodule found on imaging.  Former smoker.  No hemoptysis or unintended weight loss.  CT does suggest mediastinal and hilar adenopathy but this is felt to be reactive.   - Repeat CT in 6 months.      COVID status: POSITIVE 3/23  Diet: Dysphagia Diet Level 3 Advanced Thin Liquids (water, ice chips, juice, milk gelatin, ice cream, etc)    DVT Prophylaxis:  Eliquis  Code Status: Full Code      DISPO:  Anticipate may need several more days until can discharge to TCU    Daughter Inna contacted by no answer.  Left VM.  Will attempt to connect again early afternoon.    Kassidy Castro, PAC  Hospitalist Service  [facility]     Text Page (7AM - 5PM, M-F)  ______________________________________________________________________    Interval History   Febrile to 100.4 while in the room.  Denies nausea, headache, body aches, diarrhea.  A bit confused but appears  fairly comfortable although lethargic.     Data reviewed today: I reviewed all medications, new labs and imaging results over the last 24 hours. I personally reviewed no images or EKG's today.    Physical Exam   Vital Signs: Temp: 98.4  F (36.9  C) Temp src: Oral BP: 138/78 Pulse: 82   Resp: 20 SpO2: 95 % O2 Device: Nasal cannula Oxygen Delivery: 2 LPM  Weight: 193 lbs 3.2 oz    GENERAL:  Pleasant, cooperative, alert. Nontoxic.  A bit restless.    HEENT: Normocephalic, atraumatic.  Extra occular mm intact.  Sclera clear. PERRL.  Mucous membranes moist.   PULMONOLOGY: Clear bilaterally, anteriorly  CARDIAC: Fast but seems regular at present.  No audible murmur  ABDOMEN: Soft, nontender non distended.   MUSCULOSKELETAL:  Moving x 4 spontaneously with CMS intact x4.  Normal bulk and tone.  No LE edema.    NEURO: Alert and oriented to self.  CN II-XII grossly intact and symmetric.  No ataxia.   SKIN: hot, dry.        Data   Recent Labs   Lab 21  0714 21  0756 21  2358 21  1836 21  1423   WBC 8.0 7.6  --   --  5.8   HGB 14.2 14.3  --   --  14.4   MCV 95 100  --   --  96    195  --   --  186    140  --   --  137   POTASSIUM 3.8 4.0  --   --  3.8   CHLORIDE 107 106  --   --  102   CO2 23 27  --   --  26   BUN 21 21  --   --  20   CR 0.85 0.90  --   --  1.12   ANIONGAP 8 7  --   --  9   ROSA 8.4* 8.7  --   --  8.6   * 210*  --   --  208*   ALBUMIN  --   --   --   --  2.9*   PROTTOTAL  --   --   --   --  7.5   BILITOTAL  --   --   --   --  0.9   ALKPHOS  --   --   --   --  69   ALT  --   --   --   --  26   AST  --   --   --   --  30   TROPI  --   --  0.057* 0.054* 0.064*     Recent Results (from the past 24 hour(s))   Echocardiogram Limited    Narrative    [id number]  [id number]  [id number]  [id number]^WILLAM^HEIDE^[facility]  Echocardiography Laboratory  [address] [place] [address]     Name: KWAME MULLEN ROSE MARY  MRN: [id number]  :  1945  Study Date: 03/24/2021 07:18 AM  Age: 75 yrs  Gender: Male  Patient Location: [facility]  Reason For Study: TIA  Ordering Physician: HEIDE QUARLES  Referring Physician: Candi Dumont  Performed By: Katie Schmidt RDCS     BSA: 2.1 m2  Height: 71 in  Weight: 190 lb  HR: 60  BP: 141/72 mmHg  ______________________________________________________________________________  Procedure  Limited Portable Echo Adult.  ______________________________________________________________________________  Interpretation Summary     The visual ejection fraction is estimated at 35-40%. LVEF was 40-45% in 2018  Grade I or early diastolic dysfunction.  The left atrium is mild to moderately dilated.  ______________________________________________________________________________  Left Ventricle  The left ventricle is normal in size. There is normal left ventricular wall  thickness. The visual ejection fraction is estimated at 35-40%. Grade I or  early diastolic dysfunction.     Right Ventricle  The right ventricle is normal in structure, function and size.     Atria  The left atrium is mild to moderately dilated. Right atrial size is normal.     Mitral Valve  The mitral valve is normal in structure and function. There is trace mitral  regurgitation.     Tricuspid Valve  Normal tricuspid valve.     Aortic Valve  The aortic valve is normal in structure and function.     Pulmonic Valve  Normal pulmonic valve.     Vessels  The aortic root is normal size. The inferior vena cava is normal.     Pericardium  There is no pericardial effusion.     Rhythm  Sinus rhythm was noted.  ______________________________________________________________________________  Doppler Measurements & Calculations  MV E max callie: 59.7 cm/sec  MV A max callie: 93.1 cm/sec  MV E/A: 0.64  MV dec time: 0.12 sec     ______________________________________________________________________________  Report approved by: Evette Sahu 03/24/2021 11:03 AM            Medications     - MEDICATION INSTRUCTIONS -         apixaban ANTICOAGULANT  5 mg Oral BID     aspirin  81 mg Oral Daily     atorvastatin  40 mg Oral Daily     dexamethasone  6 mg Oral Daily     famotidine  20 mg Oral BID     glipiZIDE  10 mg Oral BID AC     insulin aspart  1-7 Units Subcutaneous TID AC     insulin aspart  1-5 Units Subcutaneous At Bedtime     insulin aspart   Subcutaneous TID w/meals     insulin glargine  35 Units Subcutaneous QAM AC     lisinopril  10 mg Oral Daily     metoprolol tartrate  12.5 mg Oral BID     sodium chloride (PF)  3 mL Intracatheter Q8H

## 2021-03-25 NOTE — PLAN OF CARE
Contact Precautions   DX: Covid +  Tele: Afib HR 60's  A&O x3, disoriented to time  Activity: A-1 Gait belt  Diet: D3 thin liquids  VSS: stable  O2: 2L nc 94% no LS dim  B, 263   PIV: SL-RA  Pain: denies  GI/: incontinent BB  Plan: OT, PT, Speech  Discharge: 2-3 days     Continue POC assumed cares [id number]  3/24 Echo EF 35-40%

## 2021-03-26 ENCOUNTER — APPOINTMENT (OUTPATIENT)
Dept: OCCUPATIONAL THERAPY | Facility: CLINIC | Age: 76
DRG: 177 | End: 2021-03-26
Payer: MEDICARE

## 2021-03-26 ENCOUNTER — APPOINTMENT (OUTPATIENT)
Dept: SPEECH THERAPY | Facility: CLINIC | Age: 76
DRG: 177 | End: 2021-03-26
Payer: MEDICARE

## 2021-03-26 LAB
ANION GAP SERPL CALCULATED.3IONS-SCNC: 4 MMOL/L (ref 3–14)
BUN SERPL-MCNC: 25 MG/DL (ref 7–30)
CALCIUM SERPL-MCNC: 8.6 MG/DL (ref 8.5–10.1)
CHLORIDE SERPL-SCNC: 109 MMOL/L (ref 94–109)
CO2 SERPL-SCNC: 30 MMOL/L (ref 20–32)
CREAT SERPL-MCNC: 0.92 MG/DL (ref 0.66–1.25)
CRP SERPL-MCNC: 103 MG/L (ref 0–8)
ERYTHROCYTE [DISTWIDTH] IN BLOOD BY AUTOMATED COUNT: 13.9 % (ref 10–15)
GFR SERPL CREATININE-BSD FRML MDRD: 82 ML/MIN/{1.73_M2}
GLUCOSE BLDC GLUCOMTR-MCNC: 119 MG/DL (ref 70–99)
GLUCOSE BLDC GLUCOMTR-MCNC: 144 MG/DL (ref 70–99)
GLUCOSE BLDC GLUCOMTR-MCNC: 184 MG/DL (ref 70–99)
GLUCOSE BLDC GLUCOMTR-MCNC: 201 MG/DL (ref 70–99)
GLUCOSE BLDC GLUCOMTR-MCNC: 247 MG/DL (ref 70–99)
GLUCOSE SERPL-MCNC: 166 MG/DL (ref 70–99)
HCT VFR BLD AUTO: 41 % (ref 40–53)
HGB BLD-MCNC: 13.7 G/DL (ref 13.3–17.7)
MCH RBC QN AUTO: 31.4 PG (ref 26.5–33)
MCHC RBC AUTO-ENTMCNC: 33.4 G/DL (ref 31.5–36.5)
MCV RBC AUTO: 94 FL (ref 78–100)
PLATELET # BLD AUTO: 199 10E9/L (ref 150–450)
POTASSIUM SERPL-SCNC: 4.3 MMOL/L (ref 3.4–5.3)
RBC # BLD AUTO: 4.36 10E12/L (ref 4.4–5.9)
SODIUM SERPL-SCNC: 143 MMOL/L (ref 133–144)
WBC # BLD AUTO: 6.8 10E9/L (ref 4–11)

## 2021-03-26 PROCEDURE — 999N001017 HC STATISTIC GLUCOSE BY METER IP

## 2021-03-26 PROCEDURE — 92526 ORAL FUNCTION THERAPY: CPT | Mod: GN

## 2021-03-26 PROCEDURE — 99232 SBSQ HOSP IP/OBS MODERATE 35: CPT | Performed by: INTERNAL MEDICINE

## 2021-03-26 PROCEDURE — 86140 C-REACTIVE PROTEIN: CPT | Performed by: INTERNAL MEDICINE

## 2021-03-26 PROCEDURE — 250N000013 HC RX MED GY IP 250 OP 250 PS 637: Performed by: PHYSICIAN ASSISTANT

## 2021-03-26 PROCEDURE — 250N000012 HC RX MED GY IP 250 OP 636 PS 637: Performed by: INTERNAL MEDICINE

## 2021-03-26 PROCEDURE — 36415 COLL VENOUS BLD VENIPUNCTURE: CPT | Performed by: INTERNAL MEDICINE

## 2021-03-26 PROCEDURE — 250N000012 HC RX MED GY IP 250 OP 636 PS 637: Performed by: PHYSICIAN ASSISTANT

## 2021-03-26 PROCEDURE — 80048 BASIC METABOLIC PNL TOTAL CA: CPT | Performed by: INTERNAL MEDICINE

## 2021-03-26 PROCEDURE — 97535 SELF CARE MNGMENT TRAINING: CPT | Mod: GO

## 2021-03-26 PROCEDURE — 250N000013 HC RX MED GY IP 250 OP 250 PS 637: Performed by: INTERNAL MEDICINE

## 2021-03-26 PROCEDURE — 250N000009 HC RX 250: Performed by: PHYSICIAN ASSISTANT

## 2021-03-26 PROCEDURE — 120N000001 HC R&B MED SURG/OB

## 2021-03-26 PROCEDURE — 85027 COMPLETE CBC AUTOMATED: CPT | Performed by: INTERNAL MEDICINE

## 2021-03-26 PROCEDURE — 258N000003 HC RX IP 258 OP 636: Performed by: PHYSICIAN ASSISTANT

## 2021-03-26 RX ORDER — LISINOPRIL 10 MG/1
10 TABLET ORAL DAILY
Status: DISCONTINUED | OUTPATIENT
Start: 2021-03-26 | End: 2021-03-26

## 2021-03-26 RX ADMIN — METOPROLOL TARTRATE 25 MG: 25 TABLET, FILM COATED ORAL at 21:48

## 2021-03-26 RX ADMIN — INSULIN ASPART 1 UNITS: 100 INJECTION, SOLUTION INTRAVENOUS; SUBCUTANEOUS at 22:47

## 2021-03-26 RX ADMIN — GLIPIZIDE 10 MG: 5 TABLET ORAL at 08:33

## 2021-03-26 RX ADMIN — APIXABAN 5 MG: 5 TABLET, FILM COATED ORAL at 21:48

## 2021-03-26 RX ADMIN — INSULIN GLARGINE 42 UNITS: 100 INJECTION, SOLUTION SUBCUTANEOUS at 08:34

## 2021-03-26 RX ADMIN — DEXAMETHASONE 6 MG: 2 TABLET ORAL at 13:23

## 2021-03-26 RX ADMIN — GLIPIZIDE 10 MG: 5 TABLET ORAL at 17:01

## 2021-03-26 RX ADMIN — REMDESIVIR 100 MG: 100 INJECTION, POWDER, LYOPHILIZED, FOR SOLUTION INTRAVENOUS at 17:02

## 2021-03-26 RX ADMIN — LISINOPRIL 10 MG: 10 TABLET ORAL at 08:33

## 2021-03-26 RX ADMIN — FAMOTIDINE 20 MG: 20 TABLET ORAL at 08:33

## 2021-03-26 RX ADMIN — ATORVASTATIN CALCIUM 40 MG: 40 TABLET, FILM COATED ORAL at 08:33

## 2021-03-26 RX ADMIN — METFORMIN ER 500 MG 1000 MG: 500 TABLET ORAL at 19:23

## 2021-03-26 RX ADMIN — APIXABAN 5 MG: 5 TABLET, FILM COATED ORAL at 08:34

## 2021-03-26 RX ADMIN — ASPIRIN 81 MG: 81 TABLET ORAL at 08:33

## 2021-03-26 RX ADMIN — METFORMIN ER 500 MG 1000 MG: 500 TABLET ORAL at 08:33

## 2021-03-26 RX ADMIN — METOPROLOL TARTRATE 25 MG: 25 TABLET, FILM COATED ORAL at 08:33

## 2021-03-26 RX ADMIN — SODIUM CHLORIDE 50 ML: 9 INJECTION, SOLUTION INTRAVENOUS at 19:36

## 2021-03-26 RX ADMIN — FAMOTIDINE 20 MG: 20 TABLET ORAL at 21:48

## 2021-03-26 ASSESSMENT — ACTIVITIES OF DAILY LIVING (ADL)
ADLS_ACUITY_SCORE: 18
ADLS_ACUITY_SCORE: 17
ADLS_ACUITY_SCORE: 18

## 2021-03-26 NOTE — PLAN OF CARE
Diagnosis. Covid  History. CVA, DM, A. Fib, HTN  Labs/Protocol. BG slightly elevated.   Vitals. VSS  Tele. A. Fib CVR  Respiratory. On room air. LS clear and diminished. O2 stats in mid to low 90's.   Neuro. Disoriented to time.   GI/. Using bedside commode, incontinent at times   Skin. Blanchable redness on sacrum, pt positioning independently. Perineal care provided.   LDA's. PIV saline locked.   Diet. Dd3 thin liquids   Activity. A of 1 GB and walker  Plan. Continue to monitor patients oxygen.

## 2021-03-26 NOTE — PROVIDER NOTIFICATION
Discussed 11 beat run of V tach and elevated BPs in person with Dr. Vance. Tele strip was reviewed by MD and medications for BP were added to regimen.  Pt was asymptomatic with tachy run.

## 2021-03-26 NOTE — PLAN OF CARE
Pt continues to appear to have generalized weakness, but moving well independently in bed from side to side.  Choosing to sleep during the day today.  Pt is afebrile, last temperature was 102.1 on 3/25 at 0908.  BP is elevated, discussed with Dr. Vance.  , 144.  Pt is eating 50-75% of meals.  Tele is A fib, CVR 80-90s. Will continue  supportive cares with scheduled remdesivir, Eliquis, decadron.  Pt is followed by PT, OT, SLP.

## 2021-03-26 NOTE — PROGRESS NOTES
[facility]  Hospitalist Progress Note  Yash Vance MD 03/26/2021    Reason for Stay (Diagnosis): resp failure, COVID PNA         Assessment and Plan:      Summary of Stay: Yanick Adorno is a 75 year old male with IDDM type II, HFrEF, HTN, PAF on Eliquis, left MCA CVA in 2018 with residual mild-mod expressive and receptive aphasia, nonobstructive ASCVD, and NICM (EF 40-45%) who presented to [facility] ED on 3/23/2021 three days after a mechanical fall with resulting left rib pain and 48 hours slurred speech and word-finding difficulties.  Speech changes had completely resolved while in the ER, however, he was noted to hypoxic with sats into the mid-80s.  COVID19 positive.  CMP notable for Cr 1.12 and glucose 208.  CBC within normal limits.  UA bland. DDimer 1.4.  BNP 3397.  Trop 0.064.  EKG showed AFib vs sinus dysrhythmia with CVR.  Stroke work-up negative (CT Head, CTA Head/Neck, MRI Brain without acute process).  CT Chest showed patchy GGO in the right lung with reactive mediastinal and hilar adenopathy consistent with COVID19 PNA.  Patient admitted for further work-up and management.      Addendum:  I called and updated dtr Inna today     #COVID19 PNA with resulting Acute Hypoxic Respiratory Failure.   Sats remain in mid-80s on RA; requires 2L NC to keep them above 90.  Denies dyspnea at rest.  Remains intermittently febrile.  Becomes confused, tachypneic, and (more) hypoxic with fevers.  CRP continues to climb (89.8 >> 107 >> 121).    - Day 3/10 dexamethasone  - Started remdesivir (day 2/5)  - Continue to monitor and treat fever  - Continue oxygen support, wean as tolerated  - Anticoagulated with PTA apixaban   - Therapies recommending TCU     #Ongoing Deficits in Strength, Endurance, and Cognition.  #Dysphagia.  Etiology likely multifactorial:  Acute systemic illness +/- exacerbation of prior deficits.  Known to therapies from stroke 2018 when he suffered from residual mild-mod expressive  and receptive aphasia.  - PT/OT following and recommmending TCU.    - SLP following, Recommending DD3 with thins     #PAF.  [id number] of 7.  Heart rates ~60s-110s.   - Increased PTA metoprolol from 12.5 >> 25 BID  - Continue PTA Eliquis  - Tele showed 11 beat run of wide complex tachycardia 3/26.  Possibly vtach, but appeared to have some irregularity so more suspicious for SVT with abberancy.  Continue metoprolol     #IDDM2, poorly controlled, complicated by Steroid-induced Hyperglycemia.  [PTA Lantus 35u qAM, metformin XR 1000 BID, glipizide 10 BID]  HgbA1c 9.1.  Quite hyperglycemic in setting of acute illness and dexamethasone use.    - Continue PTA metformin, glipizide.  - Increased Lantus to 42u qAM.  Continue ISS and carb coverage (1:15).     #NICM, EF 35-40% / HFrEF, compensated.  #Nonobstructive ASCVD (per cardiac cath 3/2018).  #HTN / DLD  BNP elevated upon admission at 3397.  Troponins cycled detectable but stable 0.064 >> 0.054 >> 0.057, likely related to demand.   - Appears compensated from a CV standpoint  - Echocardiogram 3/24/2021 revealed decreased LVEF from 40-45% >> 35-40% with G1LVDD, normal RVSF, and no significant valvular dysfunction.   - Continue PTA metoprolol, lisinopril, atorvastatin, ASA 81, and Eliquis     #Incidental pulmonary nodule.  #History of tobacco abuse.  6 mm RML nodule found on imaging.  Former smoker.  No hemoptysis or unintended weight loss.  CT does suggest mediastinal and hilar adenopathy but this is felt to be reactive.   - Repeat CT in 6 months.        COVID status: POSITIVE 3/23  Diet: Dysphagia Diet Level 3 Advanced Thin Liquids (water, ice chips, juice, milk gelatin, ice cream, etc)    DVT Prophylaxis:  Eliquis  Code Status: Full Code    Discharge Dispo: TCU   Estimated Disch Date / # of Days until Disch: 2-3 days to TCU likely after fevers improved and if remains clinically stable        Interval History (Subjective):      No complaints.  Denies SOB.  Continues to  "have intermittent fevers.  Remains on oxygen.  Notified by nurse of 11 beat run of wide complex tachycardia.  Reviewed strip with nursing.                    Physical Exam:      Last Vital Signs:  BP (!) 150/88 (BP Location: Left arm)   Pulse 83   Temp 97.7  F (36.5  C) (Oral)   Resp 22   Ht 1.854 m (6' 1\")   Wt 87.6 kg (193 lb 3.2 oz)   SpO2 95%   BMI 25.49 kg/m        Intake/Output Summary (Last 24 hours) at 3/26/2021 1524  Last data filed at 3/26/2021 1300  Gross per 24 hour   Intake 408 ml   Output 900 ml   Net -492 ml       Constitutional: Awake, alert, cooperative, no apparent distress   Respiratory: Clear to auscultation bilaterally, no crackles or wheezing   Cardiovascular: Regular rate and rhythm, normal S1 and S2, and no murmur noted   Abdomen: Normal bowel sounds, soft, non-distended, non-tender   Skin: No rashes, no cyanosis, dry to touch   Neuro: Alert and oriented x3, no weakness, numbness, memory loss   Extremities: No edema, normal range of motion   Other(s):        All other systems: Negative          Medications:      All current medications were reviewed with changes reflected in problem list.         Data:      All new lab and imaging data was reviewed.   Labs:  Recent Labs   Lab 03/26/21  0621   WBC 6.8   HGB 13.7   HCT 41.0   MCV 94         Imaging:   No results found for this or any previous visit (from the past 24 hour(s)).   "

## 2021-03-26 NOTE — PLAN OF CARE
Disoriented to time; confused at times. VSS; placed on 2 L O2 NC due to O2 sats 87-89% RA while sleeping. Encouraged deep breathing. TELE: A fib CVR. Denies pain, N/V, CP, dizziness. Incontinent of bladder at times; used bedside urinal. Up with assist of one, gb and walker. DD3 Thin liquid diet. Alarms on for safety. Special precautions for COVID-19 maintained. Plan for discharge to TCU as recommended by PT/OT, TBD. Will continue with POC.

## 2021-03-26 NOTE — CONSULTS
Care Management Initial Consult    General Information  Assessment completed with: Patient, Children, Will  Type of CM/SW Visit: Initial Assessment    Primary Care Provider verified and updated as needed: No   Readmission within the last 30 days:        Reason for Consult: discharge planning  Advance Care Planning:  Not discussed at this time       Communication Assessment  Patient's communication style: spoken language (English or Bilingual)    Hearing Difficulty or Deaf: no   Wear Glasses or Blind: no    Cognitive  Cognitive/Neuro/Behavioral: WDL  Level of Consciousness: alert, lethargic  Arousal Level: opens eyes spontaneously  Orientation: oriented x 4  Mood/Behavior: calm, cooperative  Best Language: 1 - Mild to moderate  Speech: spontaneous, clear, logical    Living Environment:   People in home: spouse  Cindy  Current living Arrangements: apartment      Able to return to prior arrangements: yes(After TCU)       Family/Social Support:  Care provided by: self  Provides care for: no one  Marital Status:   Wife, Children  Cindy       Description of Support System: Supportive, Involved    Support Assessment: Adequate family and caregiver support    Current Resources:   Patient receiving home care services: No     Community Resources: Transitional Care, Skilled Nursing Facility  Equipment currently used at home: none  Supplies currently used at home: None    Employment/Financial:  Employment Status: retired        Financial Concerns: No concerns identified   Referral to Financial Counselor: No       Lifestyle & Psychosocial Needs:        Socioeconomic History     Marital status:      Spouse name: Not on file     Number of children: Not on file     Years of education: Not on file     Highest education level: Not on file     Tobacco Use     Smoking status: Former Smoker     Packs/day: 1.50     Years: 33.00     Pack years: 49.50     Quit date: 2018     Years since quittin.8      Continue prednisone 10 mg daily until seen.   Smokeless tobacco: Never Used   Substance and Sexual Activity     Alcohol use: No       Functional Status:  Prior to admission patient needed assistance:    The pt was independent at baseline.  Pt still drives.       Mental Health Status:  Mental Health Status: No Current Concerns       Chemical Dependency Status:  Chemical Dependency Status: No Current Concern           Values/Beliefs:  Spiritual, Cultural Beliefs, Rastafari Practices, Values that affect care:            Values/Beliefs Comment: Jens    Additional Information:  Maximino spoke with the pt regarding his discharge plan.  The pt is aware that his discharge recommendation is TCU.  He is agreeable.  Sw discussed [facility] with him because they are able to accept COVID-19 positive pts.  He is agreeable to sw sending the referral.  He asked if he would be able to go home, but sw explained the safety concerns of him discharging home from the hospital instead of going to TCU.  He said he understood and said that he will go to TCU.  Sw told the pt that therapies will continue to work with him until he discharges from the hospital.  He told sw that it is he, his wife, and their dog in the home.  That is when maximino again explained the safety concerns of him discharging home from the hospital.  The pt had no additional questions or concerns at this time.    Transport is pending his physical ability to get in and out of a personal vehicle as well as if a family member would want to transport him while being COVID-19 positive.    Maximino spoke with the pt's dtr Inna, [phone], to update her on the pt's discharge plan.  She said that the pt is independent at baseline, still drives, and he is not on O2 at baseline.  Inna is aware of the discharge recommendation for TCU.  Maximino informed her about [facility].  Inna is also agreeable to this plan and thinks it is safest for the pt.  Inna had no additional sw questions or concerns at this time.    A referral was sent to  [facility].    Luiz will continue with discharge planning and will be available as needed until discharge.      RUTHIE Armijo, [facility]  Inpatient Care Coordination  [facility]  [phone]

## 2021-03-27 ENCOUNTER — APPOINTMENT (OUTPATIENT)
Dept: PHYSICAL THERAPY | Facility: CLINIC | Age: 76
DRG: 177 | End: 2021-03-27
Payer: MEDICARE

## 2021-03-27 LAB
ANION GAP SERPL CALCULATED.3IONS-SCNC: 10 MMOL/L (ref 3–14)
BUN SERPL-MCNC: 25 MG/DL (ref 7–30)
CALCIUM SERPL-MCNC: 8.6 MG/DL (ref 8.5–10.1)
CHLORIDE SERPL-SCNC: 111 MMOL/L (ref 94–109)
CO2 SERPL-SCNC: 29 MMOL/L (ref 20–32)
CREAT SERPL-MCNC: 0.81 MG/DL (ref 0.66–1.25)
CRP SERPL-MCNC: 58.2 MG/L (ref 0–8)
ERYTHROCYTE [DISTWIDTH] IN BLOOD BY AUTOMATED COUNT: 14 % (ref 10–15)
GFR SERPL CREATININE-BSD FRML MDRD: 88 ML/MIN/{1.73_M2}
GLUCOSE BLDC GLUCOMTR-MCNC: 104 MG/DL (ref 70–99)
GLUCOSE BLDC GLUCOMTR-MCNC: 128 MG/DL (ref 70–99)
GLUCOSE BLDC GLUCOMTR-MCNC: 173 MG/DL (ref 70–99)
GLUCOSE BLDC GLUCOMTR-MCNC: 192 MG/DL (ref 70–99)
GLUCOSE BLDC GLUCOMTR-MCNC: 209 MG/DL (ref 70–99)
GLUCOSE BLDC GLUCOMTR-MCNC: 236 MG/DL (ref 70–99)
GLUCOSE SERPL-MCNC: 138 MG/DL (ref 70–99)
HCT VFR BLD AUTO: 42.4 % (ref 40–53)
HGB BLD-MCNC: 14 G/DL (ref 13.3–17.7)
MCH RBC QN AUTO: 31.3 PG (ref 26.5–33)
MCHC RBC AUTO-ENTMCNC: 33 G/DL (ref 31.5–36.5)
MCV RBC AUTO: 95 FL (ref 78–100)
PLATELET # BLD AUTO: 241 10E9/L (ref 150–450)
POTASSIUM SERPL-SCNC: 3.5 MMOL/L (ref 3.4–5.3)
RBC # BLD AUTO: 4.48 10E12/L (ref 4.4–5.9)
SODIUM SERPL-SCNC: 150 MMOL/L (ref 133–144)
WBC # BLD AUTO: 5.7 10E9/L (ref 4–11)

## 2021-03-27 PROCEDURE — 258N000003 HC RX IP 258 OP 636: Performed by: PHYSICIAN ASSISTANT

## 2021-03-27 PROCEDURE — 250N000012 HC RX MED GY IP 250 OP 636 PS 637: Performed by: PHYSICIAN ASSISTANT

## 2021-03-27 PROCEDURE — 999N001017 HC STATISTIC GLUCOSE BY METER IP

## 2021-03-27 PROCEDURE — 250N000013 HC RX MED GY IP 250 OP 250 PS 637: Performed by: INTERNAL MEDICINE

## 2021-03-27 PROCEDURE — 86140 C-REACTIVE PROTEIN: CPT | Performed by: INTERNAL MEDICINE

## 2021-03-27 PROCEDURE — 85027 COMPLETE CBC AUTOMATED: CPT | Performed by: INTERNAL MEDICINE

## 2021-03-27 PROCEDURE — 97116 GAIT TRAINING THERAPY: CPT | Mod: GP

## 2021-03-27 PROCEDURE — 80048 BASIC METABOLIC PNL TOTAL CA: CPT | Performed by: INTERNAL MEDICINE

## 2021-03-27 PROCEDURE — 97110 THERAPEUTIC EXERCISES: CPT | Mod: GP

## 2021-03-27 PROCEDURE — 258N000003 HC RX IP 258 OP 636: Performed by: INTERNAL MEDICINE

## 2021-03-27 PROCEDURE — 120N000001 HC R&B MED SURG/OB

## 2021-03-27 PROCEDURE — 99232 SBSQ HOSP IP/OBS MODERATE 35: CPT | Performed by: INTERNAL MEDICINE

## 2021-03-27 PROCEDURE — 36415 COLL VENOUS BLD VENIPUNCTURE: CPT | Performed by: INTERNAL MEDICINE

## 2021-03-27 PROCEDURE — 97530 THERAPEUTIC ACTIVITIES: CPT | Mod: GP

## 2021-03-27 PROCEDURE — 250N000013 HC RX MED GY IP 250 OP 250 PS 637: Performed by: PHYSICIAN ASSISTANT

## 2021-03-27 PROCEDURE — 250N000012 HC RX MED GY IP 250 OP 636 PS 637: Performed by: INTERNAL MEDICINE

## 2021-03-27 PROCEDURE — 250N000009 HC RX 250: Performed by: PHYSICIAN ASSISTANT

## 2021-03-27 RX ORDER — DEXTROSE MONOHYDRATE 50 MG/ML
INJECTION, SOLUTION INTRAVENOUS CONTINUOUS
Status: DISCONTINUED | OUTPATIENT
Start: 2021-03-27 | End: 2021-03-28

## 2021-03-27 RX ADMIN — METOPROLOL TARTRATE 25 MG: 25 TABLET, FILM COATED ORAL at 08:21

## 2021-03-27 RX ADMIN — DEXTROSE MONOHYDRATE: 50 INJECTION, SOLUTION INTRAVENOUS at 13:48

## 2021-03-27 RX ADMIN — DEXAMETHASONE 6 MG: 2 TABLET ORAL at 13:31

## 2021-03-27 RX ADMIN — SODIUM CHLORIDE 50 ML: 9 INJECTION, SOLUTION INTRAVENOUS at 20:16

## 2021-03-27 RX ADMIN — FAMOTIDINE 20 MG: 20 TABLET ORAL at 21:28

## 2021-03-27 RX ADMIN — ATORVASTATIN CALCIUM 40 MG: 40 TABLET, FILM COATED ORAL at 08:21

## 2021-03-27 RX ADMIN — INSULIN GLARGINE 42 UNITS: 100 INJECTION, SOLUTION SUBCUTANEOUS at 08:19

## 2021-03-27 RX ADMIN — INSULIN ASPART 1 UNITS: 100 INJECTION, SOLUTION INTRAVENOUS; SUBCUTANEOUS at 21:28

## 2021-03-27 RX ADMIN — METFORMIN ER 500 MG 1000 MG: 500 TABLET ORAL at 18:14

## 2021-03-27 RX ADMIN — GLIPIZIDE 10 MG: 5 TABLET ORAL at 17:09

## 2021-03-27 RX ADMIN — APIXABAN 5 MG: 5 TABLET, FILM COATED ORAL at 08:21

## 2021-03-27 RX ADMIN — REMDESIVIR 100 MG: 100 INJECTION, POWDER, LYOPHILIZED, FOR SOLUTION INTRAVENOUS at 17:14

## 2021-03-27 RX ADMIN — ASPIRIN 81 MG: 81 TABLET ORAL at 08:20

## 2021-03-27 RX ADMIN — METOPROLOL TARTRATE 25 MG: 25 TABLET, FILM COATED ORAL at 21:28

## 2021-03-27 RX ADMIN — GLIPIZIDE 10 MG: 5 TABLET ORAL at 06:43

## 2021-03-27 RX ADMIN — FAMOTIDINE 20 MG: 20 TABLET ORAL at 08:21

## 2021-03-27 RX ADMIN — METFORMIN ER 500 MG 1000 MG: 500 TABLET ORAL at 08:21

## 2021-03-27 RX ADMIN — APIXABAN 5 MG: 5 TABLET, FILM COATED ORAL at 21:28

## 2021-03-27 RX ADMIN — LISINOPRIL 10 MG: 10 TABLET ORAL at 08:21

## 2021-03-27 ASSESSMENT — ACTIVITIES OF DAILY LIVING (ADL)
ADLS_ACUITY_SCORE: 17
ADLS_ACUITY_SCORE: 18
ADLS_ACUITY_SCORE: 17
ADLS_ACUITY_SCORE: 17

## 2021-03-27 NOTE — PLAN OF CARE
Vitals VSS  Neuro A&Ox4 at beginning of shift, disoriented to time and situation at 0430, pt reoriented.   Respiratory 98% on 2 LPM NC, crackles  Cardiac/Tele Afib CVR 80's  GI/ Continent of B&B  Skin bruising/scabs on extremities   LDAs PIV SL  Labs   Diet DD3 thin liquid  Activity A1 gait belt and walker  Plan Speech, OT, PT following. Continue to monitor.

## 2021-03-27 NOTE — PROGRESS NOTES
A&OX3. Disoriented to time. Forgetful. Up with assist of 1 with gait belt for transferring. Ls crackles, but diminished. Continent of B&B. Oxygen 93% on 2L per nc. Sob with activity. Remdesivir infusion started for 5 days. Decadron and eloquis continued. Denied any pain nor discomfort.  and 247.  Continue monitoring.

## 2021-03-27 NOTE — PROGRESS NOTES
SW received consult for discharge planning, however, SWS already involved and working on discharge planning. See not from 3/26/21.     Winter HENDRICKS, [facility]  Inpatient Care Coordination   [facility]   [phone]

## 2021-03-27 NOTE — PROGRESS NOTES
[facility]  Hospitalist Progress Note  Yash Vance MD 03/27/2021    Reason for Stay (Diagnosis): COVID 19, resp failure         Assessment and Plan:      Summary of Stay: Yanick Adorno is a 75 year old male with IDDM type II, HFrEF, HTN, PAF on Eliquis, left MCA CVA in 2018 with residual mild-mod expressive and receptive aphasia, nonobstructive ASCVD, and NICM (EF 40-45%) who presented to [facility] ED on 3/23/2021 three days after a mechanical fall with resulting left rib pain and 48 hours slurred speech and word-finding difficulties.  Speech changes had completely resolved while in the ER, however, he was noted to hypoxic with sats into the mid-80s.  COVID19 positive.  CMP notable for Cr 1.12 and glucose 208.  CBC within normal limits.  UA bland. DDimer 1.4.  BNP 3397.  Trop 0.064.  EKG showed AFib vs sinus dysrhythmia with CVR.  Stroke work-up negative (CT Head, CTA Head/Neck, MRI Brain without acute process).  CT Chest showed patchy GGO in the right lung with reactive mediastinal and hilar adenopathy consistent with COVID19 PNA.  Patient admitted for further work-up and management.       Since admit pt has clinically improved.  Fevers appear to be resolving and he has now been weaned off oxygen.      Plans today:  - SW consult to assist with disposition.  TCU recommended by rehab and pt/family agreeable, assuming facility can be found to accept a COVID positive patient.    - start D5W IVF.  Hypernatremia on labs indicates dehydration with free water deficiency     #COVID19 PNA with resulting Acute Hypoxic Respiratory Failure.   - initially hypoxic and intermittently febrile; clinical course now much improved.  Weaned off oxygen and fevers resolving  - Day 4/10 dexamethasone  - Started remdesivir (day 3/5)  - Continue to monitor and treat fever  - Continue oxygen support, wean as tolerated  - Anticoagulated with PTA apixaban   - Therapies recommending TCU     #Ongoing Deficits in Strength,  Endurance, and Cognition.  #Dysphagia.  Etiology likely multifactorial:  Acute systemic illness +/- exacerbation of prior deficits.  Known to therapies from stroke 2018 when he suffered from residual mild-mod expressive and receptive aphasia.  - PT/OT following and recommmending TCU.    - SLP following, Recommending DD3 with thins     #PAF.  [id number] of 7.  Heart rates ~60s-110s.   - Increased PTA metoprolol from 12.5 >> 25 BID  - Continue PTA Eliquis  - Tele showed 11 beat run of wide complex tachycardia 3/26.  Possibly vtach, but appeared to have some irregularity so more suspicious for SVT with abberancy.  Continue metoprolol     #IDDM2, poorly controlled, complicated by Steroid-induced Hyperglycemia.  [PTA Lantus 35u qAM, metformin XR 1000 BID, glipizide 10 BID]  HgbA1c 9.1.  Quite hyperglycemic in setting of acute illness and dexamethasone use.    - Continue PTA metformin, glipizide.  - Increased Lantus to 42u qAM.  Continue ISS and carb coverage (1:15).     #NICM, EF 35-40% / HFrEF, compensated.  #Nonobstructive ASCVD (per cardiac cath 3/2018).  #HTN / DLD  BNP elevated upon admission at 3397.  Troponins cycled detectable but stable 0.064 >> 0.054 >> 0.057, likely related to demand.   - Appears compensated from a CV standpoint  - Echocardiogram 3/24/2021 revealed decreased LVEF from 40-45% >> 35-40% with G1LVDD, normal RVSF, and no significant valvular dysfunction.   - Continue PTA metoprolol, lisinopril, atorvastatin, ASA 81, and Eliquis     #Incidental pulmonary nodule.  #History of tobacco abuse.  6 mm RML nodule found on imaging.  Former smoker.  No hemoptysis or unintended weight loss.  CT does suggest mediastinal and hilar adenopathy but this is felt to be reactive.   - Repeat CT in 6 months.    #Hypernatremia.  Sodium 150 on 3/27.  Free water deficient and dehydrated  - start D5W IVF @ 75cc/hr        COVID status: POSITIVE 3/23  Diet: Dysphagia Diet Level 3 Advanced Thin Liquids (water, ice chips,  "juice, milk gelatin, ice cream, etc)    DVT Prophylaxis:  Eliquis  Code Status: Full Code    Discharge Dispo: TCU   Estimated Disch Date / # of Days until Disch: anticipate medically ready for discharge 3/29             Interval History (Subjective):      No complaints.  Weaned off oxygen this morning.  No fevers overnight.  No dyspnea                  Physical Exam:      Last Vital Signs:  BP (!) 148/80 (BP Location: Left arm)   Pulse 67   Temp 98  F (36.7  C) (Oral)   Resp 20   Ht 1.854 m (6' 1\")   Wt 87.6 kg (193 lb 3.2 oz)   SpO2 92%   BMI 25.49 kg/m        Intake/Output Summary (Last 24 hours) at 3/27/2021 1324  Last data filed at 3/27/2021 1123  Gross per 24 hour   Intake 940 ml   Output --   Net 940 ml       Constitutional: Awake, alert, cooperative, no apparent distress   Respiratory: Clear to auscultation bilaterally, no crackles or wheezing   Cardiovascular: Regular rate and rhythm, normal S1 and S2, and no murmur noted   Abdomen: Normal bowel sounds, soft, non-distended, non-tender   Skin: No rashes, no cyanosis, dry to touch   Neuro: Alert and oriented x3, no weakness, numbness, memory loss   Extremities: No edema, normal range of motion   Other(s):        All other systems: Negative          Medications:      All current medications were reviewed with changes reflected in problem list.         Data:      All new lab and imaging data was reviewed.   Labs:  Recent Labs   Lab 03/27/21  0733 03/26/21  0621 03/25/21  0714   WBC 5.7 6.8 8.0   HGB 14.0 13.7 14.2   HCT 42.4 41.0 43.5   MCV 95 94 95    199 172      Imaging:   No results found for this or any previous visit (from the past 24 hour(s)).   "

## 2021-03-28 ENCOUNTER — APPOINTMENT (OUTPATIENT)
Dept: PHYSICAL THERAPY | Facility: CLINIC | Age: 76
DRG: 177 | End: 2021-03-28
Payer: MEDICARE

## 2021-03-28 ENCOUNTER — APPOINTMENT (OUTPATIENT)
Dept: OCCUPATIONAL THERAPY | Facility: CLINIC | Age: 76
DRG: 177 | End: 2021-03-28
Payer: MEDICARE

## 2021-03-28 LAB
ANION GAP SERPL CALCULATED.3IONS-SCNC: 8 MMOL/L (ref 3–14)
BUN SERPL-MCNC: 16 MG/DL (ref 7–30)
CALCIUM SERPL-MCNC: 8.1 MG/DL (ref 8.5–10.1)
CHLORIDE SERPL-SCNC: 105 MMOL/L (ref 94–109)
CO2 SERPL-SCNC: 30 MMOL/L (ref 20–32)
CREAT SERPL-MCNC: 0.67 MG/DL (ref 0.66–1.25)
GFR SERPL CREATININE-BSD FRML MDRD: >90 ML/MIN/{1.73_M2}
GLUCOSE BLDC GLUCOMTR-MCNC: 120 MG/DL (ref 70–99)
GLUCOSE BLDC GLUCOMTR-MCNC: 158 MG/DL (ref 70–99)
GLUCOSE BLDC GLUCOMTR-MCNC: 225 MG/DL (ref 70–99)
GLUCOSE BLDC GLUCOMTR-MCNC: 281 MG/DL (ref 70–99)
GLUCOSE SERPL-MCNC: 85 MG/DL (ref 70–99)
POTASSIUM SERPL-SCNC: 3.5 MMOL/L (ref 3.4–5.3)
SODIUM SERPL-SCNC: 143 MMOL/L (ref 133–144)

## 2021-03-28 PROCEDURE — 97530 THERAPEUTIC ACTIVITIES: CPT | Mod: GO

## 2021-03-28 PROCEDURE — 250N000013 HC RX MED GY IP 250 OP 250 PS 637: Performed by: PHYSICIAN ASSISTANT

## 2021-03-28 PROCEDURE — 97530 THERAPEUTIC ACTIVITIES: CPT | Mod: GP

## 2021-03-28 PROCEDURE — 250N000009 HC RX 250: Performed by: PHYSICIAN ASSISTANT

## 2021-03-28 PROCEDURE — 999N001017 HC STATISTIC GLUCOSE BY METER IP

## 2021-03-28 PROCEDURE — 99232 SBSQ HOSP IP/OBS MODERATE 35: CPT | Performed by: INTERNAL MEDICINE

## 2021-03-28 PROCEDURE — 36415 COLL VENOUS BLD VENIPUNCTURE: CPT | Performed by: INTERNAL MEDICINE

## 2021-03-28 PROCEDURE — 250N000012 HC RX MED GY IP 250 OP 636 PS 637: Performed by: INTERNAL MEDICINE

## 2021-03-28 PROCEDURE — 120N000001 HC R&B MED SURG/OB

## 2021-03-28 PROCEDURE — 258N000003 HC RX IP 258 OP 636: Performed by: PHYSICIAN ASSISTANT

## 2021-03-28 PROCEDURE — 80048 BASIC METABOLIC PNL TOTAL CA: CPT | Performed by: INTERNAL MEDICINE

## 2021-03-28 PROCEDURE — 250N000013 HC RX MED GY IP 250 OP 250 PS 637: Performed by: INTERNAL MEDICINE

## 2021-03-28 PROCEDURE — 97116 GAIT TRAINING THERAPY: CPT | Mod: GP

## 2021-03-28 PROCEDURE — 250N000012 HC RX MED GY IP 250 OP 636 PS 637: Performed by: PHYSICIAN ASSISTANT

## 2021-03-28 PROCEDURE — 258N000003 HC RX IP 258 OP 636: Performed by: INTERNAL MEDICINE

## 2021-03-28 RX ADMIN — SODIUM CHLORIDE 50 ML: 9 INJECTION, SOLUTION INTRAVENOUS at 17:58

## 2021-03-28 RX ADMIN — GLIPIZIDE 10 MG: 5 TABLET ORAL at 20:00

## 2021-03-28 RX ADMIN — REMDESIVIR 100 MG: 100 INJECTION, POWDER, LYOPHILIZED, FOR SOLUTION INTRAVENOUS at 16:47

## 2021-03-28 RX ADMIN — METOPROLOL TARTRATE 25 MG: 25 TABLET, FILM COATED ORAL at 20:19

## 2021-03-28 RX ADMIN — APIXABAN 5 MG: 5 TABLET, FILM COATED ORAL at 08:00

## 2021-03-28 RX ADMIN — GLIPIZIDE 10 MG: 5 TABLET ORAL at 06:46

## 2021-03-28 RX ADMIN — METFORMIN ER 500 MG 1000 MG: 500 TABLET ORAL at 08:00

## 2021-03-28 RX ADMIN — APIXABAN 5 MG: 5 TABLET, FILM COATED ORAL at 20:19

## 2021-03-28 RX ADMIN — LISINOPRIL 10 MG: 10 TABLET ORAL at 08:00

## 2021-03-28 RX ADMIN — INSULIN GLARGINE 42 UNITS: 100 INJECTION, SOLUTION SUBCUTANEOUS at 07:58

## 2021-03-28 RX ADMIN — METFORMIN ER 500 MG 1000 MG: 500 TABLET ORAL at 20:19

## 2021-03-28 RX ADMIN — ASPIRIN 81 MG: 81 TABLET ORAL at 08:00

## 2021-03-28 RX ADMIN — METOPROLOL TARTRATE 25 MG: 25 TABLET, FILM COATED ORAL at 08:00

## 2021-03-28 RX ADMIN — ATORVASTATIN CALCIUM 40 MG: 40 TABLET, FILM COATED ORAL at 08:00

## 2021-03-28 RX ADMIN — FAMOTIDINE 20 MG: 20 TABLET ORAL at 20:19

## 2021-03-28 RX ADMIN — FAMOTIDINE 20 MG: 20 TABLET ORAL at 08:00

## 2021-03-28 RX ADMIN — INSULIN ASPART 2 UNITS: 100 INJECTION, SOLUTION INTRAVENOUS; SUBCUTANEOUS at 23:08

## 2021-03-28 RX ADMIN — DEXAMETHASONE 6 MG: 2 TABLET ORAL at 12:08

## 2021-03-28 RX ADMIN — DEXTROSE MONOHYDRATE: 50 INJECTION, SOLUTION INTRAVENOUS at 04:48

## 2021-03-28 ASSESSMENT — ACTIVITIES OF DAILY LIVING (ADL)
ADLS_ACUITY_SCORE: 18
ADLS_ACUITY_SCORE: 17
ADLS_ACUITY_SCORE: 19
ADLS_ACUITY_SCORE: 18

## 2021-03-28 NOTE — PROGRESS NOTES
[facility]  Hospitalist Progress Note  Yash Vance MD 03/28/2021    Reason for Stay (Diagnosis): COVID PNA         Assessment and Plan:      Summary of Stay: Yanick Adorno is a 75 year old male with IDDM type II, HFrEF, HTN, PAF on Eliquis, left MCA CVA in 2018 with residual mild-mod expressive and receptive aphasia, nonobstructive ASCVD, and NICM (EF 40-45%) who presented to [facility] ED on 3/23/2021 three days after a mechanical fall with resulting left rib pain and 48 hours slurred speech and word-finding difficulties.  Speech changes had completely resolved while in the ER, however, he was noted to hypoxic with sats into the mid-80s.  COVID19 positive.  CMP notable for Cr 1.12 and glucose 208.  CBC within normal limits.  UA bland. DDimer 1.4.  BNP 3397.  Trop 0.064.  EKG showed AFib vs sinus dysrhythmia with CVR.  Stroke work-up negative (CT Head, CTA Head/Neck, MRI Brain without acute process).  CT Chest showed patchy GGO in the right lung with reactive mediastinal and hilar adenopathy consistent with COVID19 PNA.  Patient admitted for further work-up and management.       Since admit pt has clinically improved.  Fevers appear to be resolving and he has now been weaned off oxygen.         Plans today:  - stop IVF.  Hypernatremia resolved    Addendum:  I called emily Keith to provide an update.  No answer but I left a VM message     #COVID19 PNA with resulting Acute Hypoxic Respiratory Failure.   - initially hypoxic and intermittently febrile; clinical course now much improved.  Weaned off oxygen and fevers resolving  - Day 4/10 dexamethasone  - Started remdesivir (day 3/5)  - Continue to monitor and treat fever  - Continue oxygen support, wean as tolerated  - Anticoagulated with PTA apixaban   - Therapies recommending TCU     #Ongoing Deficits in Strength, Endurance, and Cognition.  #Dysphagia.  Etiology likely multifactorial:  Acute systemic illness +/- exacerbation of prior deficits.   Known to therapies from stroke 2018 when he suffered from residual mild-mod expressive and receptive aphasia.  - PT/OT following and recommmending TCU.    - SLP following, Recommending DD3 with thins     #PAF.  [id number] of 7.  Heart rates ~60s-110s.   - Increased PTA metoprolol from 12.5 >> 25 BID  - Continue PTA Eliquis  - Tele showed 11 beat run of wide complex tachycardia 3/26.  Possibly vtach, but appeared to have some irregularity so more suspicious for SVT with abberancy.  Continue metoprolol     #IDDM2, poorly controlled, complicated by Steroid-induced Hyperglycemia.  [PTA Lantus 35u qAM, metformin XR 1000 BID, glipizide 10 BID]  HgbA1c 9.1.  Quite hyperglycemic in setting of acute illness and dexamethasone use.    - Continue PTA metformin, glipizide.  - Increased Lantus to 42u qAM.  Continue ISS and carb coverage (1:15).     #NICM, EF 35-40% / HFrEF, compensated.  #Nonobstructive ASCVD (per cardiac cath 3/2018).  #HTN / DLD  BNP elevated upon admission at 3397.  Troponins cycled detectable but stable 0.064 >> 0.054 >> 0.057, likely related to demand.   - Appears compensated from a CV standpoint  - Echocardiogram 3/24/2021 revealed decreased LVEF from 40-45% >> 35-40% with G1LVDD, normal RVSF, and no significant valvular dysfunction.   - Continue PTA metoprolol, lisinopril, atorvastatin, ASA 81, and Eliquis     #Incidental pulmonary nodule.  #History of tobacco abuse.  6 mm RML nodule found on imaging.  Former smoker.  No hemoptysis or unintended weight loss.  CT does suggest mediastinal and hilar adenopathy but this is felt to be reactive.   - Repeat CT in 6 months.     #Hypernatremia.  Sodium 150 on 3/27.  Free water deficient and dehydrated  - resolved with D5W IVF        COVID status: POSITIVE 3/23  Diet: Dysphagia Diet Level 3 Advanced Thin Liquids (water, ice chips, juice, milk gelatin, ice cream, etc)    DVT Prophylaxis:  Eliquis  Code Status: Full Code    Discharge Dispo: TCU   Estimated Disch  "Date / # of Days until Disch: appears medically ready to discharge when bed available        Interval History (Subjective):      Feels well.  Off oxygen.  No fevers.  No dyspnea                  Physical Exam:      Last Vital Signs:  BP (!) 146/74 (BP Location: Left arm)   Pulse 70   Temp 98.4  F (36.9  C) (Oral)   Resp 20   Ht 1.854 m (6' 1\")   Wt 87.6 kg (193 lb 3.2 oz)   SpO2 94%   BMI 25.49 kg/m        Intake/Output Summary (Last 24 hours) at 3/28/2021 1241  Last data filed at 3/28/2021 1219  Gross per 24 hour   Intake 1356 ml   Output 550 ml   Net 806 ml       Constitutional: Awake, alert, cooperative, no apparent distress   Respiratory: Clear to auscultation bilaterally, no crackles or wheezing   Cardiovascular: Regular rate and rhythm, normal S1 and S2, and no murmur noted   Abdomen: Normal bowel sounds, soft, non-distended, non-tender   Skin: No rashes, no cyanosis, dry to touch   Neuro: Alert and oriented x3, no weakness, numbness, memory loss   Extremities: No edema, normal range of motion   Other(s):        All other systems: Negative          Medications:      All current medications were reviewed with changes reflected in problem list.         Data:      All new lab and imaging data was reviewed.   Labs:  Recent Labs   Lab 03/27/21  0733   WBC 5.7   HGB 14.0   HCT 42.4   MCV 95         Imaging:   No results found for this or any previous visit (from the past 24 hour(s)).   "

## 2021-03-28 NOTE — PROGRESS NOTES
Mini-Cog Assessment:   interventions for fall prevention utilized and in place include WAR, GB, W, and non-skid socks when ambulating, falls risk band in place, bed and chair alarms on (zone 2 when in bed), education provided about calling prior to OOB or OOC.    Mini-Cog Assessment Score:  3/5.

## 2021-03-28 NOTE — PROGRESS NOTES
A&OX4. Forgetful and impulsive. Sets off bed alarm repeatedly.  Ls diminished. Productive cough intermittently. Clear/brownish sputum. Oxygen 95% on 2L nc. Up with assist of 1 with gait belt and walker.  and 236 at HS. Continue monitoring.

## 2021-03-28 NOTE — PLAN OF CARE
Disoriented to time/place. VSS on RA except elevated BP's , improved w/ given scheduled HTN meds. Denied pain. BG 85, 120. Tele SR w/ invert T's, denied CP . LS dim, denied SOB. PIV to right intact, SL. Up Ax1 GB W, frequently sets off bed alarm impulsively despite repetitive education. Maintained covid iso precautions. On IV remdesivir and PO decadron. Discharge pending TCU placement, SW following. Will continue POC.

## 2021-03-28 NOTE — PLAN OF CARE
Vitals VSS except BP elevated 170s/90s, HR 50s-60s  Neuro Confused at times, impulsive- set off bed alarm several times throughout shift.  Respiratory 96% on 2 LPM NC. SOB w/activity  Cardiac/Tele SB/SR  GI/ Continent of B&B for shift. 2 BMs during shift  Skin scabbing and bruising   LDAs D5 infusing at 75 mL/hr  Labs   Diet DD3 thin liquid  Activity A1 gait belt and walker  Plan Continue with remdesivir and decadron. Wean O2 as tolerable. Speech, PT, OT following. Continue to monitor.

## 2021-03-29 ENCOUNTER — APPOINTMENT (OUTPATIENT)
Dept: PHYSICAL THERAPY | Facility: CLINIC | Age: 76
DRG: 177 | End: 2021-03-29
Payer: MEDICARE

## 2021-03-29 ENCOUNTER — APPOINTMENT (OUTPATIENT)
Dept: SPEECH THERAPY | Facility: CLINIC | Age: 76
DRG: 177 | End: 2021-03-29
Payer: MEDICARE

## 2021-03-29 ENCOUNTER — APPOINTMENT (OUTPATIENT)
Dept: OCCUPATIONAL THERAPY | Facility: CLINIC | Age: 76
DRG: 177 | End: 2021-03-29
Payer: MEDICARE

## 2021-03-29 LAB
GLUCOSE BLDC GLUCOMTR-MCNC: 114 MG/DL (ref 70–99)
GLUCOSE BLDC GLUCOMTR-MCNC: 161 MG/DL (ref 70–99)
GLUCOSE BLDC GLUCOMTR-MCNC: 191 MG/DL (ref 70–99)
GLUCOSE BLDC GLUCOMTR-MCNC: 206 MG/DL (ref 70–99)
GLUCOSE BLDC GLUCOMTR-MCNC: 236 MG/DL (ref 70–99)
GLUCOSE BLDC GLUCOMTR-MCNC: 246 MG/DL (ref 70–99)
GLUCOSE BLDC GLUCOMTR-MCNC: 296 MG/DL (ref 70–99)
GLUCOSE BLDC GLUCOMTR-MCNC: 61 MG/DL (ref 70–99)
GLUCOSE BLDC GLUCOMTR-MCNC: 84 MG/DL (ref 70–99)

## 2021-03-29 PROCEDURE — 250N000012 HC RX MED GY IP 250 OP 636 PS 637: Performed by: INTERNAL MEDICINE

## 2021-03-29 PROCEDURE — 250N000013 HC RX MED GY IP 250 OP 250 PS 637: Performed by: INTERNAL MEDICINE

## 2021-03-29 PROCEDURE — 250N000009 HC RX 250: Performed by: PHYSICIAN ASSISTANT

## 2021-03-29 PROCEDURE — 97530 THERAPEUTIC ACTIVITIES: CPT | Mod: GP | Performed by: PHYSICAL THERAPIST

## 2021-03-29 PROCEDURE — 250N000013 HC RX MED GY IP 250 OP 250 PS 637: Performed by: PHYSICIAN ASSISTANT

## 2021-03-29 PROCEDURE — 250N000012 HC RX MED GY IP 250 OP 636 PS 637: Performed by: PHYSICIAN ASSISTANT

## 2021-03-29 PROCEDURE — 92526 ORAL FUNCTION THERAPY: CPT | Mod: GN | Performed by: SPEECH-LANGUAGE PATHOLOGIST

## 2021-03-29 PROCEDURE — 999N001017 HC STATISTIC GLUCOSE BY METER IP

## 2021-03-29 PROCEDURE — 97535 SELF CARE MNGMENT TRAINING: CPT | Mod: GO

## 2021-03-29 PROCEDURE — 120N000001 HC R&B MED SURG/OB

## 2021-03-29 PROCEDURE — 99232 SBSQ HOSP IP/OBS MODERATE 35: CPT | Performed by: INTERNAL MEDICINE

## 2021-03-29 PROCEDURE — 258N000003 HC RX IP 258 OP 636: Performed by: PHYSICIAN ASSISTANT

## 2021-03-29 RX ADMIN — GLIPIZIDE 10 MG: 5 TABLET ORAL at 08:56

## 2021-03-29 RX ADMIN — ASPIRIN 81 MG: 81 TABLET ORAL at 08:49

## 2021-03-29 RX ADMIN — METFORMIN ER 500 MG 1000 MG: 500 TABLET ORAL at 17:28

## 2021-03-29 RX ADMIN — METFORMIN ER 500 MG 1000 MG: 500 TABLET ORAL at 08:59

## 2021-03-29 RX ADMIN — ATORVASTATIN CALCIUM 40 MG: 40 TABLET, FILM COATED ORAL at 08:49

## 2021-03-29 RX ADMIN — APIXABAN 5 MG: 5 TABLET, FILM COATED ORAL at 21:24

## 2021-03-29 RX ADMIN — INSULIN ASPART 1 UNITS: 100 INJECTION, SOLUTION INTRAVENOUS; SUBCUTANEOUS at 22:05

## 2021-03-29 RX ADMIN — SODIUM CHLORIDE 50 ML: 9 INJECTION, SOLUTION INTRAVENOUS at 19:20

## 2021-03-29 RX ADMIN — REMDESIVIR 100 MG: 100 INJECTION, POWDER, LYOPHILIZED, FOR SOLUTION INTRAVENOUS at 17:29

## 2021-03-29 RX ADMIN — DEXAMETHASONE 6 MG: 2 TABLET ORAL at 11:56

## 2021-03-29 RX ADMIN — INSULIN GLARGINE 42 UNITS: 100 INJECTION, SOLUTION SUBCUTANEOUS at 08:55

## 2021-03-29 RX ADMIN — METOPROLOL TARTRATE 25 MG: 25 TABLET, FILM COATED ORAL at 08:49

## 2021-03-29 RX ADMIN — Medication 15 G: at 08:46

## 2021-03-29 RX ADMIN — FAMOTIDINE 20 MG: 20 TABLET ORAL at 21:24

## 2021-03-29 RX ADMIN — LISINOPRIL 10 MG: 10 TABLET ORAL at 08:49

## 2021-03-29 RX ADMIN — APIXABAN 5 MG: 5 TABLET, FILM COATED ORAL at 08:49

## 2021-03-29 RX ADMIN — METOPROLOL TARTRATE 25 MG: 25 TABLET, FILM COATED ORAL at 21:22

## 2021-03-29 RX ADMIN — Medication 15 G: at 08:27

## 2021-03-29 RX ADMIN — GLIPIZIDE 10 MG: 5 TABLET ORAL at 17:28

## 2021-03-29 RX ADMIN — FAMOTIDINE 20 MG: 20 TABLET ORAL at 08:49

## 2021-03-29 ASSESSMENT — ACTIVITIES OF DAILY LIVING (ADL)
ADLS_ACUITY_SCORE: 18
ADLS_ACUITY_SCORE: 17
ADLS_ACUITY_SCORE: 18

## 2021-03-29 NOTE — PLAN OF CARE
3pm-7pm    Disoriented to time/place, impulsive (high fall risk). VS within patient's baseline, on RA, LS diminished, denied SoB. Up Ax1 GB W. Maintained Covid iso precautions. Administered remdesivir then 50ml NS bolus per MD orders. Discharge pending TCU placement.

## 2021-03-29 NOTE — PROGRESS NOTES
Care Management Follow Up    Length of Stay (days): 6    Expected Discharge Date: 03/29/21     Concerns to be Addressed: all concerns addressed in this encounter     Patient plan of care discussed at interdisciplinary rounds: Yes    Anticipated Discharge Disposition: Skilled Nursing Facilty, Transitional Care     Anticipated Discharge Services: None  Anticipated Discharge DME: Walker, Oxygen    Patient/family educated on Medicare website which has current facility and service quality ratings: yes  Education Provided on the Discharge Plan:  yes  Patient/Family in Agreement with the Plan: yes    Referrals Placed by CM/SW: Post Acute Facilities  Private pay costs discussed: private room/amenity fees and transportation costs    Additional Information:  Sw left a vm for Dayna at [facility], [phone], requesting an update on their bed availability to determine if they are able to accept the pt.    Sw will continue with discharge planning and will be available as needed until discharge.      RUTHIE Armijo, [facility]  Inpatient Care Coordination  [facility]  [phone]

## 2021-03-29 NOTE — PROGRESS NOTES
[facility]  Hospitalist Progress Note  Yash Vance MD 03/29/2021    Reason for Stay (Diagnosis): COVID PNA         Assessment and Plan:      Summary of Stay: Yanick Adorno is a 75 year old male with IDDM type II, HFrEF, HTN, PAF on Eliquis, left MCA CVA in 2018 with residual mild-mod expressive and receptive aphasia, nonobstructive ASCVD, and NICM (EF 40-45%) who presented to [facility] ED on 3/23/2021 three days after a mechanical fall with resulting left rib pain and 48 hours slurred speech and word-finding difficulties.  Speech changes had completely resolved while in the ER, however, he was noted to hypoxic with sats into the mid-80s.  COVID19 positive.  CMP notable for Cr 1.12 and glucose 208.  CBC within normal limits.  UA bland. DDimer 1.4.  BNP 3397.  Trop 0.064.  EKG showed AFib vs sinus dysrhythmia with CVR.  Stroke work-up negative (CT Head, CTA Head/Neck, MRI Brain without acute process).  CT Chest showed patchy GGO in the right lung with reactive mediastinal and hilar adenopathy consistent with COVID19 PNA.  Patient admitted for further work-up and management.       Since admit pt has clinically improved.  Fevers appear to be resolving and he has now been weaned off oxygen.      This morning patient had hypoglycemia with BS near 60.  On review of glucose levels he typically is lower in the morning with significant rise in afternoon following daily decadron dose    Plans today:  - decrease Lantus from 42 units to 35 units daily given morning hypoglycemia  - start NPH insulin 10 units every morning to cover afternoon hyperglycemia secondary to dexamethasone dose     #COVID19 PNA with resulting Acute Hypoxic Respiratory Failure.   - initially hypoxic and intermittently febrile; clinical course now much improved.  Weaned off oxygen and fevers resolving  - Day 6/10 dexamethasone  - Started remdesivir (day 5/5)  - Continue to monitor and treat fever  - Continue oxygen support, wean as  tolerated  - Anticoagulated with PTA apixaban   - Therapies recommending TCU     #Ongoing Deficits in Strength, Endurance, and Cognition.  #Dysphagia.  Etiology likely multifactorial:  Acute systemic illness +/- exacerbation of prior deficits.  Known to therapies from stroke 2018 when he suffered from residual mild-mod expressive and receptive aphasia.  - PT/OT following and recommmending TCU.    - SLP following, Recommending DD3 with thins     #PAF.  [id number] of 7.  Heart rates now controlled in the  range   - Increased PTA metoprolol from 12.5 >> 25 BID  - Continue PTA Eliquis  - Tele showed 11 beat run of wide complex tachycardia 3/26.  Possibly vtach, but appeared to have some irregularity so more suspicious for SVT with abberancy.  Continue metoprolol     #IDDM2, poorly controlled, complicated by Steroid-induced Hyperglycemia.  [PTA Lantus 35u qAM, metformin XR 1000 BID, glipizide 10 BID]  HgbA1c 9.1.  Quite hyperglycemic in setting of acute illness and dexamethasone use.    - Continue PTA metformin, glipizide.  - add NPH 10 units daily with dexamethasone dose.  Discontinue after dexamethasone completed.       #NICM, EF 35-40% / HFrEF, compensated.  #Nonobstructive ASCVD (per cardiac cath 3/2018).  #HTN / DLD  BNP elevated upon admission at 3397.  Troponins cycled detectable but stable 0.064 >> 0.054 >> 0.057, likely related to demand.   - Appears compensated from a CV standpoint  - Echocardiogram 3/24/2021 revealed decreased LVEF from 40-45% >> 35-40% with G1LVDD, normal RVSF, and no significant valvular dysfunction.   - Continue PTA metoprolol, lisinopril, atorvastatin, ASA 81, and Eliquis     #Incidental pulmonary nodule.  #History of tobacco abuse.  6 mm RML nodule found on imaging.  Former smoker.  No hemoptysis or unintended weight loss.  CT does suggest mediastinal and hilar adenopathy but this is felt to be reactive.   - Repeat CT in 6 months recommended.     #Hypernatremia.  Sodium 150 on  "3/27.  Free water deficient and dehydrated  - resolved with D5W IVF        COVID status: POSITIVE 3/23  Diet: Dysphagia Diet Level 3 Advanced Thin Liquids (water, ice chips, juice, milk gelatin, ice cream, etc)    DVT Prophylaxis:  Eliquis  Code Status: Full Code    Discharge Dispo: TCU   Estimated Disch Date / # of Days until Disch: appears medically ready to discharge when bed available        Interval History (Subjective):      Had asymptomatic hypoglycemia with BS near 60 this morning                  Physical Exam:      Last Vital Signs:  /68 (BP Location: Left arm)   Pulse 91   Temp 98.7  F (37.1  C) (Axillary)   Resp 18   Ht 1.854 m (6' 1\")   Wt 87.6 kg (193 lb 3.2 oz)   SpO2 93%   BMI 25.49 kg/m        Intake/Output Summary (Last 24 hours) at 3/29/2021 1246  Last data filed at 3/29/2021 0852  Gross per 24 hour   Intake 723 ml   Output 150 ml   Net 573 ml       Constitutional: Awake, alert, cooperative, no apparent distress   Respiratory: Clear to auscultation bilaterally, no crackles or wheezing   Cardiovascular: Regular rate and rhythm, normal S1 and S2, and no murmur noted   Abdomen: Normal bowel sounds, soft, non-distended, non-tender   Skin: No rashes, no cyanosis, dry to touch   Neuro: Alert and oriented x3, no weakness, numbness, memory loss   Extremities: No edema, normal range of motion   Other(s):        All other systems: Negative          Medications:      All current medications were reviewed with changes reflected in problem list.         Data:      All new lab and imaging data was reviewed.   Labs:  Recent Labs   Lab 03/27/21  0733   WBC 5.7   HGB 14.0   HCT 42.4   MCV 95         Imaging:   No results found for this or any previous visit (from the past 24 hour(s)).   "

## 2021-03-29 NOTE — PROVIDER NOTIFICATION
"MD paged: \"Pt's BG 61. I am following hypoglycemia protocol now. Will continue to monitor. No callback needed unless necessary, thx!\"  "

## 2021-03-29 NOTE — PROGRESS NOTES
Speech Language Therapy Discharge Summary    Reason for therapy discharge:    All goals and outcomes met, no further needs identified.    Progress towards therapy goal(s). See goals on Care Plan in [facility] electronic health record for goal details.  Goals met    Therapy recommendation(s):    No further therapy is recommended. Dysphagia diet level 3 with thin liquids due to limited dentition.

## 2021-03-29 NOTE — PLAN OF CARE
"Pt A&Ox3, disoriented to time, A1 belt +walker, RA LS Clear, Tele SR, PIV (L) lower forearm saline locked. Denies pain & SOB, . Skin: redness (heel blanchable), abrasion- shin&calf, scab-(L) hand& forearm, bruises-(R) hand &forearm.  /74 (BP Location: Left arm)   Pulse 65   Temp 98.4  F (36.9  C) (Oral)   Resp 22   Ht 1.854 m (6' 1\")   Wt 87.6 kg (193 lb 3.2 oz)   SpO2 95%   BMI 25.49 kg/m     "

## 2021-03-29 NOTE — PLAN OF CARE
"BP (!) 155/85 (BP Location: Left arm)   Pulse 72   Temp 97.8  F (36.6  C) (Oral)   Resp 20   Ht 1.854 m (6' 1\")   Wt 87.6 kg (193 lb 3.2 oz)   SpO2 95%   BMI 25.49 kg/m    NEURO:Pt A/Ox3, disoriented to time, forgetful and impulsive and frequently sets off bed alarms,pt was also found outside of his room across the brice using the bathroom in an unoccupied room by staff and was escorted back to his room, Pt educated on the importance of calling for assistance, bed alarms were activated with increased awareness and IPAD at bedside  VS:BP mildly elevated 150s systolic  PAIN:Denies  TELE: SR w/ PVCs, Per tele tech pt had 8 beat run of Vtach at 2308, pt asymptomatic  IV:New SL LA  GLUCOSE CHECKS:281  RESPIRATORY:LS- diminished, denies SOB, tolerating RA, sats  95%  GI:+BS  :Voiding appropriately  SKIN:Bruised, scabs, abrasions  ACTIVITY:Ax1 gait belt and walker  DIET:DD3 w/ thin liquids  PLAN:Continue PO decadron, IV remdesivir, discharge plan TCU once bed found, continue POC.     "

## 2021-03-29 NOTE — PLAN OF CARE
A/O x 4, forgetful. VSS on RA except elevated BP's , improved w/ given scheduled HTN meds. AM BG 61, MD paged and hypoglycemia protocol followed w/ rechecks 84, 114. Afternoon , 296, 191. Tele SR, denied CP. LS dim, denied SOB. PIV to left intact, SL . Up Ax1 GB W, freq impulsively OOB setting off alarms despite repetitive education and direction; WAR, GB utilized, non-skid socks in place, bed and chair alarms on. Good PO intake. Maintained covid iso precautions. Will continue POC.

## 2021-03-30 ENCOUNTER — APPOINTMENT (OUTPATIENT)
Dept: OCCUPATIONAL THERAPY | Facility: CLINIC | Age: 76
DRG: 177 | End: 2021-03-30
Payer: MEDICARE

## 2021-03-30 ENCOUNTER — APPOINTMENT (OUTPATIENT)
Dept: PHYSICAL THERAPY | Facility: CLINIC | Age: 76
DRG: 177 | End: 2021-03-30
Payer: MEDICARE

## 2021-03-30 LAB
GLUCOSE BLDC GLUCOMTR-MCNC: 108 MG/DL (ref 70–99)
GLUCOSE BLDC GLUCOMTR-MCNC: 146 MG/DL (ref 70–99)
GLUCOSE BLDC GLUCOMTR-MCNC: 166 MG/DL (ref 70–99)
GLUCOSE BLDC GLUCOMTR-MCNC: 192 MG/DL (ref 70–99)
GLUCOSE BLDC GLUCOMTR-MCNC: 85 MG/DL (ref 70–99)

## 2021-03-30 PROCEDURE — 250N000012 HC RX MED GY IP 250 OP 636 PS 637: Performed by: INTERNAL MEDICINE

## 2021-03-30 PROCEDURE — 250N000013 HC RX MED GY IP 250 OP 250 PS 637: Performed by: INTERNAL MEDICINE

## 2021-03-30 PROCEDURE — 250N000013 HC RX MED GY IP 250 OP 250 PS 637: Performed by: PHYSICIAN ASSISTANT

## 2021-03-30 PROCEDURE — 99232 SBSQ HOSP IP/OBS MODERATE 35: CPT | Performed by: INTERNAL MEDICINE

## 2021-03-30 PROCEDURE — 999N001017 HC STATISTIC GLUCOSE BY METER IP

## 2021-03-30 PROCEDURE — 97116 GAIT TRAINING THERAPY: CPT | Mod: GP | Performed by: PHYSICAL THERAPIST

## 2021-03-30 PROCEDURE — 97535 SELF CARE MNGMENT TRAINING: CPT | Mod: GO | Performed by: REHABILITATION PRACTITIONER

## 2021-03-30 PROCEDURE — 120N000001 HC R&B MED SURG/OB

## 2021-03-30 RX ADMIN — INSULIN HUMAN 10 UNITS: 100 INJECTION, SUSPENSION SUBCUTANEOUS at 08:56

## 2021-03-30 RX ADMIN — METOPROLOL TARTRATE 25 MG: 25 TABLET, FILM COATED ORAL at 08:12

## 2021-03-30 RX ADMIN — APIXABAN 5 MG: 5 TABLET, FILM COATED ORAL at 08:12

## 2021-03-30 RX ADMIN — ASPIRIN 81 MG: 81 TABLET ORAL at 08:12

## 2021-03-30 RX ADMIN — FAMOTIDINE 20 MG: 20 TABLET ORAL at 08:12

## 2021-03-30 RX ADMIN — GLIPIZIDE 10 MG: 5 TABLET ORAL at 08:12

## 2021-03-30 RX ADMIN — METOPROLOL TARTRATE 25 MG: 25 TABLET, FILM COATED ORAL at 20:40

## 2021-03-30 RX ADMIN — GLIPIZIDE 10 MG: 5 TABLET ORAL at 17:28

## 2021-03-30 RX ADMIN — ATORVASTATIN CALCIUM 40 MG: 40 TABLET, FILM COATED ORAL at 08:12

## 2021-03-30 RX ADMIN — FAMOTIDINE 20 MG: 20 TABLET ORAL at 20:40

## 2021-03-30 RX ADMIN — DEXAMETHASONE 6 MG: 2 TABLET ORAL at 08:12

## 2021-03-30 RX ADMIN — METFORMIN ER 500 MG 1000 MG: 500 TABLET ORAL at 08:12

## 2021-03-30 RX ADMIN — INSULIN GLARGINE 35 UNITS: 100 INJECTION, SOLUTION SUBCUTANEOUS at 08:11

## 2021-03-30 RX ADMIN — METFORMIN ER 500 MG 1000 MG: 500 TABLET ORAL at 17:28

## 2021-03-30 RX ADMIN — LISINOPRIL 10 MG: 10 TABLET ORAL at 08:12

## 2021-03-30 RX ADMIN — APIXABAN 5 MG: 5 TABLET, FILM COATED ORAL at 20:40

## 2021-03-30 ASSESSMENT — ACTIVITIES OF DAILY LIVING (ADL)
ADLS_ACUITY_SCORE: 18
ADLS_ACUITY_SCORE: 18
ADLS_ACUITY_SCORE: 17
ADLS_ACUITY_SCORE: 18

## 2021-03-30 NOTE — PLAN OF CARE
A/O x 4, forgetful. VSS on RA except elevated BP's , improved w/ scheduled given HTN meds. Denied pain. , 192. Tele SR / invert T's, denied CP . LS dim, productive cough, denied SOB. PIV to left intact, SL . Up Ax1 GB W, impulsive at times OOB or OOC setting off alarms; education and reminders provided. Good PO intake. Covid iso precautions maintained. Discharge pending TCU placement, awaiting bed availability. Will continue POC.

## 2021-03-30 NOTE — PROGRESS NOTES
"   03/30/21 1015   Signing Clinician's Name / Credentials   Signing clinician's name / credentials Irena Boyce DPT   Quick Adds   Rehab Discipline PT   Gait Training   Minutes of Treatment (Gait Training) 31   Symptoms Noted During/After Treatment (Gait Training) fatigue;shortness of breath   Distance in Feet (Required for LE Total Joints) 45   Treatment Detail Gait training performed for assessing and improving safety with ambulation and stair negotiation. Engaged patient in ambulation without FWW at [facility] for continuous ambulation for a total distance of 45 ft in room, somewhat unsteady, oxygen desat from 93% to 90%, back to >90% with 1min seated rest. Pt somewhat unsteady, stooped posture, intermittently reaching for objects, but reporting \"I don't need a walker.\"  Cued for upright posture and safety with making turns in room, no overt loss of balance. Patient appears to not be putting wt through entire surface of foot.  Pt performed 30s SST test as overall gross assessment and intervention for strength, balance, and cardiovascular endurance.  Pt performing 5x in 30 seconds, SBA, O2 desaturation to 85%, improved to >90% within 1min of pursed lip breathing and relaxed breathing. Pt unaware of deficits, reporting \"I feel fine\", needing cues to sit and breathe following decreased oxygen. Pt educated on importance of checking oxygen once returning home, effects of decreasing oxygen on risk of falling and overall health, and educated on use of pulse oximeter. Pt reporting he has 14 steps to get to bedroom/bathroom. Writer brought fitness step to assess stair mgmt, pt able to perform 14 steps with 1 heavy B UE support, no overt loss of balance, desaturated to 82% following activity, pt reaching to sit right away, reporting \"I don't know why, I just needed to sit.\" Overall pt requiring supervision level of assistance 2/2 decreased weakness, balance, and desturating with strenuous activity of stair climbing. Pt also " exhibiting mild cognitive issues and decreased awareness of needing assistance. Pt is at increased risk of falling.    PT Discharge Planning    PT Discharge Recommendation (DC Rec) [facility]-Motivated patient will benefit from intensive, interdisciplinary therapy.  Anticipate will be able to tolerate 3 hours of therapy per day  (Would benefit from Outpt Pulmonary Rehab to return to PLOF)   PT Rationale for DC Rec Pt continued to perform below baseline for all functional mobility, would benefit from cont skilled PT to progress strength, safety with transfers, ambulation, and stairs, and cardiopulmonary endurance. Pt is at increased risk of falling, forgetful with safety awareness and need for assistance. Attempted stairs with fitness step in room, pt with no overt loss of  balance, needing 1 UE support and SB/CG level of assistance, desaturated to 82%. Continuing to recommend ARU, pt would benefit from increased team approach and multi-discipline focus to return to PLOF, able to tolerate 3+ hours of intensive therapy. From conversation with SW and Hospitalist, pt is not being accepted to ARU, unable to see details of decision.  If ARU not available, recommend TCU to reduce risk of falling and re-hospitalization. If pt/fam refusing TCU, recommend Home with 24/7 supervision and Home PT.  . It would be significant taxing effort for pt to leave home with A x 1 and 2WW.    PT Brief overview of current status  SBA-CGA wtih ambulation/transfers, and stair negotiation   Additional Documentation   PT Plan [place]   Total Session Time   Total Session Time (minutes) 31 minutes

## 2021-03-30 NOTE — PLAN OF CARE
Covid +, AOx3, but forgetful, 95% on RA, B, 246 and carb ct. PT/OT consult ordered. Good appetite. EF 40-45%, ISO 1500. DD3 Thin liquids. On Eliquis. Falls risk. Plan to discharge once TCU bed is available.

## 2021-03-30 NOTE — PROGRESS NOTES
[facility]  Hospitalist Progress Note  Val Florez MD 03/30/21  Text Page  Pager: [phone] (7am-6pm)    Reason for Stay (Diagnosis): COVID         Assessment and Plan:      Summary of Stay: Yanick Adorno is a 75 year old male with IDDM2, HFrEF, HTN, PAF on Eliquis, left MCA CVA in 2018 with residual mild-mod expressive and receptive aphasia, nonobstructive ASCVD, and NICM (EF 40-45%) who was admitted on 3/23/2021 three days after a mechanical fall with resulting left rib pain and 48 hours slurred speech and word-finding difficulties.  Speech changes had completely resolved while in the ER, however, he was noted to hypoxic with sats into the mid-80s and ultimately was found to have COVID-19.  Stroke work-up negative (CT Head, CTA Head/Neck, MRI Brain without acute process).  CT Chest showed patchy GGO in the right lung with reactive mediastinal and hilar adenopathy consistent with COVID-19 PNA.  Patient is improving but will require TCU upon discharge, awaiting bed availability.    Problem List/Assessment and Plan:     COVID-19 PNA with resulting Acute Hypoxic Respiratory Failure.   - initially hypoxic and intermittently febrile; clinical course now much improved.  At rest off oxygen but does desat at times with ambulation (82% with PT today)  - Day 7/10 dexamethasone  - Has completed Remdesivir  - Continue oxygen support, wean as tolerated  - Anticoagulated with PTA apixaban   - Therapies recommending TCU, SW following     Ongoing Deficits in Strength, Endurance, and Cognition.  Dysphagia.  Etiology likely multifactorial:  Acute systemic illness +/- exacerbation of prior deficits.  Known to therapies from stroke 2018 when he suffered from residual mild-mod expressive and receptive aphasia.  - PT/OT following and recommending TCU.    - SLP following, Recommending DD3 with thins     PAF.  [id number] of 7.  Heart rates now controlled in the  range   - Increased PTA metoprolol from 12.5  to 25 BID  - Continue PTA Eliquis  - Tele showed 11 beat run of wide complex tachycardia 3/26.  Possibly vtach, but appeared to have some irregularity so more suspicious for SVT with aberrancy.  Continue metoprolol     IDDM2, poorly controlled, complicated by Steroid-induced Hyperglycemia.  [PTA Lantus 35u qAM, metformin XR 1000 BID, glipizide 10 BID]  HgbA1c 9.1.  Quite hyperglycemic in setting of acute illness and dexamethasone use.    - Continue PTA metformin, glipizide.  - NPH 10 units daily with dexamethasone dose.  Discontinue after dexamethasone completed.    - Lantus 35 units QAM     NICM, EF 35-40% / HFrEF, compensated.  Nonobstructive ASCVD (per cardiac cath 3/2018).  HTN / DLD  BNP elevated upon admission at 3397.  Troponins cycled detectable but stable 0.064 >> 0.054 >> 0.057, likely related to demand.   - Appears compensated from a CV standpoint  - Echocardiogram 3/24/2021 revealed decreased LVEF from 40-45% >> 35-40% with G1LVDD, normal RVSF, and no significant valvular dysfunction.   - Continue PTA metoprolol, lisinopril, atorvastatin, ASA 81, and Eliquis     Incidental pulmonary nodule.  History of tobacco abuse.  6 mm RML nodule found on imaging.  Former smoker.  No hemoptysis or unintended weight loss.  CT does suggest mediastinal and hilar adenopathy but this is felt to be reactive.   - Repeat CT in 6 months recommended.     Hypernatremia - Resolved.  Sodium 150 on 3/27.  Free water deficient and dehydrated. Treated with IVF and this has now resolved.    GERD: Continue Pepcid.    Diet: Dysphagia Diet Level 3 Advanced Thin Liquids (water, ice chips, juice, milk gelatin, ice cream, etc)    DVT Prophylaxis: Eliquis  Medina Catheter: not present  Code Status: Full Code      Disposition Plan   Expected discharge: as soon as TCU bed available, recommended to transitional care unit once safe disposition plan/ TCU bed available.  Entered: Val Florez MD 03/30/2021, 2:23 PM       The patient's care was  "discussed with the Bedside Nurse, Care Coordinator/, Patient and Patient's Family.    Hospitalist Service  [facility]          Interval History (Subjective):      Patient was discussed with his bedside nurse this morning.  He is doing well, on room air at rest.  He did desaturate to 82% when working with physical therapy.  He denies any pain.  No chest pain, nausea or vomiting.  He does feel weaker than his baseline.      I called his daughter, terri, by phone.  Updated her on his status.  Explained that we are waiting for a TCU bed.  She is agreeable to him going to rehab and she thinks this is important for him to improve his strength after this illness.                  Physical Exam:      Last Vital Signs:  BP (!) 141/84 (BP Location: Left arm)   Pulse 74   Temp 97.7  F (36.5  C) (Oral)   Resp 22   Ht 1.854 m (6' 1\")   Wt 87.6 kg (193 lb 3.2 oz)   SpO2 95%   BMI 25.49 kg/m      General: Alert, awake, no acute distress.  HEENT: Normocephalic and atraumatic, eyes anicteric and without scleral injection, EOMI, face symmetric, MMM.  Cardiac: RRR, normal S1, S2. No m/g/r, no LE edema.  Pulmonary: Normal chest rise, normal work of breathing.  Lungs CTAB without crackles or wheezing.  Abdomen: soft, non-tender, non-distended.  Normoactive bowel sounds, no guarding or rebound tenderness.  Extremities: no deformities.  Warm, well perfused.  Skin: no rashes or lesions.  Warm and Dry.  Neuro: No focal deficits.  Speech clear.  Requires assistance to stand up out of bed as well as to ambulate to the bathroom.  Psych: Alert and oriented to person and place, cannot give details of presentation. Appropriate affect.         Medications:      All current medications were reviewed with changes reflected in problem list.         Data:      All new lab and imaging data was reviewed.   Labs:  Recent Labs   Lab 03/27/21  0733 03/26/21  0621 03/25/21  0714   WBC 5.7 6.8 8.0   HGB 14.0 13.7 " 14.2   HCT 42.4 41.0 43.5   MCV 95 94 95    199 172     Recent Labs   Lab 03/28/21  0622 03/27/21  0733 03/26/21  0621    150* 143   POTASSIUM 3.5 3.5 4.3   CHLORIDE 105 111* 109   CO2 30 29 30   ANIONGAP 8 10 4   GLC 85 138* 166*   BUN 16 25 25   CR 0.67 0.81 0.92   GFRESTIMATED >90 88 82   GFRESTBLACK >90 >90 >90   ROSA 8.1* 8.6 8.6     Recent Labs   Lab 03/27/21  0733 03/26/21  0621 03/25/21  0714   CRP 58.2* 103.0* 121.0*     Recent Labs   Lab 03/23/21  2358 03/23/21  1836   TROPI 0.057* 0.054*      Imaging:   No results found for this or any previous visit (from the past 24 hour(s)).    Val Florez MD

## 2021-03-30 NOTE — PROGRESS NOTES
"NUTRITION ASSESSMENT    REASON FOR ASSESSMENT:  LOS   History of IDDM type II, HTN, paroxysmal A. fib on Eliquis, left MCA CVA in 2018, mild to moderate nonobstructive CAD, and a nonischemic cardiomyopathy with EF 40-45% in 2018 who presents with left rib pain and slurred speech following a fall 3 days ago  COVID19 positive   CURRENT DIET AND NOURISHMENT ORDER:  Information obtained from chart review, discharge ready when bed available  Food allergies/intolerances: NKFA    Diet: Dysphagia Diet Level 3 Advanced Thin Liquids   Current Intake/Tolerance: Per flow sheet review, % intake for majority of documented meals.    ANTHROPOMETRICS  Height: 6' 1\"  Weight: 87 kg   Body mass index is 25.49 kg/m .  Weight Status:  Overweight BMI 25-29.9  Weight History:  Wt Readings from Last 10 Encounters:   03/23/21 87.6 kg (193 lb 3.2 oz)   03/27/19 92.1 kg (203 lb)   08/17/18 94.2 kg (207 lb 9.6 oz)   05/16/18 93.9 kg (207 lb)   04/11/18 93.4 kg (206 lb)   03/21/18 91.6 kg (202 lb)   02/06/18 90.4 kg (199 lb 6.4 oz)   01/23/18 86.8 kg (191 lb 6.4 oz)   01/20/18 88.5 kg (195 lb)   01/26/04 97.1 kg (214 lb)       ASSESSED NUTRITION NEEDS (PER APPROVED PRACTICE GUIDELINES, Dosing weight: 88 kg)  Estimated Energy Needs: 3068-0729 kcals (25-30 Kcal/Kg)  Justification: maintenance with COVD19 finfection  Estimated Protein Needs: 88+ grams protein (1+ g pro/Kg)  Justification: preservation of lean body mass  Estimated Fluid Needs: per provider     LABS/MEDS/PHYSICAL FINDINGS:  Nutrition focused physical exam deferred, per direction of department guidelines in the setting of COVID19  Meds reviewed  Labs reviewed     Malnutrition:  Unable to determine if patient meets two of the following criteria necessary for diagnosing malnutrition: significant weight loss, reduced intake, subcutaneous fat loss, muscle loss or fluid retention     INTERVENTION:  Nutrition Diagnosis:  Predicted inadequate nutrient intake (protein energy) related to " prolonged hospitalization, COVID19 infection    Implementation:  Nutrition Education: Per MD [email or web address]  Collaboration and Referral of care: Discussed patient during interdisciplinary care rounds this morning. Discharge ready    Follow Up/Monitoring:   Progress towards goals will be monitored and evaluated per protocol and Practice Guidelines        Edie Sullivan MS, RDN-AP, LD, CNSC  Pager - 3rd floor/ICU: [phone]  Pager - All other floors: [phone]  Pager - Weekend/holiday: [phone]  Office: [phone]

## 2021-03-30 NOTE — PLAN OF CARE
"Pt A&Ox4, A1/SBA, RA, LS Dim, Tele SR w/inverted T waves, DD3, PIV(L) forearm. BGM 85. Covid (+), ISO precautions maintained. Denies pain & SOB. PT/OT following. Plan: Discharge TCU when bed available.  BP (!) 161/89 (BP Location: Left arm)   Pulse 68   Temp 98.5  F (36.9  C) (Oral)   Resp 20   Ht 1.854 m (6' 1\")   Wt 87.6 kg (193 lb 3.2 oz)   SpO2 94%   BMI 25.49 kg/m     "

## 2021-03-31 ENCOUNTER — APPOINTMENT (OUTPATIENT)
Dept: OCCUPATIONAL THERAPY | Facility: CLINIC | Age: 76
DRG: 177 | End: 2021-03-31
Payer: MEDICARE

## 2021-03-31 ENCOUNTER — APPOINTMENT (OUTPATIENT)
Dept: PHYSICAL THERAPY | Facility: CLINIC | Age: 76
DRG: 177 | End: 2021-03-31
Payer: MEDICARE

## 2021-03-31 VITALS
SYSTOLIC BLOOD PRESSURE: 128 MMHG | BODY MASS INDEX: 24.85 KG/M2 | HEART RATE: 71 BPM | TEMPERATURE: 97.8 F | OXYGEN SATURATION: 95 % | RESPIRATION RATE: 16 BRPM | HEIGHT: 73 IN | DIASTOLIC BLOOD PRESSURE: 77 MMHG | WEIGHT: 187.5 LBS

## 2021-03-31 LAB
ANION GAP SERPL CALCULATED.3IONS-SCNC: 2 MMOL/L (ref 3–14)
BUN SERPL-MCNC: 16 MG/DL (ref 7–30)
CALCIUM SERPL-MCNC: 8.4 MG/DL (ref 8.5–10.1)
CHLORIDE SERPL-SCNC: 101 MMOL/L (ref 94–109)
CO2 SERPL-SCNC: 33 MMOL/L (ref 20–32)
CREAT SERPL-MCNC: 0.87 MG/DL (ref 0.66–1.25)
GFR SERPL CREATININE-BSD FRML MDRD: 85 ML/MIN/{1.73_M2}
GLUCOSE BLDC GLUCOMTR-MCNC: 147 MG/DL (ref 70–99)
GLUCOSE BLDC GLUCOMTR-MCNC: 164 MG/DL (ref 70–99)
GLUCOSE BLDC GLUCOMTR-MCNC: 181 MG/DL (ref 70–99)
GLUCOSE BLDC GLUCOMTR-MCNC: 207 MG/DL (ref 70–99)
GLUCOSE BLDC GLUCOMTR-MCNC: 50 MG/DL (ref 70–99)
GLUCOSE BLDC GLUCOMTR-MCNC: 50 MG/DL (ref 70–99)
GLUCOSE BLDC GLUCOMTR-MCNC: 93 MG/DL (ref 70–99)
GLUCOSE SERPL-MCNC: 224 MG/DL (ref 70–99)
MAGNESIUM SERPL-MCNC: 2 MG/DL (ref 1.6–2.3)
POTASSIUM SERPL-SCNC: 4 MMOL/L (ref 3.4–5.3)
SODIUM SERPL-SCNC: 136 MMOL/L (ref 133–144)

## 2021-03-31 PROCEDURE — 97116 GAIT TRAINING THERAPY: CPT | Mod: GP | Performed by: PHYSICAL THERAPIST

## 2021-03-31 PROCEDURE — 250N000013 HC RX MED GY IP 250 OP 250 PS 637: Performed by: PHYSICIAN ASSISTANT

## 2021-03-31 PROCEDURE — 97530 THERAPEUTIC ACTIVITIES: CPT | Mod: GP | Performed by: PHYSICAL THERAPIST

## 2021-03-31 PROCEDURE — 36415 COLL VENOUS BLD VENIPUNCTURE: CPT | Performed by: INTERNAL MEDICINE

## 2021-03-31 PROCEDURE — 99239 HOSP IP/OBS DSCHRG MGMT >30: CPT | Performed by: INTERNAL MEDICINE

## 2021-03-31 PROCEDURE — 80048 BASIC METABOLIC PNL TOTAL CA: CPT | Performed by: INTERNAL MEDICINE

## 2021-03-31 PROCEDURE — 83735 ASSAY OF MAGNESIUM: CPT | Performed by: INTERNAL MEDICINE

## 2021-03-31 PROCEDURE — 250N000012 HC RX MED GY IP 250 OP 636 PS 637: Performed by: INTERNAL MEDICINE

## 2021-03-31 PROCEDURE — 999N001017 HC STATISTIC GLUCOSE BY METER IP

## 2021-03-31 PROCEDURE — 97530 THERAPEUTIC ACTIVITIES: CPT | Mod: GO | Performed by: REHABILITATION PRACTITIONER

## 2021-03-31 PROCEDURE — 250N000013 HC RX MED GY IP 250 OP 250 PS 637: Performed by: INTERNAL MEDICINE

## 2021-03-31 RX ORDER — METOPROLOL TARTRATE 25 MG/1
25 TABLET, FILM COATED ORAL 2 TIMES DAILY
DISCHARGE
Start: 2021-03-31 | End: 2023-04-04

## 2021-03-31 RX ORDER — ACETAMINOPHEN 325 MG/1
650 TABLET ORAL EVERY 4 HOURS PRN
DISCHARGE
Start: 2021-03-31

## 2021-03-31 RX ADMIN — GLIPIZIDE 10 MG: 5 TABLET ORAL at 16:54

## 2021-03-31 RX ADMIN — APIXABAN 5 MG: 5 TABLET, FILM COATED ORAL at 08:26

## 2021-03-31 RX ADMIN — ASPIRIN 81 MG: 81 TABLET ORAL at 08:25

## 2021-03-31 RX ADMIN — DEXAMETHASONE 6 MG: 2 TABLET ORAL at 08:26

## 2021-03-31 RX ADMIN — GLIPIZIDE 10 MG: 5 TABLET ORAL at 08:25

## 2021-03-31 RX ADMIN — METFORMIN ER 500 MG 1000 MG: 500 TABLET ORAL at 08:26

## 2021-03-31 RX ADMIN — LISINOPRIL 10 MG: 10 TABLET ORAL at 08:26

## 2021-03-31 RX ADMIN — INSULIN GLARGINE 28 UNITS: 100 INJECTION, SOLUTION SUBCUTANEOUS at 08:31

## 2021-03-31 RX ADMIN — METOPROLOL TARTRATE 25 MG: 25 TABLET, FILM COATED ORAL at 08:26

## 2021-03-31 RX ADMIN — METFORMIN ER 500 MG 1000 MG: 500 TABLET ORAL at 17:00

## 2021-03-31 RX ADMIN — ATORVASTATIN CALCIUM 40 MG: 40 TABLET, FILM COATED ORAL at 08:26

## 2021-03-31 RX ADMIN — FAMOTIDINE 20 MG: 20 TABLET ORAL at 08:26

## 2021-03-31 ASSESSMENT — ACTIVITIES OF DAILY LIVING (ADL)
ADLS_ACUITY_SCORE: 17
ADLS_ACUITY_SCORE: 18
ADLS_ACUITY_SCORE: 18

## 2021-03-31 ASSESSMENT — MIFFLIN-ST. JEOR: SCORE: 1649.37

## 2021-03-31 NOTE — PROVIDER NOTIFICATION
Pt. Had a 6 beat run of [employer]. Pt. Denies any chest pain, denies any complaints at this time. MD paged.

## 2021-03-31 NOTE — PROGRESS NOTES
Care Management Follow Up    Length of Stay (days): 8    Expected Discharge Date: 04/01/21     Concerns to be Addressed: all concerns addressed in this encounter     Patient plan of care discussed at interdisciplinary rounds: Yes    Anticipated Discharge Disposition: Skilled Nursing Facilty, Transitional Care     Anticipated Discharge Services: None  Anticipated Discharge DME: Walker, Oxygen    Patient/family educated on Medicare website which has current facility and service quality ratings: yes  Education Provided on the Discharge Plan:  yes  Patient/Family in Agreement with the Plan: yes    Referrals Placed by CM/SW: Post Acute Facilities  Private pay costs discussed: Not applicable    Additional Information:  ROBINSON spoke with pt's daughter that he is d.c ready and that SW will need to send updated referrals for COVID (+) facilities as [facility] is full  Sent referrals to [facility] and [facility] for possible admission .   Per MD pt d.c ready   SW will update pt on referrals and status. Family aware     Called pt in room @ [phone] he is aware that first choice location is full        Corinne C. White, LSW     Addendum    [facility] has accepted pt for TCU for today. ROBINSON called Loretta with [facility] and was told they were not sure when they could get to the assessment..  SW will set up transport and update family. MD working on d.c orders now  Will request PASS to be completed       CM; Addendum    SW will need to set up transport to TCU. Inna due to her job is not allowed to have contact with pt. ROBINSON did discuss possible OP Cost due to stretcher transport and COVID cost  SW will update pt as well about location and cost      H/E stretcher transport set up for 1830  ROBINSON has been asked to fax order to [phone] and call Nava PERSAUD @ [phone] to update on time and orders

## 2021-03-31 NOTE — PLAN OF CARE
Physical Therapy Discharge Summary    Reason for therapy discharge:    Discharged to transitional care facility.    Progress towards therapy goal(s). See goals on Care Plan in [facility] electronic health record for goal details.  Goals partially met.  Barriers to achieving goals:   Pt needing SBA/CGA with mobility.     Therapy recommendation(s):    Continued therapy is recommended.  Rationale/Recommendations:  Due to patients poor balance, poor insight and lack of endurance would recommend TCU at this time. .

## 2021-03-31 NOTE — PROGRESS NOTES
Your information has been submitted on March 31st, 2021 at 03:45:40 PM CDT. The confirmation number is [id number]    Paty Allen  Care Management Coordinator  [facility]  [phone]

## 2021-03-31 NOTE — PROGRESS NOTES
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  Speech changes had completely resolved while in the ER, however, he was noted to hypoxic with sats into the mid-80s and ultimately was found to have COVID-19.  Stroke work-up negative (CT Head, CTA Head/Neck, MRI Brain without acute process).  CT Chest showed patchy GGO in the right lung with reactive mediastinal and hilar adenopathy consistent with COVID-19 PNA.  Patient is improving but will require TCU upon discharge, awaiting bed availability.    Problem List/Assessment and Plan:     COVID-19 PNA with resulting Acute Hypoxic Respiratory Failure  - initially hypoxic and intermittently febrile; clinical course now much improved.  At rest off oxygen but does desat at times with ambulation (82% with PT 3/30)  - Day 8/10 dexamethasone  - Has completed Remdesivir  - Anticoagulated with PTA apixaban   - Therapies recommending TCU, SW following     Ongoing Deficits in Strength, Endurance, and Cognition.  Dysphagia.  Etiology likely multifactorial:  Acute systemic illness +/- exacerbation of prior deficits.  Known to therapies from stroke 2018 when he suffered from residual mild-mod expressive and receptive aphasia.  - PT/OT following and recommending TCU.    - SLP following, Recommending DD3 with thins     PAF.  [id number] of 7.  Heart rates now controlled in the  range   - Increased PTA metoprolol from 12.5 to 25 BID  - Continue PTA Eliquis  - Tele showed  11 beat run of wide complex tachycardia 3/26.  Possibly vtach, but appeared to have some irregularity so more suspicious for SVT with aberrancy.  He again had a 6 beat run of wide complex tachycardia on 3/30.  Continue Metoprolol     IDDM2, poorly controlled, complicated by Steroid-induced Hyperglycemia. PTA on Lantus 35u qAM, metformin XR 1000 BID, glipizide 10 BID. HgbA1c 9.1.  Quite hyperglycemic in setting of acute illness and dexamethasone use so insulin regimen has been adjusted.  He had hypoglycemia overnight requiring treatment with juice.  Will decrease Lantus today to prevent hypoglycemia.  - Continue PTA metformin, glipizide.  - NPH 8 units daily with dexamethasone dose.  Discontinue after dexamethasone completed.    - Decrease Lantus to 28 units QAM  - Medium sliding scale insulin  - Aspart 1 unit per 15 grams carbohydrates TID WM     NICM, EF 35-40% / HFrEF, compensated.  Nonobstructive ASCVD (per cardiac cath 3/2018).  HTN / DLD  BNP elevated upon admission at 3397.  Troponins cycled detectable but stable 0.064 >> 0.054 >> 0.057, likely related to demand.   - Appears compensated from a CV standpoint  - Echocardiogram 3/24/2021 revealed decreased LVEF from 40-45% >> 35-40% with G1LVDD, normal RVSF, and no significant valvular dysfunction.   - Continue PTA metoprolol, lisinopril, atorvastatin, ASA 81, and Eliquis     Incidental pulmonary nodule.  History of tobacco abuse.  6 mm RML nodule found on imaging.  Former smoker.  No hemoptysis or unintended weight loss.  CT does suggest mediastinal and hilar adenopathy but this is felt to be reactive.   - Repeat CT in 6 months recommended.     Hypernatremia - Resolved.  Sodium 150 on 3/27.  Free water deficient and dehydrated. Treated with IVF and this has now resolved.    GERD: Continue Pepcid.    Diet: Dysphagia Diet Level 3 Advanced Thin Liquids (water, ice chips, juice, milk gelatin, ice cream, etc)    DVT Prophylaxis: Eliquis  Medina Catheter: not  "present  Code Status: Full Code      Disposition Plan   Expected discharge: as soon as TCU bed available, recommended to transitional care unit once safe disposition plan/ TCU bed available.  Entered: Val Florez MD 03/31/2021, 11:52 AM       The patient's care was discussed with the Bedside Nurse, Care Coordinator/, Patient and Patient's Family.    Hospitalist [facility]          Interval History (Subjective):      Patient was discussed with his bedside nurse this morning.  He states he is feeling well.  He denies any pain.  He is eating and drinking well.  Denies shortness of breath.  I explained that we are awaiting a TCU bed for discharge.  All his questions were answered.    I called his daughter, terri, by phone.  She did not answer.  I gave her an update that we are still waiting on a TCU bed.                  Physical Exam:      Last Vital Signs:  BP (!) 156/80 (BP Location: Left arm)   Pulse 82   Temp 98.8  F (37.1  C) (Oral)   Resp 16   Ht 1.854 m (6' 1\")   Wt 85 kg (187 lb 8 oz)   SpO2 92%   BMI 24.74 kg/m      General: Alert, awake, no acute distress.  HEENT: Normocephalic and atraumatic, eyes anicteric and without scleral injection, EOMI, face symmetric, MMM.  Cardiac: RRR, normal S1, S2. No m/g/r, no LE edema.  Pulmonary: Normal chest rise, normal work of breathing.  Lungs CTAB without crackles or wheezing.  Abdomen: soft, non-tender, non-distended.  Normoactive bowel sounds, no guarding or rebound tenderness.  Extremities: no deformities.  Warm, well perfused.  Skin: no rashes or lesions.  Warm and Dry.  Neuro: No focal deficits.  Speech clear.  Requires assistance to stand up out of bed as well as to ambulate to the bathroom.  Psych: Alert and oriented to person and place, cannot give details of presentation. Appropriate affect.         Medications:      All current medications were reviewed with changes reflected in problem list.         " Data:      All new lab and imaging data was reviewed.   Labs:  Recent Labs   Lab 03/27/21  0733 03/26/21  0621 03/25/21  0714   WBC 5.7 6.8 8.0   HGB 14.0 13.7 14.2   HCT 42.4 41.0 43.5   MCV 95 94 95    199 172     Recent Labs   Lab 03/31/21  0706 03/28/21  0622 03/27/21  0733    143 150*   POTASSIUM 4.0 3.5 3.5   CHLORIDE 101 105 111*   CO2 33* 30 29   ANIONGAP 2* 8 10   * 85 138*   BUN 16 16 25   CR 0.87 0.67 0.81   GFRESTIMATED 85 >90 88   GFRESTBLACK >90 >90 >90   ROSA 8.4* 8.1* 8.6   MAG 2.0  --   --      Recent Labs   Lab 03/27/21  0733 03/26/21  0621 03/25/21  0714   CRP 58.2* 103.0* 121.0*     Imaging:   No results found for this or any previous visit (from the past 24 hour(s)).    Val Florez MD

## 2021-03-31 NOTE — PLAN OF CARE
AOx4, ambulated in room. VSS. RA 96%. Appetite good, Tele: SR Voiding well, no pain. PT/OT following. Waiting for bed at TCU.

## 2021-03-31 NOTE — PLAN OF CARE
Pt. Continues on special precautions for COVID dx. Pt. A&Ox4, forgetful, up with assist of 1 and gaitbelt. Tele: SR with 1st degree block and inverted T waves. Lung sounds diminished, crackles to left lung, productive cough with yellow colored phlegm per pt. Room air sat's at 96%. BGL at 0200=50, juice given, recheck=50 juice and snack given, recheck=93, juice and snack given, pawrjnl=308. Pt. To discharge to TCU when ready. Social work sent referrals. Pt. Denies any needs at this time. Will continue with POC.

## 2022-01-31 NOTE — DISCHARGE SUMMARY
[facility]  Discharge Summary  Name: Yanick Adorno    MRN: [id number]  YOB: 1945    Age: 75 year old  Date of Discharge:  3/31/2021  Date of Admission: 3/23/2021  Primary Care Provider: Candi Dumont  Discharge Physician:  Val Florez MD  Discharging Service:  Hospitalist      Discharge Diagnoses:  1.  COVID-19 pneumonia with acute hypoxic respiratory failure  2.  Ongoing deficits in strength, endurance and cognition  3.  Dysphagia  4.  Paroxysmal atrial fibrillation  5.  Insulin-dependent type 2 diabetes  6.  Nonischemic cardiomyopathy with chronic systolic CHF  7.  Hyperlipidemia  8.  Hypertension  9.  Incidental pulmonary nodule with history of tobacco use disorder  10.  Hypernatremia  11.  GERD     Follow-ups Needed After Discharge   Follow up with PCP upon discharge from TCU    Unresulted Labs Ordered in the Past 30 Days of this Admission     No orders found from 10/16/2018 to 12/16/2018.        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  Speech changes had completely resolved while in the ER, however, he was noted to hypoxic with sats into the mid-80s and ultimately was found to have COVID-19.  Stroke work-up negative (CT Head, CTA Head/Neck, MRI Brain without acute process).  CT Chest showed patchy GGO in the right lung with reactive mediastinal and hilar adenopathy consistent with COVID-19 PNA.  Patient is improving but will require TCU upon discharge for further rehabilitation prior to going home.     COVID-19 PNA with resulting Acute Hypoxic Respiratory Failure  - initially hypoxic and intermittently febrile; clinical course now much improved.  At  rest off oxygen but does desat at times with ambulation (82% with PT 3/30).  No noted desaturations on the day of discharge.  - He completed 9 days of dexamethasone while hospitalized.  He no longer is having hypoxia and I am stopping dexamethasone at the time of discharge as I think this will only lead to worsening hyperglycemia with little further benefit.  - Completed Remdesivir while hospitalized  - Anticoagulated with PTA apixaban      Ongoing Deficits in Strength, Endurance, and Cognition.  Dysphagia.  Etiology likely multifactorial:  Acute systemic illness +/- exacerbation of prior deficits.  Known to therapies from stroke in 2018 when he suffered from residual mild-mod expressive and receptive aphasia.  - PT/OT following and recommending TCU.    - SLP following, Recommending DD3 with thins     PAF.  [id number] of 7.  Heart rates now controlled in the  range   - Increased PTA metoprolol from 12.5 to 25 BID  - Continue PTA Eliquis  - Tele showed 11 beat run of wide complex tachycardia 3/26.  Possibly vtach, but appeared to have some irregularity so more suspicious for SVT with aberrancy.  He again had a 6 beat run of wide complex tachycardia on 3/30.  Continue Metoprolol     IDDM2, poorly controlled, complicated by Steroid-induced Hyperglycemia. PTA on Lantus 35u qAM, metformin XR 1000 BID, glipizide 10 BID. HgbA1c 9.1.  Quite hyperglycemic in setting of acute illness and dexamethasone use so insulin regimen has been adjusted.  He had hypoglycemia the night prior to discharge requiring treatment with juice.  I have decreased his Lantus to 28 units every morning.  He will continue with medium scale sliding scale insulin as well as aspart 1 unit per 15 g of carbohydrates 3 times daily with meals.     NICM, EF 35-40% / HFrEF, compensated.  Nonobstructive ASCVD (per cardiac cath 3/2018).  HTN / DLD  BNP elevated upon admission at 3397.  Troponins cycled detectable but stable 0.064 >> 0.054 >> 0.057, likely  "related to demand.   - Appears compensated from a CV standpoint  - Echocardiogram 3/24/2021 revealed decreased LVEF from 40-45% >> 35-40% with G1LVDD, normal RVSF, and no significant valvular dysfunction.   - Continue PTA metoprolol, lisinopril, atorvastatin, ASA 81, and Eliquis     Incidental pulmonary nodule.  History of tobacco abuse.  6 mm RML nodule found on imaging.  Former smoker.  No hemoptysis or unintended weight loss.  CT does suggest mediastinal and hilar adenopathy but this is felt to be reactive.   - Repeat CT in 6 months recommended.     Hypernatremia - Resolved.  Sodium 150 on 3/27.  Free water deficient and dehydrated. Treated with IVF and this has now resolved.     GERD: Continue Pepcid.     Diet: Dysphagia Diet Level 3 Advanced Thin Liquids (water, ice chips, juice, milk gelatin, ice cream, etc).  Speech therapy to follow-up at TCU.       Discharge Disposition:  Discharged to short-term care facility     Allergies:  Allergies   Allergen Reactions     No Known Drug Allergies         Condition on Discharge:  Discharge condition: Stable   Discharge vitals: Blood pressure (!) 156/80, pulse 82, temperature 98.8  F (37.1  C), temperature source Oral, resp. rate 16, height 1.854 m (6' 1\"), weight 85 kg (187 lb 8 oz), SpO2 92 %.   Code status on discharge: Full Code     History of Illness:  See detailed admission note for full details.    Physical Exam:  Blood pressure (!) 156/80, pulse 82, temperature 98.8  F (37.1  C), temperature source Oral, resp. rate 16, height 1.854 m (6' 1\"), weight 85 kg (187 lb 8 oz), SpO2 92 %.  Wt Readings from Last 1 Encounters:   03/31/21 85 kg (187 lb 8 oz)     General: Alert, awake, no acute distress.  HEENT: Normocephalic and atraumatic, eyes anicteric and without scleral injection, EOMI, face symmetric, MMM.  Cardiac: RRR, normal S1, S2. No m/g/r, no LE edema.  Pulmonary: Normal chest rise, normal work of breathing.  Lungs CTAB without crackles or wheezing.  Abdomen: " soft, non-tender, non-distended.  Normoactive bowel sounds, no guarding or rebound tenderness.  Extremities: no deformities.  Warm, well perfused.  Skin: no rashes or lesions.  Warm and Dry.  Neuro: No focal deficits.  Speech clear.  Requires assistance to stand up out of bed as well as to ambulate to the bathroom.  Psych: Alert and oriented to person and place, cannot give details of presentation. Appropriate affect.    Procedures other than Imaging:  TTE  Telemetry  Phlebotomy     Imaging:  Results for orders placed or performed during the hospital encounter of 03/23/21   CTA Head Neck with Contrast    Narrative    CT ANGIOGRAM OF THE HEAD AND NECK WITH CONTRAST  3/23/2021 4:50 PM     HISTORY: Fall, on Eliquis, per daughter, recent unsteady gait and  difficulty with word finding, now resolved, rule out CVA.    TECHNIQUE: CT angiography with an injection of 70mL Isovue-370 IV with  scans through the head and neck. Images were transferred to a separate  3-D workstation where multiplanar reformations and 3-D images were  created. Estimates of carotid stenoses are made relative to the distal  internal carotid artery diameters except as noted. Radiation dose for  this scan was reduced using automated exposure control, adjustment of  the mA and/or kV according to patient size, or iterative  reconstruction technique.    COMPARISON: CT head dated 3/23/2021. MRI head dated 1/21/2018.    CT HEAD FINDINGS: No contrast enhancing lesions. Cerebral blood flow  is grossly normal.     CT ANGIOGRAM HEAD FINDINGS: Mild nonflow-limiting atherosclerosis  involving the bilateral carotid siphons. The major intracranial  arteries including the proximal branches of the anterior cerebral,  middle cerebral, and posterior cerebral arteries appear patent without  vascular cutoff. No aneurysm identified. No significant stenosis.  Venous circulation is unremarkable.     CT ANGIOGRAM NECK FINDINGS:   There is mild scattered atherosclerosis  involving the aortic arch  without significant stenosis of the origins of the great vessels.  Common origin of the brachycephalic and left common carotid arteries,  a normal variant.     Right carotid artery: The right common and internal carotid arteries  are patent. Mild atherosclerotic disease at the carotid bifurcation  and proximal internal carotid artery without significant stenosis by  NASCET criteria.     Left carotid artery: The left common and internal carotid arteries are  patent. Mild atherosclerotic disease at the carotid bifurcation and  proximal internal carotid artery without significant stenosis by  NASCET criteria.     Vertebral arteries: Mild scattered atherosclerosis involving the  bilateral cervical vertebral arteries. Moderate focal stenosis  involving the V1 segment of the right vertebral artery (series 6 image  197). Elsewhere, there is only minimal/mild scattered stenosis of the  cervical vertebral arteries.    Other findings: Scattered irregular groundglass opacities seen in the  visualized right upper lung zone (for example, see series 7 image  14-17), likely infectious/inflammatory in nature. Multiple enlarged  right hilar/mediastinal lymph nodes, better seen and characterized on  chest CT of same date. Please see that separate report for additional  details. Moderate to severe degenerative disc space narrowing at  C6-C7. Scattered uncovertebral and facet arthropathy of the cervical  spine.      Impression    IMPRESSION:  1. No flow limiting stenosis or large vessel occlusion involving the  major craniocervical arterial vasculature.  2. Mild atherosclerotic disease involving the bilateral carotid  bifurcations and carotid siphons without significant stenosis.  3. Scattered atherosclerotic disease involving the bilateral  cervicovertebral arteries. There is a moderate focal stenosis  involving the V1 segment of the right vertebral artery with  minimal/mild stenosis elsewhere.  4. Partially  visualized patchy groundglass opacities in the right lung  with mediastinal and right hilar lymphadenopathy. This is better  characterized on chest CT of same date. Please see that separate  report for additional details.    GINI PRICE MD   Chest CT w IV contrast only, TRAUMA / DISSECTION    Narrative    CT CHEST WITH CONTRAST 3/23/2021 4:51 PM    CLINICAL HISTORY: Left-sided rib pain after trauma 48 hours ago, rule  out rib fracture, pneumothorax.    TECHNIQUE: CT chest with IV contrast. Multiplanar reformats were  obtained. Dose reduction techniques were used.    CONTRAST: 70mL Isovue-370    COMPARISON: None.    FINDINGS:   LUNGS AND PLEURA: There are patchy bilateral groundglass opacities  with peripheral and basilar predominance. No pneumothorax or pleural  effusion. Noncalcified pulmonary nodule in the right middle lobe  measures 6 mm (series 7, 159).    MEDIASTINUM/AXILLAE: Severe coronary calcifications are present.  Enlarged right hilar lymph node measures 1.8 cm in short axis (series  4, image 56). There are several prominent mediastinal and left hilar  lymph nodes as well. No axillary adenopathy. Unremarkable thyroid.    UPPER ABDOMEN: Fatty infiltration of the liver. Cholelithiasis.  Partial visualization of an infrarenal abdominal aortic aneurysm  measures up to 3.3 cm at the lowest margin of this study.    MUSCULOSKELETAL: Unremarkable.      Impression    IMPRESSION:   1.  No evidence of acute rib abnormality or pneumothorax.  2.  Patchy bilateral groundglass opacities are nonspecific. Imaging  features can be seen with (COVID-19)  pneumonia, though are  nonspecific and can occur with a variety of infectious and  noninfectious processes.  3.  Severe coronary calcifications.  4.  Mild mediastinal and hilar adenopathy is likely reactive to the  pulmonary process.  5.  Incidental 6 mm pulmonary nodule.  6.  Fatty infiltration of the liver.  7.  Partial visualization of an infrarenal abdominal aortic  aneurysm.  Nonemergent follow-up with ultrasound or abdominal CT recommended.  8.  Cholelithiasis.    Recommendations for an incidental lung nodule = or > 6mm to 8mm:    Low risk patients: Initial follow-up CT at 6-12 months, then  consider CT at 18-24 months if no change.    High risk patients: Initial follow-up CT at 6-12 months, then CT at  18-24 months if no change.    *Low Risk: Minimal or absent history of smoking or other known risk  factors.  *Nonsolid (ground-glass) or partly solid nodules may require longer  follow-up to exclude indolent adenocarcinoma.  *Recommendations based on Guidelines for the Management of Incidental  Pulmonary Nodules Detected at CT: From the Fleischner Society 2017,  Radiology 2017.    RED CAMPOVERDE MD   CT Head w/o Contrast    Narrative    CT SCAN OF THE HEAD WITHOUT CONTRAST   3/23/2021 4:48 PM     HISTORY: Recent fall, altered mental status.    TECHNIQUE:  Axial images of the head and coronal reformations without  IV contrast material. Radiation dose for this scan was reduced using  automated exposure control, adjustment of the mA and/or kV according  to patient size, or iterative reconstruction technique.    COMPARISON: MRI of the brain dated 1/21/2018.    FINDINGS: Chronic left middle cerebral artery territory ischemic  infarct centered in the left inferior frontal gyrus and its respective  subcortical white matter. Moderate to advanced patchy and confluent  areas of hypoattenuation in the periventricular and deep cerebral  white matter and also in the subcortical white matter, nonspecific,  but presumably related to chronic small vessel ischemic disease. Mild  to moderate generalized brain parenchymal volume loss. The ventricles  are within normal limits in size and configuration. No acute  intracranial hemorrhage, extra-axial fluid collection, mass lesion or  herniation.    Visualized aspects of the orbits appear within normal limits. Mild to  moderate scattered paranasal  sinus trace fluid in the bilateral  mastoid air cells. The middle ear cavities appear grossly clear.  Mucosal thickening. The bony calvarium and bones of the skull base  appear intact.       Impression    IMPRESSION:  1. No CT findings of acute intracranial abnormality.  2. Chronic left middle cerebral artery territory infarct.  3. Brain atrophy and presumed chronic small vessel ischemic disease,  as described.    GINI PRICE MD   MR Brain w/o & w Contrast    Narrative    EXAM: MR BRAIN W/O and W CONTRAST  LOCATION: [facility]  DATE/TIME: 3/23/2021 10:24 PM    INDICATION: Transient ischemic attack (TIA)  COMPARISON: CTA head and neck of 03/23/2021 and brain MRI 01/21/2018  CONTRAST: 7.5 mL Gadavist  TECHNIQUE: Routine multiplanar multisequence head MRI without and with intravenous contrast.    FINDINGS:  INTRACRANIAL CONTENTS: No acute or subacute infarct. No mass, acute hemorrhage, or extra-axial fluid collections. Mild to moderate volume loss and moderate presumed chronic small vessel ischemia mildly progressive since 2018. Interval evolutionary   changes of the now chronic left frontal infarct imaged in its acute phase 01/21/2018. No pathologic contrast enhancement.    SELLA: No abnormality accounting for technique.    OSSEOUS STRUCTURES/SOFT TISSUES: Normal marrow signal. The major intracranial vascular flow voids are maintained.     ORBITS: No abnormality accounting for technique.     SINUSES/MASTOIDS: Mild to moderate mucosal thickening ethmoid air cells and maxillary sinuses. Small right and trace left mastoid effusions.      Impression    IMPRESSION:  1.  No acute intracranial abnormality.    2.  Mildly progressive age-related changes compared to 2018.    3.  Chronic left frontal infarct.   Echocardiogram Limited    Narrative    [id number]  [id number]  [id number]  [id number]^WILLAM^HEIDE^[facility]  Echocardiography Laboratory  [address] [place] [address]      Name: KWAME MULLEN  MRN: [id number]  : 1945  Study Date: 2021 07:18 AM  Age: 75 yrs  Gender: Male  Patient Location: [facility]  Reason For Study: TIA  Ordering Physician: HEIDE QUARLES  Referring Physician: Candi Dumnot  Performed By: Katie Schmidt RDCS     BSA: 2.1 m2  Height: 71 in  Weight: 190 lb  HR: 60  BP: 141/72 mmHg  ______________________________________________________________________________  Procedure  Limited Portable Echo Adult.  ______________________________________________________________________________  Interpretation Summary     The visual ejection fraction is estimated at 35-40%. LVEF was 40-45% in 2018  Grade I or early diastolic dysfunction.  The left atrium is mild to moderately dilated.  ______________________________________________________________________________  Left Ventricle  The left ventricle is normal in size. There is normal left ventricular wall  thickness. The visual ejection fraction is estimated at 35-40%. Grade I or  early diastolic dysfunction.     Right Ventricle  The right ventricle is normal in structure, function and size.     Atria  The left atrium is mild to moderately dilated. Right atrial size is normal.     Mitral Valve  The mitral valve is normal in structure and function. There is trace mitral  regurgitation.     Tricuspid Valve  Normal tricuspid valve.     Aortic Valve  The aortic valve is normal in structure and function.     Pulmonic Valve  Normal pulmonic valve.     Vessels  The aortic root is normal size. The inferior vena cava is normal.     Pericardium  There is no pericardial effusion.     Rhythm  Sinus rhythm was noted.  ______________________________________________________________________________  Doppler Measurements & Calculations  MV E max callie: 59.7 cm/sec  MV A max callie: 93.1 cm/sec  MV E/A: 0.64  MV dec time: 0.12 sec     ______________________________________________________________________________  Report approved by:  Evette Sahu 03/24/2021 11:03 AM                Consultations:  Consultations This Hospital Stay   PHYSICAL THERAPY ADULT IP CONSULT  OCCUPATIONAL THERAPY ADULT IP CONSULT  SPEECH LANGUAGE PATH ADULT IP CONSULT  SPEECH LANGUAGE PATH ADULT IP CONSULT  CARE MANAGEMENT / SOCIAL WORK IP CONSULT  SOCIAL WORK IP CONSULT  PHYSICAL THERAPY ADULT IP CONSULT  OCCUPATIONAL THERAPY ADULT IP CONSULT  SPEECH LANGUAGE PATH ADULT IP CONSULT     Recent Lab Results:  Recent Labs   Lab 03/27/21  0733 03/26/21  0621 03/25/21  0714   WBC 5.7 6.8 8.0   HGB 14.0 13.7 14.2   HCT 42.4 41.0 43.5   MCV 95 94 95    199 172     Recent Labs   Lab 03/31/21  0706 03/28/21  0622 03/27/21  0733    143 150*   POTASSIUM 4.0 3.5 3.5   CHLORIDE 101 105 111*   CO2 33* 30 29   ANIONGAP 2* 8 10   * 85 138*   BUN 16 16 25   CR 0.87 0.67 0.81   GFRESTIMATED 85 >90 88   GFRESTBLACK >90 >90 >90   ROSA 8.4* 8.1* 8.6   MAG 2.0  --   --      No results for input(s): CKT in the last 168 hours.    Invalid input(s): CK, CK TOTAL  No results for input(s): DD in the last 168 hours.  Recent Labs   Lab 03/27/21  0733 03/26/21  0621 03/25/21  0714   CRP 58.2* 103.0* 121.0*     No results for input(s): TROPONIN, TROPI, TROPR in the last 168 hours.    Invalid input(s): TROP, TROPONINIES       Pending Results:    Unresulted Labs Ordered in the Past 30 Days of this Admission     No orders found from 2/21/2021 to 3/24/2021.           Discharge Instructions and Follow-Up:   Discharge Orders      Follow Up and recommended labs and tests    1)  You were found to have a small, 6 mm nodule in the right middle lobe of your lung of unknown cause.  This may be related to a previous infection you had, but because of your smoking history we recommend to have a chest CT repeated in 6-12 months to make sure this does not grow or change.  If it does increase in size further work up may be required.     General info for SNF    Length of Stay Estimate: Short Term Care:  Estimated # of Days <30  Condition at Discharge: Improving  Level of care:skilled   Rehabilitation Potential: Good  Admission H&P remains valid and up-to-date: Yes  Recent Chemotherapy: N/A  Use Nursing Home Standing Orders: Yes     Mantoux instructions    Give two-step Mantoux (PPD) Per Facility Policy Yes     Reason for your hospital stay    You were hospitalized for COVID-19 viral pneumonia.  At this time you are no longer requiring oxygen but you are quite weak from this illness and will go to rehab to continue to work on therapies to get stronger prior to returning home.     Glucose monitor nursing POCT    Before meals and at bedtime     Additional Discharge Instructions    Patient had been on Lantus 35 units every morning.  He had hypoglycemia so his Lantus was decreased to 28 units every morning.  May need to be adjusted again based on blood sugars.     Activity - Up with nursing assistance     Full Code     Physical Therapy Adult Consult    Evaluate and treat as clinically indicated.    Reason:  Covid, deconditioning, weakness     Occupational Therapy Adult Consult    Evaluate and treat as clinically indicated.    Reason:  Covid, deconditioning, weakness     Speech Language Path Adult Consult    Evaluate and treat as clinically indicated.    Reason:  dysphagia     Fall precautions     Advance Diet as Tolerated    Follow this diet upon discharge: Orders Placed This Encounter      Dysphagia Diet Level 3 Advanced Thin Liquids (water, ice chips, juice, milk gelatin, ice cream, etc)     Discharge Medications   Current Discharge Medication List      START taking these medications    Details   acetaminophen (TYLENOL) 325 MG tablet Take 2 tablets (650 mg) by mouth every 4 hours as needed for mild pain or fever  Qty:      Associated Diagnoses: 2019 novel coronavirus disease (COVID-19)      !! insulin aspart (NOVOLOG PEN) 100 UNIT/ML pen Inject 1-7 Units Subcutaneous 3 times daily (before meals) Three times daily  before meals  For Pre-Meal  - 189 give 1 unit.   For Pre-Meal  - 239 give 2 units.   For Pre-Meal  - 289 give 3 units.   For Pre-Meal  - 339 give 4 units.   For Pre-Meal - 399 give 5 units.   For Pre-Meal -449 give 6 units  For Pre-Meal BG greater than or equal to 450 give 7 units.   To be given with prandial insulin, and based on pre-meal blood glucose.  Qty:      Associated Diagnoses: Type 2 diabetes mellitus with other specified complication, with long-term current use of insulin (H)      !! insulin aspart (NOVOLOG PEN) 100 UNIT/ML pen Inject 1-5 Units Subcutaneous At Bedtime Give at bedtime  Do Not give Bedtime Correction Insulin if BG less than  200.   For  - 249 give 1 units.   For  - 299 give 2 units.   For  - 349 give 3 units.   For  -399 give 4 units.   For BG greater than or equal to 400 give 5 units.  Qty:      Associated Diagnoses: Type 2 diabetes mellitus with other specified complication, with long-term current use of insulin (H)      !! insulin aspart (NOVOLOG PEN) 100 UNIT/ML pen Inject 1 unit per 15 grams carbohydrates three times daily with meals (in addition to pre prandial insulin)    Associated Diagnoses: Type 2 diabetes mellitus with other specified complication, with long-term current use of insulin (H)       !! - Potential duplicate medications found. Please discuss with provider.      CONTINUE these medications which have CHANGED    Details   insulin glargine (LANTUS PEN) 100 UNIT/ML pen Inject 28 Units Subcutaneous every morning    Comments: If Lantus is not covered by insurance, may substitute Basaglar at same dose and frequency.    Associated Diagnoses: Type 2 diabetes mellitus with other specified complication, with long-term current use of insulin (H)      metoprolol tartrate (LOPRESSOR) 25 MG tablet Take 1 tablet (25 mg) by mouth 2 times daily    Associated Diagnoses: Paroxysmal atrial fibrillation (H)         CONTINUE these  medications which have NOT CHANGED    Details   apixaban ANTICOAGULANT (ELIQUIS) 5 MG tablet Take 1 tablet (5 mg) by mouth 2 times daily  Qty: 60 tablet, Refills: 0    Associated Diagnoses: Cerebrovascular accident (CVA) due to embolism of left middle cerebral artery (H); Paroxysmal atrial fibrillation (H)      ASPIRIN PO Take 81 mg by mouth      atorvastatin (LIPITOR) 80 MG tablet Take 40 mg by mouth daily       glipiZIDE (GLUCOTROL) 10 MG tablet Take 10 mg by mouth 2 times daily (before meals)       lisinopril (ZESTRIL) 20 MG tablet Take 10 mg by mouth daily      metFORMIN (GLUCOPHAGE-XR) 500 MG 24 hr tablet Take 1,000 mg by mouth 2 times daily (with meals)       tacrolimus (PROTOPIC) 0.1 % external ointment Apply topically 2 times daily      triamcinolone (KENALOG) 0.1 % external cream Apply topically 2 times daily             Time Spent on this Encounter   I, Val Florez MD, personally saw the patient today and spent greater than 30 minutes discharging this patient.    Val Florez MD         180.34

## 2022-08-24 ENCOUNTER — HOSPITAL ENCOUNTER (EMERGENCY)
Facility: CLINIC | Age: 77
Discharge: HOME | End: 2022-08-25
Attending: EMERGENCY MEDICINE | Admitting: EMERGENCY MEDICINE
Payer: COMMERCIAL

## 2022-08-24 VITALS
RESPIRATION RATE: 16 BRPM | BODY MASS INDEX: 25.18 KG/M2 | HEIGHT: 73 IN | DIASTOLIC BLOOD PRESSURE: 88 MMHG | HEART RATE: 63 BPM | WEIGHT: 190 LBS | TEMPERATURE: 98.1 F | OXYGEN SATURATION: 94 % | SYSTOLIC BLOOD PRESSURE: 137 MMHG

## 2022-08-24 DIAGNOSIS — R07.9 CHEST PAIN, UNSPECIFIED TYPE: ICD-10-CM

## 2022-08-24 LAB
ALBUMIN SERPL BCG-MCNC: 3.9 G/DL (ref 3.5–5.2)
ALP SERPL-CCNC: 70 U/L (ref 40–129)
ALT SERPL W P-5'-P-CCNC: 26 U/L (ref 10–50)
ANION GAP SERPL CALCULATED.3IONS-SCNC: 9 MMOL/L (ref 7–15)
AST SERPL W P-5'-P-CCNC: 31 U/L (ref 10–50)
BASOPHILS # BLD AUTO: 0.2 10E3/UL (ref 0–0.2)
BASOPHILS NFR BLD AUTO: 2 %
BILIRUB DIRECT SERPL-MCNC: 0.23 MG/DL (ref 0–0.3)
BILIRUB SERPL-MCNC: 1 MG/DL
BUN SERPL-MCNC: 24.4 MG/DL (ref 8–23)
CALCIUM SERPL-MCNC: 9.5 MG/DL (ref 8.8–10.2)
CHLORIDE SERPL-SCNC: 104 MMOL/L (ref 98–107)
CREAT SERPL-MCNC: 1.09 MG/DL (ref 0.67–1.17)
DEPRECATED HCO3 PLAS-SCNC: 28 MMOL/L (ref 22–29)
EOSINOPHIL # BLD AUTO: 0.3 10E3/UL (ref 0–0.7)
EOSINOPHIL NFR BLD AUTO: 3 %
ERYTHROCYTE [DISTWIDTH] IN BLOOD BY AUTOMATED COUNT: 14.3 % (ref 10–15)
GFR SERPL CREATININE-BSD FRML MDRD: 71 ML/MIN/1.73M2
GLUCOSE SERPL-MCNC: 139 MG/DL (ref 70–99)
HCT VFR BLD AUTO: 46.4 % (ref 40–53)
HGB BLD-MCNC: 14.5 G/DL (ref 13.3–17.7)
IMM GRANULOCYTES # BLD: 0 10E3/UL
IMM GRANULOCYTES NFR BLD: 0 %
LIPASE SERPL-CCNC: 24 U/L (ref 13–60)
LYMPHOCYTES # BLD AUTO: 3.3 10E3/UL (ref 0.8–5.3)
LYMPHOCYTES NFR BLD AUTO: 33 %
MCH RBC QN AUTO: 30.9 PG (ref 26.5–33)
MCHC RBC AUTO-ENTMCNC: 31.3 G/DL (ref 31.5–36.5)
MCV RBC AUTO: 99 FL (ref 78–100)
MONOCYTES # BLD AUTO: 1 10E3/UL (ref 0–1.3)
MONOCYTES NFR BLD AUTO: 10 %
NEUTROPHILS # BLD AUTO: 5.3 10E3/UL (ref 1.6–8.3)
NEUTROPHILS NFR BLD AUTO: 52 %
NRBC # BLD AUTO: 0 10E3/UL
NRBC BLD AUTO-RTO: 0 /100
PLATELET # BLD AUTO: 262 10E3/UL (ref 150–450)
POTASSIUM SERPL-SCNC: 4.4 MMOL/L (ref 3.4–5.3)
PROT SERPL-MCNC: 7.3 G/DL (ref 6.4–8.3)
RBC # BLD AUTO: 4.69 10E6/UL (ref 4.4–5.9)
SODIUM SERPL-SCNC: 141 MMOL/L (ref 136–145)
TROPONIN T SERPL HS-MCNC: 19 NG/L
WBC # BLD AUTO: 10 10E3/UL (ref 4–11)

## 2022-08-24 PROCEDURE — 85025 COMPLETE CBC W/AUTO DIFF WBC: CPT | Performed by: EMERGENCY MEDICINE

## 2022-08-24 PROCEDURE — 36415 COLL VENOUS BLD VENIPUNCTURE: CPT | Performed by: EMERGENCY MEDICINE

## 2022-08-24 PROCEDURE — 84484 ASSAY OF TROPONIN QUANT: CPT | Performed by: EMERGENCY MEDICINE

## 2022-08-24 PROCEDURE — 99284 EMERGENCY DEPT VISIT MOD MDM: CPT

## 2022-08-24 PROCEDURE — 80053 COMPREHEN METABOLIC PANEL: CPT | Performed by: EMERGENCY MEDICINE

## 2022-08-24 PROCEDURE — 82248 BILIRUBIN DIRECT: CPT | Performed by: EMERGENCY MEDICINE

## 2022-08-24 PROCEDURE — 83690 ASSAY OF LIPASE: CPT | Performed by: EMERGENCY MEDICINE

## 2022-08-24 PROCEDURE — 93005 ELECTROCARDIOGRAM TRACING: CPT

## 2022-08-24 ASSESSMENT — ENCOUNTER SYMPTOMS: PALPITATIONS: 1

## 2022-08-24 ASSESSMENT — ACTIVITIES OF DAILY LIVING (ADL): ADLS_ACUITY_SCORE: 35

## 2022-08-25 LAB
ATRIAL RATE - MUSE: 69 BPM
DIASTOLIC BLOOD PRESSURE - MUSE: NORMAL MMHG
INTERPRETATION ECG - MUSE: NORMAL
P AXIS - MUSE: 40 DEGREES
PR INTERVAL - MUSE: 262 MS
QRS DURATION - MUSE: 98 MS
QT - MUSE: 404 MS
QTC - MUSE: 432 MS
R AXIS - MUSE: -56 DEGREES
SYSTOLIC BLOOD PRESSURE - MUSE: NORMAL MMHG
T AXIS - MUSE: 49 DEGREES
TROPONIN T SERPL HS-MCNC: 19 NG/L
VENTRICULAR RATE- MUSE: 69 BPM

## 2022-08-25 ASSESSMENT — ACTIVITIES OF DAILY LIVING (ADL): ADLS_ACUITY_SCORE: 35

## 2022-08-25 NOTE — ED TRIAGE NOTES
Patient states he was working at [employer] today and states started having chest pain. Patient states the pain is gone now. Patient was seen by Urgent Care and they were concerned about his EKG so they sent him to the ED. Patient denies SOB.      Triage Assessment     Row Name 08/24/22 1937       Triage Assessment (Adult)    Airway WDL WDL       Respiratory WDL    Respiratory WDL WDL       Skin Circulation/Temperature WDL    Skin Circulation/Temperature WDL WDL       Cardiac WDL    Cardiac WDL WDL       Peripheral/Neurovascular WDL    Peripheral Neurovascular WDL WDL       Cognitive/Neuro/Behavioral WDL    Cognitive/Neuro/Behavioral WDL WDL

## 2022-08-25 NOTE — ED PROVIDER NOTES
History   Chief Complaint:  Chest Pain       The history is provided by the patient.      Yanick Adorno is a 76 year old male anticoagulated with Eliquis with history of hyperlipidemia, hypertension, diabetes, and paroxysmal atrial fibrillation who presents with low chest pain. Patient states that he went to work today and during his normal tasks, which he does not describe as strenuous, he began to feel pressure and pain in his lower chest. This is made worse with coughing, but he did not describe any radiation of pain. He was not sure how long the pain lasted, but said that he took a nap after work, and when he woke up it was gone. The job is new, and he works eight hour shifts, which is different from his usual daily routine. Upon arrival in the ED, the patient states that he is not having pain right now. He also denied any leg swelling. Of note, the patient does have a history of his heart skipping beats, which he again described he experienced today. He also underwent heart catheterization in 2018 following an abnormal stress test.  This showed multivessel disease that was managed medically.  He also had an echo in March of 2021, the findings of which were an ejection fraction between 35-40%.     Review of Systems   Cardiovascular: Positive for chest pain and palpitations. Negative for leg swelling.   All other systems reviewed and are negative.      Allergies:  The patient has no known allergies.     Medications:  Prilosec  Aspirin 81 mg  Eliquis  Gabapentin  Lipitor  Zestril  Toprol XL  Jardiance  Trulicity  Metformin  Glucotrol  Lopressor    Past Medical History:     Hyperlipidemia  Paroxysmal atrial fibrillation  Type II diabetes mellitus  Chronic systolic CGF  Hypertension  CVA  Tobacco abuse  Aortic aneurysm  Nonischemic cardiomyopathy  CAD  NSVT  Generalized muscle weakness  Incidental pulmonary nodule  Elevated troponin  Acute respiratory failure with hypoxia     Past Surgical History:    Sinus  "surgery     Family History:    Diabetes - father, brother  Uterine cancer - mother  Suicide - brother    Social History:  Patient came from home.  Patient is accompanied in the ED.  Patient denied current tobacco use.  PCP: Candi Dumont     Physical Exam     Patient Vitals for the past 24 hrs:   BP Temp Temp src Pulse Resp SpO2 Height Weight   08/24/22 2345 137/88 -- -- 63 16 94 % -- --   08/24/22 2330 121/71 -- -- 62 16 95 % -- --   08/24/22 2315 126/70 -- -- 63 16 95 % -- --   08/24/22 2300 118/81 -- -- 62 17 95 % -- --   08/24/22 2245 128/80 -- -- 66 18 95 % -- --   08/24/22 2230 118/82 -- -- 64 21 95 % -- --   08/24/22 2215 128/76 -- -- 64 (!) 6 96 % -- --   08/24/22 2200 (!) 118/103 -- -- 64 13 96 % -- --   08/24/22 2145 121/70 -- -- 62 17 100 % -- --   08/24/22 2137 137/77 -- -- 61 18 99 % 1.854 m (6' 1\") 86.2 kg (190 lb)   08/24/22 1936 128/78 98.1  F (36.7  C) Oral 72 18 98 % -- 85.2 kg (187 lb 12.8 oz)       Physical Exam  Constitutional:       General: He is not in acute distress.     Appearance: He is not diaphoretic.   HENT:      Head: Atraumatic.   Eyes:      General: No scleral icterus.     Pupils: Pupils are equal, round, and reactive to light.   Cardiovascular:      Rate and Rhythm: Normal rate and regular rhythm.      Heart sounds: Normal heart sounds.   Pulmonary:      Effort: No respiratory distress.      Breath sounds: Normal breath sounds.   Chest:      Comments: No chest wall tenderness, crepitance, or rash.  Abdominal:      Palpations: Abdomen is soft.      Tenderness: There is no abdominal tenderness.   Musculoskeletal:         General: No tenderness.      Right lower leg: No tenderness. No edema.      Left lower leg: No tenderness. No edema.   Skin:     General: Skin is warm.      Capillary Refill: Capillary refill takes less than 2 seconds.      Findings: No rash.   Neurological:      General: No focal deficit present.      Mental Status: He is oriented to person, place, and time. "   Psychiatric:         Mood and Affect: Mood normal.         Behavior: Behavior normal.         Emergency Department Course   ECG  ECG results from 08/24/22   EKG 12 lead     Value    Systolic Blood Pressure     Diastolic Blood Pressure     Ventricular Rate 69    Atrial Rate 69    ND Interval 262    QRS Duration 98        QTc 432    P Axis 40    R AXIS -56    T Axis 49    Interpretation ECG      Sinus rhythm with 1st degree A-V block with Premature atrial complexes  Left axis deviation  Septal infarct , age undetermined  Inferior infarct (cited on or before 22-JAN-2018)  Abnormal ECG  When compared with ECG of 23-MAR-2021 14:40,  Significant changes have occurred         Laboratory:  Labs Ordered and Resulted from Time of ED Arrival to Time of ED Departure   BASIC METABOLIC PANEL - Abnormal       Result Value    Creatinine 1.09      Sodium 141      Potassium 4.4      Urea Nitrogen 24.4 (*)     Chloride 104      Carbon Dioxide (CO2) 28      Anion Gap 9      Glucose 139 (*)     GFR Estimate 71      Calcium 9.5     CBC WITH PLATELETS AND DIFFERENTIAL - Abnormal    WBC Count 10.0      RBC Count 4.69      Hemoglobin 14.5      Hematocrit 46.4      MCV 99      MCH 30.9      MCHC 31.3 (*)     RDW 14.3      Platelet Count 262      % Neutrophils 52      % Lymphocytes 33      % Monocytes 10      % Eosinophils 3      % Basophils 2      % Immature Granulocytes 0      NRBCs per 100 WBC 0      Absolute Neutrophils 5.3      Absolute Lymphocytes 3.3      Absolute Monocytes 1.0      Absolute Eosinophils 0.3      Absolute Basophils 0.2      Absolute Immature Granulocytes 0.0      Absolute NRBCs 0.0     TROPONIN T, HIGH SENSITIVITY - Normal    Troponin T, High Sensitivity 19     LIPASE - Normal    Lipase 24     HEPATIC FUNCTION PANEL - Normal    Protein Total 7.3      Albumin 3.9      Bilirubin Total 1.0      Alkaline Phosphatase 70      AST 31      ALT 26      Bilirubin Direct 0.23     TROPONIN T, HIGH SENSITIVITY - Normal     Troponin T, High Sensitivity 19          Emergency Department Course:     Reviewed:  I reviewed nursing notes, vitals, past medical history and Care Everywhere    Assessments:  2208 I obtained history and examined the patient as noted above.   2255 I rechecked the patient and explained findings.   0031 I rechecked and updated the patient. Patient deemed safe to discharge and agreed to the plan of care.    Disposition:  The patient was discharged to home.     Impression & Plan     Medical Decision Making:  This patient is a 76-year-old man who presents to the ED with chest discomfort.  He does have a known history of coronary artery disease.  He feels that his symptoms may be related to reflux as he does at times have a burning sensation.  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    EKG does not show any new ischemic changes.  2 troponin measurements are negative.  The patient has remained asymptomatic.  He feels comfortable going home but will call his cardiologist to arrange short-term outpatient follow-up as soon as possible.    Given the patient's known coronary artery disease he likely will need either a repeat stress test or repeat angiogram.  He is already on aspirin and is anticoagulated with Eliquis.  Heart score is 4 which places him in the low moderate category.    Diagnosis:    ICD-10-CM    1. Chest pain, unspecified type  R07.9 Follow-Up with Cardiology       Discharge Medications:  Discharge Medication List as of 8/25/2022 12:33 AM          Scribe Disclosure:  ICaitlin, am serving as a scribe at 10:06 PM on 8/24/2022 to document services personally performed by Jake Dotson MD based on my observations and the provider's statements to me.   ILoyda, am serving as a scribe  at 11:40 PM on 8/24/2022.       Jake Dotson MD  08/25/22 9858

## 2022-08-25 NOTE — DISCHARGE INSTRUCTIONS
Return immediately to the ER for recurrent chest pain or any new concerns.    Avoid heavy lifting and straining.

## 2023-04-03 ENCOUNTER — HOSPITAL ENCOUNTER (INPATIENT)
Facility: CLINIC | Age: 78
LOS: 4 days | Discharge: HOME | DRG: 640 | End: 2023-04-08
Attending: EMERGENCY MEDICINE | Admitting: INTERNAL MEDICINE
Payer: MEDICARE

## 2023-04-03 DIAGNOSIS — I45.5 SINUS PAUSE: ICD-10-CM

## 2023-04-03 DIAGNOSIS — E87.20 METABOLIC ACIDOSIS: ICD-10-CM

## 2023-04-03 DIAGNOSIS — E87.5 HYPERKALEMIA: ICD-10-CM

## 2023-04-03 DIAGNOSIS — Z86.79 HISTORY OF ATRIAL FIBRILLATION: ICD-10-CM

## 2023-04-03 DIAGNOSIS — M62.81 GENERALIZED MUSCLE WEAKNESS: ICD-10-CM

## 2023-04-03 DIAGNOSIS — Z86.73 HISTORY OF CVA (CEREBROVASCULAR ACCIDENT): Primary | ICD-10-CM

## 2023-04-03 DIAGNOSIS — N17.9 AKI (ACUTE KIDNEY INJURY) (H): ICD-10-CM

## 2023-04-03 LAB
ALBUMIN UR-MCNC: 20 MG/DL
ANION GAP SERPL CALCULATED.3IONS-SCNC: 12 MMOL/L (ref 7–15)
APPEARANCE UR: CLEAR
BASE EXCESS BLDV CALC-SCNC: -10.9 MMOL/L (ref -7.7–1.9)
BASOPHILS # BLD AUTO: 0.2 10E3/UL (ref 0–0.2)
BASOPHILS NFR BLD AUTO: 3 %
BILIRUB UR QL STRIP: NEGATIVE
BUN SERPL-MCNC: 94.1 MG/DL (ref 8–23)
CALCIUM SERPL-MCNC: 9.1 MG/DL (ref 8.8–10.2)
CHLORIDE SERPL-SCNC: 111 MMOL/L (ref 98–107)
CK SERPL-CCNC: 120 U/L (ref 39–308)
COLOR UR AUTO: ABNORMAL
CREAT SERPL-MCNC: 4.79 MG/DL (ref 0.67–1.17)
DEPRECATED HCO3 PLAS-SCNC: 16 MMOL/L (ref 22–29)
EOSINOPHIL # BLD AUTO: 0.8 10E3/UL (ref 0–0.7)
EOSINOPHIL NFR BLD AUTO: 10 %
ERYTHROCYTE [DISTWIDTH] IN BLOOD BY AUTOMATED COUNT: 15.5 % (ref 10–15)
GFR SERPL CREATININE-BSD FRML MDRD: 12 ML/MIN/1.73M2
GLUCOSE BLDC GLUCOMTR-MCNC: 184 MG/DL (ref 70–99)
GLUCOSE BLDC GLUCOMTR-MCNC: 217 MG/DL (ref 70–99)
GLUCOSE SERPL-MCNC: 210 MG/DL (ref 70–99)
GLUCOSE UR STRIP-MCNC: >=1000 MG/DL
HCO3 BLDV-SCNC: 17 MMOL/L (ref 21–28)
HCT VFR BLD AUTO: 36.9 % (ref 40–53)
HGB BLD-MCNC: 11.6 G/DL (ref 13.3–17.7)
HGB UR QL STRIP: ABNORMAL
HOLD SPECIMEN: NORMAL
HOLD SPECIMEN: NORMAL
IMM GRANULOCYTES # BLD: 0 10E3/UL
IMM GRANULOCYTES NFR BLD: 0 %
KETONES UR STRIP-MCNC: NEGATIVE MG/DL
LEUKOCYTE ESTERASE UR QL STRIP: NEGATIVE
LYMPHOCYTES # BLD AUTO: 1.7 10E3/UL (ref 0.8–5.3)
LYMPHOCYTES NFR BLD AUTO: 20 %
MAGNESIUM SERPL-MCNC: 2 MG/DL (ref 1.7–2.3)
MCH RBC QN AUTO: 30.4 PG (ref 26.5–33)
MCHC RBC AUTO-ENTMCNC: 31.4 G/DL (ref 31.5–36.5)
MCV RBC AUTO: 97 FL (ref 78–100)
MONOCYTES # BLD AUTO: 0.8 10E3/UL (ref 0–1.3)
MONOCYTES NFR BLD AUTO: 9 %
MUCOUS THREADS #/AREA URNS LPF: PRESENT /LPF
NEUTROPHILS # BLD AUTO: 4.9 10E3/UL (ref 1.6–8.3)
NEUTROPHILS NFR BLD AUTO: 58 %
NITRATE UR QL: NEGATIVE
NRBC # BLD AUTO: 0 10E3/UL
NRBC BLD AUTO-RTO: 0 /100
O2/TOTAL GAS SETTING VFR VENT: 0 %
PCO2 BLDV: 48 MM HG (ref 40–50)
PH BLDV: 7.17 [PH] (ref 7.32–7.43)
PH UR STRIP: 5 [PH] (ref 5–7)
PLATELET # BLD AUTO: 280 10E3/UL (ref 150–450)
PO2 BLDV: 18 MM HG (ref 25–47)
POTASSIUM SERPL-SCNC: 6.2 MMOL/L (ref 3.4–5.3)
RBC # BLD AUTO: 3.82 10E6/UL (ref 4.4–5.9)
RBC URINE: 2 /HPF
SODIUM SERPL-SCNC: 139 MMOL/L (ref 136–145)
SP GR UR STRIP: 1.01 (ref 1–1.03)
SQUAMOUS EPITHELIAL: <1 /HPF
UROBILINOGEN UR STRIP-MCNC: NORMAL MG/DL
WBC # BLD AUTO: 8.5 10E3/UL (ref 4–11)
WBC URINE: 1 /HPF

## 2023-04-03 PROCEDURE — 85025 COMPLETE CBC W/AUTO DIFF WBC: CPT | Performed by: EMERGENCY MEDICINE

## 2023-04-03 PROCEDURE — 82962 GLUCOSE BLOOD TEST: CPT

## 2023-04-03 PROCEDURE — 93005 ELECTROCARDIOGRAM TRACING: CPT

## 2023-04-03 PROCEDURE — 250N000013 HC RX MED GY IP 250 OP 250 PS 637: Performed by: EMERGENCY MEDICINE

## 2023-04-03 PROCEDURE — 82803 BLOOD GASES ANY COMBINATION: CPT | Performed by: EMERGENCY MEDICINE

## 2023-04-03 PROCEDURE — 36415 COLL VENOUS BLD VENIPUNCTURE: CPT | Performed by: EMERGENCY MEDICINE

## 2023-04-03 PROCEDURE — 250N000009 HC RX 250: Performed by: EMERGENCY MEDICINE

## 2023-04-03 PROCEDURE — 99285 EMERGENCY DEPT VISIT HI MDM: CPT | Mod: 25

## 2023-04-03 PROCEDURE — 80048 BASIC METABOLIC PNL TOTAL CA: CPT | Performed by: EMERGENCY MEDICINE

## 2023-04-03 PROCEDURE — 93005 ELECTROCARDIOGRAM TRACING: CPT | Mod: 76

## 2023-04-03 PROCEDURE — 82550 ASSAY OF CK (CPK): CPT | Performed by: EMERGENCY MEDICINE

## 2023-04-03 PROCEDURE — 250N000011 HC RX IP 250 OP 636: Performed by: EMERGENCY MEDICINE

## 2023-04-03 PROCEDURE — 96375 TX/PRO/DX INJ NEW DRUG ADDON: CPT

## 2023-04-03 PROCEDURE — 96361 HYDRATE IV INFUSION ADD-ON: CPT

## 2023-04-03 PROCEDURE — 258N000001 HC RX 258: Performed by: EMERGENCY MEDICINE

## 2023-04-03 PROCEDURE — 83735 ASSAY OF MAGNESIUM: CPT | Performed by: EMERGENCY MEDICINE

## 2023-04-03 PROCEDURE — 81001 URINALYSIS AUTO W/SCOPE: CPT | Performed by: EMERGENCY MEDICINE

## 2023-04-03 RX ORDER — NICOTINE POLACRILEX 4 MG
15-30 LOZENGE BUCCAL
Status: DISCONTINUED | OUTPATIENT
Start: 2023-04-03 | End: 2023-04-04

## 2023-04-03 RX ORDER — FUROSEMIDE 10 MG/ML
40 INJECTION INTRAMUSCULAR; INTRAVENOUS ONCE
Status: COMPLETED | OUTPATIENT
Start: 2023-04-03 | End: 2023-04-03

## 2023-04-03 RX ORDER — CALCIUM GLUCONATE 20 MG/ML
2 INJECTION, SOLUTION INTRAVENOUS ONCE
Status: COMPLETED | OUTPATIENT
Start: 2023-04-03 | End: 2023-04-03

## 2023-04-03 RX ORDER — DEXTROSE MONOHYDRATE 25 G/50ML
25-50 INJECTION, SOLUTION INTRAVENOUS
Status: DISCONTINUED | OUTPATIENT
Start: 2023-04-03 | End: 2023-04-04

## 2023-04-03 RX ORDER — CALCIUM GLUCONATE 94 MG/ML
2 INJECTION, SOLUTION INTRAVENOUS ONCE
Status: DISCONTINUED | OUTPATIENT
Start: 2023-04-03 | End: 2023-04-03

## 2023-04-03 RX ORDER — DEXTROSE MONOHYDRATE 25 G/50ML
25 INJECTION, SOLUTION INTRAVENOUS ONCE
Status: COMPLETED | OUTPATIENT
Start: 2023-04-03 | End: 2023-04-03

## 2023-04-03 RX ORDER — ALBUTEROL SULFATE 5 MG/ML
10 SOLUTION RESPIRATORY (INHALATION) ONCE
Status: COMPLETED | OUTPATIENT
Start: 2023-04-03 | End: 2023-04-03

## 2023-04-03 RX ADMIN — CALCIUM GLUCONATE 2 G: 20 INJECTION, SOLUTION INTRAVENOUS at 22:55

## 2023-04-03 RX ADMIN — HUMAN INSULIN 9 UNITS: 100 INJECTION, SOLUTION SUBCUTANEOUS at 22:48

## 2023-04-03 RX ADMIN — FUROSEMIDE 40 MG: 10 INJECTION, SOLUTION INTRAMUSCULAR; INTRAVENOUS at 22:59

## 2023-04-03 RX ADMIN — DEXTROSE MONOHYDRATE 25 G: 25 INJECTION, SOLUTION INTRAVENOUS at 22:48

## 2023-04-03 RX ADMIN — SODIUM BICARBONATE 50 MEQ: 84 INJECTION, SOLUTION INTRAVENOUS at 22:54

## 2023-04-03 RX ADMIN — SODIUM BICARBONATE 50 MEQ: 84 INJECTION INTRAVENOUS at 23:58

## 2023-04-03 RX ADMIN — DEXTROSE MONOHYDRATE 300 ML: 100 INJECTION, SOLUTION INTRAVENOUS at 22:56

## 2023-04-03 RX ADMIN — ALBUTEROL SULFATE 10 MG: 2.5 SOLUTION RESPIRATORY (INHALATION) at 22:44

## 2023-04-03 ASSESSMENT — ENCOUNTER SYMPTOMS
LIGHT-HEADEDNESS: 0
BACK PAIN: 0
DIZZINESS: 0
DIFFICULTY URINATING: 1
VOMITING: 0
DIARRHEA: 0
FATIGUE: 1
ABDOMINAL PAIN: 0
CHEST TIGHTNESS: 0

## 2023-04-03 ASSESSMENT — ACTIVITIES OF DAILY LIVING (ADL): ADLS_ACUITY_SCORE: 35

## 2023-04-04 ENCOUNTER — APPOINTMENT (OUTPATIENT)
Dept: CT IMAGING | Facility: CLINIC | Age: 78
DRG: 640 | End: 2023-04-04
Attending: EMERGENCY MEDICINE
Payer: MEDICARE

## 2023-04-04 ENCOUNTER — APPOINTMENT (OUTPATIENT)
Dept: CARDIOLOGY | Facility: CLINIC | Age: 78
DRG: 640 | End: 2023-04-04
Attending: STUDENT IN AN ORGANIZED HEALTH CARE EDUCATION/TRAINING PROGRAM
Payer: MEDICARE

## 2023-04-04 PROBLEM — N17.9 AKI (ACUTE KIDNEY INJURY) (H): Status: ACTIVE | Noted: 2023-04-04

## 2023-04-04 PROBLEM — E87.5 HYPERKALEMIA: Status: ACTIVE | Noted: 2023-04-04

## 2023-04-04 LAB
ALBUMIN SERPL BCG-MCNC: 3.2 G/DL (ref 3.5–5.2)
ANION GAP SERPL CALCULATED.3IONS-SCNC: 12 MMOL/L (ref 7–15)
ANION GAP SERPL CALCULATED.3IONS-SCNC: 15 MMOL/L (ref 7–15)
ATRIAL RATE - MUSE: 312 BPM
ATRIAL RATE - MUSE: 60 BPM
BASE EXCESS BLDV CALC-SCNC: -5.1 MMOL/L (ref -7.7–1.9)
BASOPHILS # BLD AUTO: 0.2 10E3/UL (ref 0–0.2)
BASOPHILS NFR BLD AUTO: 2 %
BUN SERPL-MCNC: 87.1 MG/DL (ref 8–23)
BUN SERPL-MCNC: 88.2 MG/DL (ref 8–23)
CALCIUM SERPL-MCNC: 9.2 MG/DL (ref 8.8–10.2)
CALCIUM SERPL-MCNC: 9.3 MG/DL (ref 8.8–10.2)
CHLORIDE SERPL-SCNC: 108 MMOL/L (ref 98–107)
CHLORIDE SERPL-SCNC: 109 MMOL/L (ref 98–107)
CK SERPL-CCNC: 88 U/L (ref 39–308)
CREAT SERPL-MCNC: 4.31 MG/DL (ref 0.67–1.17)
CREAT SERPL-MCNC: 4.5 MG/DL (ref 0.67–1.17)
CREAT SERPL-MCNC: 4.62 MG/DL (ref 0.67–1.17)
CREAT SERPL-MCNC: 4.62 MG/DL (ref 0.67–1.17)
DEPRECATED HCO3 PLAS-SCNC: 18 MMOL/L (ref 22–29)
DEPRECATED HCO3 PLAS-SCNC: 20 MMOL/L (ref 22–29)
DIASTOLIC BLOOD PRESSURE - MUSE: NORMAL MMHG
DIASTOLIC BLOOD PRESSURE - MUSE: NORMAL MMHG
EOSINOPHIL # BLD AUTO: 0.8 10E3/UL (ref 0–0.7)
EOSINOPHIL NFR BLD AUTO: 10 %
ERYTHROCYTE [DISTWIDTH] IN BLOOD BY AUTOMATED COUNT: 15.4 % (ref 10–15)
GFR SERPL CREATININE-BSD FRML MDRD: 13 ML/MIN/1.73M2
GFR SERPL CREATININE-BSD FRML MDRD: 14 ML/MIN/1.73M2
GLUCOSE BLDC GLUCOMTR-MCNC: 115 MG/DL (ref 70–99)
GLUCOSE BLDC GLUCOMTR-MCNC: 136 MG/DL (ref 70–99)
GLUCOSE BLDC GLUCOMTR-MCNC: 187 MG/DL (ref 70–99)
GLUCOSE BLDC GLUCOMTR-MCNC: 191 MG/DL (ref 70–99)
GLUCOSE BLDC GLUCOMTR-MCNC: 199 MG/DL (ref 70–99)
GLUCOSE BLDC GLUCOMTR-MCNC: 234 MG/DL (ref 70–99)
GLUCOSE BLDC GLUCOMTR-MCNC: 94 MG/DL (ref 70–99)
GLUCOSE SERPL-MCNC: 126 MG/DL (ref 70–99)
GLUCOSE SERPL-MCNC: 147 MG/DL (ref 70–99)
HCO3 BLDV-SCNC: 22 MMOL/L (ref 21–28)
HCT VFR BLD AUTO: 36.6 % (ref 40–53)
HGB BLD-MCNC: 11.6 G/DL (ref 13.3–17.7)
IMM GRANULOCYTES # BLD: 0 10E3/UL
IMM GRANULOCYTES NFR BLD: 0 %
INTERPRETATION ECG - MUSE: NORMAL
INTERPRETATION ECG - MUSE: NORMAL
LVEF ECHO: NORMAL
LYMPHOCYTES # BLD AUTO: 2.1 10E3/UL (ref 0.8–5.3)
LYMPHOCYTES NFR BLD AUTO: 26 %
MAGNESIUM SERPL-MCNC: 1.7 MG/DL (ref 1.7–2.3)
MCH RBC QN AUTO: 30.9 PG (ref 26.5–33)
MCHC RBC AUTO-ENTMCNC: 31.7 G/DL (ref 31.5–36.5)
MCV RBC AUTO: 97 FL (ref 78–100)
MONOCYTES # BLD AUTO: 0.8 10E3/UL (ref 0–1.3)
MONOCYTES NFR BLD AUTO: 10 %
NEUTROPHILS # BLD AUTO: 4.3 10E3/UL (ref 1.6–8.3)
NEUTROPHILS NFR BLD AUTO: 52 %
NRBC # BLD AUTO: 0 10E3/UL
NRBC BLD AUTO-RTO: 0 /100
O2/TOTAL GAS SETTING VFR VENT: 0 %
P AXIS - MUSE: 16 DEGREES
P AXIS - MUSE: NORMAL DEGREES
PCO2 BLDV: 45 MM HG (ref 40–50)
PH BLDV: 7.29 [PH] (ref 7.32–7.43)
PHOSPHATE SERPL-MCNC: 4.3 MG/DL (ref 2.5–4.5)
PHOSPHATE SERPL-MCNC: 4.4 MG/DL (ref 2.5–4.5)
PLATELET # BLD AUTO: 277 10E3/UL (ref 150–450)
PO2 BLDV: 21 MM HG (ref 25–47)
POTASSIUM SERPL-SCNC: 4.5 MMOL/L (ref 3.4–5.3)
POTASSIUM SERPL-SCNC: 4.8 MMOL/L (ref 3.4–5.3)
POTASSIUM SERPL-SCNC: 4.9 MMOL/L (ref 3.4–5.3)
POTASSIUM SERPL-SCNC: 5 MMOL/L (ref 3.4–5.3)
POTASSIUM SERPL-SCNC: 5.1 MMOL/L (ref 3.4–5.3)
POTASSIUM SERPL-SCNC: 5.3 MMOL/L (ref 3.4–5.3)
PR INTERVAL - MUSE: 264 MS
PR INTERVAL - MUSE: NORMAL MS
QRS DURATION - MUSE: 92 MS
QRS DURATION - MUSE: 98 MS
QT - MUSE: 438 MS
QT - MUSE: 440 MS
QTC - MUSE: 438 MS
QTC - MUSE: 446 MS
R AXIS - MUSE: -54 DEGREES
R AXIS - MUSE: -59 DEGREES
RBC # BLD AUTO: 3.76 10E6/UL (ref 4.4–5.9)
SODIUM SERPL-SCNC: 141 MMOL/L (ref 136–145)
SODIUM SERPL-SCNC: 141 MMOL/L (ref 136–145)
SYSTOLIC BLOOD PRESSURE - MUSE: NORMAL MMHG
SYSTOLIC BLOOD PRESSURE - MUSE: NORMAL MMHG
T AXIS - MUSE: 27 DEGREES
T AXIS - MUSE: 28 DEGREES
VENTRICULAR RATE- MUSE: 60 BPM
VENTRICULAR RATE- MUSE: 62 BPM
WBC # BLD AUTO: 8.3 10E3/UL (ref 4–11)

## 2023-04-04 PROCEDURE — 82550 ASSAY OF CK (CPK): CPT | Performed by: INTERNAL MEDICINE

## 2023-04-04 PROCEDURE — 84100 ASSAY OF PHOSPHORUS: CPT | Performed by: STUDENT IN AN ORGANIZED HEALTH CARE EDUCATION/TRAINING PROGRAM

## 2023-04-04 PROCEDURE — 96365 THER/PROPH/DIAG IV INF INIT: CPT

## 2023-04-04 PROCEDURE — 250N000013 HC RX MED GY IP 250 OP 250 PS 637: Performed by: EMERGENCY MEDICINE

## 2023-04-04 PROCEDURE — 99222 1ST HOSP IP/OBS MODERATE 55: CPT | Performed by: INTERNAL MEDICINE

## 2023-04-04 PROCEDURE — 93306 TTE W/DOPPLER COMPLETE: CPT | Mod: 26 | Performed by: INTERNAL MEDICINE

## 2023-04-04 PROCEDURE — 36415 COLL VENOUS BLD VENIPUNCTURE: CPT | Performed by: EMERGENCY MEDICINE

## 2023-04-04 PROCEDURE — 250N000009 HC RX 250: Performed by: EMERGENCY MEDICINE

## 2023-04-04 PROCEDURE — 84132 ASSAY OF SERUM POTASSIUM: CPT | Performed by: STUDENT IN AN ORGANIZED HEALTH CARE EDUCATION/TRAINING PROGRAM

## 2023-04-04 PROCEDURE — 36415 COLL VENOUS BLD VENIPUNCTURE: CPT | Performed by: STUDENT IN AN ORGANIZED HEALTH CARE EDUCATION/TRAINING PROGRAM

## 2023-04-04 PROCEDURE — 84132 ASSAY OF SERUM POTASSIUM: CPT | Performed by: EMERGENCY MEDICINE

## 2023-04-04 PROCEDURE — 250N000013 HC RX MED GY IP 250 OP 250 PS 637: Performed by: STUDENT IN AN ORGANIZED HEALTH CARE EDUCATION/TRAINING PROGRAM

## 2023-04-04 PROCEDURE — 82803 BLOOD GASES ANY COMBINATION: CPT | Performed by: INTERNAL MEDICINE

## 2023-04-04 PROCEDURE — 258N000003 HC RX IP 258 OP 636: Performed by: STUDENT IN AN ORGANIZED HEALTH CARE EDUCATION/TRAINING PROGRAM

## 2023-04-04 PROCEDURE — 96366 THER/PROPH/DIAG IV INF ADDON: CPT

## 2023-04-04 PROCEDURE — 99223 1ST HOSP IP/OBS HIGH 75: CPT | Mod: AI | Performed by: INTERNAL MEDICINE

## 2023-04-04 PROCEDURE — 120N000001 HC R&B MED SURG/OB

## 2023-04-04 PROCEDURE — 84132 ASSAY OF SERUM POTASSIUM: CPT | Performed by: INTERNAL MEDICINE

## 2023-04-04 PROCEDURE — 250N000012 HC RX MED GY IP 250 OP 636 PS 637: Performed by: INTERNAL MEDICINE

## 2023-04-04 PROCEDURE — 82962 GLUCOSE BLOOD TEST: CPT

## 2023-04-04 PROCEDURE — G1010 CDSM STANSON: HCPCS

## 2023-04-04 PROCEDURE — 250N000013 HC RX MED GY IP 250 OP 250 PS 637: Performed by: HOSPITALIST

## 2023-04-04 PROCEDURE — 82565 ASSAY OF CREATININE: CPT | Performed by: STUDENT IN AN ORGANIZED HEALTH CARE EDUCATION/TRAINING PROGRAM

## 2023-04-04 PROCEDURE — 99447 NTRPROF PH1/NTRNET/EHR 11-20: CPT | Performed by: SURGERY

## 2023-04-04 PROCEDURE — 93010 ELECTROCARDIOGRAM REPORT: CPT | Performed by: INTERNAL MEDICINE

## 2023-04-04 PROCEDURE — 258N000002 HC RX IP 258 OP 250: Performed by: EMERGENCY MEDICINE

## 2023-04-04 PROCEDURE — 85025 COMPLETE CBC W/AUTO DIFF WBC: CPT | Performed by: INTERNAL MEDICINE

## 2023-04-04 PROCEDURE — 82565 ASSAY OF CREATININE: CPT | Performed by: INTERNAL MEDICINE

## 2023-04-04 PROCEDURE — 83735 ASSAY OF MAGNESIUM: CPT | Performed by: STUDENT IN AN ORGANIZED HEALTH CARE EDUCATION/TRAINING PROGRAM

## 2023-04-04 PROCEDURE — 36415 COLL VENOUS BLD VENIPUNCTURE: CPT | Performed by: INTERNAL MEDICINE

## 2023-04-04 PROCEDURE — 93306 TTE W/DOPPLER COMPLETE: CPT

## 2023-04-04 RX ORDER — SODIUM CHLORIDE 9 MG/ML
INJECTION, SOLUTION INTRAVENOUS CONTINUOUS
Status: ACTIVE | OUTPATIENT
Start: 2023-04-04 | End: 2023-04-04

## 2023-04-04 RX ORDER — METOPROLOL TARTRATE 25 MG/1
25 TABLET, FILM COATED ORAL 2 TIMES DAILY
Status: DISCONTINUED | OUTPATIENT
Start: 2023-04-04 | End: 2023-04-04

## 2023-04-04 RX ORDER — ONDANSETRON 4 MG/1
4 TABLET, ORALLY DISINTEGRATING ORAL EVERY 6 HOURS PRN
Status: DISCONTINUED | OUTPATIENT
Start: 2023-04-04 | End: 2023-04-08 | Stop reason: HOSPADM

## 2023-04-04 RX ORDER — DEXTROSE MONOHYDRATE 25 G/50ML
25-50 INJECTION, SOLUTION INTRAVENOUS
Status: DISCONTINUED | OUTPATIENT
Start: 2023-04-04 | End: 2023-04-08 | Stop reason: HOSPADM

## 2023-04-04 RX ORDER — AMLODIPINE BESYLATE 2.5 MG/1
2.5 TABLET ORAL AT BEDTIME
Status: DISCONTINUED | OUTPATIENT
Start: 2023-04-04 | End: 2023-04-08 | Stop reason: HOSPADM

## 2023-04-04 RX ORDER — OMEPRAZOLE 40 MG/1
40 CAPSULE, DELAYED RELEASE ORAL
COMMUNITY

## 2023-04-04 RX ORDER — ONDANSETRON 2 MG/ML
4 INJECTION INTRAMUSCULAR; INTRAVENOUS EVERY 6 HOURS PRN
Status: DISCONTINUED | OUTPATIENT
Start: 2023-04-04 | End: 2023-04-08 | Stop reason: HOSPADM

## 2023-04-04 RX ORDER — ACETAMINOPHEN 325 MG/1
650 TABLET ORAL EVERY 6 HOURS PRN
Status: DISCONTINUED | OUTPATIENT
Start: 2023-04-04 | End: 2023-04-08 | Stop reason: HOSPADM

## 2023-04-04 RX ORDER — ACETAMINOPHEN 650 MG/1
650 SUPPOSITORY RECTAL EVERY 6 HOURS PRN
Status: DISCONTINUED | OUTPATIENT
Start: 2023-04-04 | End: 2023-04-08 | Stop reason: HOSPADM

## 2023-04-04 RX ORDER — LORAZEPAM 0.5 MG/1
0.25 TABLET ORAL ONCE
Status: COMPLETED | OUTPATIENT
Start: 2023-04-04 | End: 2023-04-04

## 2023-04-04 RX ORDER — ATORVASTATIN CALCIUM 40 MG/1
40 TABLET, FILM COATED ORAL AT BEDTIME
Status: DISCONTINUED | OUTPATIENT
Start: 2023-04-04 | End: 2023-04-08 | Stop reason: HOSPADM

## 2023-04-04 RX ORDER — ATROPINE SULFATE 0.4 MG/ML
0.2 AMPUL (ML) INJECTION
Status: DISCONTINUED | OUTPATIENT
Start: 2023-04-04 | End: 2023-04-08 | Stop reason: HOSPADM

## 2023-04-04 RX ORDER — LIDOCAINE 40 MG/G
CREAM TOPICAL
Status: DISCONTINUED | OUTPATIENT
Start: 2023-04-04 | End: 2023-04-08 | Stop reason: HOSPADM

## 2023-04-04 RX ORDER — METOPROLOL SUCCINATE 50 MG/1
50 TABLET, EXTENDED RELEASE ORAL DAILY
Status: DISCONTINUED | OUTPATIENT
Start: 2023-04-04 | End: 2023-04-08 | Stop reason: HOSPADM

## 2023-04-04 RX ORDER — METOPROLOL SUCCINATE 50 MG/1
50 TABLET, EXTENDED RELEASE ORAL DAILY
COMMUNITY

## 2023-04-04 RX ORDER — NICOTINE POLACRILEX 4 MG
15-30 LOZENGE BUCCAL
Status: DISCONTINUED | OUTPATIENT
Start: 2023-04-04 | End: 2023-04-08 | Stop reason: HOSPADM

## 2023-04-04 RX ORDER — AMLODIPINE BESYLATE 2.5 MG/1
2.5 TABLET ORAL AT BEDTIME
COMMUNITY

## 2023-04-04 RX ADMIN — ATORVASTATIN CALCIUM 40 MG: 40 TABLET, FILM COATED ORAL at 20:15

## 2023-04-04 RX ADMIN — LORAZEPAM 0.25 MG: 0.5 TABLET ORAL at 23:22

## 2023-04-04 RX ADMIN — SODIUM BICARBONATE: 84 INJECTION, SOLUTION INTRAVENOUS at 00:50

## 2023-04-04 RX ADMIN — APIXABAN 5 MG: 5 TABLET, FILM COATED ORAL at 13:56

## 2023-04-04 RX ADMIN — SODIUM ZIRCONIUM CYCLOSILICATE 10 G: 10 POWDER, FOR SUSPENSION ORAL at 10:35

## 2023-04-04 RX ADMIN — SODIUM ZIRCONIUM CYCLOSILICATE 10 G: 10 POWDER, FOR SUSPENSION ORAL at 00:50

## 2023-04-04 RX ADMIN — METOPROLOL SUCCINATE 50 MG: 50 TABLET, EXTENDED RELEASE ORAL at 13:55

## 2023-04-04 RX ADMIN — SODIUM ZIRCONIUM CYCLOSILICATE 10 G: 10 POWDER, FOR SUSPENSION ORAL at 22:20

## 2023-04-04 RX ADMIN — INSULIN ASPART 2 UNITS: 100 INJECTION, SOLUTION INTRAVENOUS; SUBCUTANEOUS at 23:28

## 2023-04-04 RX ADMIN — INSULIN ASPART 2 UNITS: 100 INJECTION, SOLUTION INTRAVENOUS; SUBCUTANEOUS at 20:15

## 2023-04-04 RX ADMIN — APIXABAN 5 MG: 5 TABLET, FILM COATED ORAL at 20:16

## 2023-04-04 RX ADMIN — SODIUM CHLORIDE: 9 INJECTION, SOLUTION INTRAVENOUS at 14:49

## 2023-04-04 RX ADMIN — SODIUM CHLORIDE 500 ML: 9 INJECTION, SOLUTION INTRAVENOUS at 10:19

## 2023-04-04 ASSESSMENT — ACTIVITIES OF DAILY LIVING (ADL)
ADLS_ACUITY_SCORE: 35
ADLS_ACUITY_SCORE: 20
ADLS_ACUITY_SCORE: 35
CONCENTRATING,_REMEMBERING_OR_MAKING_DECISIONS_DIFFICULTY: NO
ADLS_ACUITY_SCORE: 20
WALKING_OR_CLIMBING_STAIRS_DIFFICULTY: NO
ADLS_ACUITY_SCORE: 35
WEAR_GLASSES_OR_BLIND: NO
ADLS_ACUITY_SCORE: 35
FALL_HISTORY_WITHIN_LAST_SIX_MONTHS: NO
ADLS_ACUITY_SCORE: 35
TOILETING_ISSUES: NO
CHANGE_IN_FUNCTIONAL_STATUS_SINCE_ONSET_OF_CURRENT_ILLNESS/INJURY: NO
DIFFICULTY_EATING/SWALLOWING: NO
ADLS_ACUITY_SCORE: 20
DRESSING/BATHING_DIFFICULTY: NO
ADLS_ACUITY_SCORE: 20
DOING_ERRANDS_INDEPENDENTLY_DIFFICULTY: NO
ADLS_ACUITY_SCORE: 35

## 2023-04-04 NOTE — ED NOTES
[facility]  ED Nurse Handoff Report    Yanick Adorno is a 77 year old male   ED Chief complaint: Abnormal Labs (/)  . ED Diagnosis:   Final diagnoses:   None     Allergies:   Allergies   Allergen Reactions     No Known Drug Allergies        Code Status: Full Code  Activity level - Baseline/Home:  Independent. Activity Level - Current:   Stand by Assist. Lift room needed: No. Bariatric: No   Needed: No   Isolation: No. Infection: Not Applicable.     Vital Signs:   Vitals:    04/03/23 2357 04/04/23 0012 04/04/23 0027 04/04/23 0042   BP: (!) 165/94  (!) 164/76    Pulse: 72 68 76 64   Resp: 12 18 19 21   Temp:       TempSrc:       SpO2: 98% 97% 97% 96%       Cardiac Rhythm:  ,      Pain level:    Patient confused: No. Patient Falls Risk: Yes.   Elimination Status: Has voided   Patient Report - Initial Complaint: Hyperkalemia. Focused Assessment: Chief Complaint:  Abnormal Labs (/)        HPI   Yanick Adorno is a 77 year old male, on Eliquis, with a history of hypertension, atrial fibrillation, stroke, and CAD who presents with an elevated potassium of 6.4. He received a call from his clinic about his abnormal blood test and was advised to present to the ED. He has been very fatigued all day but he just started a job sweeping floors. He has been urinating very frequently and feels like his bladder has been emptying. He denies vomiting or diarrhea. He denies back pain, chest pain, abdominal pain, dizziness, or lightheadedness. He was recently taken off of lisinopril and started taking another med today.         Independent Historian: the patient and wife     Review of External Notes:       ROS:  Review of Systems   Constitutional: Positive for fatigue.   Respiratory: Negative for chest tightness.    Cardiovascular: Negative for chest pain.   Gastrointestinal: Negative for abdominal pain, diarrhea and vomiting.   Genitourinary: Positive for difficulty urinating.   Musculoskeletal: Negative for  back pain.   Neurological: Negative for dizziness and light-headedness.   All other systems reviewed and are negative.         Tests Performed: Labs, imaging. Abnormal Results:   Labs Ordered and Resulted from Time of ED Arrival to Time of ED Departure   BASIC METABOLIC PANEL - Abnormal       Result Value    Sodium 139      Potassium 6.2 (*)     Chloride 111 (*)     Carbon Dioxide (CO2) 16 (*)     Anion Gap 12      Urea Nitrogen 94.1 (*)     Creatinine 4.79 (*)     Calcium 9.1      Glucose 210 (*)     GFR Estimate 12 (*)    CBC WITH PLATELETS AND DIFFERENTIAL - Abnormal    WBC Count 8.5      RBC Count 3.82 (*)     Hemoglobin 11.6 (*)     Hematocrit 36.9 (*)     MCV 97      MCH 30.4      MCHC 31.4 (*)     RDW 15.5 (*)     Platelet Count 280      % Neutrophils 58      % Lymphocytes 20      % Monocytes 9      % Eosinophils 10      % Basophils 3      % Immature Granulocytes 0      NRBCs per 100 WBC 0      Absolute Neutrophils 4.9      Absolute Lymphocytes 1.7      Absolute Monocytes 0.8      Absolute Eosinophils 0.8 (*)     Absolute Basophils 0.2      Absolute Immature Granulocytes 0.0      Absolute NRBCs 0.0     BLOOD GAS VENOUS - Abnormal    pH Venous 7.17 (*)     pCO2 Venous 48      pO2 Venous 18 (*)     Bicarbonate Venous 17 (*)     Base Excess/Deficit (+/-) -10.9 (*)     FIO2 0     ROUTINE UA WITH MICROSCOPIC REFLEX TO CULTURE - Abnormal    Color Urine Straw      Appearance Urine Clear      Glucose Urine >=1000 (*)     Bilirubin Urine Negative      Ketones Urine Negative      Specific Gravity Urine 1.015      Blood Urine Trace (*)     pH Urine 5.0      Protein Albumin Urine 20 (*)     Urobilinogen Urine Normal      Nitrite Urine Negative      Leukocyte Esterase Urine Negative      Mucus Urine Present (*)     RBC Urine 2      WBC Urine 1      Squamous Epithelials Urine <1     GLUCOSE BY METER - Abnormal    GLUCOSE BY METER POCT 184 (*)    GLUCOSE BY METER - Abnormal    GLUCOSE BY METER POCT 217 (*)    GLUCOSE BY  METER - Abnormal    GLUCOSE BY METER POCT 199 (*)    GLUCOSE BY METER - Abnormal    GLUCOSE BY METER POCT 187 (*)    CK TOTAL - Normal         MAGNESIUM - Normal    Magnesium 2.0     POTASSIUM - Normal    Potassium 5.3     GLUCOSE MONITOR NURSING POCT   GLUCOSE MONITOR NURSING POCT   GLUCOSE MONITOR NURSING POCT   POTASSIUM     CT Abdomen Pelvis w/o Contrast   Preliminary Result   IMPRESSION:    1.  There are a few tiny sand-like calyceal tip stones in the kidneys, more on the left, without hydronephrosis or hydroureter. Bilateral nonspecific perinephric soft tissue stranding. Dependent stellate stone in the urinary bladder. Mild prostatomegaly.      2.  Calcified gallstones filling the gallbladder lumen. No biliary dilatation or adjacent inflammation. Diffusely fatty infiltrated liver.      3.  Atherosclerotic and aneurysmally dilated distal abdominal aorta. Recommend vascular surgical consultation. Cardiac size approaches upper limits of normal. Moderate coronary artery calcifications. No pericardial effusion.      NOTE: ABNORMAL REPORT      THE DICTATION ABOVE DESCRIBES AN ABNORMALITY FOR WHICH FOLLOW-UP IS NEEDED.            .   Treatments provided: See MAR. K+ shift. Frequent monitoring of pt.  Family Comments: Wife was present at bedside. Involved and supportive in pt plan of care.  OBS brochure/video discussed/provided to patient:  No  ED Medications:   Medications   glucose gel 15-30 g (has no administration in time range)     Or   dextrose 50 % injection 25-50 mL (has no administration in time range)     Or   glucagon injection 1 mg (has no administration in time range)   dextrose 10% BOLUS (300 mLs Intravenous $New Bag 4/3/23 2250)   sodium bicarbonate 8.4 % injection 50 mEq (50 mEq Intravenous $Given 4/3/23 2254)   dextrose 50 % injection 25 g (25 g Intravenous $Given 4/3/23 2242)   albuterol (PROVENTIL) neb solution 10 mg (10 mg Nebulization $Given 4/3/23 2244)   furosemide (LASIX) injection 40 mg  (40 mg Intravenous $Given 4/3/23 5525)   calcium gluconate 2 g in  mL intermittent infusion (2 g Intravenous $Given 4/3/23 9945)   insulin regular 1 unit/mL injection 9 Units (9 Units Intravenous $Given 4/3/23 7077)   sodium bicarbonate 8.4 % injection 50 mEq (50 mEq Intravenous $Given 4/3/23 6690)   NaCl 0.45 % 1,000 mL with sodium bicarbonate 150 mEq/L infusion ( Intravenous $New Bag 4/4/23 0050)   sodium zirconium cyclosilicate (LOKELMA) packet 10 g (10 g Oral $Given 4/4/23 0050)     Drips infusing:  Yes  For the majority of the shift, the patient's behavior Green. Interventions performed were N/a.    Sepsis treatment initiated: No     Patient tested for COVID 19 prior to admission: NO    ED Nurse Name/Phone Number: Kim Calle RN,   2:18 AM  RECEIVING UNIT ED HANDOFF REVIEW    Above ED Nurse Handoff Report was reviewed: Yes  Reviewed by: Guy Magana RN on April 4, 2023 at 11:18 AM

## 2023-04-04 NOTE — ED PROVIDER NOTES
History     Chief Complaint:  Abnormal Labs (/)       HPI   Yanick Adorno is a 77 year old male, on Eliquis, with a history of hypertension, atrial fibrillation, stroke, and CAD who presents with an elevated potassium of 6.4. He received a call from his clinic about his abnormal blood test and was advised to present to the ED. He has been very fatigued all day but he just started a job sweeping floors. He has been urinating very frequently and feels like his bladder has been emptying. He denies vomiting or diarrhea. He denies back pain, chest pain, abdominal pain, dizziness, or lightheadedness. He was recently taken off of lisinopril and started taking another med today.      Independent Historian: the patient and wife    Review of External Notes: I reviewed outpatient labs from a family medicine note on 3/30    ROS:  Review of Systems   Constitutional: Positive for fatigue.   Respiratory: Negative for chest tightness.    Cardiovascular: Negative for chest pain.   Gastrointestinal: Negative for abdominal pain, diarrhea and vomiting.   Genitourinary: Positive for difficulty urinating.   Musculoskeletal: Negative for back pain.   Neurological: Negative for dizziness and light-headedness.   All other systems reviewed and are negative.      Allergies:  No Known Drug Allergies     Medications:    Eliquis  Lipitor  Glucotrol  Novolog  Zestril  Glucophage  Lopressor    Past Medical History:    Cardiomyopathy  CAD  CVA  Esophageal reflux  Hypertension  Atrial fibrillation   NSVT  Stroke  Diabetes mellitus 2  Tobacco abuse    Past Surgical History:    Head and neck surgery    Family History:    family history includes Diabetes in his brother, brother, brother, and father; Suicide in his brother; Uterine Cancer in his mother.    Social History:   reports that he quit smoking about 4 years ago. He has a 49.50 pack-year smoking history. He has never used smokeless tobacco. He reports that he does not drink alcohol.  PCP:  Candi Dumont     Physical Exam     Patient Vitals for the past 24 hrs:   BP Temp Temp src Pulse Resp SpO2   04/04/23 0400 (!) 149/76 -- -- 71 -- 97 %   04/04/23 0320 -- -- -- 66 -- 98 %   04/04/23 0305 (!) 165/66 -- -- 71 -- 99 %   04/04/23 0250 -- -- -- 64 -- 98 %   04/04/23 0235 (!) 146/68 -- -- 70 -- 97 %   04/04/23 0206 132/84 -- -- 73 -- 97 %   04/04/23 0136 136/63 -- -- 71 22 96 %   04/04/23 0042 -- -- -- 64 21 96 %   04/04/23 0027 (!) 164/76 -- -- 76 19 97 %   04/04/23 0012 -- -- -- 68 18 97 %   04/03/23 2357 (!) 165/94 -- -- 72 12 98 %   04/03/23 2342 -- -- -- 58 14 98 %   04/03/23 2327 (!) 159/74 -- -- 59 20 98 %   04/03/23 2325 -- -- -- 59 19 99 %   04/03/23 2315 -- -- -- 58 17 98 %   04/03/23 2305 (!) 149/93 -- -- 58 15 99 %   04/03/23 2229 -- -- -- 58 16 98 %   04/03/23 2228 -- -- -- -- 17 97 %   04/03/23 2227 -- -- -- -- -- 98 %   04/03/23 2226 -- -- -- -- -- 98 %   04/03/23 2225 -- -- -- -- -- 99 %   04/03/23 2224 -- -- -- -- -- 100 %   04/03/23 2223 (!) 161/73 -- -- 61 -- 97 %   04/03/23 2050 (!) 154/73 98  F (36.7  C) Temporal 61 18 99 %        Physical Exam  General: Patient is awake, alert and interactive when I enter the room  Head: The scalp, face, and head appear normal  Neck: Normal range of motion.   CV: Regular rate and rhythm.   Resp: Lungs are clear without wheezes or rales. No respiratory distress.   GI: Abdomen is soft, no rigidity, guarding, or rebound. No distension. No tenderness to palpation in any quadrant.     MS: Normal tone. No asymmetric leg swelling, calf or thigh tenderness.    Skin: No rash or lesions noted. Normal capillary refill noted  Neuro: Speech is normal and fluent. Face is symmetric. Moving all extremities.   Psych:  Normal affect.  Appropriate interactions.    Emergency Department Course     Imaging:  CT Abdomen Pelvis w/o Contrast   Final Result   IMPRESSION:    1.  There are a few tiny sand-like calyceal tip stones in the kidneys, more on the left, without  hydronephrosis or hydroureter. Bilateral nonspecific perinephric soft tissue stranding. Dependent stellate stone in the urinary bladder. Mild prostatomegaly.      2.  Calcified gallstones filling the gallbladder lumen. No biliary dilatation or adjacent inflammation. Diffusely fatty infiltrated liver.      3.  Atherosclerotic and aneurysmally dilated distal abdominal aorta. Recommend vascular surgical consultation. Cardiac size approaches upper limits of normal. Moderate coronary artery calcifications. No pericardial effusion.      NOTE: ABNORMAL REPORT      THE DICTATION ABOVE DESCRIBES AN ABNORMALITY FOR WHICH FOLLOW-UP IS NEEDED.             Report per radiology    Laboratory:  Labs Ordered and Resulted from Time of ED Arrival to Time of ED Departure   BASIC METABOLIC PANEL - Abnormal       Result Value    Sodium 139      Potassium 6.2 (*)     Chloride 111 (*)     Carbon Dioxide (CO2) 16 (*)     Anion Gap 12      Urea Nitrogen 94.1 (*)     Creatinine 4.79 (*)     Calcium 9.1      Glucose 210 (*)     GFR Estimate 12 (*)    CBC WITH PLATELETS AND DIFFERENTIAL - Abnormal    WBC Count 8.5      RBC Count 3.82 (*)     Hemoglobin 11.6 (*)     Hematocrit 36.9 (*)     MCV 97      MCH 30.4      MCHC 31.4 (*)     RDW 15.5 (*)     Platelet Count 280      % Neutrophils 58      % Lymphocytes 20      % Monocytes 9      % Eosinophils 10      % Basophils 3      % Immature Granulocytes 0      NRBCs per 100 WBC 0      Absolute Neutrophils 4.9      Absolute Lymphocytes 1.7      Absolute Monocytes 0.8      Absolute Eosinophils 0.8 (*)     Absolute Basophils 0.2      Absolute Immature Granulocytes 0.0      Absolute NRBCs 0.0     BLOOD GAS VENOUS - Abnormal    pH Venous 7.17 (*)     pCO2 Venous 48      pO2 Venous 18 (*)     Bicarbonate Venous 17 (*)     Base Excess/Deficit (+/-) -10.9 (*)     FIO2 0     ROUTINE UA WITH MICROSCOPIC REFLEX TO CULTURE - Abnormal    Color Urine Straw      Appearance Urine Clear      Glucose Urine >=1000 (*)      Bilirubin Urine Negative      Ketones Urine Negative      Specific Gravity Urine 1.015      Blood Urine Trace (*)     pH Urine 5.0      Protein Albumin Urine 20 (*)     Urobilinogen Urine Normal      Nitrite Urine Negative      Leukocyte Esterase Urine Negative      Mucus Urine Present (*)     RBC Urine 2      WBC Urine 1      Squamous Epithelials Urine <1     GLUCOSE BY METER - Abnormal    GLUCOSE BY METER POCT 184 (*)    GLUCOSE BY METER - Abnormal    GLUCOSE BY METER POCT 217 (*)    GLUCOSE BY METER - Abnormal    GLUCOSE BY METER POCT 199 (*)    GLUCOSE BY METER - Abnormal    GLUCOSE BY METER POCT 187 (*)    BLOOD GAS VENOUS - Abnormal    pH Venous 7.29 (*)     pCO2 Venous 45      pO2 Venous 21 (*)     Bicarbonate Venous 22      Base Excess/Deficit (+/-) -5.1      FIO2 0     CBC WITH PLATELETS AND DIFFERENTIAL - Abnormal    WBC Count 8.3      RBC Count 3.76 (*)     Hemoglobin 11.6 (*)     Hematocrit 36.6 (*)     MCV 97      MCH 30.9      MCHC 31.7      RDW 15.4 (*)     Platelet Count 277      % Neutrophils 52      % Lymphocytes 26      % Monocytes 10      % Eosinophils 10      % Basophils 2      % Immature Granulocytes 0      NRBCs per 100 WBC 0      Absolute Neutrophils 4.3      Absolute Lymphocytes 2.1      Absolute Monocytes 0.8      Absolute Eosinophils 0.8 (*)     Absolute Basophils 0.2      Absolute Immature Granulocytes 0.0      Absolute NRBCs 0.0     CK TOTAL - Normal         MAGNESIUM - Normal    Magnesium 2.0     POTASSIUM - Normal    Potassium 5.3     POTASSIUM - Normal    Potassium 5.1     CK TOTAL - Normal    CK 88     POTASSIUM - Normal    Potassium 4.8     GLUCOSE MONITOR NURSING POCT   GLUCOSE MONITOR NURSING POCT   GLUCOSE MONITOR NURSING POCT   GLUCOSE MONITOR NURSING POCT   GLUCOSE MONITOR NURSING POCT   BASIC METABOLIC PANEL   CREATININE   FRACTIONAL EXCRETION OF SODIUM      Emergency Department Course & Assessments:    Interventions:  Medications   lidocaine 1 % 0.1-1 mL (has no  administration in time range)   lidocaine (LMX4) cream (has no administration in time range)   sodium chloride (PF) 0.9% PF flush 3 mL (3 mLs Intracatheter $Given 4/4/23 0431)   sodium chloride (PF) 0.9% PF flush 3 mL (has no administration in time range)   melatonin tablet 1 mg (has no administration in time range)   glucose gel 15-30 g (has no administration in time range)     Or   dextrose 50 % injection 25-50 mL (has no administration in time range)     Or   glucagon injection 1 mg (has no administration in time range)   sodium chloride 0.9% infusion (has no administration in time range)   acetaminophen (TYLENOL) tablet 650 mg (has no administration in time range)     Or   acetaminophen (TYLENOL) Suppository 650 mg (has no administration in time range)   ondansetron (ZOFRAN ODT) ODT tab 4 mg (has no administration in time range)     Or   ondansetron (ZOFRAN) injection 4 mg (has no administration in time range)   insulin aspart (NovoLOG) injection (RAPID ACTING) (1 Units Subcutaneous Not Given 4/4/23 0430)   sodium bicarbonate 8.4 % injection 50 mEq (50 mEq Intravenous $Given 4/3/23 2254)   dextrose 10% BOLUS (0 mLs Intravenous Stopped 4/4/23 0341)   dextrose 50 % injection 25 g (25 g Intravenous $Given 4/3/23 2248)   albuterol (PROVENTIL) neb solution 10 mg (10 mg Nebulization $Given 4/3/23 2244)   furosemide (LASIX) injection 40 mg (40 mg Intravenous $Given 4/3/23 2259)   calcium gluconate 2 g in  mL intermittent infusion (2 g Intravenous $Given 4/3/23 2255)   insulin regular 1 unit/mL injection 9 Units (9 Units Intravenous $Given 4/3/23 2248)   sodium bicarbonate 8.4 % injection 50 mEq (50 mEq Intravenous $Given 4/3/23 2358)   NaCl 0.45 % 1,000 mL with sodium bicarbonate 150 mEq/L infusion ( Intravenous Rate/Dose Verify 4/4/23 0430)   sodium zirconium cyclosilicate (LOKELMA) packet 10 g (10 g Oral $Given 4/4/23 0050)        Assessments:  2230 I obtained a history and examined the patient     Independent  Interpretation (X-rays, CTs, rhythm strip):  None    Consultations/Discussion of Management or Tests:  2352 I spoke with Dr. Ardon, nephrology, regarding the patient's condition   0240 I spoke with Dr. Joyner, hospitalist, who agreed to admit the patient.     Social Determinants of Health affecting care:   None    Disposition:  The patient was admitted to the hospital under the care of Dr. Joyner.     Impression & Plan      Medical Decision Making:  This is a 77-year-old gentleman with past medical history of type 2 diabetes and hypertension who presents to the emergency department with concern for hyperkalemia.  Patient had outpatient labs drawn which showed worsening kidney function and potassium level at 6.4 with a creatinine of 4.9.  Blood work was obtained to the emergency department which confirms hyperkalemia and significant renal dysfunction.  Patient had a baseline creatinine of around 1 about 6 months ago.  Although in reviewing some of his labs it seems that his creatinine has been rising over time.  Patient has a large amount of glucose in the urine and notes polyuria.  There may be an element of hypovolemia and dehydration as well is kidney injury secondary to poorly controlled diabetes and hypertension.  No clear medication culprits.  CT scan does not show any evidence of urinary obstruction.  Patient will require admission for further work-up of his acute renal injury.  Patient was shifted in the emergency department with good effect with reduction of his potassium from 6.2 to 5.3.  Patient also had significant metabolic acidosis with pH of 7.17. Case was discussed with nephrology who recommended sodium bicarbonate containing fluids and use of the Lokelma. He did not feel that patient was a candidate for dialysis at this juncture.  Patient will be admitted for treatment of his metabolic acidosis, hyperkalemia and acute renal failure.    Diagnosis:    ICD-10-CM    1. Hyperkalemia  E87.5       2. CHEY  (acute kidney injury) (H)  N17.9       3. Metabolic acidosis  E87.20              Scribe Disclosure:  I, Bonnie Bondabhan, am serving as a scribe at 10:35 PM on 4/3/2023 to document services personally performed by Andrei George MD based on my observations and the provider's statements to me.   4/3/2023   Andrei George MD, Christopher Joseph, MD  04/04/23 0535

## 2023-04-04 NOTE — PROGRESS NOTES
Ordered repeat EKG, TTE this morning given history of afib.   Most recent TTE from [facility] reviewed 1/2023  Limited Echo for function.   Left ventricular ejection fraction is visually estimated at 40%.   Moderate diffuse hypokinesis.   Normal right ventricle size and normal global function.      Ordered 2 additional doses of lokelma as creatinine minimally improved and potassium will likely continue to rise. IVF bolus 500 ml now with repeat creatinine at 1200 to see fluid responsiveness of creatinine. If remains elevated more concerned for ATN vs AIN, will consider nephrology consultation at that time.     Virtual vascular surgery consult placed for CT findings including Atherosclerotic and aneurysmally dilated distal abdominal aorta, will likely need outpatient follow up but e-consult will assist in coordinating this for the patient. Unlikely acutely implicated in his presentation at this time.     Restarted anticoagulation and metoprolol for atrial fibrillation. Ordered AM labs. Updated patient at bedside regarding plan, patient feels well at time of interview with no new concerns or complaints. Otherwise plan as outlined in Dr. Joyner's note from 04/04/23.    Sia Singh,  9:10 AM

## 2023-04-04 NOTE — PHARMACY-ADMISSION MEDICATION HISTORY
Pharmacist Admission Medication History    Admission medication history is complete. The information provided in this note is only as accurate as the sources available at the time of the update.    Medication reconciliation/reorder completed by provider prior to medication history? Yes    Information Source(s): Family member via phone    Pertinent Information: none    Changes made to PTA medication list:    Added: omeprazole, amlodipine, jardiance    Deleted: aspirin, glipizide, novolog, lisinopril, tacrolimus cream, triamcinolone cream    Changed: metoprolol, lantus    Medication Affordability:  Not including over the counter (OTC) medications, was there a time in the past 12 months when you did not take your medications as prescribed because of cost?: No    Allergies reviewed with patient and updates made in EHR: yes    Medication History Completed By: Demar Navarro RPH 4/4/2023 10:52 AM    Pharmacy reviewed prior to admission med list from pre-admitting rn.

## 2023-04-04 NOTE — ED NOTES
Pt appears to be in rate controlled a-flutter. Pt asymptomatic. MD Wang notified, paged hospitalist Dr. Singh if she would like repeat EKG. Pt has hx afib, anticoagulated. Telemetry strip placed on chart.

## 2023-04-04 NOTE — PROGRESS NOTES
MD paged: Pt. had 2.2 sec pause X2, Asymptomatic. VSS    1538PM Pt. Had another 2.4sec pause.     1600: Pt.  had 7 beats of Vtach

## 2023-04-04 NOTE — PROGRESS NOTES
"Patient Transfer Information  Patient connected to monitoring equipment on arrival: yes Vital signs monitor     Patient connected to wall oxygen on arrival: N/A    Belongings: Transferred with patient    Safety check completed: Yes     /57 (BP Location: Right arm)   Pulse 68   Temp 98  F (36.7  C) (Oral)   Resp 18   Ht 1.854 m (6' 1\")   Wt 80.7 kg (177 lb 14.4 oz)   SpO2 97%   BMI 23.47 kg/m      "

## 2023-04-04 NOTE — ED TRIAGE NOTES
Here for abnormal blood test. Received a call from [facility] about elevated potassium of 6.4, advise to go to the ED for further eval. ABCs intact.     Triage Assessment     Row Name 04/03/23 2049       Triage Assessment (Adult)    Airway WDL WDL       Respiratory WDL    Respiratory WDL WDL       Cardiac WDL    Cardiac WDL WDL

## 2023-04-04 NOTE — CONSULTS
[facility]    Cardiology Consultation     Yanick Adorno MRN#: [id number]   YOB: 1945 Age: 77 year old     Date of Admission:  4/3/2023    Consult Indication:  Atrial fibrillation       Assessment & Plan     # HFrEF LVEF 30-35%, visually appears similar to prior TTE 3/24/2021 LVEF 35 to 40%.  Patient denies angina or symptoms concerning for decompensated HF.  Has a known history of nonischemic cardiomyopathy.  Euvolemic on exam.  Weight on admission 177 pounds, weight at clinic visit 9/30/2022 was 179 pounds.  # Paroxysmal atrial fibrillation, on apixaban, no pauses over 3 seconds  # CHEY, hyperkalemia  # HTN  # HL  # CAD    - Reviewed telemetry, no sustained bradycardia, no pauses over 3 seconds  - He did have a 7 beat run of nonsustained VT, recommend continuing metoprolol succinate 50 mg daily  - Continue home cardiac regimen  - Discharge with a Zio patch monitor  - Cardiac telemetry.  Monitor electrolytes daily, maintain potassium greater than 4, magnesium greater than 2  - Cardiology will sign off, recommend follow-up with his outpatient cardiology team (follows with Dr. Atkins with [facility])    Please do not hesitate to page with any questions or concerns.     Stone Rose MD, [facility]  Cardiology  April 4, 2023    Voice recognition software utilized.   Moderate complexity     History of Present Illness     Patient is a pleasant 77-year-old [employer]  with a past medical history significant for heart failure with reduced ejection fraction secondary to nonischemic cardiomyopathy, nonobstructive coronary artery disease, paroxysmal atrial fibrillation, CVA, nonsustained VT, diabetes, hypertension, hyperlipidemia, who was admitted with hyperkalemia, found to be in renal failure.    Patient reports that at baseline he stays active, he retired from the [employer] many years ago, stays busy now working at [employer].  He does note some mild fatigue over the  past few days, also has been experiencing increased urinary frequency.  He denies any chest pain, chest pressure.  He was called regarding an elevated potassium of 6.4, instructed to the emergency department.  Labs are notable for potassium 6.2, creatinine 4.79.  ECG demonstrated sinus rhythm with first-degree AV block at 60 bpm, prior septal infarct, Q waves in the inferior leads, IVCD.  Overall findings are similar to prior ECG 8/24/2022.    On inquiry, patient does note that he has been taking a lot of Aleve every day due to back pain.    Patient is on metoprolol succinate 50 mg daily, atorvastatin, amlodipine, apixaban, Jardiance.  On telemetry, heart rates have been in the 50s to 70s beats per minute range, with pauses up to 2.4 seconds in atrial fibrillation, asymptomatic.  He did have a 7 beat run of nonsustained VT at 137 bpm, asymptomatic.    Patient follows with Dr. Woodson with [facility] cardiology.  Patient has a history of nonischemic cardiomyopathy, prior studies have demonstrated LVEF ranging from 35 to 45% over the past few years, more recently TTE 1/11/2023 report through [facility] reported an LVEF of 40% with moderate diffuse hypokinesis.  Last ischemic evaluation was a Lexiscan stress test 9/8/2022 that was negative for evidence of myocardial ischemia or infarction..    TTE 4/4/2023 demonstrates LVEF 30 to 35% with global hypokinesis, trace aortic insufficiency, normal RV function.      Past Medical History   Past Medical History:   Diagnosis Date     Cardiomyopathy (H)      Coronary artery disease      CVA (cerebral vascular accident) (H) 01/21/2018    (L) frontal cerbral infarct     Esophageal reflux      Hypertension      New onset a-fib (H)      Nonischemic cardiomyopathy (H)      NSVT (nonsustained ventricular tachycardia) (H)      Paroxysmal atrial fibrillation (H)      Stroke (H) 1/21/2018     Tobacco abuse     quit when he had stroke     Type II or unspecified type diabetes  mellitus without mention of complication, not stated as uncontrolled        Past Surgical History   Past Surgical History:   Procedure Laterality Date     HEAD & NECK SURGERY      sinus       Prior to Admission Medications   Prior to Admission Medications   Prescriptions Last Dose Informant Patient Reported? Taking?   acetaminophen (TYLENOL) 325 MG tablet Unknown at ?  No Yes   Sig: Take 2 tablets (650 mg) by mouth every 4 hours as needed for mild pain or fever   amLODIPine (NORVASC) 2.5 MG tablet 4/3/2023 at pm  Yes Yes   Sig: Take 2.5 mg by mouth At Bedtime   apixaban ANTICOAGULANT (ELIQUIS) 5 MG tablet 4/3/2023 at pm  No Yes   Sig: Take 1 tablet (5 mg) by mouth 2 times daily   atorvastatin (LIPITOR) 80 MG tablet 4/3/2023 at pm  Yes Yes   Sig: Take 40 mg by mouth At Bedtime   empagliflozin (JARDIANCE) 25 MG TABS tablet 4/3/2023 at am  Yes Yes   Sig: Take 25 mg by mouth daily   insulin glargine (LANTUS PEN) 100 UNIT/ML pen 4/3/2023 at 1800  Yes Yes   Sig: Inject 21 Units Subcutaneous every evening   metFORMIN (GLUCOPHAGE-XR) 500 MG 24 hr tablet 4/3/2023 at pm  Yes Yes   Sig: Take 1,000 mg by mouth 2 times daily (with meals)    metoprolol succinate ER (TOPROL XL) 50 MG 24 hr tablet 4/3/2023 at am  Yes Yes   Sig: Take 50 mg by mouth daily   omeprazole (PRILOSEC) 40 MG DR capsule 4/3/2023 at pm  Yes Yes   Sig: Take 40 mg by mouth daily (before supper)      Facility-Administered Medications: None     Current Facility-Administered Medications   Medication Dose Route Frequency     [Held by provider] amLODIPine  2.5 mg Oral At Bedtime     apixaban ANTICOAGULANT  5 mg Oral BID     atorvastatin  40 mg Oral At Bedtime     insulin aspart  1-6 Units Subcutaneous Q4H     metoprolol succinate ER  50 mg Oral Daily     sodium chloride (PF)  3 mL Intracatheter Q8H     sodium zirconium cyclosilicate  10 g Oral BID     Current Facility-Administered Medications   Medication Last Rate     sodium chloride 100 mL/hr at 04/04/23 4014      Allergies   Allergies   Allergen Reactions     No Known Drug Allergies        Social History    reports that he quit smoking about 4 years ago. He has a 49.50 pack-year smoking history. He has never used smokeless tobacco. He reports that he does not drink alcohol.    Family History   I have reviewed this patient's family history and updated it with pertinent information if needed.  Family History   Problem Relation Age of Onset     Diabetes Father      Uterine Cancer Mother      Diabetes Brother      Diabetes Brother      Suicide Brother      Diabetes Brother           Review of Systems   A comprehensive review of system was performed and is negative other than that noted in the HPI or here.     Physical Exam   Vital Signs with Ranges  Temp:  [98  F (36.7  C)-98.2  F (36.8  C)] 98.2  F (36.8  C)  Pulse:  [54-76] 56  Resp:  [12-26] 18  BP: (124-165)/() 124/60  SpO2:  [96 %-100 %] 98 %  Wt Readings from Last 4 Encounters:   23 80.7 kg (177 lb 14.4 oz)   22 86.2 kg (190 lb)   21 85 kg (187 lb 8 oz)   19 92.1 kg (203 lb)     I/O last 3 completed shifts:  In: -   Out: 2175 [Urine:2175]    Vital signs were personally reviewed:  Temperatures:  Current - Temp: 98.2  F (36.8  C); Max - Temp  Av.1  F (36.7  C)  Min: 98  F (36.7  C)  Max: 98.2  F (36.8  C)  Respiration range: Resp  Av.6  Min: 12  Max: 26  Pulse range: Pulse  Av.6  Min: 54  Max: 76  Blood pressure range: Systolic (24hrs), Av , Min:124 , Max:165   ; Diastolic (24hrs), Av, Min:57, Max:110    Pulse oximetry range: SpO2  Av.9 %  Min: 96 %  Max: 100 %    Intake/Output Summary (Last 24 hours) at 2023 1633  Last data filed at 2023 0931  Gross per 24 hour   Intake --   Output 2175 ml   Net -2175 ml     177 lbs 14.4 oz  Body mass index is 23.47 kg/m .   Body surface area is 2.04 meters squared.    Physical Exam:   General/Constitutional: appears stated age, in no apparent distress, appears to be  well nourished  Head: normocephalic, atraumatic  Eyes - No scleral icterus  Neck: supple, trachea midline  Respiratory: clear to auscultation bilaterally  Cardiovascular: JVP normal, regular rate, irregularly irregular rhythm, no murmur appreciated  Gastrointestinal: no guarding, non-rigid   Neurologic: awake, alert, moves all extremities  Skin: no jaundice, warm on limited exam  Psychiatric: affect is normal, answers questions appropriately, oriented to self and place    Laboratory tests personally reviewed:   CMP  Recent Labs   Lab 04/04/23  1617 04/04/23  1447 04/04/23  1214 04/04/23  1200 04/04/23  0802 04/04/23  0707 04/04/23  0439 04/04/23  0214 04/03/23 2249 04/03/23 2059   NA  --   --   --   --   --  141 141  --   --  139   POTASSIUM  --  4.5  --   --   --  5.0 4.9  4.8 5.1   < > 6.2*   CHLORIDE  --   --   --   --   --  109* 108*  --   --  111*   CO2  --   --   --   --   --  20* 18*  --   --  16*   ANIONGAP  --   --   --   --   --  12 15  --   --  12   GLC 94  --   --  136* 115* 126* 147*  --    < > 210*   BUN  --   --   --   --   --  87.1* 88.2*  --   --  94.1*   CR  --   --  4.31*  --   --  4.50* 4.62*  4.62*  --   --  4.79*   GFRESTIMATED  --   --  14*  --   --  13* 13*  13*  --   --  12*   ROSA  --   --   --   --   --  9.2 9.3  --   --  9.1   MAG  --  1.7  --   --   --   --   --   --   --  2.0   PHOS  --  4.4  --   --   --   --  4.3  --   --   --    ALBUMIN  --   --   --   --   --   --  3.2*  --   --   --     < > = values in this interval not displayed.     CBC  Recent Labs   Lab 04/04/23  0439 04/03/23 2059   WBC 8.3 8.5   RBC 3.76* 3.82*   HGB 11.6* 11.6*   HCT 36.6* 36.9*   MCV 97 97   MCH 30.9 30.4   MCHC 31.7 31.4*   RDW 15.4* 15.5*    280     INRNo lab results found in last 7 days.  Lab Results   Component Value Date    TROPI 0.057 (H) 03/23/2021    TROPI 0.054 (H) 03/23/2021    TROPI 0.064 (H) 03/23/2021     Recent Labs   Lab Test 03/27/19  0739 01/21/18  0530   CHOL 113 133   HDL 57  60   LDL 47 66   TRIG 44 35     Lab Results   Component Value Date    A1C 9.1 2021    A1C 8.4 2018    A1C 8.0 2017    A1C 7.6 2003    A1C 7.4 10/06/2003     TSH   Date Value Ref Range Status   2018 1.71 0.40 - 4.00 mU/L Final       Imaging:  Recent Results (from the past 4320 hour(s))   Echocardiogram Complete   Result Value    LVEF  30-35%    [facility]    [id number]  [id number]  [id number]  [id number]^CELINE^PHILLIP     [facility]  Echocardiography Laboratory  [address] [place] [address]     Name: KWAME MULLEN  MRN: [id number]  : 1945  Study Date: 2023 09:44 AM  Age: 77 yrs  Gender: Male  Patient Location: [facility]  Reason For Study: Atrial Fibrillation  Ordering Physician: PHILLIP BERGER  Referring Physician: Candi Dumont  Performed By: Garret Washington RDCS     BSA: 2.1 m2  Height: 73 in  Weight: 190 lb  HR: 71  BP: 127/86 mmHg  ______________________________________________________________________________  Procedure  Complete Portable Echo Adult.  ______________________________________________________________________________  Interpretation Summary     The visual ejection fraction is 30-35%.  Left ventricular systolic function is moderately reduced.  There is trace aortic regurgitation.  Mild aortic root dilatation.  The ascending aorta is Mildly dilated.  ______________________________________________________________________________  Left Ventricle  The left ventricle is normal in size. There is mild to moderate concentric  left ventricular hypertrophy. The visual ejection fraction is 30-35%. Left  ventricular systolic function is moderately reduced. Diastolic function not  assessed due to atrial fibrillation. There is moderate global hypokinesia of  the left ventricle.     Right Ventricle  The right ventricle is normal in size and function.     Atria  The left atrium is mildly dilated. The right atrium is mildly dilated. There  is no  color Doppler evidence of an atrial shunt.     Mitral Valve  There is trace mitral regurgitation.     Tricuspid Valve  There is trace tricuspid regurgitation. Right ventricular systolic pressure  could not be approximated due to inadequate tricuspid regurgitation.     Aortic Valve  The aortic valve is trileaflet. There is trace aortic regurgitation. No  hemodynamically significant valvular aortic stenosis.     Pulmonic Valve  There is trace pulmonic valvular regurgitation. Normal pulmonic valve  velocity.     Vessels  Mild aortic root dilatation. The ascending aorta is Mildly dilated. IVC  diameter <2.1 cm collapsing >50% with sniff suggests a normal RA pressure of 3  mmHg.     Pericardium  There is no pericardial effusion.     Rhythm  The rhythm was atrial fibrillation.  ______________________________________________________________________________  MMode/2D Measurements & Calculations  IVSd: 1.3 cm     LVIDd: 5.3 cm  LVIDs: 4.6 cm  LVPWd: 1.4 cm  IVC diam: 1.7 cm  FS: 13.1 %  LV mass(C)d: 283.5 grams  LV mass(C)dI: 134.7 grams/m2  Ao root diam: 4.4 cm  asc Aorta Diam: 3.9 cm  LVOT diam: 2.2 cm  LVOT area: 3.8 cm2  LA Volume (BP): 89.8 ml  LA Volume Index (BP): 42.6 ml/m2  RWT: 0.51     Doppler Measurements & Calculations  MV E max callie: 93.0 cm/sec  MV dec time: 0.21 sec  AI P1/2t: 744.1 msec  PA acc time: 0.13 sec  E/E' av.5  Lateral E/e': 12.9  Medial E/e': 14.0     ______________________________________________________________________________  Report approved by: Evette Yeung 2023 11:14 AM            Clinically Significant Risk Factors Present on Admission       # Hyperkalemia: Highest K = 6.2 mmol/L in last 2 days, will monitor as appropriate       # Hypoalbuminemia: Lowest albumin = 3.2 g/dL at 2023  4:39 AM, will monitor as appropriate   # Drug Induced Coagulation Defect: home medication list includes an anticoagulant medication      Cardiovascular: Cardiac Arrhythmia: Atrial  fibrillation: Chronic    Fluid & Electrolyte Disorders: Hyperkalemia

## 2023-04-04 NOTE — CONSULTS
Vascular Surgery Virtual Consult Note     Date of Admission:  4/3/2023  Date: 2023     HPI  Mr. Adorno was admitted on 23 with hyperkalemia and fatigue. He underwent CT imaging in the ED, which noted aneurysmal dilation of the abdominal aorta.     In review of his records:   He has a history of remote stroke (2018 left MCA territory, residual mild-moderate expressive and receptive aphasia); nonobstructive CAD with NICM, s/p diagnostic cardiac catheterization in 2018 and with HFrEF; paroxysmal a-fib, anticoagulated on Eliquis; HTN; T2DM    He has a 7 mm solid lung nodule that is due for follow up in this month - [facility] with [facility].       Past Medical History:   Diagnosis Date     Cardiomyopathy (H)      Coronary artery disease      CVA (cerebral vascular accident) (H) 2018    (L) frontal cerbral infarct     Esophageal reflux      Hypertension      New onset a-fib (H)      Nonischemic cardiomyopathy (H)      NSVT (nonsustained ventricular tachycardia) (H)      Paroxysmal atrial fibrillation (H)      Stroke (H) 2018     Tobacco abuse     quit when he had stroke     Type II or unspecified type diabetes mellitus without mention of complication, not stated as uncontrolled      Past Surgical History:   Procedure Laterality Date     HEAD & NECK SURGERY      sinus     Social History     Tobacco Use     Smoking status: Former     Packs/day: 1.50     Years: 33.00     Pack years: 49.50     Types: Cigarettes     Quit date: 2018     Years since quittin.8     Smokeless tobacco: Never   Substance Use Topics     Alcohol use: No         Data   Most Recent 3 CBCs:  Recent Labs   Lab Test 23  0439 23  2136   WBC 8.3 8.5 10.0   HGB 11.6* 11.6* 14.5   MCV 97 97 99    280 262     Most Recent 3 BMPs:  Recent Labs   Lab Test 23  0707 23  04323    141 139   POTASSIUM 5.0 4.9  4.8 6.2*   CHLORIDE 109*  "108* 111*   CO2 20* 18* 16*   BUN 87.1* 88.2* 94.1*   CR 4.50* 4.62*  4.62* 4.79*   ANIONGAP 12 15 12   ROSA 9.2 9.3 9.1   * 147* 210*    < > = values in this interval not displayed.     Lipid Panel (3/22/22, [facility]): Chol 98  HDL 37  LDL 45  Trig 78    HgA1c (3/14/23, [facility]): 8.3%      Imaging:   Echocardiogram (4/4/23): \"The visual ejection fraction is 30-35%. Left ventricular systolic function is moderately reduced. There is trace aortic regurgitation. Mild aortic root dilatation. The ascending aorta is Mildly dilated.\"    NM Stress Test (1/21/18): \"1.  Myocardial perfusion imaging using single isotope technique demonstrated a small apical defect which is predominantly fixed, consistent with prior infarction with only trivial associated cande-infarct ischemia. There is a small area of ischemia in the inferolateral mid ventricle associated with either an inferior/inferolateral basal infarct or diaphragmatic attenuation artifact at the base in the setting of global hypokinesis.  2. Gated images demonstrated severe global hypokinesis.  The left ventricular systolic function is 18% at rest and 28% post stress.\"    CTA Head/Neck (2021): \"1. No flow limiting stenosis or large vessel occlusion involving the major craniocervical arterial vasculature. 2. Mild atherosclerotic disease involving the bilateral carotid bifurcations and carotid siphons without significant stenosis. 3. Scattered atherosclerotic disease involving the bilateral vertebral arteries. There is a moderate focal stenosis involving the V1 segment of the right vertebral artery with minimal/mild stenosis elsewhere. 4. Partially visualized patchy groundglass opacities in the right lung with mediastinal and right hilar lymphadenopathy. This is better characterized on chest CT of same date. Please see that separate report for additional details.\"    CT Abd/Pelvis (4/4/23):  \"1.  There are a few tiny sand-like calyceal tip stones in the " "kidneys, more on the left, without hydronephrosis or hydroureter. Bilateral nonspecific perinephric soft tissue stranding. Dependent stellate stone in the urinary bladder. Mild prostatomegaly. 2.  Calcified gallstones filling the gallbladder lumen. No biliary dilatation or adjacent inflammation. Diffusely fatty infiltrated liver. 3.  Atherosclerotic and aneurysmally dilated distal abdominal aorta. Recommend vascular surgical consultation. Cardiac size approaches upper limits of normal. Moderate coronary artery calcifications. No pericardial effusion.\"          Assessment & Plan   Yanick Adorno is a 77 year old male with history of nonobstructive CAD; paroxysmal a-fib, on eliquis anticoagulation; HFrEF (LVEF 30-35%); IDDM (HgA1c 8.3%); HTN; prior stroke with residual aphasia; prior tobacco abuse; and CHEY with hyperkalemia on current admission.     He was discovered on imaging to have a 4.3 cm infrarenal abdominal aortic aneurysm. This is not previously reported in our records and we do not have prior imaging of the aorta for comparison (although [facility] may have [facility]).       Would recommend that he establish care with Vascular Surgery as an outpatient and we would be happy to see him in our clinics here - or [facility] may [facility].     The next recommended imaging would be in one year, to evaluate for aneurysm progression; repair would be considered if/when this infrarenal abdominal aortic aneurysm is > 5.5 cm in size.     Tressa Hernandez MD    "

## 2023-04-04 NOTE — PLAN OF CARE
End of Shift Summary.  For vital signs and complete assessments, please see documentation flowsheets.      Pertinent assessments: A&OX4.  Up with SBA. VSS except HR is in mid 50s. On RA. Tele: Aflutter. Use urinal at the bedside. Skin intact. Continues IVF infusing 100mls/hr to right upper forearm.     Major Shift Events:   Couple of 2.0sec-2.4sec.  Had a 7 beat run of nonsustained VT  Started metoprolol succinate 50 mg daily  Discontinued IMC orders per MD.     Plan (Upcoming Events): IVF, Cardiac telemetry.  Monitor electrolytes daily, maintain K greater than 4, Mg greater than 2and discharge with a Zio patch monitor.   Discharge/Transfer Needs: TBD     Bedside Shift Report Completed : Y  Bedside Safety Check Completed: Y

## 2023-04-05 LAB
ALBUMIN SERPL BCG-MCNC: 2.9 G/DL (ref 3.5–5.2)
ALP SERPL-CCNC: 72 U/L (ref 40–129)
ALT SERPL W P-5'-P-CCNC: 13 U/L (ref 10–50)
ANION GAP SERPL CALCULATED.3IONS-SCNC: 11 MMOL/L (ref 7–15)
AST SERPL W P-5'-P-CCNC: 16 U/L (ref 10–50)
ATRIAL RATE - MUSE: 300 BPM
BILIRUB SERPL-MCNC: 0.6 MG/DL
BUN SERPL-MCNC: 80.7 MG/DL (ref 8–23)
CALCIUM SERPL-MCNC: 8.5 MG/DL (ref 8.8–10.2)
CHLORIDE SERPL-SCNC: 110 MMOL/L (ref 98–107)
CREAT SERPL-MCNC: 3.98 MG/DL (ref 0.67–1.17)
CREAT SERPL-MCNC: 4.14 MG/DL (ref 0.67–1.17)
CREAT UR-MCNC: 55 MG/DL
DEPRECATED HCO3 PLAS-SCNC: 20 MMOL/L (ref 22–29)
DIASTOLIC BLOOD PRESSURE - MUSE: NORMAL MMHG
ERYTHROCYTE [DISTWIDTH] IN BLOOD BY AUTOMATED COUNT: 15.3 % (ref 10–15)
FRACT EXCRET NA UR+SERPL-RTO: 3.7 %
GFR SERPL CREATININE-BSD FRML MDRD: 14 ML/MIN/1.73M2
GFR SERPL CREATININE-BSD FRML MDRD: 15 ML/MIN/1.73M2
GLUCOSE BLDC GLUCOMTR-MCNC: 130 MG/DL (ref 70–99)
GLUCOSE BLDC GLUCOMTR-MCNC: 140 MG/DL (ref 70–99)
GLUCOSE BLDC GLUCOMTR-MCNC: 178 MG/DL (ref 70–99)
GLUCOSE BLDC GLUCOMTR-MCNC: 266 MG/DL (ref 70–99)
GLUCOSE BLDC GLUCOMTR-MCNC: 358 MG/DL (ref 70–99)
GLUCOSE SERPL-MCNC: 144 MG/DL (ref 70–99)
HCT VFR BLD AUTO: 32.1 % (ref 40–53)
HGB BLD-MCNC: 10.6 G/DL (ref 13.3–17.7)
INTERPRETATION ECG - MUSE: NORMAL
MAGNESIUM SERPL-MCNC: 1.6 MG/DL (ref 1.7–2.3)
MCH RBC QN AUTO: 30.8 PG (ref 26.5–33)
MCHC RBC AUTO-ENTMCNC: 33 G/DL (ref 31.5–36.5)
MCV RBC AUTO: 93 FL (ref 78–100)
P AXIS - MUSE: NORMAL DEGREES
PHOSPHATE SERPL-MCNC: 4.6 MG/DL (ref 2.5–4.5)
PLATELET # BLD AUTO: 286 10E3/UL (ref 150–450)
POTASSIUM SERPL-SCNC: 4.5 MMOL/L (ref 3.4–5.3)
PR INTERVAL - MUSE: NORMAL MS
PROT SERPL-MCNC: 6.6 G/DL (ref 6.4–8.3)
QRS DURATION - MUSE: 88 MS
QT - MUSE: 456 MS
QTC - MUSE: 432 MS
R AXIS - MUSE: -52 DEGREES
RBC # BLD AUTO: 3.44 10E6/UL (ref 4.4–5.9)
SODIUM SERPL-SCNC: 141 MMOL/L (ref 136–145)
SODIUM SERPL-SCNC: 141 MMOL/L (ref 136–145)
SODIUM UR-SCNC: 70 MMOL/L
SYSTOLIC BLOOD PRESSURE - MUSE: NORMAL MMHG
T AXIS - MUSE: 2 DEGREES
VENTRICULAR RATE- MUSE: 54 BPM
WBC # BLD AUTO: 6.2 10E3/UL (ref 4–11)

## 2023-04-05 PROCEDURE — 120N000001 HC R&B MED SURG/OB

## 2023-04-05 PROCEDURE — 84132 ASSAY OF SERUM POTASSIUM: CPT | Performed by: STUDENT IN AN ORGANIZED HEALTH CARE EDUCATION/TRAINING PROGRAM

## 2023-04-05 PROCEDURE — 83735 ASSAY OF MAGNESIUM: CPT | Performed by: STUDENT IN AN ORGANIZED HEALTH CARE EDUCATION/TRAINING PROGRAM

## 2023-04-05 PROCEDURE — 99232 SBSQ HOSP IP/OBS MODERATE 35: CPT | Performed by: STUDENT IN AN ORGANIZED HEALTH CARE EDUCATION/TRAINING PROGRAM

## 2023-04-05 PROCEDURE — 250N000013 HC RX MED GY IP 250 OP 250 PS 637: Performed by: STUDENT IN AN ORGANIZED HEALTH CARE EDUCATION/TRAINING PROGRAM

## 2023-04-05 PROCEDURE — 80053 COMPREHEN METABOLIC PANEL: CPT | Performed by: STUDENT IN AN ORGANIZED HEALTH CARE EDUCATION/TRAINING PROGRAM

## 2023-04-05 PROCEDURE — 250N000013 HC RX MED GY IP 250 OP 250 PS 637: Performed by: HOSPITALIST

## 2023-04-05 PROCEDURE — 36415 COLL VENOUS BLD VENIPUNCTURE: CPT | Performed by: STUDENT IN AN ORGANIZED HEALTH CARE EDUCATION/TRAINING PROGRAM

## 2023-04-05 PROCEDURE — 84295 ASSAY OF SERUM SODIUM: CPT | Performed by: INTERNAL MEDICINE

## 2023-04-05 PROCEDURE — 250N000011 HC RX IP 250 OP 636: Performed by: HOSPITALIST

## 2023-04-05 PROCEDURE — 84300 ASSAY OF URINE SODIUM: CPT | Performed by: INTERNAL MEDICINE

## 2023-04-05 PROCEDURE — 84100 ASSAY OF PHOSPHORUS: CPT | Performed by: STUDENT IN AN ORGANIZED HEALTH CARE EDUCATION/TRAINING PROGRAM

## 2023-04-05 PROCEDURE — 85014 HEMATOCRIT: CPT | Performed by: STUDENT IN AN ORGANIZED HEALTH CARE EDUCATION/TRAINING PROGRAM

## 2023-04-05 RX ORDER — TRAZODONE HYDROCHLORIDE 50 MG/1
50 TABLET, FILM COATED ORAL AT BEDTIME
Status: DISCONTINUED | OUTPATIENT
Start: 2023-04-05 | End: 2023-04-08 | Stop reason: HOSPADM

## 2023-04-05 RX ORDER — RISPERIDONE 1 MG/1
1 TABLET ORAL AT BEDTIME
Status: DISCONTINUED | OUTPATIENT
Start: 2023-04-05 | End: 2023-04-08 | Stop reason: HOSPADM

## 2023-04-05 RX ORDER — HALOPERIDOL 5 MG/ML
5 INJECTION INTRAMUSCULAR EVERY 6 HOURS PRN
Status: DISCONTINUED | OUTPATIENT
Start: 2023-04-05 | End: 2023-04-08 | Stop reason: HOSPADM

## 2023-04-05 RX ORDER — HYDROXYZINE HYDROCHLORIDE 50 MG/1
50 TABLET, FILM COATED ORAL EVERY 6 HOURS PRN
Status: DISCONTINUED | OUTPATIENT
Start: 2023-04-05 | End: 2023-04-08 | Stop reason: HOSPADM

## 2023-04-05 RX ORDER — HYDROXYZINE HYDROCHLORIDE 25 MG/1
25 TABLET, FILM COATED ORAL EVERY 6 HOURS PRN
Status: DISCONTINUED | OUTPATIENT
Start: 2023-04-05 | End: 2023-04-08 | Stop reason: HOSPADM

## 2023-04-05 RX ADMIN — TRAZODONE HYDROCHLORIDE 25 MG: 50 TABLET ORAL at 21:41

## 2023-04-05 RX ADMIN — RISPERIDONE 1 MG: 1 TABLET ORAL at 21:42

## 2023-04-05 RX ADMIN — INSULIN ASPART 3 UNITS: 100 INJECTION, SOLUTION INTRAVENOUS; SUBCUTANEOUS at 18:21

## 2023-04-05 RX ADMIN — APIXABAN 5 MG: 5 TABLET, FILM COATED ORAL at 08:25

## 2023-04-05 RX ADMIN — APIXABAN 5 MG: 5 TABLET, FILM COATED ORAL at 21:42

## 2023-04-05 RX ADMIN — AMLODIPINE BESYLATE 2.5 MG: 2.5 TABLET ORAL at 21:42

## 2023-04-05 RX ADMIN — INSULIN ASPART 1 UNITS: 100 INJECTION, SOLUTION INTRAVENOUS; SUBCUTANEOUS at 13:55

## 2023-04-05 RX ADMIN — Medication 5 MG: at 18:21

## 2023-04-05 RX ADMIN — ATORVASTATIN CALCIUM 40 MG: 40 TABLET, FILM COATED ORAL at 21:42

## 2023-04-05 RX ADMIN — INSULIN ASPART 1 UNITS: 100 INJECTION, SOLUTION INTRAVENOUS; SUBCUTANEOUS at 08:26

## 2023-04-05 RX ADMIN — INSULIN ASPART 5 UNITS: 100 INJECTION, SOLUTION INTRAVENOUS; SUBCUTANEOUS at 21:42

## 2023-04-05 RX ADMIN — HALOPERIDOL LACTATE 5 MG: 5 INJECTION, SOLUTION INTRAMUSCULAR at 09:47

## 2023-04-05 RX ADMIN — TRAZODONE HYDROCHLORIDE 50 MG: 50 TABLET ORAL at 02:42

## 2023-04-05 RX ADMIN — METOPROLOL SUCCINATE 50 MG: 50 TABLET, EXTENDED RELEASE ORAL at 08:25

## 2023-04-05 ASSESSMENT — ACTIVITIES OF DAILY LIVING (ADL)
ADLS_ACUITY_SCORE: 20

## 2023-04-05 NOTE — PLAN OF CARE
Contacted by nursing, patient confused and trying to leave the hospital. Apparently got low dose ativan earlier to help with anxiety/sleep but seems to have had a paradoxical effect vs. Some sundowning. I evaluated patient at bedside, he is oriented x1-2 but easily redirected and agreeable to stay. States he just wants to go home because he can't sleep so sleep aid ordered and will add IV haldol PRN if sleep aid insufficient.

## 2023-04-05 NOTE — PROGRESS NOTES
[facility]  Medicine Progress Note - Hospitalist Service  Date of Admission:  4/3/2023    Assessment & Plan   Acute renal failure, improving   Hyperkalemia, resolved   Hypomagnesemia   Suspect renal function due to NSAID abuse (pre-renal injury vs ATN vs AIN). Renal function now improving after cautious IVF (setting of reduced EF). Pending ongoing improvement and counseling on use of NSAIDs, needs OT cog evaluation as well given intermittent confusion. Received 3 doses of lokelma, potassium stable, creatinine improving.    IVF overnight, now discontinued in setting of HF history.   - avoid nephrotoxic agents   - low K+ diet   - daily labs   - [place] for strict I's and O's.  - check Cr in am, if no improvement consult nephrology.    Acute delirium /Sundowning  Acute vs chronic confusion   - start risperidone at bedtime   - start melatonin   - PRN haldol     Aneurysmal dilatation of AAA  - needs vascular surgery consult, likely as out-pt.  - virtual vasculr consult completed    - Would recommend that he establish care with Vascular Surgery as an outpatient and we would be happy to see him in our clinics here - [facility]    -The next recommended imaging would be in one year, to evaluate for aneurysm progression; repair would be considered if/when this infrarenal abdominal aortic aneurysm is > 5.5 cm in size.    Acute normocytic anemia, stable   No evidence of active bleeding at this time.   -CBC QAM     Sinus pauses   Noted to have 2-3 second sinus pauses on 4/4. Occurring more frequently so cardiology consulted. He did have a 7 beat run of nonsustained VT,   - cardiology recommended    - continuing metoprolol succinate 50 mg daily   - Continue home cardiac regimen   - Discharge with a Zio patch monitor   - Monitor electrolytes daily, maintain potassium greater than 4, magnesium greater than 2  - Cardiology will sign off, recommend follow-up with his outpatient  cardiology team (follows with Dr. Atkins with [facility])   - Zio patch ordered in discharge navigator     Chronic:   Chronic heart failure   The visual ejection fraction is 30-35%. On TTE performed this admission. No evidence of acute exacerbation at this time.   -PTA meds   -monitor respiratory status      Paroxysmal atrial fibrillation   - PTA eliquis 5 mg BID   - PTA metoprolol 50 gm PO     Diabetes 2   - sliding scale insulin , blood glucose well controlled   - holding PTA oral meds     HTN   - PTA amlodipine     Hypercalcemia corrected calcium is >10.1, will monitor as appropriate    Hypoalbuminemia Lowest albumin = 2.9 g/dL at 4/5/2023  7:06 AM, will monitor as appropriate        Diet: 2 Gram K Diet    DVT Prophylaxis: DOAC   Medina Catheter: Not present  Lines: None     Cardiac Monitoring: None  Code Status: Full Code      Clinically Significant Risk Factors          Disposition Plan      Expected Discharge Date: 04/05/2023                  Sia Singh DO  Hospitalist Service  [facility]  Securely message with [employer] (more info)  Text page via [employer] Paging/Directory   ______________________________________________________________________    Interval History   Patient doing well   Overnight had confusion and tried to leave the hospital   No new chest pain, pressure or shortness of breath   No new concerns or complaints     History may be limited as patient is slightly confused     Physical Exam   Vital Signs: Temp: 98  F (36.7  C) Temp src: Oral BP: (!) 147/72 Pulse: 70   Resp: 18 SpO2: 95 % O2 Device: None (Room air)    Weight: 177 lbs 14.4 oz    Constitutional: awake, alert, cooperative, no apparent distress, and appears stated age  HEENT: Normocephalic, atraumatic  Respiratory: Normal work of breathing, good air exchange, clear to auscultation bilaterally, no crackles or wheezing appreciated   Cardiovascular: Regular rate and rhythm, no murmurs appreciated  GI: Soft and  nontender to palpation, nondistended, no rebound or guarding  Skin: normal skin color, texture, turgor  Musculoskeletal: No deformities, no edema present  Neurologic: Alert and oriented to person, not oriented to place or time (bank and 2004) does know that it is April, no focal deficits   Neuropsychiatric: General: normal, calm and normal eye contact     Data   ------------------------- PAST 24 HR DATA REVIEWED -----------------------------------------------    I have personally reviewed the following data over the past 24 hrs:    6.2  \   10.6 (L)   / 286     141 110 (H) 80.7 (H) /  178 (H)   4.5 20 (L) 3.98 (H) \       ALT: 13 AST: 16 AP: 72 TBILI: 0.6   ALB: 2.9 (L) TOT PROTEIN: 6.6 LIPASE: N/A       Imaging results reviewed over the past 24 hrs:   No results found for this or any previous visit (from the past 24 hour(s)).

## 2023-04-05 NOTE — PROGRESS NOTES
"Pt A&Ox4 at beginning of shift but as the shift progressed he became very forgetful. He does not use the call light appropriately. Denies pain. Up with SBA. Slightly unsteady on his feet. Urinates frequently. Continent of urine and stool.     Around 2300 pt stated he wanted to go home. Educated patient that if he were to leave AMA, insurance would not pay. He decided to talk with the provider prior to leaving. Provider paged. Rn then walked into the room and pt was sitting with his gown off talking on the phone with his daughter. Daughter stated she believes he is having anxiety and was convincing pt to stay. Pt decided to stay. MD paged for something for anxiety and Ativan was ordered and given.    Patient continued to get out of bed, about every 10-15min. At one point he was confused as to why he was in the hospital and thought he was in [facility] in [place]. He knew the time. He refused the telemetry and took it off around 0130. MD aware. He was in [place] with [facility].     At 0230, pt dressed himself and started walking out into the brice to head home. He started walking faster to get to the elevator. Security was called and MD paged. Pt reluctantly returned to his room. MD came to see patient and ordered meds. Trazodone given with minimal relief. Pt continued to get out of bed but about every 30-45 min. Has not been agitated and has been redirectable. He continues to ask for his car so he can drive home. Reoriented pt frequently.    BP (!) 150/74   Pulse 71   Temp 97.4  F (36.3  C) (Oral)   Resp 16   Ht 1.854 m (6' 1\")   Wt 80.7 kg (177 lb 14.4 oz)   SpO2 96%   BMI 23.47 kg/m      "

## 2023-04-05 NOTE — PLAN OF CARE
"Goal Outcome Evaluation:      Plan of Care Reviewed With: patient      Pt A&O x 4 but forgetful.  VSS. Up with SBA. On room air. LS Clear. BS+. Denies pain. SOB or nausea. Was very anxious and agitated. Wanted to leave AMA. PRN Haldol given and was effective. PIV SL. , 178 and 266. Sliding scale insulin given. Tele discontinued. Tolerating 2 gm Na diet. K+ 4.5, Mg 1.6, phos 4.6 and cr3.98    /73 (BP Location: Left arm)   Pulse 63   Temp 98.1  F (36.7  C) (Oral)   Resp 16   Ht 1.854 m (6' 1\")   Wt 80.7 kg (177 lb 14.4 oz)   SpO2 97%   BMI 23.47 kg/m             "

## 2023-04-06 ENCOUNTER — APPOINTMENT (OUTPATIENT)
Dept: OCCUPATIONAL THERAPY | Facility: CLINIC | Age: 78
DRG: 640 | End: 2023-04-06
Attending: STUDENT IN AN ORGANIZED HEALTH CARE EDUCATION/TRAINING PROGRAM
Payer: MEDICARE

## 2023-04-06 ENCOUNTER — TELEPHONE (OUTPATIENT)
Dept: OTHER | Facility: CLINIC | Age: 78
End: 2023-04-06

## 2023-04-06 LAB
ANION GAP SERPL CALCULATED.3IONS-SCNC: 13 MMOL/L (ref 7–15)
BUN SERPL-MCNC: 71.7 MG/DL (ref 8–23)
CALCIUM SERPL-MCNC: 8.9 MG/DL (ref 8.8–10.2)
CHLORIDE SERPL-SCNC: 110 MMOL/L (ref 98–107)
CREAT SERPL-MCNC: 3.56 MG/DL (ref 0.67–1.17)
DEPRECATED HCO3 PLAS-SCNC: 20 MMOL/L (ref 22–29)
ERYTHROCYTE [DISTWIDTH] IN BLOOD BY AUTOMATED COUNT: 15.2 % (ref 10–15)
GFR SERPL CREATININE-BSD FRML MDRD: 17 ML/MIN/1.73M2
GLUCOSE BLDC GLUCOMTR-MCNC: 172 MG/DL (ref 70–99)
GLUCOSE BLDC GLUCOMTR-MCNC: 190 MG/DL (ref 70–99)
GLUCOSE BLDC GLUCOMTR-MCNC: 275 MG/DL (ref 70–99)
GLUCOSE BLDC GLUCOMTR-MCNC: 278 MG/DL (ref 70–99)
GLUCOSE BLDC GLUCOMTR-MCNC: 294 MG/DL (ref 70–99)
GLUCOSE BLDC GLUCOMTR-MCNC: 302 MG/DL (ref 70–99)
GLUCOSE SERPL-MCNC: 196 MG/DL (ref 70–99)
HCT VFR BLD AUTO: 35.9 % (ref 40–53)
HGB BLD-MCNC: 11.6 G/DL (ref 13.3–17.7)
MAGNESIUM SERPL-MCNC: 1.8 MG/DL (ref 1.7–2.3)
MCH RBC QN AUTO: 30.8 PG (ref 26.5–33)
MCHC RBC AUTO-ENTMCNC: 32.3 G/DL (ref 31.5–36.5)
MCV RBC AUTO: 95 FL (ref 78–100)
PHOSPHATE SERPL-MCNC: 4.3 MG/DL (ref 2.5–4.5)
PLATELET # BLD AUTO: 276 10E3/UL (ref 150–450)
POTASSIUM SERPL-SCNC: 4.5 MMOL/L (ref 3.4–5.3)
RBC # BLD AUTO: 3.77 10E6/UL (ref 4.4–5.9)
SODIUM SERPL-SCNC: 143 MMOL/L (ref 136–145)
WBC # BLD AUTO: 7.5 10E3/UL (ref 4–11)

## 2023-04-06 PROCEDURE — 84100 ASSAY OF PHOSPHORUS: CPT | Performed by: STUDENT IN AN ORGANIZED HEALTH CARE EDUCATION/TRAINING PROGRAM

## 2023-04-06 PROCEDURE — 120N000001 HC R&B MED SURG/OB

## 2023-04-06 PROCEDURE — 36415 COLL VENOUS BLD VENIPUNCTURE: CPT | Performed by: STUDENT IN AN ORGANIZED HEALTH CARE EDUCATION/TRAINING PROGRAM

## 2023-04-06 PROCEDURE — 83735 ASSAY OF MAGNESIUM: CPT | Performed by: STUDENT IN AN ORGANIZED HEALTH CARE EDUCATION/TRAINING PROGRAM

## 2023-04-06 PROCEDURE — 250N000013 HC RX MED GY IP 250 OP 250 PS 637: Performed by: HOSPITALIST

## 2023-04-06 PROCEDURE — 250N000012 HC RX MED GY IP 250 OP 636 PS 637: Performed by: STUDENT IN AN ORGANIZED HEALTH CARE EDUCATION/TRAINING PROGRAM

## 2023-04-06 PROCEDURE — 250N000013 HC RX MED GY IP 250 OP 250 PS 637: Performed by: STUDENT IN AN ORGANIZED HEALTH CARE EDUCATION/TRAINING PROGRAM

## 2023-04-06 PROCEDURE — 85014 HEMATOCRIT: CPT | Performed by: STUDENT IN AN ORGANIZED HEALTH CARE EDUCATION/TRAINING PROGRAM

## 2023-04-06 PROCEDURE — 97530 THERAPEUTIC ACTIVITIES: CPT | Mod: GO

## 2023-04-06 PROCEDURE — 97165 OT EVAL LOW COMPLEX 30 MIN: CPT | Mod: GO

## 2023-04-06 PROCEDURE — 99232 SBSQ HOSP IP/OBS MODERATE 35: CPT | Performed by: INTERNAL MEDICINE

## 2023-04-06 PROCEDURE — 80048 BASIC METABOLIC PNL TOTAL CA: CPT | Performed by: STUDENT IN AN ORGANIZED HEALTH CARE EDUCATION/TRAINING PROGRAM

## 2023-04-06 RX ORDER — DEXTROSE MONOHYDRATE 25 G/50ML
25-50 INJECTION, SOLUTION INTRAVENOUS
Status: DISCONTINUED | OUTPATIENT
Start: 2023-04-06 | End: 2023-04-06

## 2023-04-06 RX ORDER — NICOTINE POLACRILEX 4 MG
15-30 LOZENGE BUCCAL
Status: DISCONTINUED | OUTPATIENT
Start: 2023-04-06 | End: 2023-04-06

## 2023-04-06 RX ADMIN — INSULIN ASPART 3 UNITS: 100 INJECTION, SOLUTION INTRAVENOUS; SUBCUTANEOUS at 10:02

## 2023-04-06 RX ADMIN — Medication 5 MG: at 21:40

## 2023-04-06 RX ADMIN — INSULIN ASPART 1 UNITS: 100 INJECTION, SOLUTION INTRAVENOUS; SUBCUTANEOUS at 06:26

## 2023-04-06 RX ADMIN — ATORVASTATIN CALCIUM 40 MG: 40 TABLET, FILM COATED ORAL at 21:39

## 2023-04-06 RX ADMIN — AMLODIPINE BESYLATE 2.5 MG: 2.5 TABLET ORAL at 21:40

## 2023-04-06 RX ADMIN — INSULIN ASPART 2 UNITS: 100 INJECTION, SOLUTION INTRAVENOUS; SUBCUTANEOUS at 03:07

## 2023-04-06 RX ADMIN — INSULIN ASPART 4 UNITS: 100 INJECTION, SOLUTION INTRAVENOUS; SUBCUTANEOUS at 18:14

## 2023-04-06 RX ADMIN — INSULIN GLARGINE 10 UNITS: 100 INJECTION, SOLUTION SUBCUTANEOUS at 21:42

## 2023-04-06 RX ADMIN — APIXABAN 5 MG: 5 TABLET, FILM COATED ORAL at 09:58

## 2023-04-06 RX ADMIN — INSULIN ASPART 3 UNITS: 100 INJECTION, SOLUTION INTRAVENOUS; SUBCUTANEOUS at 14:17

## 2023-04-06 RX ADMIN — APIXABAN 5 MG: 5 TABLET, FILM COATED ORAL at 21:40

## 2023-04-06 RX ADMIN — RISPERIDONE 1 MG: 1 TABLET ORAL at 21:39

## 2023-04-06 RX ADMIN — METOPROLOL SUCCINATE 50 MG: 50 TABLET, EXTENDED RELEASE ORAL at 09:58

## 2023-04-06 RX ADMIN — TRAZODONE HYDROCHLORIDE 25 MG: 50 TABLET ORAL at 21:39

## 2023-04-06 ASSESSMENT — ACTIVITIES OF DAILY LIVING (ADL)
ADLS_ACUITY_SCORE: 20
DEPENDENT_IADLS:: INDEPENDENT
ADLS_ACUITY_SCORE: 20

## 2023-04-06 NOTE — TELEPHONE ENCOUNTER
----- Message from Tressa Hernandez MD sent at 4/4/2023  4:39 PM CDT -----  Mr. Adorno needs a long-term (like, 3 month) follow-up with any of us here in Vascular Surgery to review the incidentally found 4 cm infrarenal AAA he has. He just needs to establish care with us and then we can set him up for annual imaging. Non-urgent, he is still admitted to [facility] for other reasons.   Thanks,   Tressa Hernandez

## 2023-04-06 NOTE — TELEPHONE ENCOUNTER
Please see below. Patient needs to be scheduled for follow up for AAA in clinic with Anika Coulter NP. No imaging.     Appt note: Follow up to consult in hospital with Dr. Hernandez for AAA. No imaging prior.     Crsi LANDAVERDE, CORI    [facility]  Vascular [facility]  Office: [phone]  Fax: [phone]

## 2023-04-06 NOTE — CONSULTS
Care Management Discharge Note    Discharge Date: 04/05/2023       Discharge Disposition: Home    Discharge Services:      Discharge DME:      Discharge Transportation:  Daughter    Private pay costs discussed: Not applicable    PAS Confirmation Code:    Patient/family educated on Medicare website which has current facility and service quality ratings:      Education Provided on the Discharge Plan:    Persons Notified of Discharge Plans:   Patient/Family in Agreement with the Plan: yes    Handoff Referral Completed: No    Additional Information:    Pt admitted with hyperkalemia, noted to have unplanned readmission risk of 21%. Spoke with pt spouse over the phone to introduce SW role and discuss discharge plans. Pt spouse explains that pt lives at home with her and is independent with ambulation and self cares at baseline. Pt spouse explains that they have support from her daughter in the area who can assist with some things as needed. Pt spouse explains that pt has some confusion at baseline but that his confusion increases when he is not getting enough sleep. Pt spouse explains that her daughter will transport pt at discharge and denies the need for any resources at this time. No SW needs identified at this time. Please alert SW if needs arise.     RUTHIE Gallo, LGROBINSON  Inpatient Care Coordination  Emergency Room /Float  [phone]   Josi Miller, SUSHANT

## 2023-04-06 NOTE — PLAN OF CARE
Goal Outcome Evaluation:      Plan of Care Reviewed With: patient    Overall Patient Progress: improvingOverall Patient Progress: improving    VSS except elevated BP. No intervention needed. Pt is intermittently confused; disoriented to time, place and situation. No behaviors noted during shift. LS clear on RA. BG Q4H checks, 358,190. Pt impulsive, attempts to turn off bed alarm when it goes off. Bed alarm set. Plan for OT to do cog eval. discharge TBD.

## 2023-04-06 NOTE — TELEPHONE ENCOUNTER
Spoke with Catrachito.  Since he expects to be discharged in the next day or 2, I told him we would call early next week.

## 2023-04-06 NOTE — PROGRESS NOTES
[facility]  Hospitalist Progress Note  Yash Vance MD 04/06/2023    Reason for Stay (Diagnosis): ARF         Assessment and Plan:      Summary of Stay: Yanick dAorno is a 77 year old male admitted on 4/3/2023. He has hx of A fib on DOAC, chronic systolic CHF with EF 40%, type 2 DM, HTN, HLP, prior CVA who was told to come in from his clinic at  due to elevated potassium and ARF    Hyperkalemia has resolved and creatinine is gradually improving    Plans today:  - daily BMP ordered  - ins/outs reviewed; pt is non-oliguric with good urine output  - instructions provided to both patient and spouse re: avoiding NSAIDs going forward  - I updated spouse over the phone today    Acute renal failure, improving   Hyperkalemia, resolved   Hypomagnesemia   - Suspect renal function due to NSAID abuse (pre-renal injury vs ATN vs AIN).  No hydro on imaging.  Renal function now improving.   - Received 3 doses of lokelma, potassium improved, creatinine improving.    - avoid nephrotoxic agents   - low K+ diet for now  - daily labs   - [place] for strict I's and O's.  - follow BMP daily     Acute delirium /Sundowning  Acute vs chronic confusion   - started risperidone at bedtime   - started melatonin   - PRN haldol      Aneurysmal dilatation of AAA  - needs vascular surgery consult, likely as out-pt.  - virtual vasculr consult completed                 - Would recommend that he establish care with Vascular Surgery as an outpatient and we would be happy to see him in our clinics here - or [facility]                 -The next recommended imaging would be in one year, to evaluate for aneurysm progression; repair would be considered if/when this infrarenal abdominal aortic aneurysm is > 5.5 cm in size.     Acute normocytic anemia, stable   No evidence of active bleeding at this time.   -CBC QAM      Sinus pauses   Noted to have 2-3 second sinus pauses on 4/4. Occurring more frequently so  "cardiology consulted. He did have a 7 beat run of nonsustained VT,   - cardiology recommended                 - continuing metoprolol succinate 50 mg daily                - Continue home cardiac regimen                - Discharge with a Zio patch monitor                - Monitor electrolytes daily, maintain potassium greater than 4, magnesium greater than 2  - Cardiology has signed off, recommend follow-up with his outpatient cardiology team (follows with Dr. Atkins with [facility])   - Zio patch ordered in discharge navigator      Chronic:   Chronic heart failure   The visual ejection fraction is 30-35%. On TTE performed this admission. No evidence of acute exacerbation at this time.   -PTA meds      Paroxysmal atrial fibrillation   - PTA eliquis 5 mg BID   - PTA metoprolol 50 gm PO      Diabetes 2   - sliding scale insulin , blood glucose well controlled   - holding PTA oral meds      HTN   - PTA amlodipine             Diet: 2 Gram K Diet    DVT Prophylaxis: DOAC   Med[place] Catheter: Not present  Lines: None     Cardiac Monitoring: None  Code Status: Full Code    DISPO:  With improved renal function.  Would like to see creatinine persistent creatinine improvement and at least < 3 prior to discharge        Interval History (Subjective):      No complaints.  No SOB>  No edema                  Physical Exam:      Last Vital Signs:  /61   Pulse 77   Temp 97  F (36.1  C) (Oral)   Resp 18   Ht 1.854 m (6' 1\")   Wt 80.7 kg (177 lb 14.4 oz)   SpO2 94%   BMI 23.47 kg/m        Intake/Output Summary (Last 24 hours) at 4/6/2023 1225  Last data filed at 4/6/2023 0701  Gross per 24 hour   Intake 1043 ml   Output 700 ml   Net 343 ml       Constitutional: Awake, alert, cooperative, no apparent distress   Respiratory: Clear to auscultation bilaterally, no crackles or wheezing   Cardiovascular: Regular rate and rhythm, normal S1 and S2, and no murmur noted   Abdomen: Normal bowel sounds, soft, non-distended, non-tender "   Skin: No rashes, no cyanosis, dry to touch   Neuro: Alert and oriented x3, no weakness, numbness, memory loss   Extremities: No edema, normal range of motion   Other(s):        All other systems: Negative          Medications:      All current medications were reviewed with changes reflected in problem list.         Data:      All new lab and imaging data was reviewed.   Labs:  Recent Labs   Lab 04/06/23  0957 04/06/23  0806 04/06/23  0624 04/05/23  0743 04/05/23  0706 04/05/23  0255 04/04/23  1617 04/04/23  1447 04/04/23  0802 04/04/23  0707   NA  --  143  --   --  141 141  --   --   --  141   POTASSIUM  --  4.5  --   --  4.5  --   --  4.5  --  5.0   CHLORIDE  --  110*  --   --  110*  --   --   --   --  109*   CO2  --  20*  --   --  20*  --   --   --   --  20*   ANIONGAP  --  13  --   --  11  --   --   --   --  12   * 196* 172*   < > 144*  --    < >  --    < > 126*   BUN  --  71.7*  --   --  80.7*  --   --   --   --  87.1*   CR  --  3.56*  --   --  3.98* 4.14*  --   --    < > 4.50*   GFRESTIMATED  --  17*  --   --  15* 14*  --   --    < > 13*   ROSA  --  8.9  --   --  8.5*  --   --   --   --  9.2    < > = values in this interval not displayed.     Recent Labs   Lab 04/06/23  0806   WBC 7.5   HGB 11.6*   HCT 35.9*   MCV 95         Imaging:   No results found for this or any previous visit (from the past 24 hour(s)).

## 2023-04-06 NOTE — PLAN OF CARE
Occupational Therapy Discharge Summary    Reason for therapy discharge:    All goals and outcomes met, no further needs identified.    Progress towards therapy goal(s). See goals on Care Plan in [facility] electronic health record for goal details.  Goals met    Therapy recommendation(s):    Continued therapy is recommended.  Rationale/Recommendations:  outpatient OT for outpatient driving eval to ensure safety before return back to driving. Recommend dtr assist with driving until he passes test. Also recommend outpatient PT due to shuffling gait and neuropathy.

## 2023-04-06 NOTE — PROGRESS NOTES
04/06/23 1000   Appointment Info   Signing Clinician's Name / Credentials (OT) Shila Syed, OTR/L   Living Environment   People in Home significant other   Current Living Arrangements apartment   Home Accessibility stairs to enter home   Number of Stairs, Main Entrance greater than 10 stairs   Stair Railings, Main Entrance railings safe and in good condition   Transportation Anticipated car, drives self;family or friend will provide   Living Environment Comments Pt has tubshower with grab bars. Pt has SH toilet.   Self-Care   Usual Activity Tolerance good   Current Activity Tolerance good   Equipment Currently Used at Home grab bar, tub/shower   Fall history within last six months no   Activity/Exercise/Self-Care Comment at baseline pt is indep with ADLs. Pt recieves assist for most IADLs from significant other. Pt does drive; significant other does not drive   General Information   Onset of Illness/Injury or Date of Surgery 04/04/23   Referring Physician Sia Singh, DO   Patient/Family Therapy Goal Statement (OT) Pt would like to return back home; is receptive to outpatient PT   Additional Occupational Profile Info/Pertinent History of Current Problem per chart: Suspect renal function due to NSAID abuse (pre-renal injury vs ATN vs AIN). Renal function now improving after cautious IVF (setting of reduced EF). Pending ongoing improvement and counseling on use of NSAIDs, needs OT cog evaluation as well given intermittent confusion. Received 3 doses of lokelma, potassium stable, creatinine improving.    IVF overnight, now discontinued in setting of HF history.   Cognitive Status Examination   Orientation Status orientation to person, place and time   Cognitive Status Comments concerns about cognition from staff   Cognitive Screens/Assessments   Cognitive Assessments Completed [facility] Mental Status Exam ([facility]):  Total Score out of /30 22/30   [facility] Norms 21-26 equals mild neurocognitive  disorder   SLUMS Interpretation Mild cog impariment; may need assist with all IADLs including driving   Sensory   Sensory Comments pt notes baseline neuropathy in BLE   Pain Assessment   Patient Currently in Pain No   Bed Mobility   Bed Mobility supine-sit;sit-supine   Supine-Sit [place] (Bed Mobility) modified independence   Sit-Supine [place] (Bed Mobility) modified independence   Transfers   Transfers sit-stand transfer;toilet transfer   Sit-Stand Transfer   Sit-Stand [place] (Transfers) supervision   Toilet Transfer   [place] Level (Toilet Transfer) supervision   Balance   Balance Comments no LOB   Activities of Daily Living   BADL Assessment/Intervention lower body dressing   Lower Body Dressing Assessment/Training   [place] Level (Lower Body Dressing) supervision   Clinical Impression   Criteria for Skilled Therapeutic Interventions Met (OT) Yes, treatment indicated   OT Diagnosis cog concern; weakness   OT Problem List-Impairments impacting ADL activity tolerance impaired;balance;cognition;mobility   Assessment of Occupational Performance 1-3 Performance Deficits   Identified Performance Deficits driving   Planned Therapy Interventions (OT) ADL retraining;IADL retraining;transfer training;progressive activity/exercise   Clinical Decision Making Complexity (OT) low complexity   Risk & Benefits of therapy have been explained evaluation/treatment results reviewed;care plan/treatment goals reviewed;risks/benefits reviewed;current/potential barriers reviewed;participants voiced agreement with care plan;participants included;patient   Clinical Impression Comments At this time, due to cog impairment, recommending outpatient driving evaluation to ensure safety with driving. Recommend pt have rides from family or friends until passes driving assessment.   OT Total Evaluation Time   OT Eval, Low Complexity Minutes (15688) 18   OT Goals   Therapy Frequency (OT) One time eval and treatment   OT  Predicted Duration/Target Date for Goal Attainment 04/06/23   OT Goals Lower Body Dressing;Bed Mobility;Toilet Transfer/Toileting;Cognition;OT Goal 1   OT: Lower Body Dressing Supervision/stand-by assist;Goal Met   OT: Bed Mobility Supervision/stand-by assist;Goal Met   OT: Toilet Transfer/Toileting Supervision/stand-by assist;Goal Met   OT: Cognitive Goal Met   OT: Goal 1 Pt will be SBA with stair completion- goal met   Interventions   Interventions Quick Adds Therapeutic Activity   Therapeutic Activities   Therapeutic Activity Minutes (79469) 10   Treatment Detail/Skilled Intervention Pt is SBA with bed mobility, toilet transfer and STS. Pt ambulates over 100ft with SBA. Pt completes 1 flight of stairs up and down to ensure safety due to generalized weakness; pt able to complete with SBA with rails. Pt noted to have shuffled gait; OT educates on outpatient PT and pt is receptive to going.   OT Discharge Planning   OT Plan d/c OT   OT Discharge Recommendation (DC Rec) home with assist;home with home care occupational therapy  (outpatient PT)   OT Rationale for DC Rec at this time, pt appears close to functional baseline. Recommending outpatient OT for driving evaluation to ensure safety before pt returns back to driving. Recommend dtr provide rides at this time until driving assessment. Also recommend pt have outpatient PT due to shuffling gait and neuropathy to prevent falls.   OT Brief overview of current status SBA LB dressing; SBA toilet transfer; SBA stairs; 22/30 SLUMS   Total Session Time   Timed Code Treatment Minutes 10   Total Session Time (sum of timed and untimed services) 28

## 2023-04-07 LAB
ANION GAP SERPL CALCULATED.3IONS-SCNC: 10 MMOL/L (ref 7–15)
BUN SERPL-MCNC: 61.2 MG/DL (ref 8–23)
CALCIUM SERPL-MCNC: 8.9 MG/DL (ref 8.8–10.2)
CHLORIDE SERPL-SCNC: 109 MMOL/L (ref 98–107)
CREAT SERPL-MCNC: 3.18 MG/DL (ref 0.67–1.17)
DEPRECATED HCO3 PLAS-SCNC: 22 MMOL/L (ref 22–29)
GFR SERPL CREATININE-BSD FRML MDRD: 20 ML/MIN/1.73M2
GLUCOSE BLDC GLUCOMTR-MCNC: 162 MG/DL (ref 70–99)
GLUCOSE BLDC GLUCOMTR-MCNC: 241 MG/DL (ref 70–99)
GLUCOSE BLDC GLUCOMTR-MCNC: 267 MG/DL (ref 70–99)
GLUCOSE BLDC GLUCOMTR-MCNC: 316 MG/DL (ref 70–99)
GLUCOSE BLDC GLUCOMTR-MCNC: 349 MG/DL (ref 70–99)
GLUCOSE SERPL-MCNC: 192 MG/DL (ref 70–99)
POTASSIUM SERPL-SCNC: 4.2 MMOL/L (ref 3.4–5.3)
SODIUM SERPL-SCNC: 141 MMOL/L (ref 136–145)

## 2023-04-07 PROCEDURE — 250N000013 HC RX MED GY IP 250 OP 250 PS 637: Performed by: INTERNAL MEDICINE

## 2023-04-07 PROCEDURE — 82310 ASSAY OF CALCIUM: CPT | Performed by: INTERNAL MEDICINE

## 2023-04-07 PROCEDURE — 250N000013 HC RX MED GY IP 250 OP 250 PS 637: Performed by: HOSPITALIST

## 2023-04-07 PROCEDURE — 99232 SBSQ HOSP IP/OBS MODERATE 35: CPT | Performed by: INTERNAL MEDICINE

## 2023-04-07 PROCEDURE — 36415 COLL VENOUS BLD VENIPUNCTURE: CPT | Performed by: INTERNAL MEDICINE

## 2023-04-07 PROCEDURE — 120N000001 HC R&B MED SURG/OB

## 2023-04-07 PROCEDURE — 250N000013 HC RX MED GY IP 250 OP 250 PS 637: Performed by: STUDENT IN AN ORGANIZED HEALTH CARE EDUCATION/TRAINING PROGRAM

## 2023-04-07 RX ORDER — PANTOPRAZOLE SODIUM 40 MG/1
40 TABLET, DELAYED RELEASE ORAL 2 TIMES DAILY
Status: DISCONTINUED | OUTPATIENT
Start: 2023-04-07 | End: 2023-04-08 | Stop reason: HOSPADM

## 2023-04-07 RX ORDER — AMOXICILLIN 250 MG
1 CAPSULE ORAL 2 TIMES DAILY
Status: DISCONTINUED | OUTPATIENT
Start: 2023-04-07 | End: 2023-04-08 | Stop reason: HOSPADM

## 2023-04-07 RX ADMIN — APIXABAN 5 MG: 5 TABLET, FILM COATED ORAL at 21:51

## 2023-04-07 RX ADMIN — RISPERIDONE 1 MG: 1 TABLET ORAL at 21:51

## 2023-04-07 RX ADMIN — ATORVASTATIN CALCIUM 40 MG: 40 TABLET, FILM COATED ORAL at 21:51

## 2023-04-07 RX ADMIN — APIXABAN 5 MG: 5 TABLET, FILM COATED ORAL at 10:15

## 2023-04-07 RX ADMIN — SENNOSIDES AND DOCUSATE SODIUM 1 TABLET: 50; 8.6 TABLET ORAL at 14:26

## 2023-04-07 RX ADMIN — METOPROLOL SUCCINATE 50 MG: 50 TABLET, EXTENDED RELEASE ORAL at 10:15

## 2023-04-07 RX ADMIN — TRAZODONE HYDROCHLORIDE 25 MG: 50 TABLET ORAL at 21:52

## 2023-04-07 RX ADMIN — Medication 5 MG: at 21:51

## 2023-04-07 RX ADMIN — AMLODIPINE BESYLATE 2.5 MG: 2.5 TABLET ORAL at 21:51

## 2023-04-07 RX ADMIN — PANTOPRAZOLE SODIUM 40 MG: 40 TABLET, DELAYED RELEASE ORAL at 21:52

## 2023-04-07 ASSESSMENT — ACTIVITIES OF DAILY LIVING (ADL)
ADLS_ACUITY_SCORE: 20

## 2023-04-07 NOTE — PROGRESS NOTES
[facility]  Hospitalist Progress Note  Yash Vance MD 04/07/2023    Reason for Stay (Diagnosis): ARF         Assessment and Plan:      Summary of Stay: Yanick Adorno is a 77 year old male admitted on 4/3/2023. He has hx of A fib on DOAC, chronic systolic CHF with EF 40%, type 2 DM, HTN, HLP, prior CVA who was told to come in from his clinic at  due to elevated potassium and ARF     Hyperkalemia has resolved and creatinine is gradually improving     Plans today:  - daily BMP ordered  - ins/outs reviewed; pt is non-oliguric with good urine output.  1.3 liters of urine past 24 hours  - increase Lantus to 18 units at bedtime (received 10 units last night)  - discharge anticipated tomorrow assuming continued improvement in renal ftn     Acute renal failure, improving   Hyperkalemia, resolved   Hypomagnesemia   - Suspect renal function due to NSAID abuse (pre-renal injury vs ATN vs AIN).  No hydro on imaging.  Renal function now improving.   - Received 3 doses of lokelma, potassium improved, creatinine improving.    - avoid nephrotoxic agents   - low K+ diet for now  - daily labs   - [facility] for strict I's and O's.  - follow BMP daily     Acute delirium /Sundowning  Acute vs chronic confusion   - started risperidone at bedtime   - started melatonin   - PRN haldol      Aneurysmal dilatation of AAA  - needs vascular surgery consult, likely as out-pt.  - virtual vasculr consult completed                 - Would recommend that he establish care with Vascular Surgery as an outpatient and we would be happy to see him in our clinics here - or [facility]                 -The next recommended imaging would be in one year, to evaluate for aneurysm progression; repair would be considered if/when this infrarenal abdominal aortic aneurysm is > 5.5 cm in size.     Acute normocytic anemia, stable   No evidence of active bleeding at this time.      Sinus pauses   Noted to have 2-3 second  "sinus pauses on 4/4. Occurring more frequently so cardiology consulted. He did have a 7 beat run of nonsustained VT,   - cardiology recommended                 - continuing metoprolol succinate 50 mg daily                - Continue home cardiac regimen                - Discharge with a Zio patch monitor                - Monitor electrolytes daily, maintain potassium greater than 4, magnesium greater than 2  - Cardiology has signed off, recommend follow-up with his outpatient cardiology team (follows with Dr. Atkins with [facility])        Chronic:   Chronic heart failure   The visual ejection fraction is 30-35%. On TTE performed this admission. No evidence of acute exacerbation at this time.   -PTA meds      Paroxysmal atrial fibrillation   - PTA eliquis 5 mg BID   - PTA metoprolol 50 gm PO      Diabetes 2   - sliding scale insulin   - holding PTA oral meds due to ARF  - Lantus resumed and dose being titrated     HTN   - PTA amlodipine             Diet: 2 Gram K Diet    DVT Prophylaxis: DOAC   Medina Catheter: Not present  Lines: None     Cardiac Monitoring: None  Code Status: Full Code    DISPO:  anticipate tomorrow.  Would like to see persistent creatinine improvement and at least < 3 prior to discharge        Interval History (Subjective):      No SOB.  No edema.  No complaints                  Physical Exam:      Last Vital Signs:  BP (!) 162/84 (BP Location: Right arm)   Pulse 82   Temp 97.7  F (36.5  C) (Oral)   Resp 18   Ht 1.854 m (6' 1\")   Wt 80.7 kg (177 lb 14.4 oz)   SpO2 95%   BMI 23.47 kg/m        Intake/Output Summary (Last 24 hours) at 4/7/2023 1242  Last data filed at 4/7/2023 1123  Gross per 24 hour   Intake 840 ml   Output 2000 ml   Net -1160 ml       Constitutional: Awake, alert, cooperative, no apparent distress   Respiratory: Clear to auscultation bilaterally, no crackles or wheezing   Cardiovascular: Regular rate and rhythm, normal S1 and S2, and no murmur noted   Abdomen: Normal bowel " sounds, soft, non-distended, non-tender   Skin: No rashes, no cyanosis, dry to touch   Neuro: Alert and oriented x3, no weakness, numbness, memory loss   Extremities: No edema, normal range of motion   Other(s):        All other systems: Negative          Medications:      All current medications were reviewed with changes reflected in problem list.         Data:      All new lab and imaging data was reviewed.   Labs:  Recent Labs   Lab 04/07/23  1002 04/07/23  0704 04/07/23  0204 04/06/23  0957 04/06/23  0806 04/05/23  0743 04/05/23  0706   NA  --  141  --   --  143  --  141   POTASSIUM  --  4.2  --   --  4.5  --  4.5   CHLORIDE  --  109*  --   --  110*  --  110*   CO2  --  22  --   --  20*  --  20*   ANIONGAP  --  10  --   --  13  --  11   * 192* 241*   < > 196*   < > 144*   BUN  --  61.2*  --   --  71.7*  --  80.7*   CR  --  3.18*  --   --  3.56*  --  3.98*   GFRESTIMATED  --  20*  --   --  17*  --  15*   ROSA  --  8.9  --   --  8.9  --  8.5*    < > = values in this interval not displayed.     Recent Labs   Lab 04/06/23  0806   WBC 7.5   HGB 11.6*   HCT 35.9*   MCV 95         Imaging:   No results found for this or any previous visit (from the past 24 hour(s)).

## 2023-04-07 NOTE — PLAN OF CARE
Goal Outcome Evaluation:      Plan of Care Reviewed With: patient    Overall Patient Progress: improving    VSS. A/ox4. LS clear, on RA. Strict I and O. Good output this shift. Paged provider to get BG changed to ACHS from q4h per pharmacies note. Creatinine improving. Pt sets off bed alarm frequently but no behaviors this shift and cognition improved from last shift. Plan to discharge home when creatanine is less than 3.

## 2023-04-07 NOTE — PLAN OF CARE
Goal Outcome Evaluation:    Plan of Care Reviewed With: patient    Overall Patient Progress: improving    A&Ox4. Denies pain. SBA. LS: clear, occasional cough. Per pt, had BM, but no one saw it, gave Senna. Creatinine trending down. Needs Holter monitor, but will probably have to get it outpatient, MD aware. B and 394 (pt ate before BG was taken). Possible discharge tomorrow.

## 2023-04-07 NOTE — PLAN OF CARE
Goal Outcome Evaluation:    Plan of Care Reviewed With: patient    Overall Patient Progress: improving    A&Ox4. Denies pain. SBA. LS: clear. No tele. Complaining of constipation, night RN to give senna. OT did cog eval, see note. B/294. Gave update to girlfriendCindy, with pt's permission.

## 2023-04-08 VITALS
HEART RATE: 72 BPM | OXYGEN SATURATION: 97 % | SYSTOLIC BLOOD PRESSURE: 156 MMHG | BODY MASS INDEX: 23.58 KG/M2 | WEIGHT: 177.9 LBS | HEIGHT: 73 IN | TEMPERATURE: 98.4 F | RESPIRATION RATE: 20 BRPM | DIASTOLIC BLOOD PRESSURE: 70 MMHG

## 2023-04-08 LAB
ANION GAP SERPL CALCULATED.3IONS-SCNC: 14 MMOL/L (ref 7–15)
BUN SERPL-MCNC: 56.9 MG/DL (ref 8–23)
CALCIUM SERPL-MCNC: 9 MG/DL (ref 8.8–10.2)
CHLORIDE SERPL-SCNC: 108 MMOL/L (ref 98–107)
CREAT SERPL-MCNC: 2.92 MG/DL (ref 0.67–1.17)
DEPRECATED HCO3 PLAS-SCNC: 20 MMOL/L (ref 22–29)
GFR SERPL CREATININE-BSD FRML MDRD: 22 ML/MIN/1.73M2
GLUCOSE BLDC GLUCOMTR-MCNC: 177 MG/DL (ref 70–99)
GLUCOSE BLDC GLUCOMTR-MCNC: 208 MG/DL (ref 70–99)
GLUCOSE SERPL-MCNC: 202 MG/DL (ref 70–99)
POTASSIUM SERPL-SCNC: 4.3 MMOL/L (ref 3.4–5.3)
SODIUM SERPL-SCNC: 142 MMOL/L (ref 136–145)

## 2023-04-08 PROCEDURE — 36415 COLL VENOUS BLD VENIPUNCTURE: CPT | Performed by: INTERNAL MEDICINE

## 2023-04-08 PROCEDURE — 250N000013 HC RX MED GY IP 250 OP 250 PS 637: Performed by: INTERNAL MEDICINE

## 2023-04-08 PROCEDURE — 250N000013 HC RX MED GY IP 250 OP 250 PS 637: Performed by: STUDENT IN AN ORGANIZED HEALTH CARE EDUCATION/TRAINING PROGRAM

## 2023-04-08 PROCEDURE — 99238 HOSP IP/OBS DSCHRG MGMT 30/<: CPT | Performed by: INTERNAL MEDICINE

## 2023-04-08 PROCEDURE — 80048 BASIC METABOLIC PNL TOTAL CA: CPT | Performed by: INTERNAL MEDICINE

## 2023-04-08 RX ADMIN — METOPROLOL SUCCINATE 50 MG: 50 TABLET, EXTENDED RELEASE ORAL at 10:38

## 2023-04-08 RX ADMIN — APIXABAN 5 MG: 5 TABLET, FILM COATED ORAL at 10:38

## 2023-04-08 RX ADMIN — SENNOSIDES AND DOCUSATE SODIUM 1 TABLET: 50; 8.6 TABLET ORAL at 10:38

## 2023-04-08 RX ADMIN — PANTOPRAZOLE SODIUM 40 MG: 40 TABLET, DELAYED RELEASE ORAL at 10:38

## 2023-04-08 ASSESSMENT — ACTIVITIES OF DAILY LIVING (ADL)
ADLS_ACUITY_SCORE: 20

## 2023-04-08 NOTE — PLAN OF CARE
Goal Outcome Evaluation:      Plan of Care Reviewed With: patient    Overall Patient Progress: improvingOverall Patient Progress: improving     Temp: 97.6  F (36.4  C) Temp src: Oral BP: (!) 151/69 Pulse: 70   Resp: 18 SpO2: 98 % O2 Device: None (Room air)       A/O4. Forgetful. Up SBA. A1 walker/gaitbelt in brice. Ambulated halls. PIV SL. Denies pain, SOB. , 267. Bed alarm on. Cr lab recheck in AM. Discharge tomorrow.

## 2023-04-08 NOTE — DISCHARGE SUMMARY
[facility]  Hospitalist Discharge Summary      Date of Admission:  4/3/2023  Date of Discharge:  4/8/2023  Discharging Provider: Yash Vance MD  Discharge Service: Hospitalist Service    Discharge Diagnoses      Acute renal failure, likely ATN possibly due to NSAID use.   Baseline creatinine appears to be near 1.1    Hyperkalemia     Incidental finding of aortic aneurysm measuring 4.3 cm on CT imaging.  Recommend yearly imaging for surveillance    Anemia    Chronic systolic HF with EF near 35%    Paroxysmal atrial fibrillation with occasional pauses noted on telemetry    Diabetes mellitus type 2    HTN        Follow-ups Needed After Discharge   Follow-up Appointments     Follow-up and recommended labs and tests       See your primary MD (or a partner if your primary MD is booked) in the   next 2-3 days to recheck your creatinine level.  On admission your   creatinine was elevated to 4.5 and has gradually improved to 2.9 on   discharge from the hospital.  We expect your creatinine to gradually   improve and return to your previous baseline level.    Do not take any ibuprofen, naproxyn, Aleve, Advil, high dose aspirin, or   other NSAID medications as these medications can adversely affect the   kidneys.  Acetaminophen (Tylenol) is OK to take.    For now do not take Jardiance or metformin due to abnormal kidney   function.  When your creatinine returns to normal these medications can be   resumed.  Discuss this with your doctor at your next clinic visit.      You were incidentally found to have a slightly enlarged aorta (aneurysm)   on CT imaging of 4.3 cm in size.  We recommend to continue to monitor this   with a yearly imaging study (ultrasound or CT scan) to monitor it.  If it   grows in size to 5.5 cm a procedure or surgery would be considered to   treat this.  [facility] can [facility]   follow up imaging for you.      You were ordered a heart monitor  to wear for 2 weeks to monitor your heart   rate after discharge from the hospital.  You will be contacted to arrange   this.  Follow up with your doctor after this is completed to review the   results.             Unresulted Labs Ordered in the Past 30 Days of this Admission     No orders found from 3/4/2023 to 4/4/2023.          Discharge Disposition   Discharged to home  Condition at discharge: Stable    Hospital Course   77 year old male admitted on 4/3/2023. He has hx of A fib on DOAC, chronic systolic CHF with EF 40%, type 2 DM, HTN, HLP, prior CVA who was told to come in from his clinic due to elevated potassium and ARF.  The patient reported taking Aleve twice a day for chronic low back pain    On presentation to the emergency department, creatinine was elevated at 4.5.  He was also found to have hyperkalemia.  While hospitalized, his creatinine is gradually improved and is 2.9 today.  At baseline he appears to have a creatinine near 1.1.  Suspect the patient likely had acute tubular necrosis, possible related to NSAID use.    As patient's renal function continues to slowly improve daily, I think he is stable for discharge from the hospital with close monitoring of his renal function on discharge.  I encouraged the patient to arrange an appointment with his primary clinic in 2 to 3 days to recheck labs.  I also encouraged him to avoid any NSAID medications going forward.    In regards to his diabetic management, we will continue Lantus on discharge but for now hold metformin and Jardiance until his renal function returns back to his baseline.    Patient discharged home today    Consultations This Hospital Stay   NEPHROLOGY IP CONSULT  VIRTUAL VASCULAR SURGERY ADULT IP CONSULT  CARDIOLOGY IP CONSULT  OCCUPATIONAL THERAPY ADULT IP CONSULT  CARE MANAGEMENT / SOCIAL WORK IP CONSULT    Code Status   Full Code    Time Spent on this Encounter   I, Yash Vance MD, personally saw the patient today and spent  less than or equal to 30 minutes discharging this patient.       Yash Vance MD  [place] [address] [facility]  [address]  [facility] [address]  Phone: [phone]  Fax: [phone]  ______________________________________________________________________    Physical Exam   Vital Signs: Temp: 98.4  F (36.9  C) Temp src: Oral BP: (!) 156/70 Pulse: 72   Resp: 20 SpO2: 97 % O2 Device: None (Room air)    Weight: 177 lbs 14.4 oz  Awake, alert, oriented.  Lungs clear.  No edema.       Primary Care Physician   Candi Leon    Discharge Orders      Physical Therapy Referral      Occupational Therapy Referral      Reason for your hospital stay    Acute renal insufficiency.     Activity    Your activity upon discharge: activity as tolerated     Follow-up and recommended labs and tests     See your primary MD (or a partner if your primary MD is booked) in the next 2-3 days to recheck your creatinine level.  On admission your creatinine was elevated to 4.5 and has gradually improved to 2.9 on discharge from the hospital.  We expect your creatinine to gradually improve and return to your previous baseline level.    Do not take any ibuprofen, naproxyn, Aleve, Advil, high dose aspirin, or other NSAID medications as these medications can adversely affect the kidneys.  Acetaminophen (Tylenol) is OK to take.    For now do not take Jardiance or metformin due to abnormal kidney function.  When your creatinine returns to normal these medications can be resumed.  Discuss this with your doctor at your next clinic visit.      You were incidentally found to have a slightly enlarged aorta (aneurysm) on CT imaging of 4.3 cm in size.  We recommend to continue to monitor this with a yearly imaging study (ultrasound or CT scan) to monitor it.  If it grows in size to 5.5 cm a procedure or surgery would be considered to treat this.  [facility] who can [facility] follow up  imaging for you.      You were ordered a heart monitor to wear for 2 weeks to monitor your heart rate after discharge from the hospital.  You will be contacted to arrange this.  Follow up with your doctor after this is completed to review the results.     Adult Leadless EKG Monitor 8 to 14 Days     Diet    Follow this diet upon discharge: regular diet       Significant Results and Procedures   Most Recent 3 BMP's:Recent Labs   Lab Test 04/08/23  1002 04/08/23  0825 04/08/23  0158 04/07/23  1002 04/07/23  0704 04/06/23  0957 04/06/23  0806   NA  --  142  --   --  141  --  143   POTASSIUM  --  4.3  --   --  4.2  --  4.5   CHLORIDE  --  108*  --   --  109*  --  110*   CO2  --  20*  --   --  22  --  20*   BUN  --  56.9*  --   --  61.2*  --  71.7*   CR  --  2.92*  --   --  3.18*  --  3.56*   ANIONGAP  --  14  --   --  10  --  13   RSOA  --  9.0  --   --  8.9  --  8.9   * 202* 208*   < > 192*   < > 196*    < > = values in this interval not displayed.       Discharge Medications   Current Discharge Medication List      CONTINUE these medications which have NOT CHANGED    Details   acetaminophen (TYLENOL) 325 MG tablet Take 2 tablets (650 mg) by mouth every 4 hours as needed for mild pain or fever  Qty:      Associated Diagnoses: 2019 novel coronavirus disease (COVID-19)      amLODIPine (NORVASC) 2.5 MG tablet Take 2.5 mg by mouth At Bedtime      apixaban ANTICOAGULANT (ELIQUIS) 5 MG tablet Take 1 tablet (5 mg) by mouth 2 times daily  Qty: 60 tablet, Refills: 0    Associated Diagnoses: Cerebrovascular accident (CVA) due to embolism of left middle cerebral artery (H); Paroxysmal atrial fibrillation (H)      atorvastatin (LIPITOR) 80 MG tablet Take 40 mg by mouth At Bedtime      insulin glargine (LANTUS PEN) 100 UNIT/ML pen Inject 21 Units Subcutaneous every evening      metoprolol succinate ER (TOPROL XL) 50 MG 24 hr tablet Take 50 mg by mouth daily      omeprazole (PRILOSEC) 40 MG DR capsule Take 40 mg by mouth daily  (before supper)         STOP taking these medications       empagliflozin (JARDIANCE) 25 MG TABS tablet Comments:   Reason for Stopping:         metFORMIN (GLUCOPHAGE-XR) 500 MG 24 hr tablet Comments:   Reason for Stopping:             Allergies   Allergies   Allergen Reactions     No Known Drug Allergies

## 2023-04-08 NOTE — PROGRESS NOTES
I saw and examined this patient today    He feels well and is eager to go home    Creatinine continues to improve and is 2.9 today    Recommend repeat labs in 2-3 days with primary provider

## 2023-04-08 NOTE — PLAN OF CARE
Goal Outcome Evaluation:       Alert and oriented but forgetful. Cooperative. Up with SBA to BR. Denies pain. Glucose at 0200- 208. VSS. Patient hoping to discharge today.

## 2023-04-08 NOTE — PLAN OF CARE
Goal Outcome Evaluation:    Plan of Care Reviewed With: patient    Overall Patient Progress: improving    A&Ox4. Denies pain. SBA. LS: clear. Creatinine <3 and trending down. Discharging today.    Patient discharged home via private car with family.  Patient and family verbalized and received copy of discharge instructions.  Personal belongings gathered and sent with patient.  VSS. IV removed prior to discharge.

## 2023-04-19 NOTE — TELEPHONE ENCOUNTER
Call 2 of 2. Left voicemail with instructions for patient to call back to schedule their appointment(s)    April 19, 2023 , 1:03 PM

## 2023-07-26 NOTE — H&P
[facility]    History and Physical - Hospitalist Service       Date of Admission:  4/3/2023    Assessment & Plan      Yanick Adorno is a 77 year old male admitted on 4/3/2023. He has hx of A fib on DOAC, chronic systolic CHF with EF 40%, type 2 DM, HTN, HLP, prior CVA who was told to come in from his clinic at  due to elevated potassium. Pt denies any chest pain/SOB/N/V/trauma/falls/ muscle aches. His appetite has been good. In fact on 2/28/2023 his lisinopril was stopped as his BP was soft. On return appointment at  clinic on 3/30 there were plans to restart lisinopril at a lower dose as his BP had gone up. However, his Cr went up to 2.9 and instead of lisinopril he was started on norvasc. When they checked his potassium on 4/3 it was 6.4. and he was told to come to ED. He states his appetite has been good. On further questioning he states he does have to get up 3-4 times a night to urinate. However, during the day he has no urgency or frequency. He has not noted any change in urine out-put. 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 I discussed case with Dr George. He was given lokelma, bicarbonate, lasix and insulin with dextrose.    1. ARF.  - Likely due to NSAID's.  - Doubt AIN.  - IVF's.  - [facility] for strict I's and O's.  - check Cr in am, if no improvement consult nephrology.    2. DM.  - sliding scale insulin.    3. PAF.  - resume home meds when reconciled.    4. Aneurysmal dilatation of AAA.  - needs vascular surgery consult, likely as out-pt.       Diet:  renal.  DVT Prophylaxis: Pneumatic Compression Devices  [facility] Catheter: Not present  Lines: None     Cardiac Monitoring: None  Code Status:   Full Code.    Clinically Significant Risk Factors Present on Admission        # Hyperkalemia: Highest K = 6.2 mmol/L in last 2 days, will monitor as appropriate        #  Drug Induced Coagulation Defect: home medication list includes an anticoagulant medication  # Drug Induced Platelet Defect: home medication list includes an antiplatelet medication   # Hypertension: home medication list includes antihypertensive(s)              Disposition Plan      Expected Discharge Date: 04/06/2023                  Melva Joyner MD  Hospitalist Service  [facility]  Securely message with [employer] (more info)  Text page via [employer] Paging/Directory     ______________________________________________________________________    Chief Complaint     High Potassium.    History is obtained from the patient    History of Present Illness   Yanick Adorno is a 77 year old male who has hx of A fib on DOAC, chronic systolic CHF with EF 40%, type 2 DM, HTN, HLP, prior CVA who was told to come in from his clinic at  due to elevated potassium. Pt denies any chest pain/SOB/N/V/trauma/falls/ muscle aches. His appetite has been good. In fact on 2/28/2023 his lisinopril was stopped as his BP was soft. On return appointment at  clinic on 3/30 there were plans to restart lisinopril at a lower dose as his BP had gone up. However, his Cr went up to 2.9 and instead of lisinopril he was started on norvasc. When they checked his potassium on 4/3 it was 6.4. and he was told to come to ED. He states his appetite has been good. On further questioning he states he does have to get up 3-4 times a night to urinate. However, during the day he has no urgency or frequency. He has not noted any change in urine out-put. 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        Past Medical History    Past Medical History:   Diagnosis Date     Cardiomyopathy (H)      Coronary artery disease      CVA (cerebral vascular accident) (H) 01/21/2018    (L) frontal cerbral infarct     Esophageal reflux       Hypertension      New onset a-fib (H)      Nonischemic cardiomyopathy (H)      NSVT (nonsustained ventricular tachycardia) (H)      Paroxysmal atrial fibrillation (H)      Stroke (H) 1/21/2018     Tobacco abuse     quit when he had stroke     Type II or unspecified type diabetes mellitus without mention of complication, not stated as uncontrolled        Past Surgical History   Past Surgical History:   Procedure Laterality Date     HEAD & NECK SURGERY      sinus       Prior to Admission Medications   Prior to Admission Medications   Prescriptions Last Dose Informant Patient Reported? Taking?   ASPIRIN PO   Yes No   Sig: Take 81 mg by mouth   acetaminophen (TYLENOL) 325 MG tablet   No No   Sig: Take 2 tablets (650 mg) by mouth every 4 hours as needed for mild pain or fever   apixaban ANTICOAGULANT (ELIQUIS) 5 MG tablet   No No   Sig: Take 1 tablet (5 mg) by mouth 2 times daily   atorvastatin (LIPITOR) 80 MG tablet   Yes No   Sig: Take 40 mg by mouth daily    glipiZIDE (GLUCOTROL) 10 MG tablet  Self Yes No   Sig: Take 10 mg by mouth 2 times daily (before meals)    insulin aspart (NOVOLOG PEN) 100 UNIT/ML pen   No No   Sig: Inject 1-7 Units Subcutaneous 3 times daily (before meals) Three times daily before meals  For Pre-Meal  - 189 give 1 unit.   For Pre-Meal  - 239 give 2 units.   For Pre-Meal  - 289 give 3 units.   For Pre-Meal  - 339 give 4 units.   For Pre-Meal - 399 give 5 units.   For Pre-Meal -449 give 6 units  For Pre-Meal BG greater than or equal to 450 give 7 units.   To be given with prandial insulin, and based on pre-meal blood glucose.   insulin aspart (NOVOLOG PEN) 100 UNIT/ML pen   No No   Sig: Inject 1-5 Units Subcutaneous At Bedtime Give at bedtime  Do Not give Bedtime Correction Insulin if BG less than  200.   For  - 249 give 1 units.   For  - 299 give 2 units.   For  - 349 give 3 units.   For  -399 give 4 units.   For BG greater than or  equal to 400 give 5 units.   insulin aspart (NOVOLOG PEN) 100 UNIT/ML pen   No No   Sig: Inject 1 unit per 15 grams carbohydrates three times daily with meals (in addition to pre prandial insulin)   insulin glargine (LANTUS PEN) 100 UNIT/ML pen   No No   Sig: Inject 28 Units Subcutaneous every morning   lisinopril (ZESTRIL) 20 MG tablet   Yes No   Sig: Take 10 mg by mouth daily   metFORMIN (GLUCOPHAGE-XR) 500 MG 24 hr tablet   Yes No   Sig: Take 1,000 mg by mouth 2 times daily (with meals)    metoprolol tartrate (LOPRESSOR) 25 MG tablet   No No   Sig: Take 1 tablet (25 mg) by mouth 2 times daily   tacrolimus (PROTOPIC) 0.1 % external ointment   Yes No   Sig: Apply topically 2 times daily   triamcinolone (KENALOG) 0.1 % external cream   Yes No   Sig: Apply topically 2 times daily      Facility-Administered Medications: None        Review of Systems    The 10 point Review of Systems is negative other than noted in the HPI or here.     Social History   I have reviewed this patient's social history and updated it with pertinent information if needed.  Social History     Tobacco Use     Smoking status: Former     Packs/day: 1.50     Years: 33.00     Pack years: 49.50     Types: Cigarettes     Quit date: 2018     Years since quittin.8     Smokeless tobacco: Never   Substance Use Topics     Alcohol use: No       Family History   I have reviewed this patient's family history and updated it with pertinent information if needed.  Family History   Problem Relation Age of Onset     Diabetes Father      Uterine Cancer Mother      Diabetes Brother      Diabetes Brother      Suicide Brother      Diabetes Brother        Allergies   Allergies   Allergen Reactions     No Known Drug Allergies         Physical Exam   Vital Signs: Temp: 98  F (36.7  C) Temp src: Temporal BP: 132/84 Pulse: 73   Resp: 22 SpO2: 97 %      Weight: 0 lbs 0 oz    Gen - AAO x 3 in NAD.  Lungs - CTA B.  Heart - RR,S1+S2 nml, no m/g/r.  Abd - soft, NT,  ND, + BS.  Ext - no edema.    EKG - sinus ayden with 1st degree AVB at 60 bpm with LAD and septal/inferior infarct.    Medical Decision Making     70 MINUTES SPENT BY ME on the date of service doing chart review, history, exam, documentation & further activities per the note.      Data     I have personally reviewed the following data over the past 24 hrs:    8.5  \   11.6 (L)   / 280     139 111 (H) 94.1 (H) /  187 (H)   5.1 16 (L) 4.79 (H) \       Imaging results reviewed over the past 24 hrs:   Recent Results (from the past 24 hour(s))   CT Abdomen Pelvis w/o Contrast    Narrative    EXAM: CT ABDOMEN PELVIS W/O CONTRAST  LOCATION: [facility]  DATE/TIME: 4/4/2023 1:55 AM    INDICATION: Kidney failure, rule out obstruction.  COMPARISON: X-ray abdomen/KUB single view (2 films) 5/15/2017.  TECHNIQUE: CT scan of the abdomen and pelvis was performed without IV contrast. Multiplanar reformats were obtained. Dose reduction techniques were used.  CONTRAST: None.    FINDINGS:   LOWER CHEST: Motion artifact degrades several images. Lung bases are otherwise clear. No pleural effusion on either side. Cardiac size approaches upper limits of normal. Moderate coronary artery calcifications. No pericardial effusion.    HEPATOBILIARY: Calcified gallstones fill the gallbladder lumen. No biliary dilatation or adjacent inflammation. Diffuse fatty infiltration of the liver.    PANCREAS: Normal.    SPLEEN: Normal.    ADRENAL GLANDS: Normal.    KIDNEYS/BLADDER: Bilateral nonspecific perinephric soft tissue stranding. There are a few tiny sand-like calyceal tip stones in the kidneys, more on the left, without hydronephrosis or hydroureter. Dependent stellate stone in the urinary bladder (images   167-171, series 3; images 74-79, series 4; images 53-56, series 5).    BOWEL: Formed stool material within normal caliber redundant colon without mechanical obstruction or free gas. Normal appendix.    LYMPH NODES: No  Bladder cancer suspicious abdominopelvic adenopathy.    VASCULATURE: Atherosclerotic and aneurysmally dilated distal abdominal aorta measuring 4.0 x 4.0 cm (image 87, series 3). Normal caliber IVC.    PELVIC ORGANS: Dependent stellate stone in the urinary bladder. Mild prostatomegaly. Phleboliths. No adenopathy or free fluid. Vascular calcifications.    MUSCULOSKELETAL: Demineralization of the visualized bones. Degenerative changes involving the spine and joints of the pelvis.      Impression    IMPRESSION:   1.  There are a few tiny sand-like calyceal tip stones in the kidneys, more on the left, without hydronephrosis or hydroureter. Bilateral nonspecific perinephric soft tissue stranding. Dependent stellate stone in the urinary bladder. Mild prostatomegaly.    2.  Calcified gallstones filling the gallbladder lumen. No biliary dilatation or adjacent inflammation. Diffusely fatty infiltrated liver.    3.  Atherosclerotic and aneurysmally dilated distal abdominal aorta. Recommend vascular surgical consultation. Cardiac size approaches upper limits of normal. Moderate coronary artery calcifications. No pericardial effusion.    NOTE: ABNORMAL REPORT    THE DICTATION ABOVE DESCRIBES AN ABNORMALITY FOR WHICH FOLLOW-UP IS NEEDED.         Bladder cancer Bladder cancer Bladder cancer Bladder cancer Bladder cancer Bladder cancer Bladder cancer Bladder cancer

## 2023-12-05 ENCOUNTER — NURSE TRIAGE (OUTPATIENT)
Dept: PEDIATRICS | Facility: CLINIC | Age: 78
End: 2023-12-05
Payer: MEDICARE

## 2023-12-05 NOTE — TELEPHONE ENCOUNTER
S-(situation): Patient and patient's spouse calling regarding high blood sugar. Patient normally goes to [facility], unsure how patient was transferred to  in [place]. [facility] also [facility] and [facility] Room r/t his elevated blood sugar. Patient states he called [facility] approximately 30minutes ago, was advised to drink plenty of fluids and was told to await a call back from them.     B-(background): Patient has hx of Type 2 DM. Patient taking insuline glargine, last taken last night around 7pm, took 25 units. Patient normally takes this amount once daily. Patient also takes jardiance, half of a 25mg tablet. Patient took jardiance today once.     A-(assessment): Patient experiencing blood sugar level of 401, last checked a few minutes ago. Patient denies any confusion, no slurred speech, no weakness. No difficulty breathing. No vomiting. No fever. No dizziness. Patient notes he is drinking a lot of water and is having frequent urination.     R-(recommendations): Per protocol, RN advised patient be seen today. RN advised if [facility] had [facility], to be seen in ED. RN also advised to contact [facility] for his PCP's recommendation. RN advised if not receiving an answer from [facility], would recommend being seen in either ED or  now. Patient verbalized understanding and will consider RN's recommendation. RN reiterated importance of being seen before his blood sugar elevates any higher, patient verbalized understanding.     Reason for Disposition   Blood glucose > 400 mg/dL (22.2 mmol/L)    Additional Information   Negative: Unconscious or difficult to awaken   Negative: Acting confused (e.g., disoriented, slurred speech)   Negative: Very weak (can't stand)   Negative: Sounds like a life-threatening emergency to the triager   Negative: Vomiting and signs of dehydration (e.g., very dry mouth, lightheaded, dark urine)   Negative: Blood  "glucose > 240 mg/dL (13.3 mmol/L) and rapid breathing   Negative: Blood glucose > 500 mg/dL (27.8 mmol/L)   Negative: Blood glucose > 240 mg/dL (13.3 mmol/L) AND urine ketones moderate-large (or more than 1+)   Negative: Blood glucose > 240 mg/dL (13.3 mmol/L) and blood ketones > 1.4 mmol/L   Negative: Blood glucose > 240 mg/dL (13.3 mmol/L) AND vomiting AND unable to check for ketones (in blood or urine)   Negative: Vomiting lasting > 4 hours   Negative: Patient sounds very sick or weak to the triager   Negative: Fever > 100.4 F (38.0 C)   Negative: Caller has URGENT medication or insulin pump question and triager unable to answer question    Answer Assessment - Initial Assessment Questions  1. BLOOD GLUCOSE: \"What is your blood glucose level?\"       401  2. ONSET: \"When did you check the blood glucose?\"      A few minutes ago  3. USUAL RANGE: \"What is your glucose level usually?\" (e.g., usual fasting morning value, usual evening value)      Usually 250   4. KETONES: \"Do you check for ketones (urine or blood test strips)?\" If Yes, ask: \"What does the test show now?\"       No   5. TYPE 1 or 2:  \"Do you know what type of diabetes you have?\"  (e.g., Type 1, Type 2, Gestational; doesn't know)       Type 2  6. INSULIN: \"Do you take insulin?\" \"What type of insulin(s) do you use? What is the mode of delivery? (syringe, pen; injection or pump)?\"       Yes, see chart. Lantus Glargine, patient notes change in medication was 5 days ago.   7. DIABETES PILLS: \"Do you take any pills for your diabetes?\" If Yes, ask: \"Have you missed taking any pills recently?\"      jardiance  8. OTHER SYMPTOMS: \"Do you have any symptoms?\" (e.g., fever, frequent urination, difficulty breathing, dizziness, weakness, vomiting)      No fever. Yes to frequent urination. No difficulty breathing. No dizziness. No weakness. No vomiting.   9. PREGNANCY: \"Is there any chance you are pregnant?\" \"When was your last menstrual period?\"      no    Protocols " used: Diabetes - High Blood Sugar-A-[place]    Grover CANADA RN 12/5/2023 at 3:01 PM

## 2024-06-27 NOTE — DISCHARGE SUMMARY
Detail Level: Detailed [facility]    Discharge Summary  Hospitalist    Date of Admission:  1/21/2018  Date of Discharge:  1/23/2018  Discharging Provider: Poornima Khoury MD  Date of Service (when I saw the patient): 01/23/18    Discharge Diagnoses     Subacute left frontal CVA    NSTEMI    Paroxysmal a fib    DM2    Mediastinal lymphadenopathy    Tobacco abuse    History of Present Illness   Yanick Adorno is a 72 year old male with history of diabetes mellitus type 2, tobacco abuse who presents with confusion, slurred speech, found to have subacute left frontal CVA. Admitted 1/21/2018.     Hospital Course   Yanick Adorno was admitted on 1/21/2018.  The following problems were addressed during his hospitalization:      Subacute left frontal CVA: Patient denied any acute symptoms at presentation however his daughter noted new confusion as well as slurring of his speech starting 1/18/18.  His head CT on admission demonstrated a subacute infarct of the left frontal lobe. He remains neurologically intact and mostly oriented. Risk factors included diabetes, tobacco abuse. Neurology consulted, MRI brain showed Recent moderate-sized ischemic infarct of the lateral aspect of the left frontal lobe just above the left sylvian fissure, started on full dose ASA, plavix load given then 75 mg daily continued per neurology. CTA head and neck did not show significant stenosis.   - PT, OT, speech therapy consulted, evaluations noted, now on regular diet.  - Echocardiogram with bubble study is negative for shunt. See below.  - Lipid panel looks good, statin added.   - HgbA1c was 8.4 indicating poor control  - BP managed per protocol  - Monitored on telemetry, he was noticed to have paroxysmal A fib, so his antiplatelet changed to ASA 81 mg daily, and started Apixaban which was continued at discharge.  -He has mild word finding difficulty otherwise no neuro deficit.      NSVT  Troponin elevation  Denies any associated cardiopulmonary  Add 54107 Cpt? (Important Note: In 2017 The Use Of 62892 Is Being Tracked By Cms To Determine Future Global Period Reimbursement For Global Periods): yes symptoms, EKG without acute ischemic changes. Significant elevation of Troponin but flat and overall not consistent with ACS, therefore heparin drip deferred. NSVT appears to be asymptomatic. Given his diabetes, smoking history, stroke, likely has underlying coronary artery disease. Cardiology consulted. Echocardiogram shows global hypokinesis and mildly decreased LV EF 40-45%, (though LV EF was noted only 18-28% on lexiscan) discussed with Dr Choi regarding LV function. Recommended follow up as suspected ECHO was reflecting more accurate EF.   -ASA, Statin added as above  - he will follow up with cardiology.      Diabetes mellitus type 2  Patient reports being on glargine, glipizide, metformin.   -HbA1C was 8.4 indicating poor glycemic control, but with Glargine at reduced dose of 20 units daily, BS was in mid 100s, so concern if patient was using his meds.   -advised to closely monitor his BS      Tobacco abuse  - Smoking cessation consult placed  - Nicotine patch ordered    Poornima Khoury MD  Hospitalist    Significant Results and Procedures   CT head  CT angiogram head and neck  2D ECHO  MRI brain  NM stress test    Pending Results   These results will be followed up by Hospitalist  Unresulted Labs Ordered in the Past 30 Days of this Admission     Date and Time Order Name Status Description    1/20/2018 2005 Blood culture Preliminary     1/20/2018 1940 Blood culture Preliminary           Code Status   Full Code       Primary Care Physician   Candi Leon    Constitutional: Alert, awake. Oriented x3, Not in distress.   HEENT: PERRLA EOMI, MMM  Respiratory: Bilateral equal air entry, clear to auscultation. No respiratory distress.  Cardiovascular: Regular s1s2, no murmur, rub or gallop. No tachycardia.  GI: Soft, non distended, non tender, bowel tones active.  Skin/Integumen: Macular rash over the shin bilaterally, no blister.  Neuro:  Alert, awake, knows he is in hospital, oriented to date, CN 2-12  intact, speech was fluent today, strength symmetrical.     Discharge Disposition   Discharged to home  Condition at discharge: Stable    Consultations This Hospital Stay   NEUROLOGY IP CONSULT  PHYSICAL THERAPY ADULT IP CONSULT  OCCUPATIONAL THERAPY ADULT IP CONSULT  SPEECH LANGUAGE PATH ADULT IP CONSULT  SWALLOW EVAL SPEECH PATH AT BEDSIDE IP CONSULT  SMOKING CESSATION PROGRAM IP CONSULT  SMOKING CESSATION PROGRAM IP CONSULT  CARDIOLOGY IP CONSULT  PHARMACY TO DOSE HEPARIN    Time Spent on this Encounter   Poornima JEWELL, personally saw the patient today and spent greater than 30 minutes discharging this patient.    Discharge Orders     Follow-Up with Cardiac Advanced Practice Provider     Follow-Up with Cardiologist     Physical Therapy Referral     Occupational Therapy Referral     Reason for your hospital stay   Stroke     Follow-up and recommended labs and tests    Follow up with primary care provider, Candi Leon, within 7 days to evaluate medication change and for hospital follow- up.  The following labs/tests are recommended:   - needs outpatient CT chest abdomen and pelvis to evaluate the mediastinal lymph nodes.    Follow up with neurology, stroke clinic in 1 month.     Activity   Your activity upon discharge: activity as tolerated     Discharge Instructions   Gradually resume your diabetes medications     When to contact your care team   Call your primary doctor if you have any of the following:  increased shortness of breath or increased chest pain.     Full Code     Diet   Follow this diet upon discharge: Orders Placed This Encounter     Combination Diet 5988-8883 Calories: Moderate Consistent CHO (4-6 CHO units/meal)       Discharge Medications   Current Discharge Medication List      START taking these medications    Details   aspirin 81 MG chewable tablet Take 1 tablet (81 mg) by mouth daily  Qty: 30 tablet, Refills: 0    Associated Diagnoses: Cerebrovascular accident (CVA) due to  embolism of left middle cerebral artery (H); Paroxysmal atrial fibrillation (H); NSTEMI (non-ST elevated myocardial infarction) (H)      apixaban ANTICOAGULANT (ELIQUIS) 5 MG tablet Take 1 tablet (5 mg) by mouth 2 times daily  Qty: 60 tablet, Refills: 0    Associated Diagnoses: Cerebrovascular accident (CVA) due to embolism of left middle cerebral artery (H); Paroxysmal atrial fibrillation (H)      atorvastatin (LIPITOR) 40 MG tablet Take 1 tablet (40 mg) by mouth daily  Qty: 10 tablet, Refills: 0    Associated Diagnoses: Cerebrovascular accident (CVA) due to embolism of left middle cerebral artery (H)      metoprolol succinate (TOPROL-XL) 25 MG 24 hr tablet Take 1 tablet (25 mg) by mouth daily  Qty: 10 tablet, Refills: 0    Associated Diagnoses: Paroxysmal atrial fibrillation (H); NSTEMI (non-ST elevated myocardial infarction) (H)      nicotine (NICODERM CQ) 21 MG/24HR 24 hr patch Place 1 patch onto the skin daily  Qty: 10 patch, Refills: 0    Associated Diagnoses: Tobacco abuse         CONTINUE these medications which have NOT CHANGED    Details   insulin glargine (LANTUS) 100 UNIT/ML injection Inject 28 Units Subcutaneous every morning      GLIPIZIDE PO Take 10 mg by mouth 2 times daily (before meals)      METFORMIN HCL PO Take 500 mg by mouth 2 times daily (with meals)            Allergies   Allergies   Allergen Reactions     No Known Drug Allergies      Data   Most Recent 3 CBC's:  Recent Labs   Lab Test  01/21/18   0530  01/20/18 1938   WBC  6.6  8.3   HGB  12.9*  14.5   MCV  94  97   PLT  233  275      Most Recent 3 BMP's:  Recent Labs   Lab Test  01/22/18   2140  01/22/18   0525  01/20/18 1938   NA  141  142  140   POTASSIUM  4.2  3.8  4.0   CHLORIDE  109  110*  106   CO2  25  27  32   BUN  16  14  22   CR  0.76  0.71  1.02   ANIONGAP  7  5  2*   ROSA  8.3*  8.1*  8.8   GLC  259*  140*  190*     Most Recent 2 LFT's:  Recent Labs   Lab Test  01/20/18 1938   AST  19   ALT  21   ALKPHOS  97   BILITOTAL   0.4     Most Recent INR's and Anticoagulation Dosing History:  Anticoagulation Dose History     There is no flowsheet data to display.        Most Recent 3 Troponin's:  Recent Labs   Lab Test  01/22/18 2140 01/21/18   0530  01/21/18   0000   TROPI  0.585*  1.027*  1.228*     Most Recent Cholesterol Panel:  Recent Labs   Lab Test  01/21/18   0530   CHOL  133   LDL  66   HDL  60   TRIG  35     Most Recent 6 Bacteria Isolates From Any Culture (See EPIC Reports for Culture Details):  Recent Labs   Lab Test  01/20/18 2040 01/20/18   2003   CULT  No growth after 2 days  No growth after 2 days     Most Recent TSH, T4 and A1c Labs:  Recent Labs   Lab Test  01/22/18 2140 01/21/18   0530   TSH  1.71   --    A1C   --   8.4*     Results for orders placed or performed during the hospital encounter of 01/21/18   CTA Angiogram Head Neck    Narrative    CT ANGIOGRAM OF THE HEAD AND NECK WITHOUT AND WITH CONTRAST January 22, 2018 9:19 PM     HISTORY: Subacute left frontal infarct. Diabetes, tobacco abuse,  confusion and slurred speech.    TECHNIQUE: Precontrast localizing scans were followed by CT  angiography with an injection of 70mL Isovue-370 IV with scans through  the head and neck. Images were transferred to a separate 3-D  workstation where multiplanar reformations and 3-D images were  created. Estimates of carotid stenoses are made relative to the distal  internal carotid artery diameters except as noted. Radiation dose for  this scan was reduced using automated exposure control, adjustment of  the mA and/or kV according to patient size, or iterative  reconstruction technique.      COMPARISON: MRI 1/21/2018.    CT HEAD FINDINGS: No contrast enhancing lesions. The left lateral  frontal lobe infarct seen on the MRI is about 3 x 3.5 cm diameter.  There is generalized atrophy of the brain. White matter changes are  present in the cerebral hemispheres that are consistent with small  vessel ischemic disease in this age  patient.    CT ANGIOGRAM HEAD FINDINGS: Mild calcified plaque in the right carotid  siphon and noncalcified plaque left carotid siphon causes at most mild  stenosis. Minimal stenoses in branches of the anterior, middle and  posterior cerebral arteries intracranially. An apparent tiny saccular  aneurysm projecting superiorly and medially off the proximal left  carotid siphon is actually an artifact upon review of the raw data  images. No evidence of true aneurysm. No arterial occlusion or  thrombus. Venous circulation is unremarkable.     CT ANGIOGRAM NECK FINDINGS:   Right carotid artery: Tortuous cervical internal carotid artery.  No  significant stenosis.      Left carotid artery: Tortuous cervical internal carotid artery.  No  significant stenosis.      Vertebral arteries: No significant stenosis.      Other findings: Bilateral upper mediastinal adenopathy involving right  and left paratracheal regions in the aorticopulmonic window and  pretracheal areas anterior to the kurt. Cervical spine degenerative  changes. No focal bone lesions. There is scattered mucosal thickening  in the paranasal sinuses.      Impression    IMPRESSION:   1. Recent infarct in the lateral left frontal lobe. No hemorrhage.  2. Brain atrophy and white matter changes consistent with sequelae of  small vessel ischemic disease.  3. Mild calcified atherosclerotic plaque in the right carotid siphon,  and tortuous cervical internal carotid arteries, but no significant  stenosis, arterial occlusion or thrombus.  4. Bilateral upper mediastinal adenopathy of indeterminate etiology.  Formal CT scan of the chest is suggested for further evaluation.    CHEO FRAZIER MD   MRI Brain w & w/o contrast    Narrative    MRI OF THE BRAIN WITHOUT AND WITH CONTRAST 1/21/2018 6:21 AM     COMPARISON: Head CT 1/20/2018.    HISTORY:  Stroke with SWI sequence. If the patient requires sedation  for the procedure, please notify the Stroke Neurologist prior  to  sedation to discuss delaying of MRI.      TECHNIQUE: Axial diffusion-weighted with ADC map, axial T2-weighted  with fat saturation, axial T1-weighted, axial turboFLAIR and coronal  T1-weighted images of the brain were acquired without intravenous  contrast.  Following intravenous administration of gadolinium (9 mL  Gadavist), axial T1-weighted images of the brain were acquired.     FINDINGS: There is a recent moderate-sized ischemic infarct at the  lateral aspect of the left frontal lobe just above the sylvian  fissure. No other recent infarcts noted.    There is mild diffuse cerebral volume loss. There are numerous  scattered focal and extensive confluent periventricular areas of  abnormal T2 signal hyperintensity in the cerebral white matter  bilaterally that are consistent with sequela of chronic small vessel  ischemic disease. The ventricles and basal cisterns are within normal  limits in configuration given the degree of cerebral volume loss.  There is no midline shift. There are no extra-axial fluid collections.  There is no evidence for acute intracranial hemorrhage. There is no  abnormal contrast enhancement in the brain or its coverings.    There is no sinusitis or mastoiditis.      Impression    IMPRESSION:   1. Recent moderate-sized ischemic infarct of the lateral aspect of the  left frontal lobe just above the left sylvian fissure (left middle  cerebral artery territory). No other recent infarcts.  2. Diffuse cerebral volume loss and cerebral white matter changes  consistent with chronic small vessel ischemic disease.    SORAYA WELCH MD   NM Lexiscan stress test (nuc card)    Narrative    GATED MYOCARDIAL PERFUSION SCINTIGRAPHY WITH INTRAVENOUS PHARMACOLOGIC  VASODILATATION LEXISCAN -ONE DAY STUDY     1/23/2018 11:00 AM  KWAME MULLEN  72 years  Male  1945.    Indication/Clinical History: Stroke and elevated troponin    Impression  1.  Myocardial perfusion imaging using single isotope  technique  demonstrated a small apical defect which is predominantly fixed,  consistent with prior infarction with only trivial associated  cande-infarct ischemia. There is a small area of ischemia in the  inferolateral mid ventricle associated with either an  inferior/inferolateral basal infarct or diaphragmatic attenuation  artifact at the base in the setting of global hypokinesis.   2. Gated images demonstrated severe global hypokinesis.  The left  ventricular systolic function is 18% at rest and 28% post stress.  3. Compared to the prior study from there is no prior study for  comparison .    Procedure  Pharmacologic stress testing was performed with Lexiscan at a rate of  0.08 mg/ml rapid bolus injection, for 15 seconds, 0.4 mg/5ml  intravenously. Low-level exercise was performed along with the  vasodilator infusion.  The heart rate was 67 at baseline and cesar to  85 beats per minute during the Lexiscan infusion. The rest blood  pressure was 140/84 mmHg and was 136/73 mm Hg during Lexiscan  infusion. The patient experienced no chest pain  during the test.    Myocardial perfusion imaging was performed at rest, approximately 45  minutes after the injection intravenously of 9.9 mCi of Tc-99m  Myoview. At peak pharmacologic effect, 10-20 seconds after Lexiscan,   the patient was injected intravenously with 31.8 mCi of  Tc-99m  Myoview. The post-stress tomographic imaging was performed  approximately 60 minutes after stress.    EKG Findings  The resting EKG demonstrated sinus rhythm with nonspecific ST-T  abnormalities. The stress EKG demonstrated no ST-T abnormalities  diagnostic of ischemia or injury.    Tomographic Findings  Overall, the study quality is adequate after correction of post stress  images for tracer intensity on 265 [employer] system . On the stress  images, there is a small decrease in uptake at the apex as well as  inferior and inferolateral base and inferolateral mid ventricle. There  is a decrease in  uptake at the inferior apex and mildly anterior apex  On the rest images, there is a small decrease in uptake at the apex,  inferior apex and anterior apex and inferior/inferolateral basal  segments . Gated images demonstrated severe global hypokinesis. The  left ventricular ejection fraction was calculated to be 28%. TID was  not seen with an index of 1.03. Rotating planar images are reviewed  and appropriately motion corrected. Substantial diaphragmatic  attenuation artifact is seen which may impact on interpretation.    LEE ANN WEISS MD     Recent Results (from the past 4320 hour(s))   Echo Complete Bubble    Narrative    [id number]  [id number]  [id number]  [id number]^TEGAN^QUE^ROSE MARY           [facility]  Echocardiography Laboratory  [address] [place] [address]        Name: KWAME MULLEN  MRN: [id number]  : 1945  Study Date: 2018 11:16 AM  Age: 72 yrs  Gender: Male  Patient Location: [facility]  Reason For Study: Cerebrovascular Incident  Ordering Physician: QUE JAVED  Referring Physician: Candi Dumont  Performed By: Jessica Washington RDCS     BSA: 2.1 m2  Height: 74 in  Weight: 194 lb  HR: 78  BP: 141/66 mmHg  _____________________________________________________________________________  __        Procedure  Complete Portable Bubble Echo Adult.  _____________________________________________________________________________  __        Interpretation Summary     The left ventricle is normal in size.  There is mild to moderate concentric left ventricular hypertrophy.  The visual ejection fraction is estimated at 40-45%.  The transmitral spectral Doppler flow pattern is suggestive of diastolic  dysfunction of the left ventricle.  There is mild-moderate global hypokinesia of the left ventricle.  The right ventricle is normal in structure, function and size.  The left atrium is moderate to severely dilated.  Intact atrial septum  A contrast injection (Bubble Study) was  performed that was negative for flow  across the interatrial septum.  The mitral valve leaflets are mildly thickened.  Thickened mitral valve chordae  There is trace to mild mitral regurgitation.  There is mild trileaflet aortic sclerosis.  There is trace to mild aortic regurgitation.  Mild aortic root dilatation.  The ascending aorta is Mildly dilated.  Sinus rhythm was noted.  The study was technically adequate. There is no comparison study available.  _____________________________________________________________________________  __        Left Ventricle  The left ventricle is normal in size. There is mild to moderate concentric  left ventricular hypertrophy. The visual ejection fraction is estimated at 40-  45%. Left ventricular systolic function is mild to moderately reduced. The  transmitral spectral Doppler flow pattern is suggestive of diastolic  dysfunction of the left ventricle. There is mild-moderate global hypokinesia  of the left ventricle. There is no thrombus seen in the left ventricle.     Right Ventricle  The right ventricle is normal in structure, function and size. There is normal  right ventricular wall thickness. A moderator band is seen in the right  ventricle.     Atria  The left atrium is moderate to severely dilated. Right atrial size is normal.  Intact atrial septum. Interatrial septal thickening is noted. A contrast  injection (Bubble Study) was performed that was negative for flow across the  interatrial septum.     Mitral Valve  The mitral valve leaflets are mildly thickened. Thickened mitral valve  chordae. There is trace to mild mitral regurgitation.        Tricuspid Valve  The tricuspid valve is not well visualized, but is grossly normal. The  tricuspid valve is not well visualized. There is trace tricuspid  regurgitation. Right ventricular systolic pressure could not be approximated  due to inadequate tricuspid regurgitation.     Aortic Valve  There is mild trileaflet aortic  sclerosis. There is trace to mild aortic  regurgitation.     Pulmonic Valve  The pulmonic valve is not well seen, but is grossly normal. The pulmonic valve  is not well visualized. There is no pulmonic valvular regurgitation.     Vessels  Mild aortic root dilatation. The ascending aorta is Mildly dilated. The  inferior vena cava is not dilated. Pulmonary arteries not well visualized.     Pericardium  The pericardium appears normal. There is no pericardial effusion.        Rhythm  Sinus rhythm was noted.  _____________________________________________________________________________  __  MMode/2D Measurements & Calculations  IVSd: 1.4 cm     LVIDd: 5.5 cm  LVIDs: 4.5 cm  LVPWd: 1.6 cm  FS: 17.2 %  EDV(Teich): 146.6 ml  ESV(Teich): 94.6 ml  LV mass(C)d: 374.4 grams  LV mass(C)dI: 174.5 grams/m2  Ao root diam: 4.2 cm  LA dimension: 5.0 cm  asc Aorta Diam: 3.8 cm  LA/Ao: 1.2  LVOT diam: 2.4 cm  LVOT area: 4.4 cm2  LA Volume (BP): 95.1 ml  LA Volume Index (BP): 44.2 ml/m2  RWT: 0.59           Doppler Measurements & Calculations  MV E max callie: 65.5 cm/sec  MV A max callie: 79.5 cm/sec  MV E/A: 0.82  MV dec slope: 393.2 cm/sec2  E/E' av.6  Lateral E/e': 12.1  Medial E/e': 17.1           _____________________________________________________________________________  __           Report approved by: Evette Gonzalez 2018 06:30 PM

## (undated) RX ORDER — REGADENOSON 0.08 MG/ML
INJECTION, SOLUTION INTRAVENOUS
Status: DISPENSED
Start: 2018-01-23